# Patient Record
Sex: MALE | Race: WHITE | NOT HISPANIC OR LATINO | Employment: FULL TIME
[De-identification: names, ages, dates, MRNs, and addresses within clinical notes are randomized per-mention and may not be internally consistent; named-entity substitution may affect disease eponyms.]

---

## 2020-02-23 ENCOUNTER — HEALTH MAINTENANCE LETTER (OUTPATIENT)
Age: 40
End: 2020-02-23

## 2021-01-18 ENCOUNTER — VIRTUAL VISIT (OUTPATIENT)
Dept: FAMILY MEDICINE | Facility: OTHER | Age: 41
End: 2021-01-18

## 2021-01-18 NOTE — PROGRESS NOTES
"Date: 2021 15:29:19  Clinician: Tony Poole  Clinician NPI: [id number]  Patient: Luca Araiza  Patient : 1980  Patient Address: [address] [place] [id number]  Patient Phone: [phone]  Visit Protocol: URI  Patient Summary:  Luca is a 40 year old ( : 1980 ) male who initiated a [facility] Visit for cold, sinus infection, or influenza. When asked the question \"Please sign me up to receive news, health information and promotions from [facility].\", Luca responded \"No\".    Luca states his symptoms started gradually 5-6 days ago.   His symptoms consist of rhinitis and nasal congestion. He is experiencing difficulty breathing due to nasal congestion but he is not short of breath.   Symptom details   Nasal secretions: The color of his mucus is white, yellow, and clear.   Luca denies having sore throat, teeth pain, ageusia, diarrhea, wheezing, facial pain or pressure, myalgias, malaise, fever, cough, nausea, anosmia, ear pain, headache, chills, and vomiting. He also denies having recent facial or sinus surgery in the past 60 days, taking antibiotic medication in the past month, and double sickening (worsening symptoms after initial improvement).    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 He does not provide direct patient care. In the past 14 days, Luca has worked or volunteered at a hospital or a clinic. 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   Luca has not had a close contact with a laboratory-confirmed COVID-19 patient within 14 days of symptom onset.    Luca has not been tested for COVID-19.   Luca has received the Pfizer-BioNTech COVID-19 vaccine. He got the first dose.    Date of the first dose as reported by the patient (free text): 21      Pertinent medical " history  He has not been told by his provider to avoid NSAIDs.   Luca does not have diabetes. He denies having immunosuppressive conditions (e.g., chemotherapy, HIV, organ transplant, long-term use of steroids or other immunosuppressive medications, splenectomy). He denies having congestive heart failure and severe COPD. He does not have asthma.   Luca does not need a return to work/school note.   Luca does not smoke or use smokeless tobacco.   Weight: 205 lbs    MEDICATIONS: No current medications, ALLERGIES: NKDA  Clinician Response:  Dear Luca,   Your symptoms show that you may have coronavirus (COVID-19). This illness can cause fever, cough and trouble breathing. Many people get a mild case and get better on their own. Some people can get very sick.  What should I do?  We would like to test you for this virus.   1. Please call [phone] to schedule your visit. Explain that you were referred by [facility] to have a COVID-19 test. Be ready to share your [facility] visit ID number.  * If you need to schedule in [facility] please call [phone] or for [place] employees please call [phone].  * If you need to schedule in the [place] area please call [phone]. [place] employees call [phone].  The following will serve as your written order for this COVID Test, ordered by me, for the indication of suspected COVID [Z20.828]: The test will be ordered in [facility], our electronic health record, after you are scheduled. It will show as ordered and authorized by David Ireland MD.  Order: COVID-19 (Coronavirus) PCR for SYMPTOMATIC testing from [facility].   2. When it's time for your COVID test:  Stay at least 6 feet away from others. (If someone will drive you to your test, stay in the backseat, as far away from the  as you can.)   Cover your mouth and nose with a mask, tissue or washcloth.  Go straight to the testing site. Don't make any stops on the way there or back.      3.Starting now: Stay home and away  "from others (self-isolate) until:   You've had no fever---and no medicine that reduces fever---for one full day (24 hours). And...   Your other symptoms have gotten better. For example, your cough or breathing has improved. And...   At least 10 days have passed since your symptoms started.       During this time, don't leave the house except for testing or medical care.   Stay in your own room, even for meals. Use your own bathroom if you can.   Stay away from others in your home. No hugging, kissing or shaking hands. No visitors.  Don't go to work, school or anywhere else.    Clean \"high touch\" surfaces often (doorknobs, counters, handles, etc.). Use a household cleaning spray or wipes. You'll find a full list of  on the EPA website: www.epa.gov/pesticide-registration/list-n-disinfectants-use-against-sars-cov-2.   Cover your mouth and nose with a mask, tissue or washcloth to avoid spreading germs.  Wash your hands and face often. Use soap and water.  Caregivers in these groups are at risk for severe illness due to COVID-19:  o People 65 years and older  o People who live in a nursing home or long-term care facility  o People with chronic disease (lung, heart, cancer, diabetes, kidney, liver, immunologic)  o People who have a weakened immune system, including those who:   Are in cancer treatment  Take medicine that weakens the immune system, such as corticosteroids  Had a bone marrow or organ transplant  Have an immune deficiency  Have poorly controlled HIV or AIDS  Are obese (body mass index of 40 or higher)  Smoke regularly   o Caregivers should wear gloves while washing dishes, handling laundry and cleaning bedrooms and bathrooms.  o Use caution when washing and drying laundry: Don't shake dirty laundry, and use the warmest water setting that you can.  o For more tips, go to www.cdc.gov/coronavirus/2019-ncov/downloads/10Things.pdf.    4.Sign up for [employer]. We know it's scary to hear that you might " have COVID-19. We want to track your symptoms to make sure you're okay over the next 2 weeks. Please look for an email from [employer]---this is a free, online program that we'll use to keep in touch. To sign up, follow the link in the email. Learn more at [email or web address]  How can I take care of myself?   Get lots of rest. Drink extra fluids (unless a doctor has told you not to).   Take Tylenol (acetaminophen) for fever or pain. If you have liver or kidney problems, ask your family doctor if it's okay to take Tylenol.   Adults can take either:    650 mg (two 325 mg pills) every 4 to 6 hours, or...   1,000 mg (two 500 mg pills) every 8 hours as needed.    Note: Don't take more than 3,000 mg in one day. Acetaminophen is found in many medicines (both prescribed and over-the-counter medicines). Read all labels to be sure you don't take too much.   For children, check the Tylenol bottle for the right dose. The dose is based on the child's age or weight.    If you have other health problems (like cancer, heart failure, an organ transplant or severe kidney disease): Call your specialty clinic if you don't feel better in the next 2 days.       Know when to call 911. Emergency warning signs include:    Trouble breathing or shortness of breath Pain or pressure in the chest that doesn't go away Feeling confused like you haven't felt before, or not being able to wake up Bluish-colored lips or face.  Where can I get more information?    [employer] [place] -- About COVID-19: [email or web address] [email or web address]/covid19/   CDC -- What to Do If You're Sick: www.cdc.gov/coronavirus/2019-ncov/about/steps-when-sick.html   CDC -- Ending Home Isolation: www.cdc.gov/coronavirus/2019-ncov/hcp/disposition-in-home-patients.html   CDC -- Caring for Someone: www.cdc.gov/coronavirus/2019-ncov/if-you-are-sick/care-for-someone.html   [facility] -- Interim Guidance for Hospital Discharge to Home:  www.health.[facility].mn.us/diseases/coronavirus/hcp/hospdischarge.pdf   [facility] clinical trials (COVID-19 research studies): clinicalaffairs.[facility]/umn-clinical-trials    Below are the COVID-19 hotlines at the [facility] ([facility]). Interpreters are available.    For health questions: Call 798-348-3055 or 1-360.906.3488 (7 a.m. to 7 p.m.) For questions about schools and childcare: Call 815-864-2939 or 1-550.617.2073 (7 a.m. to 7 p.m.)    Diagnosis: Contact with and (suspected) exposure to other viral communicable diseases  Diagnosis ICD: Z20.828

## 2021-04-11 ENCOUNTER — HEALTH MAINTENANCE LETTER (OUTPATIENT)
Age: 41
End: 2021-04-11

## 2021-09-25 ENCOUNTER — HEALTH MAINTENANCE LETTER (OUTPATIENT)
Age: 41
End: 2021-09-25

## 2022-05-07 ENCOUNTER — HEALTH MAINTENANCE LETTER (OUTPATIENT)
Age: 42
End: 2022-05-07

## 2022-08-22 ENCOUNTER — TELEPHONE (OUTPATIENT)
Dept: FAMILY MEDICINE | Facility: CLINIC | Age: 42
End: 2022-08-22

## 2022-08-22 DIAGNOSIS — K52.9 GASTROENTERITIS: Primary | ICD-10-CM

## 2022-08-22 DIAGNOSIS — D64.9 ANEMIA, UNSPECIFIED TYPE: ICD-10-CM

## 2022-08-22 RX ORDER — AZITHROMYCIN 250 MG/1
500 TABLET, FILM COATED ORAL DAILY
Qty: 6 TABLET | Refills: 0 | Status: SHIPPED | OUTPATIENT
Start: 2022-08-22 | End: 2022-08-25

## 2022-08-22 NOTE — TELEPHONE ENCOUNTER
Patient states of non-improving abdominal cramping and loose since returning from [place] approximately 2 weeks.    1. Gastroenteritis  - azithromycin (ZITHROMAX) 250 MG tablet; Take 2 tablets (500 mg) by mouth daily for 3 days  Dispense: 6 tablet; Refill: 0

## 2022-10-05 ASSESSMENT — ENCOUNTER SYMPTOMS
ABDOMINAL PAIN: 0
JOINT SWELLING: 0
DIZZINESS: 0
HEMATURIA: 0
SORE THROAT: 0
DIARRHEA: 0
HEARTBURN: 0
NERVOUS/ANXIOUS: 0
CONSTIPATION: 0
PALPITATIONS: 0
ARTHRALGIAS: 0
FREQUENCY: 0
COUGH: 0
EYE PAIN: 0
HEMATOCHEZIA: 0
WEAKNESS: 0
MYALGIAS: 0
SHORTNESS OF BREATH: 0
DYSURIA: 0
FEVER: 0
HEADACHES: 0
NAUSEA: 0
CHILLS: 0
PARESTHESIAS: 0

## 2022-10-06 ENCOUNTER — OFFICE VISIT (OUTPATIENT)
Dept: FAMILY MEDICINE | Facility: CLINIC | Age: 42
End: 2022-10-06
Payer: COMMERCIAL

## 2022-10-06 VITALS
BODY MASS INDEX: 26.44 KG/M2 | WEIGHT: 206 LBS | SYSTOLIC BLOOD PRESSURE: 119 MMHG | HEART RATE: 74 BPM | DIASTOLIC BLOOD PRESSURE: 76 MMHG | HEIGHT: 74 IN

## 2022-10-06 DIAGNOSIS — Z80.0 FAMILY HISTORY OF COLON CANCER IN FATHER: ICD-10-CM

## 2022-10-06 DIAGNOSIS — Z00.00 ROUTINE GENERAL MEDICAL EXAMINATION AT A HEALTH CARE FACILITY: Primary | ICD-10-CM

## 2022-10-06 DIAGNOSIS — Z12.11 COLON CANCER SCREENING: ICD-10-CM

## 2022-10-06 DIAGNOSIS — Z13.220 SCREENING FOR LIPID DISORDERS: ICD-10-CM

## 2022-10-06 DIAGNOSIS — D64.9 ANEMIA, UNSPECIFIED TYPE: ICD-10-CM

## 2022-10-06 LAB
ALBUMIN SERPL BCG-MCNC: 4.5 G/DL (ref 3.5–5.2)
ALP SERPL-CCNC: 62 U/L (ref 40–129)
ALT SERPL W P-5'-P-CCNC: 9 U/L (ref 10–50)
ANION GAP SERPL CALCULATED.3IONS-SCNC: 11 MMOL/L (ref 7–15)
AST SERPL W P-5'-P-CCNC: 19 U/L (ref 10–50)
BILIRUB SERPL-MCNC: 0.3 MG/DL
BUN SERPL-MCNC: 17.2 MG/DL (ref 6–20)
CALCIUM SERPL-MCNC: 9.1 MG/DL (ref 8.6–10)
CHLORIDE SERPL-SCNC: 105 MMOL/L (ref 98–107)
CHOLEST SERPL-MCNC: 134 MG/DL
CREAT SERPL-MCNC: 1.19 MG/DL (ref 0.67–1.17)
DEPRECATED HCO3 PLAS-SCNC: 23 MMOL/L (ref 22–29)
ERYTHROCYTE [DISTWIDTH] IN BLOOD BY AUTOMATED COUNT: 14 % (ref 10–15)
FERRITIN SERPL-MCNC: 9 NG/ML (ref 31–409)
GFR SERPL CREATININE-BSD FRML MDRD: 79 ML/MIN/1.73M2
GLUCOSE SERPL-MCNC: 86 MG/DL (ref 70–99)
HCT VFR BLD AUTO: 29.2 % (ref 40–53)
HDLC SERPL-MCNC: 41 MG/DL
HGB BLD-MCNC: 9 G/DL (ref 13.3–17.7)
LDLC SERPL CALC-MCNC: 74 MG/DL
MCH RBC QN AUTO: 23.3 PG (ref 26.5–33)
MCHC RBC AUTO-ENTMCNC: 30.8 G/DL (ref 31.5–36.5)
MCV RBC AUTO: 76 FL (ref 78–100)
NONHDLC SERPL-MCNC: 93 MG/DL
PLATELET # BLD AUTO: 423 10E3/UL (ref 150–450)
POTASSIUM SERPL-SCNC: 4.4 MMOL/L (ref 3.4–5.3)
PROT SERPL-MCNC: 6.9 G/DL (ref 6.4–8.3)
RBC # BLD AUTO: 3.86 10E6/UL (ref 4.4–5.9)
SODIUM SERPL-SCNC: 139 MMOL/L (ref 136–145)
TRIGL SERPL-MCNC: 93 MG/DL
WBC # BLD AUTO: 8.4 10E3/UL (ref 4–11)

## 2022-10-06 PROCEDURE — 99386 PREV VISIT NEW AGE 40-64: CPT | Mod: 25 | Performed by: FAMILY MEDICINE

## 2022-10-06 PROCEDURE — 85027 COMPLETE CBC AUTOMATED: CPT | Performed by: FAMILY MEDICINE

## 2022-10-06 PROCEDURE — 80053 COMPREHEN METABOLIC PANEL: CPT | Performed by: FAMILY MEDICINE

## 2022-10-06 PROCEDURE — 82728 ASSAY OF FERRITIN: CPT | Performed by: FAMILY MEDICINE

## 2022-10-06 PROCEDURE — 91312 COVID-19,PF,PFIZER BOOSTER BIVALENT: CPT | Performed by: FAMILY MEDICINE

## 2022-10-06 PROCEDURE — 90715 TDAP VACCINE 7 YRS/> IM: CPT | Performed by: FAMILY MEDICINE

## 2022-10-06 PROCEDURE — 90471 IMMUNIZATION ADMIN: CPT | Performed by: FAMILY MEDICINE

## 2022-10-06 PROCEDURE — 80061 LIPID PANEL: CPT | Performed by: FAMILY MEDICINE

## 2022-10-06 PROCEDURE — 0124A COVID-19,PF,PFIZER BOOSTER BIVALENT: CPT | Performed by: FAMILY MEDICINE

## 2022-10-06 PROCEDURE — 36415 COLL VENOUS BLD VENIPUNCTURE: CPT | Performed by: FAMILY MEDICINE

## 2022-10-06 ASSESSMENT — ENCOUNTER SYMPTOMS
DIZZINESS: 0
COUGH: 0
DYSURIA: 0
DIARRHEA: 0
MYALGIAS: 0
EYE PAIN: 0
FEVER: 0
HEMATURIA: 0
CONSTIPATION: 0
HEADACHES: 0
SORE THROAT: 0
PARESTHESIAS: 0
CHILLS: 0
HEMATOCHEZIA: 0
SHORTNESS OF BREATH: 0
ARTHRALGIAS: 0
NAUSEA: 0
NERVOUS/ANXIOUS: 0
PALPITATIONS: 0
FREQUENCY: 0
ABDOMINAL PAIN: 0
WEAKNESS: 0
JOINT SWELLING: 0
HEARTBURN: 0

## 2022-10-06 NOTE — PROGRESS NOTES
SUBJECTIVE:   CC: Luca is an 42 year old who presents for preventative health visit.     Chief Complaint   Patient presents with     Physical     Not fasting     Establish Care       Patient has been advised of split billing requirements and indicates understanding: Yes  Healthy Habits:     Getting at least 3 servings of Calcium per day:  Yes    Bi-annual eye exam:  Yes    Dental care twice a year:  Yes    Sleep apnea or symptoms of sleep apnea:  None    Diet:  Regular (no restrictions)    Frequency of exercise:  None    Taking medications regularly:  Yes    Medication side effects:  Not applicable    PHQ-2 Total Score: 0    Additional concerns today:  No     with 3 boys, 11, 12, 15 years old. 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     Today's PHQ-2 Score:   PHQ-2 ( 1999 Pfizer) 10/5/2022   Q1: Little interest or pleasure in doing things 0   Q2: Feeling down, depressed or hopeless 0   PHQ-2 Score 0   Q1: Little interest or pleasure in doing things Not at all   Q2: Feeling down, depressed or hopeless Not at all   PHQ-2 Score 0       Abuse: Current or Past(Physical, Sexual or Emotional)- No  Do you feel safe in your environment? Yes    Have you ever done Advance Care Planning? (For example, a Health Directive, POLST, or a discussion with a medical provider or your loved ones about your wishes): No, advance care planning information given to patient to review.  Advanced care planning was discussed at today's visit.    Social History     Tobacco Use     Smoking status: Never Smoker     Smokeless tobacco: Never Used   Substance Use Topics     Alcohol use: Not Currently     If you drink alcohol do you typically have >3 drinks per day or >7 drinks per week? No    Alcohol Use 10/6/2022   Prescreen: >3 drinks/day or >7 drinks/week? -   Prescreen: >3 drinks/day or >7 drinks/week? No       Last PSA: No results found for: PSA    Reviewed orders with patient. Reviewed health maintenance and updated  "orders accordingly - Yes  Lab work is in process  Labs reviewed in EPIC    Reviewed and updated as needed this visit by clinical staff   Tobacco  Allergies  Meds  Problems  Med Hx  Surg Hx  Fam Hx            Reviewed and updated as needed this visit by Provider   Tobacco  Allergies  Meds  Problems  Med Hx  Surg Hx  Fam Hx             Review of Systems   Constitutional: Negative for chills and fever.   HENT: Negative for congestion, ear pain, hearing loss and sore throat.    Eyes: Negative for pain and visual disturbance.   Respiratory: Negative for cough and shortness of breath.    Cardiovascular: Negative for chest pain, palpitations and peripheral edema.   Gastrointestinal: Negative for abdominal pain, constipation, diarrhea, heartburn, hematochezia and nausea.   Genitourinary: Negative for dysuria, frequency, genital sores, hematuria, impotence, penile discharge and urgency.   Musculoskeletal: Negative for arthralgias, joint swelling and myalgias.   Skin: Negative for rash.   Neurological: Negative for dizziness, weakness, headaches and paresthesias.   Psychiatric/Behavioral: Negative for mood changes. The patient is not nervous/anxious.      OBJECTIVE:   /76   Pulse 74   Ht 1.88 m (6' 2\")   Wt 93.4 kg (206 lb)   BMI 26.45 kg/m      Physical Exam  GENERAL: healthy, alert and no distress  EYES: Eyes grossly normal to inspection, PERRL and conjunctivae and sclerae normal  HENT: ear canals and TM's normal, nose and mouth without ulcers or lesions  NECK: no adenopathy, no asymmetry, masses, or scars and thyroid normal to palpation  RESP: lungs clear to auscultation - no rales, rhonchi or wheezes  CV: regular rate and rhythm, normal S1 S2, no S3 or S4, no murmur  ABDOMEN: soft, nontender, no hepatosplenomegaly, no masses   MS: no gross musculoskeletal defects noted, no edema  SKIN: no suspicious lesions or rashes  NEURO: Normal strength and tone, mentation intact and speech normal  PSYCH: " "mentation appears normal, affect normal/bright    Diagnostic Test Results:  Labs reviewed in Epic    ASSESSMENT/PLAN:   Luca was seen today for physical and establish care.    Diagnoses and all orders for this visit:    Routine general medical examination at a health care facility  COUNSELING:   Reviewed preventive health counseling, as reflected in patient instructions       Regular exercise       Healthy diet/nutrition       Vision screening       Immunizations    Vaccinated for: TDAP and covid         Alcohol Use        Family planning       Consider Hep C screening for all patients one time for ages 18-79 years       HIV screeninx in teen years, 1x in adult years, and at intervals if high risk       Colorectal cancer screening       The ASCVD Risk score (Alva MENJIVAR Jr., et al., 2013) failed to calculate for the following reasons:    Cannot find a previous HDL lab    Cannot find a previous total cholesterol lab    Estimated body mass index is 26.45 kg/m  as calculated from the following:    Height as of this encounter: 1.88 m (6' 2\").    Weight as of this encounter: 93.4 kg (206 lb).     Weight management plan: Discussed healthy diet and exercise guidelines    He reports that he has never smoked. He has never used smokeless tobacco.  -     Comprehensive metabolic panel (BMP + Alb, Alk Phos, ALT, AST, Total. Bili, TP); Future  -     CBC with platelets; Future    Screening for lipid disorders  -     Lipid Profile (Chol, Trig, HDL, LDL calc); Future    Colon cancer screening  Family history of colon cancer in father  Recommend initiating colon cancer screening colonoscopy at age 40 given father's history of colon cancer.  -     Adult GI  Referral - Procedure Only; Future    Other orders  -     TDAP VACCINE (Adacel, Boostrix)  -     COVID-19,PF,PFIZER BOOSTER BIVALENT (12+YRS)        Patient has been advised of split billing requirements and indicates understanding: Yes      Diana Wade, DO   HEALTH " [facility]

## 2022-10-09 RX ORDER — FERROUS SULFATE 325(65) MG
325 TABLET ORAL EVERY MORNING
COMMUNITY
Start: 2022-10-09 | End: 2023-01-09

## 2022-10-12 PROCEDURE — 87177 OVA AND PARASITES SMEARS: CPT | Performed by: FAMILY MEDICINE

## 2022-10-12 PROCEDURE — 87209 SMEAR COMPLEX STAIN: CPT | Performed by: FAMILY MEDICINE

## 2022-10-13 LAB — O+P STL MICRO: NEGATIVE

## 2022-10-21 ENCOUNTER — LAB (OUTPATIENT)
Dept: LAB | Facility: CLINIC | Age: 42
End: 2022-10-21
Payer: COMMERCIAL

## 2022-10-21 ENCOUNTER — MYC MEDICAL ADVICE (OUTPATIENT)
Dept: FAMILY MEDICINE | Facility: CLINIC | Age: 42
End: 2022-10-21

## 2022-10-21 DIAGNOSIS — D64.9 ANEMIA, UNSPECIFIED TYPE: Primary | ICD-10-CM

## 2022-10-21 DIAGNOSIS — D64.9 ANEMIA, UNSPECIFIED TYPE: ICD-10-CM

## 2022-10-21 LAB — HGB BLD-MCNC: 8.4 G/DL (ref 13.3–17.7)

## 2022-10-21 PROCEDURE — 36415 COLL VENOUS BLD VENIPUNCTURE: CPT

## 2022-10-21 PROCEDURE — 85018 HEMOGLOBIN: CPT

## 2022-11-03 ENCOUNTER — TRANSFERRED RECORDS (OUTPATIENT)
Dept: HEALTH INFORMATION MANAGEMENT | Facility: CLINIC | Age: 42
End: 2022-11-03

## 2022-11-04 ENCOUNTER — TELEPHONE (OUTPATIENT)
Dept: FAMILY MEDICINE | Facility: CLINIC | Age: 42
End: 2022-11-04

## 2022-11-04 ENCOUNTER — TRANSFERRED RECORDS (OUTPATIENT)
Dept: HEALTH INFORMATION MANAGEMENT | Facility: CLINIC | Age: 42
End: 2022-11-04

## 2022-11-04 NOTE — TELEPHONE ENCOUNTER
Incoming call from Dr Neto Conley with [facility], calling with a pt update/FYI, no callback necessary:  Pt has new colon cancer, Dr Conley has made referrals for treatment

## 2022-11-07 ENCOUNTER — TELEPHONE (OUTPATIENT)
Dept: SURGERY | Facility: CLINIC | Age: 42
End: 2022-11-07

## 2022-11-07 ENCOUNTER — MYC MEDICAL ADVICE (OUTPATIENT)
Dept: FAMILY MEDICINE | Facility: CLINIC | Age: 42
End: 2022-11-07

## 2022-11-07 ENCOUNTER — PATIENT OUTREACH (OUTPATIENT)
Dept: SURGERY | Facility: CLINIC | Age: 42
End: 2022-11-07

## 2022-11-07 DIAGNOSIS — C18.9 COLON CANCER (H): Primary | ICD-10-CM

## 2022-11-07 DIAGNOSIS — D50.0 IRON DEFICIENCY ANEMIA DUE TO CHRONIC BLOOD LOSS: ICD-10-CM

## 2022-11-07 DIAGNOSIS — C18.2 MALIGNANT NEOPLASM OF ASCENDING COLON (H): ICD-10-CM

## 2022-11-07 NOTE — TELEPHONE ENCOUNTER
Already received referral and aware that patient needs to be scheduled. Waiting for records to review before scheduling.

## 2022-11-07 NOTE — TELEPHONE ENCOUNTER
Records Requested    Facility  [facility]  Fax: [phone]  [facility]  Fax: [phone]   Outcome * 11/7/22 8:47 AM Faxed urg req to [facility] for records to be faxed to the clinic. - Giovanna    * 11/7/22 3:21 PM Records received from [facility] and sent to HIM to be scanned into the chart. Faxed urg req to [facility] for images to be pushed. - Giovanna    * 11/9/22 7:56 AM Images received from [facility] and attached to the patient in PACs. - Giovanna    11/3/22 - Colonoscopy (Case: [id number])  11/3/22 - CEA (2.8 ng/mL)    [facility]:  11/3/22 - CT Chest/Abd/Pelvis

## 2022-11-07 NOTE — PROGRESS NOTES
Called patient to discuss recent colonoscopy results which revealed a mass in the cecum positive for cancerous cells. Underwent follow up CT chest/abd/pelvis. Waiting on result reports to be faxed. He was referred to colorectal surgery but will also place a referral if needed. Okay to stop omeprazole since alternative etiology identified for patient's iron deficiency anemia. He will continue the iron supplementation.     Diana Wade, DO

## 2022-11-07 NOTE — PROGRESS NOTES
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 Reviewed records. CEA 2.8. CT CAP showed concerns for possible metastasis in the liver. Discussed with Dr. Contreras. Plan for liver MRI.     Liver MRI 11/10/2022, check in time 6:30am (at [facility]

## 2022-11-07 NOTE — TELEPHONE ENCOUNTER
[facility] Call Center    Phone Message    May a detailed message be left on voicemail: yes     Reason for Call: Appointment Intake    Referring Provider Name:  Diana Wade DO  Diagnosis and/or Symptoms:   Colon Cancer - Urgent: 3-5 Days        Action Taken: Message routed to:  [facility] ([facility] CRS    Travel Screening: Not Applicable

## 2022-11-07 NOTE — TELEPHONE ENCOUNTER
Diagnosis, Referred by & from: Colon Cancer   Appt date: 11/17/2022   NOTES STATUS DETAILS   OFFICE NOTE from referring provider N/A    OFFICE NOTE from other specialist Internal [facility]:  10/6/22 - [facility] OV with Dr. Wade   DISCHARGE SUMMARY from hospital N/A    DISCHARGE REPORT from the ER N/A    OPERATIVE REPORT N/A    MEDICATION LIST Internal    LABS     BIOPSIES/PATHOLOGY RELATED TO DIAGNOSIS Received [facility]:  11/3/22 - Colon Biopsy (Case: [id number])   DIAGNOSTIC PROCEDURES     COLONOSCOPY Received [facility]:  11/3/22 - Colonoscopy   IMAGING (DISC & REPORT)      CT Received [facility]:  11/3/22 - CT Chest/ Abd/Pelvis   MRI Internal [facility]:  ([facility]) 11/10/22 - MRI Abdomen     Records Requested  11/07/22    Facility  [facility]  Fax: [phone]   Outcome * 11/7/22 3:21 PM Faxed urg req to [facility] for image to be pushed to [facility] PACs. - Giovanna    * 11/8/22 1:44 PM Images received from [facility] and attached to the patient in PACs. - Giovanna      Universal Safety Interventions

## 2022-11-08 ENCOUNTER — MYC MEDICAL ADVICE (OUTPATIENT)
Dept: SURGERY | Facility: CLINIC | Age: 42
End: 2022-11-08

## 2022-11-10 ENCOUNTER — ANCILLARY PROCEDURE (OUTPATIENT)
Dept: MRI IMAGING | Facility: CLINIC | Age: 42
End: 2022-11-10
Attending: SURGERY
Payer: COMMERCIAL

## 2022-11-10 ENCOUNTER — OFFICE VISIT (OUTPATIENT)
Dept: SURGERY | Facility: CLINIC | Age: 42
End: 2022-11-10
Attending: FAMILY MEDICINE
Payer: COMMERCIAL

## 2022-11-10 VITALS
WEIGHT: 211.6 LBS | HEART RATE: 77 BPM | HEIGHT: 74 IN | DIASTOLIC BLOOD PRESSURE: 75 MMHG | BODY MASS INDEX: 27.16 KG/M2 | OXYGEN SATURATION: 100 % | SYSTOLIC BLOOD PRESSURE: 119 MMHG

## 2022-11-10 DIAGNOSIS — D50.0 IRON DEFICIENCY ANEMIA DUE TO CHRONIC BLOOD LOSS: ICD-10-CM

## 2022-11-10 DIAGNOSIS — C18.9 COLON CANCER (H): ICD-10-CM

## 2022-11-10 DIAGNOSIS — C18.2 MALIGNANT NEOPLASM OF ASCENDING COLON (H): Primary | ICD-10-CM

## 2022-11-10 DIAGNOSIS — Z80.0 FAMILY HISTORY OF COLON CANCER IN FATHER: ICD-10-CM

## 2022-11-10 PROCEDURE — A9581 GADOXETATE DISODIUM INJ: HCPCS | Performed by: RADIOLOGY

## 2022-11-10 PROCEDURE — 99204 OFFICE O/P NEW MOD 45 MIN: CPT | Performed by: SURGERY

## 2022-11-10 PROCEDURE — 74183 MRI ABD W/O CNTR FLWD CNTR: CPT | Mod: GC | Performed by: RADIOLOGY

## 2022-11-10 RX ORDER — ALBUTEROL SULFATE 90 UG/1
1-2 AEROSOL, METERED RESPIRATORY (INHALATION)
Status: CANCELLED
Start: 2022-12-07

## 2022-11-10 RX ORDER — HEPARIN SODIUM,PORCINE 10 UNIT/ML
5 VIAL (ML) INTRAVENOUS
Status: CANCELLED | OUTPATIENT
Start: 2022-12-07

## 2022-11-10 RX ORDER — ONDANSETRON 4 MG/1
4 TABLET, FILM COATED ORAL EVERY 6 HOURS
Qty: 3 TABLET | Refills: 0 | Status: ON HOLD | OUTPATIENT
Start: 2022-11-10 | End: 2022-12-22

## 2022-11-10 RX ORDER — METHYLPREDNISOLONE SODIUM SUCCINATE 125 MG/2ML
125 INJECTION, POWDER, LYOPHILIZED, FOR SOLUTION INTRAMUSCULAR; INTRAVENOUS
Status: CANCELLED
Start: 2022-12-07

## 2022-11-10 RX ORDER — ALBUTEROL SULFATE 0.83 MG/ML
2.5 SOLUTION RESPIRATORY (INHALATION)
Status: CANCELLED | OUTPATIENT
Start: 2022-12-07

## 2022-11-10 RX ORDER — EPINEPHRINE 1 MG/ML
0.3 INJECTION, SOLUTION, CONCENTRATE INTRAVENOUS EVERY 5 MIN PRN
Status: CANCELLED | OUTPATIENT
Start: 2022-12-07

## 2022-11-10 RX ORDER — DIPHENHYDRAMINE HYDROCHLORIDE 50 MG/ML
50 INJECTION INTRAMUSCULAR; INTRAVENOUS
Status: CANCELLED
Start: 2022-12-07

## 2022-11-10 RX ORDER — POLYETHYLENE GLYCOL 3350 17 G/17G
238 POWDER, FOR SOLUTION ORAL SEE ADMIN INSTRUCTIONS
Qty: 14 PACKET | Refills: 0 | Status: ON HOLD | OUTPATIENT
Start: 2022-11-10 | End: 2022-12-22

## 2022-11-10 RX ORDER — HEPARIN SODIUM (PORCINE) LOCK FLUSH IV SOLN 100 UNIT/ML 100 UNIT/ML
5 SOLUTION INTRAVENOUS
Status: CANCELLED | OUTPATIENT
Start: 2022-12-07

## 2022-11-10 RX ORDER — NEOMYCIN SULFATE 500 MG/1
1000 TABLET ORAL EVERY 6 HOURS
Qty: 6 TABLET | Refills: 0 | Status: ON HOLD | OUTPATIENT
Start: 2022-11-10 | End: 2022-12-22

## 2022-11-10 RX ORDER — METRONIDAZOLE 500 MG/1
500 TABLET ORAL EVERY 6 HOURS
Qty: 3 TABLET | Refills: 0 | Status: ON HOLD | OUTPATIENT
Start: 2022-11-10 | End: 2022-12-22

## 2022-11-10 ASSESSMENT — PAIN SCALES - GENERAL: PAINLEVEL: NO PAIN (0)

## 2022-11-10 NOTE — NURSING NOTE
"Chief Complaint   Patient presents with     Cancer     New colon cancer       Vitals:    11/10/22 1459   BP: 119/75   BP Location: Left arm   Patient Position: Sitting   Cuff Size: Adult Regular   Pulse: 77   SpO2: 100%   Weight: 211 lb 9.6 oz   Height: 6' 2\"       Body mass index is 27.17 kg/m .    Yuridia Resendiz CMA    "

## 2022-11-10 NOTE — LETTER
11/10/2022       RE: Luca Araiza  [address]  [place] [address]     Dear Colleague,    Thank you for referring your patient, Luca Araiza, to the [facility] [place] [facility]. Please see a copy of my visit note below.    Colon and Rectal Surgery Clinic Note    RE: Luca Araiza.  : 1980.  LIANG: 11/10/2022.    Reason for visit: Cecal cancer    HPI: 43 yo M presenting with a new diagnosis of cecal cancer on diagnostic colonoscopy (iron deficiency anemia and father with colon cancer). Had had cramping since he got sick in [place] over the summer, continued with intermittent cramping (occurs x1-3 weekly), which prompted colonoscopy.    Baseline bowel habits: remains with normal BMs, no straining, no nausea, distention or vomiting  Last colonoscopy: 11/3/22      Pathology:           MR Abdomen 11/10/22: IMPRESSION:  No evidence for metastatic disease in the visualized abdomen. Multiple T2 hyperintense cysts throughout the liver.    CT CAP 11/3/22:       CEA 2.8      Medical history:  Past Medical History:   Diagnosis Date     Malignant neoplasm of ascending colon (H) 2022       Surgical history:  Past Surgical History:   Procedure Laterality Date     REMOVAL OF SPERM DUCT(S)      Description: Surgery Of Male Genitalia Vasectomy;  Recorded: 2011;  Comments: 2011     [facility] REPAIR CRUCIATE LIGAMENT,KNEE      Description: Primary Repair Of Knee Ligament Cruciate Anterior;  Recorded: 2009;       Family history:  Family History   Problem Relation Age of Onset     Breast Cancer Mother      Colon Cancer Father    Father with colon cancer at 60    Medications:  Current Outpatient Medications   Medication Sig Dispense Refill     ferrous sulfate (FEROSUL) 325 (65 Fe) MG tablet Take 1 tablet (325 mg) by mouth every other day         Allergies:  No Known Allergies    Social history:   Social History     Tobacco  "Use     Smoking status: Never     Smokeless tobacco: Never   Substance Use Topics     Alcohol use: Not Currently     Marital status: .    ROS:  A complete review of systems was performed with the patient and all systems negative except as per HPI.    Physical Examination:  /75 (BP Location: Left arm, Patient Position: Sitting, Cuff Size: Adult Regular)   Pulse 77   Ht 1.88 m (6' 2\")   Wt 96 kg (211 lb 9.6 oz)   SpO2 100%   BMI 27.17 kg/m    General: Well hydrated. No acute distress.  Abdomen: Soft, NT, nondistended. No inguinal adenopathy palpated.    Laboratory values reviewed:  Recent Labs   Lab Test 10/21/22  0724 10/06/22  1232   WBC  --  8.4   HGB 8.4* 9.0*   PLT  --  423   CR  --  1.19*   ALBUMIN  --  4.5   BILITOTAL  --  0.3   ALKPHOS  --  62   ALT  --  9*   AST  --  19       ASSESSMENT  1. 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 Anemia, hgb 8.4    PLAN  1. Med oncology referral  2. Genetics referral given father with colon cancer history, as well as himself at the age of 42.  3. Begin iron infusions for anemia  4. Repeat full set of labs in 3 weeks and prior to surgery    Laparoscopic Right hemicolectomy  1. Preoperative labs: CBC, CMP, PTT/INR, Prealbumin.  2. Mechanical bowel prep with oral antibiotics.  3. Ostomy marking with WOCN.  4. Hold these medications prior to surgery: none  5. Advised patient to begin protein shakes: Premier or Pure protein given high protein, low carb ratio for pre-operative rehab.    Risks, benefits, and alternatives of operative treatment were thoroughly discussed with the patient, he understands these well and agrees to proceed.    Time spent: 45 minutes, >50% spent in discussing, counseling and coordinating care.    Nahum Contreras M.D    Division of Colon and Rectal Surgery  [facility]    Referring Provider:  Diana Wade DO  [address] [place] [address]     Primary Care Provider:  Diana Wade  "

## 2022-11-10 NOTE — PATIENT INSTRUCTIONS
Follow up:  Schedule surgery with Fidelia at [phone]  Pre op physical, labs, covid test, ostomy marking appointment  Genetic counseling referral - someone will contact you to schedule this  Will need to begin iron infusions 1-2 weeks before surgery - please call [phone] to schedule ( 3 infusions total )  Begin Pure or Premier protein shakes to optimize for surgery  Mechanical bowel prep (same as colonoscopy) and antibiotics ordered for the day before surgery    Please schedule lab appt in 3 weeks       Please call with any questions or concerns regarding your clinic visit today.    It is a pleasure being involved in your health care.    Contacts post-consultation depending on your need:    Radiology Appointments [phone]    Schedule Clinic Appointments [phone] # 1   M-F 7:30 - 5 pm    JEANNINE Subramanian [phone]    Clinic Fax Number [phone]    Surgery Scheduling [phone]    My Chart is available 24 hours a day and is a secure way to access your records and communicate with your care team.  I strongly recommend signing up if you haven't already done so, if you are comfortable with computers.  If you would like to inquire about this or are having problems with My Chart access, you may call [phone] or go online at [email or web address].  Please allow at least 24 hours for a response and extra time on weekends and Holidays.

## 2022-11-14 NOTE — TELEPHONE ENCOUNTER
FUTURE VISIT INFORMATION      SURGERY INFORMATION:    Date: 12/21/22    Location: [facility] or    Surgeon:  Nahum Contreras MD    Anesthesia Type:  general    Procedure: HEMICOLECTOMY, RIGHT, LAPAROSCOPY-ASSISTED    RECORDS REQUESTED FROM:       Primary Care Provider:    Diana Wade DO- [facility]

## 2022-11-17 ENCOUNTER — PRE VISIT (OUTPATIENT)
Dept: SURGERY | Facility: CLINIC | Age: 42
End: 2022-11-17

## 2022-11-23 ENCOUNTER — TELEPHONE (OUTPATIENT)
Dept: SURGERY | Facility: CLINIC | Age: 42
End: 2022-11-23

## 2022-11-23 NOTE — TELEPHONE ENCOUNTER
"On 11/11/2022:  Spoke with patient via phone, completed the following scheduling, then later sent Surgery Packet to patient via MyChart and Mail including related scheduling information and prep instructions:    Your surgery is scheduled:    Date: Wednesday December 21, 2022  ________________________________    Time: 7:30 AM*  ________________________________    Please arrive at:  5:30 AM*  ______________________    Surgeon's Name:   - Dr. Nahum Contreras  _______________________        Pre-Op Physical Fax Numbers:         [facility] Fax:  [phone] / Phone:  [phone]        Your surgery is located at:      [facility]      [facility] [address]C)      [place] [address]      [phone]      [email or web address]     *Times are tentative and may change. You can expect a call from the pre-admission nurses to confirm arrival and surgery start time.s if the times should change.     Required: Yes, you will need a  18 years or older to drive you home from your procedure as well as stay with you for 24 hours after your procedure, if you are discharged the same day as your procedure.    Surgery: HEMICOLECTOMY, RIGHT, LAPAROSCOPY-ASSISTED    Pre-surgical Preparation:    MiraLAX/Gatorade, Antibiotics, Zofran  - see \"Day Before Surgery\"   - obtain medications and ingredients in advance    Please go to your local pharmacy or store and purchase:   - TWO 8.3oz (238g) bottles of Miralax (Powder)   - 96 oz of Gatorade (no red or purple)       Upcoming Appointments:     Dec 01, 2022  7:15 AM  (Arrive by 7:00 AM)  PAC EVALUATION with Lizette Gonzalez PA-C  [facility] [place] ([facility] ) [phone]    VIDEO VISIT     Dec 02, 2022  7:30 AM  Lab visit with [facility] ([facility] " [facility] ) [phone]    [address] [facility] [address]     Dec 06, 2022  9:00 AM  (Arrive by 8:45 AM)  Infusion Visit with SALN INFUSION CHAIR 8, SJRAGHAV INFUSION NURSE  [facility] ([facility]) [phone]    [address] [facility] [address]     Dec 07, 2022  3:40 PM  (Arrive by 3:20 PM)  Provider Visit with Diana Wade DO  [facility] ([facility] ) [phone]    VIDEO VISIT     Dec 08, 2022  9:00 AM  (Arrive by 8:45 AM)  Infusion Visit with NAN INFUSION CHAIR 8, SJRAGHAV INFUSION NURSE  [facility] ([facility]) [phone]    [address] [facility] [address]     Dec 12, 2022  9:30 AM  (Arrive by 9:15 AM)  Infusion Visit with NAN INFUSION CHAIR 9, NAN INFUSION NURSE  [facility] ([facility]) [phone]    [address] [facility] [address]     Dec 16, 2022  7:30 AM  Lab visit with [facility]  [facility] ([facility] ) [phone]    [address] [facility] [address]     Dec 19, 2022  1:00 PM  Pre-procedure Covid with [facility] COVID LAB  [facility] ([facility] )   [phone]    [address] [facility] [address]     Jan 13, 2023  8:30 AM  (Arrive by 8:15 AM)  Post Op with Lola Walton PA-C  [facility] [place] ([facility] ) [phone]    [address] [place] [address]     Feb 09, 2023  3:45 PM  (Arrive by 3:30 PM)  Post Op with Nahum Contreras MD  [facility] [place] ([facility] )  [phone]    [address] [facility] [address] [facility] [address]            WITHIN 30 DAYS OF SURGERY    Have a pre-op physical exam.  This must be completed with the Preoperative Assessment Center (PAC). All surgeries require a pre-op physical within 30 days.  If you do not have a pre-op physical completed in within this time, your surgery will be rescheduled. No exceptions.    Tell the doctor if:    -You are allergic to latex or rubber.  (Latex rubber gloves are often used in medical care.)    -You are taking any NSAID products (including baby aspirin, Ecotrin, Advil, Motrin, Ibuprofen, Clinoril, Feldane, or Naprosyn), or vitamins and/or herbal products.  You may need to stop taking some medicines before surgery.    -If you are taking a blood thinner medication such as Coumadin, Plavix, or Warfarin, you MUST contact the prescribing physician regarding clearance for this procedure, or instructions for stopping/changing this medication before surgery.    -You have any medical problems (allergies, diabetes, heart disease, etc.)    -You have a pacemaker or an AICD (automatic implanted cardiac defibrillator).  If you do, please bring the ID card with you on the day of surgery.    -You are a smoker.  People who smoke have a higher risk of infection after surgery.  Ask your doctor how you can quit smoking.     10 DAYS BEFORE SURGERY    -Arrange someone to pick you up after surgery.  You may not drive yourself home after surgery.  A  is required for all surgeries.  If you do not have a  arranged prior to surgery, your surgery will be cancelled.     FIVE DAYS BEFORE SURGERY    -Stop taking any of the following over-the-counter medications: Ibuprofen, Asprin,Iron supplements.    -If you are taking a blood thinner medication such as Coumadin, Plavix, or Warfarin, you MUST contact the prescribing physician regarding clearance for this procedure, or instructions for stopping/changing this medication before your  procedure.     -Please contact the nurse line at [phone] option #3 for any questions regarding other medications.     THREE DAYS BEFORE SURGERY    -Begin restricted low-residue diet.  Suggested foods include: white bread or rolls, plain crackers, white rice, skinless potatoes, low fiber cereals, chicken, turkey, fish or seafood, and eggs.  You may also have applesauce, pear sauce, soft honeydew, cantaloupe, ripe banana, canned fruit without seeds or skins.    -Do not eat: Corn (any form), raw vegetables, foods with seeds, whole wheat or grain breads and cereals, brown or wild rice, granola, raisins and dried fruit, berries, popcorn, all varieties of nuts, peanuts, and seeds.     THE DAY BEFORE SURGERY - Bowel Prep MiraLAX/Gatorade, Antibiotics/Zofran    -Call your surgeon if there is any change in your health.  This includes signs of a cold, flu, sore throat, runny nose, cough, rash, or fever.    -Do not smoke, drink alcohol, or take over-the-counter medicine (unless approved by your surgeon) for 24 hours before and after surgery.    -Begin the allowed clear liquid diet at breakfast time.  Drink at least 1 (one) gallon of water or allowed clear liquids throughout the day.      - Mix Miralax and 64 oz. of Gatorade in a pitcher, and place in the fridge.      ANTIBIOTICS  8:00 A.M.- Take one tab of Metronidazole (flagyle) and two tabs of Neomycin with one tab of ondansetron (zofran). Please take the ondansetron with the antibiotics as they can cause nausea.      2:00 P.M.- Take one tab of Metronidazole (flagyle) and two tabs of Neomycin with one tab of ondansetron (zofran). Please take the ondansetron with the antibiotics as they can cause nausea.     8:00 P.M.- Take one tab of Metronidazole (flagyle) and two tabs of Neomycin with one tab of ondansetron (zofran). Please take the ondansetron with the antibiotics as they can cause nausea.      BOWEL PREP MIRALAX/GATORADE    4:00 P.M.  Start drinking one 8-ounce  glass of the solution every 15-30 minutes. If you start to feel nauseous, you may space out your drinking intervals.     8:00 P.M. If your bowel movements are not clear or yellow please continue with 7 more packets (or 119g, HALF of the second bottle) of MiraLAX mixed with 32 oz of Gatorade). Start drinking one 8 ounce glass of the solution every 15-30 minutes. If you start to feel nauseous, you may space out your drinking intervals.     You may have CLEAR LIQUIDS until 2 hours prior to your procedure.    ALLOWED CLEAR LIQUIDS  - Water  - Regular or decaf coffee (no cream)  - Jell-O or popsicles (no red or purple)  - Plain hard candy (no red or purple)  - Clear juices or Gatorade (no red or purple)  - Chicken broth (no vegetables or noodles)  - Ensure Clear or Boost Clear  DO NOT DRINK  - Milk or mild products such as ice cream, malts, or shakes  - Boost or other protein drinks  - Red or purple juices of any kind  - Leon-aid, cranberry, cherry, or grape juice  - Juice with pulp (orange, grapefruit, pineapple, or tomato)  - Cream soups of any kind  - Alcoholic beverages    Fidelia Boyer  Patt-op Coordinator  [facility]  Direct Phone: [phone]

## 2022-11-23 NOTE — TELEPHONE ENCOUNTER
On 11/23/2022:  Spoke with patient via phone, there was some question as to whether he needed a WOC appointment. Typically this is not necessary with a Right Hemicolectomy, and I believe that Dr. Contreras had said that WOC is not needed. Checked with JEANNINE Subramanian, she confirmed that WOC is not needed, and stated that she just spoke with patient via phone and updated him of this. No further action needed from me in regards to this issue.    Fidelia Boyer  Patt-op Coordinator  [facility]  Direct Phone: [phone]

## 2022-12-01 ENCOUNTER — VIRTUAL VISIT (OUTPATIENT)
Dept: SURGERY | Facility: CLINIC | Age: 42
End: 2022-12-01
Payer: COMMERCIAL

## 2022-12-01 ENCOUNTER — PRE VISIT (OUTPATIENT)
Dept: SURGERY | Facility: CLINIC | Age: 42
End: 2022-12-01

## 2022-12-01 ENCOUNTER — ANESTHESIA EVENT (OUTPATIENT)
Dept: SURGERY | Facility: CLINIC | Age: 42
End: 2022-12-01

## 2022-12-01 DIAGNOSIS — D50.9 IRON DEFICIENCY ANEMIA, UNSPECIFIED IRON DEFICIENCY ANEMIA TYPE: ICD-10-CM

## 2022-12-01 DIAGNOSIS — Z01.818 PRE-OP EXAMINATION: Primary | ICD-10-CM

## 2022-12-01 DIAGNOSIS — C18.2 MALIGNANT NEOPLASM OF ASCENDING COLON (H): ICD-10-CM

## 2022-12-01 LAB
ABO/RH(D): NORMAL
ANTIBODY SCREEN: NEGATIVE
SPECIMEN EXPIRATION DATE: NORMAL

## 2022-12-01 PROCEDURE — 99203 OFFICE O/P NEW LOW 30 MIN: CPT | Mod: 95 | Performed by: PHYSICIAN ASSISTANT

## 2022-12-01 RX ORDER — ASCORBIC ACID 100 MG
TABLET,CHEWABLE ORAL EVERY MORNING
COMMUNITY
End: 2023-01-09

## 2022-12-01 ASSESSMENT — ENCOUNTER SYMPTOMS: SEIZURES: 0

## 2022-12-01 ASSESSMENT — LIFESTYLE VARIABLES: TOBACCO_USE: 0

## 2022-12-01 ASSESSMENT — PAIN SCALES - GENERAL: PAINLEVEL: NO PAIN (0)

## 2022-12-01 NOTE — H&P
Pre-Operative H & P     CC:  Preoperative exam to assess for increased cardiopulmonary risk while undergoing surgery and anesthesia.    Date of Encounter: 12/1/2022  Primary Care Physician:  Diana Wade     Reason for visit:   Encounter Diagnoses   Name Primary?     Pre-op examination Yes     Malignant neoplasm of ascending colon (H)      Iron deficiency anemia, unspecified iron deficiency anemia type        HPI  Luca Araiza is a 42 year old male who presents for pre-operative H & P in preparation for  Procedure Information     Date/Time: 12/21/22     Procedure: HEMICOLECTOMY, RIGHT, LAPAROSCOPY-ASSISTED    Anesthesia type: General     Pre-op diagnosis: Malignant neoplasm of ascending colon (H)    Location: [facility]    Providers: Dr. Contreras          The patient is a 42-year-old man with a past medical history significant for anemia and newly diagnosed colon cancer.  The patient was traveling in [place] and got traveler's diarrhea.  Upon returning he was seen by a provider and started on antibiotics.  The patient continued to have abdominal cramping and given his work-up finding iron deficiency anemia and family history of colon cancer he underwent a colonoscopy.  He was found to have a cecal mass which pathology showed to be adenocarcinoma.  He was seen by Dr. Contreras and counseled for the procedure as above    History is obtained from the patient and chart review    Hx of abnormal bleeding or anti-platelet use: none      Past Medical History  Past Medical History:   Diagnosis Date     Anemia      Malignant neoplasm of ascending colon (H) 11/07/2022       Past Surgical History  Past Surgical History:   Procedure Laterality Date     REMOVAL OF SPERM DUCT(S)      Description: Surgery Of Male Genitalia Vasectomy;  Recorded: 12/27/2011;  Comments: 12/27/2011     [facility] REPAIR CRUCIATE LIGAMENT,KNEE      Description: Primary Repair Of Knee Ligament  "Cruciate Anterior;  Recorded: 07/20/2009;       Prior to Admission Medications  Current Outpatient Medications   Medication Sig Dispense Refill     Ascorbic Acid (VITAMIN C) 100 MG CHEW Take by mouth every morning       ferrous sulfate (FEROSUL) 325 (65 Fe) MG tablet Take 325 mg by mouth every morning       metroNIDAZOLE (FLAGYL) 500 MG tablet Take 1 tablet (500 mg) by mouth every 6 hours At 8:00 am, 2:00 pm, 8:00 pm the day prior to your surgery with neomycin and zofran. 3 tablet 0     neomycin (MYCIFRADIN) 500 MG tablet Take 2 tablets (1,000 mg) by mouth every 6 hours At 8:00 am, 2:00 pm, 8:00 pm the day prior to your surgery with flagyl and zofran. 6 tablet 0     ondansetron (ZOFRAN) 4 MG tablet Take 1 tablet (4 mg) by mouth every 6 hours At 8:00 am, 2:00 pm, 8:00 pm the day prior to your surgery with neomycin and flagyl. 3 tablet 0     polyethylene glycol (MIRALAX) 17 g packet Take 238 g by mouth See Admin Instructions Starting at 4 pm night prior to surgery. Refer to \"Getting Ready for Surgery\" instructions. 14 packet 0       Allergies  No Known Allergies    Social History  Social History     Socioeconomic History     Marital status:      Spouse name: Not on file     Number of children: Not on file     Years of education: Not on file     Highest education level: Not on file   Occupational History     Not on file   Tobacco Use     Smoking status: Never     Smokeless tobacco: Never   Substance and Sexual Activity     Alcohol use: Not Currently     Drug use: Never     Sexual activity: Yes     Partners: Female     Birth control/protection: Male Surgical   Other Topics Concern     Parent/sibling w/ CABG, MI or angioplasty before 65F 55M? No   Social History Narrative     Not on file     Social Determinants of Health     Financial Resource Strain: Not on file   Food Insecurity: Not on file   Transportation Needs: Not on file   Physical Activity: Not on file   Stress: Not on file   Social Connections: Not on " file   Intimate Partner Violence: Not on file   Housing Stability: Not on file       Family History  Family History   Problem Relation Age of Onset     Breast Cancer Mother      Colon Cancer Father        Review of Systems  The complete review of systems is negative other than noted in the HPI or here.   Anesthesia Evaluation   Pt has had prior anesthetic. Type: General and MAC.    No history of anesthetic complications       ROS/MED HX  ENT/Pulmonary:  - neg pulmonary ROS  (-) tobacco use   Neurologic:    (-) no seizures, no CVA and no TIA   Cardiovascular:  - neg cardiovascular ROS     METS/Exercise Tolerance: >4 METS    Hematologic:     (+) anemia,  (-) history of blood clots and history of blood transfusion   Musculoskeletal:  - neg musculoskeletal ROS     GI/Hepatic:  - neg GI/hepatic ROS  (-) GERD   Renal/Genitourinary:  - neg Renal ROS     Endo:  - neg endo ROS     Psychiatric/Substance Use:  - neg psychiatric ROS     Infectious Disease:  - neg infectious disease ROS     Malignancy:   (+) Malignancy, History of GI.GI CA Active status post.        Other:  - neg other ROS          Virtual visit -  No vitals were obtained    Physical Exam  Constitutional: Awake, alert, cooperative, no apparent distress, and appears stated age.  Eyes: Pupils equal, glasses  HENT: Normocephalic  Respiratory: non labored breathing   Neurologic: Awake, alert, oriented to name, place and time.   Neuropsychiatric: Calm, cooperative. Normal affect.      Prior Labs/Diagnostic Studies   All labs and imaging personally reviewed     EKG/ stress test - if available please see in ROS above   No results found.  No flowsheet data found.      The patient's records and results personally reviewed by this provider.     Outside records reviewed from: Care Everywhere      Assessment      Luca Araiza is a 42 year old male seen as a PAC referral for risk assessment and optimization for anesthesia.    Plan/Recommendations  Pt will be optimized for  "the proposed procedure.  See below for details on the assessment, risk, and preoperative recommendations    NEUROLOGY  - No history of TIA, CVA or seizure  -Post Op delirium risk factors:  No risk identified    ENT  - No current airway concerns.  Will need to be reassessed day of surgery.  Mallampati: Unable to assess  TM: Unable to assess    CARDIAC  - No history of CAD, Hypertension and Afib  - METS (Metabolic Equivalents)  Patient performs 4 or more METS exercise without symptoms            Total Score: 0      RCRI-Low risk: Class 2 0.9% complication rate            Total Score: 1    RCRI: High Risk Surgery        PULMONARY  - Obstructive Sleep Apnea  No current risk of obstructive sleep apnea   DARIUS Low Risk            Total Score: 1    DARIUS: Male      - Denies asthma or inhaler use  - Tobacco History      History   Smoking Status     Never   Smokeless Tobacco     Never       GI  - ~ Colon cancer - procedure as above. ORAS  PONV Medium Risk  Total Score: 2           1 AN PONV: Patient is not a current smoker    1 AN PONV: Intended Post Op Opioids        /RENAL  - Baseline Creatinine  1.19    ENDOCRINE    - BMI: Estimated body mass index is 27.17 kg/m  as calculated from the following:    Height as of 11/10/22: 1.88 m (6' 2\").    Weight as of 11/10/22: 96 kg (211 lb 9.6 oz).  Overweight (BMI 25.0-29.9)  - No history of Diabetes Mellitus    HEME  VTE Medium Risk 1.8%            Total Score: 7    VTE: Male    VTE: Current cancer      - No history of abnormal bleeding or antiplatelet use.  - Chronic anemia - iron deficient. The patient is already set for iron infusions on 12/6, 12/8 and 12/12 before surgery.   Recommend perioperative use of blood conservation techniques intraoperatively and close monitoring for postoperative bleeding.  A type and screen has been ordered for this patient      Different anesthesia methods/types have been discussed with the patient, but they are aware that the final plan will be " decided by the assigned anesthesia provider on the date of service.      The patient is optimized for their procedure. AVS with information on surgery time/arrival time, meds and NPO status given by nursing staff. No further diagnostic testing indicated.    Please refer to the physical examination documented by the anesthesiologist in the anesthesia record on the day of surgery.    Video-Visit Details    Type of service:  Video Visit    Provider received verbal consent for a Video Visit from the patient? Yes    Video Start Time: 7:08  Video End Time (time video stopped): 7:17    Originating Location (pt. Location): Home    Distant Location (provider location): Off-site    Mode of Communication:  Video Conference via [employer]  On the day of service:     Prep time: 7 minutes  Visit time: 9 minutes  Documentation time: 5 minutes  ------------------------------------------  Total time: 21 minutes      Lizette Gonzalez PA-C  Preoperative Assessment Center  [facility]  Phone: [phone]  Fax: [phone]

## 2022-12-01 NOTE — PATIENT INSTRUCTIONS
Preparing for Your Surgery      Name:  Luca Araiza   MRN:  [id number]   :  1980   Today's Date:  2022       Arriving for surgery:  Surgery date:  22  Arrival time:  5:30AM     Surgeries and procedures: Adult patients can have 2 visitors all through the surgery process.     Visiting hours: 8 a.m. to 8:30 p.m.     Hospital: Adult patients and children under age 18 can have 4 visitor at a time     No visitors under the age of 5 are allowed for hospital patients.  Double occupancy rooms: Patients can have only two visitors at a time.     Patients with disabilities: Can have a support person with them (family member, service provider     Or someone well informed about their needs) plus the allowed number of visitors     Patients confirmed or suspected to have symptoms of COVID 19 or flu:     No visitors allowed for adult patients.   Children (under age 18) can have 1 named visitor.     People who are sick or showing symptoms of COVID 19 or flu:    Are not allowed to visit patients--we can only make exceptions in special situations.       Please follow these guidelines for your visit:   Arrive wearing a mask over your mouth and nose; we will give you a medical mask to wear    If you arrive wearing a cloth mask.   Keep it on during your entire visit, even when in patient's room.   If you don't wear a mask we'll ask you to leave.     Clean your hands with alcohol hand . Do this when you arrive at and leave the building and patient room,    And again after you touch your mask or anything in the room.     You can t visit if you have a fever, cough, shortness of breath, muscle aches, headaches, sore throat    Or diarrhea      Stay 6 feet away from others during your visit and between visits     Go directly to and from the room you are visiting.     Stay in the patient s room during your visit. Limit going to other places in the hospital as much as possible     Leave bags and jackets at home or in  the car.     For everyone s health, please don t come and go during your visit. That includes for smoking   during your visit.     Please come to:     [facility] [address]  [address] [place]  [address]    - ? parking is available in front of the hospital      -   Parking is available in the Patient Visitor Ramp on [address] and [facility].     -   When entering the hospital you will be asked COVID screening questions, you will then be directed to Registration.  Registration will direct you to the 3rd floor Surgery waiting room.     -   Please ask if you need an escort or a wheelchair to the Surgery Waiting Room.  Preop number- [phone]      What can I eat or drink?  - On 12/20/22 begin Clear Liquid diet and Bowel Prep per Colon Rectal Surgery Instructions.  -  You may have clear liquids until 2 hours prior to arrival time. (Until 12/21/22, 3:30AM)      OPTIMAL RECOVERY AFTER SURGERY        Begin hydrating yourself by drinking at least 8-10 glasses of clear liquids for 24 hours before surgery:      Suggested clear liquids:   Water    Clear Juices   Clear Broth   Non- carbonated beverages    (Crystal Light or Leon Aid)   Sodas    (Sprite, 7 up, ginger ale, seltzer)   Gatorade              Drink clear liquids up until 4 hours before your surgery.       We would like you to purchase a drink such as Gatorade or Ensure Clear (not the milkshake type).  Drink this before bedtime and the morning of surgery drink between 8-10 ounces or until you feel hydrated.        Keeping well hydrated leads to your veins being plump, you wake up faster, and you are less likely to be nauseated. Start drinking water as soon as you can after surgery and advance to clear liquids and food as tolerated.  IV fluids contain salt, drinking fluids will minimize the amount of IV fluids you need and decrease the amount of salt you get.                 The most common  reason for the patient to be readmitted is dehydration. Staying hydrated after you go home from the hospital is very important.  Ensure or Ensure Clear are good options to keep you hydrated.       -  No Alcohol for at least 24 hours before surgery.     Which medicines can I take?    Hold Aspirin for 7 days before surgery.   Hold Multivitamins for 7 days before surgery.  Hold Supplements for 7 days before surgery.  Hold Ibuprofen (Advil, Motrin) for 1 day before surgery--unless otherwise directed by surgeon.  Hold Naproxen (Aleve) for 4 days before surgery.    -  DO NOT take these medications the day of surgery:    Vitamin C    Ferrous sulfate      How do I prepare myself?  - Please take 2 showers before surgery using Scrubcare or Hibiclens soap.    Use this soap only from the neck to your toes.     Leave the soap on your skin for one minute--then rinse thoroughly.      You may use your own shampoo and conditioner. No other hair products.   - Please remove all jewelry and body piercings.  - No lotions, deodorants or fragrance.  - Bring your ID and insurance card.    -If you have a Deep Brain Stimulator, Spinal Cord Stimulator, or any Neuro Stimulator device---you must bring the remote control to the hospital.      Covid testing policy as of 12/06/2022  Your surgeon will notify and schedule you for a COVID test if one is needed before surgery--please direct any questions or COVID symptoms to your surgeon      Questions or Concerns:    - For any questions regarding the day of surgery or your hospital stay, please contact the Pre Admission Nursing Office at [phone].       - If you have health changes between today and your surgery, please call your surgeon.       - For questions after surgery, please call your surgeons office.

## 2022-12-01 NOTE — PROGRESS NOTES
Luca is a 42 year old who is being evaluated via a billable video visit.      How would you like to obtain your AVS? MyChart  If the video visit is dropped, the invitation should be resent by: Text to cell phone: [phone]    HPI       Review of Systems         Objective    Vitals - Patient Reported  Pain Score: No Pain (0)        Physical Exam

## 2022-12-02 ENCOUNTER — LAB (OUTPATIENT)
Dept: LAB | Facility: CLINIC | Age: 42
End: 2022-12-02
Payer: COMMERCIAL

## 2022-12-02 DIAGNOSIS — Z01.818 PRE-OP EXAMINATION: ICD-10-CM

## 2022-12-02 DIAGNOSIS — C18.2 MALIGNANT NEOPLASM OF ASCENDING COLON (H): ICD-10-CM

## 2022-12-02 DIAGNOSIS — D50.0 IRON DEFICIENCY ANEMIA DUE TO CHRONIC BLOOD LOSS: ICD-10-CM

## 2022-12-02 DIAGNOSIS — Z80.0 FAMILY HISTORY OF COLON CANCER IN FATHER: ICD-10-CM

## 2022-12-02 LAB
ALBUMIN SERPL BCG-MCNC: 4.5 G/DL (ref 3.5–5.2)
ALP SERPL-CCNC: 66 U/L (ref 40–129)
ALT SERPL W P-5'-P-CCNC: 15 U/L (ref 10–50)
ANION GAP SERPL CALCULATED.3IONS-SCNC: 7 MMOL/L (ref 7–15)
APTT PPP: 27 SECONDS (ref 22–38)
AST SERPL W P-5'-P-CCNC: 19 U/L (ref 10–50)
BASOPHILS # BLD AUTO: 0.1 10E3/UL (ref 0–0.2)
BASOPHILS NFR BLD AUTO: 1 %
BILIRUB SERPL-MCNC: 0.2 MG/DL
BUN SERPL-MCNC: 27.8 MG/DL (ref 6–20)
CALCIUM SERPL-MCNC: 9.2 MG/DL (ref 8.6–10)
CEA SERPL-MCNC: 2.6 NG/ML
CHLORIDE SERPL-SCNC: 108 MMOL/L (ref 98–107)
CREAT SERPL-MCNC: 1.15 MG/DL (ref 0.67–1.17)
DEPRECATED HCO3 PLAS-SCNC: 25 MMOL/L (ref 22–29)
EOSINOPHIL # BLD AUTO: 0.4 10E3/UL (ref 0–0.7)
EOSINOPHIL NFR BLD AUTO: 7 %
ERYTHROCYTE [DISTWIDTH] IN BLOOD BY AUTOMATED COUNT: 22 % (ref 10–15)
GFR SERPL CREATININE-BSD FRML MDRD: 82 ML/MIN/1.73M2
GLUCOSE SERPL-MCNC: 90 MG/DL (ref 70–99)
HCT VFR BLD AUTO: 34.7 % (ref 40–53)
HGB BLD-MCNC: 10.5 G/DL (ref 13.3–17.7)
IMM GRANULOCYTES # BLD: 0 10E3/UL
IMM GRANULOCYTES NFR BLD: 1 %
INR PPP: 1.08 (ref 0.85–1.15)
LYMPHOCYTES # BLD AUTO: 1.5 10E3/UL (ref 0.8–5.3)
LYMPHOCYTES NFR BLD AUTO: 23 %
MCH RBC QN AUTO: 24.8 PG (ref 26.5–33)
MCHC RBC AUTO-ENTMCNC: 30.3 G/DL (ref 31.5–36.5)
MCV RBC AUTO: 82 FL (ref 78–100)
MONOCYTES # BLD AUTO: 0.5 10E3/UL (ref 0–1.3)
MONOCYTES NFR BLD AUTO: 8 %
NEUTROPHILS # BLD AUTO: 3.9 10E3/UL (ref 1.6–8.3)
NEUTROPHILS NFR BLD AUTO: 61 %
PLATELET # BLD AUTO: 321 10E3/UL (ref 150–450)
POTASSIUM SERPL-SCNC: 4.9 MMOL/L (ref 3.4–5.3)
PROT SERPL-MCNC: 6.9 G/DL (ref 6.4–8.3)
RBC # BLD AUTO: 4.23 10E6/UL (ref 4.4–5.9)
SODIUM SERPL-SCNC: 140 MMOL/L (ref 136–145)
WBC # BLD AUTO: 6.4 10E3/UL (ref 4–11)

## 2022-12-02 PROCEDURE — 86900 BLOOD TYPING SEROLOGIC ABO: CPT

## 2022-12-02 PROCEDURE — 82378 CARCINOEMBRYONIC ANTIGEN: CPT

## 2022-12-02 PROCEDURE — 80053 COMPREHEN METABOLIC PANEL: CPT

## 2022-12-02 PROCEDURE — 86901 BLOOD TYPING SEROLOGIC RH(D): CPT

## 2022-12-02 PROCEDURE — 85025 COMPLETE CBC W/AUTO DIFF WBC: CPT

## 2022-12-02 PROCEDURE — 36415 COLL VENOUS BLD VENIPUNCTURE: CPT

## 2022-12-02 PROCEDURE — 84134 ASSAY OF PREALBUMIN: CPT

## 2022-12-02 PROCEDURE — 85730 THROMBOPLASTIN TIME PARTIAL: CPT

## 2022-12-02 PROCEDURE — 86850 RBC ANTIBODY SCREEN: CPT

## 2022-12-02 PROCEDURE — 85610 PROTHROMBIN TIME: CPT

## 2022-12-05 LAB — PREALB SERPL IA-MCNC: 28 MG/DL (ref 15–45)

## 2022-12-06 ENCOUNTER — INFUSION THERAPY VISIT (OUTPATIENT)
Dept: INFUSION THERAPY | Facility: HOSPITAL | Age: 42
End: 2022-12-06
Attending: SURGERY
Payer: COMMERCIAL

## 2022-12-06 VITALS
DIASTOLIC BLOOD PRESSURE: 79 MMHG | SYSTOLIC BLOOD PRESSURE: 134 MMHG | TEMPERATURE: 98.3 F | OXYGEN SATURATION: 100 % | RESPIRATION RATE: 16 BRPM | HEART RATE: 83 BPM

## 2022-12-06 DIAGNOSIS — C18.2 MALIGNANT NEOPLASM OF ASCENDING COLON (H): Primary | ICD-10-CM

## 2022-12-06 DIAGNOSIS — Z80.0 FAMILY HISTORY OF COLON CANCER IN FATHER: ICD-10-CM

## 2022-12-06 DIAGNOSIS — D50.0 IRON DEFICIENCY ANEMIA DUE TO CHRONIC BLOOD LOSS: ICD-10-CM

## 2022-12-06 PROCEDURE — 258N000003 HC RX IP 258 OP 636: Performed by: SURGERY

## 2022-12-06 PROCEDURE — 96366 THER/PROPH/DIAG IV INF ADDON: CPT

## 2022-12-06 PROCEDURE — 250N000011 HC RX IP 250 OP 636: Performed by: SURGERY

## 2022-12-06 PROCEDURE — 96365 THER/PROPH/DIAG IV INF INIT: CPT

## 2022-12-06 RX ORDER — EPINEPHRINE 1 MG/ML
0.3 INJECTION, SOLUTION INTRAMUSCULAR; SUBCUTANEOUS EVERY 5 MIN PRN
Status: CANCELLED | OUTPATIENT
Start: 2022-12-08

## 2022-12-06 RX ORDER — DIPHENHYDRAMINE HYDROCHLORIDE 50 MG/ML
50 INJECTION INTRAMUSCULAR; INTRAVENOUS
Status: CANCELLED
Start: 2022-12-08

## 2022-12-06 RX ORDER — METHYLPREDNISOLONE SODIUM SUCCINATE 125 MG/2ML
125 INJECTION, POWDER, LYOPHILIZED, FOR SOLUTION INTRAMUSCULAR; INTRAVENOUS
Status: CANCELLED
Start: 2022-12-08

## 2022-12-06 RX ORDER — HEPARIN SODIUM,PORCINE 10 UNIT/ML
5 VIAL (ML) INTRAVENOUS
Status: CANCELLED | OUTPATIENT
Start: 2022-12-08

## 2022-12-06 RX ORDER — HEPARIN SODIUM (PORCINE) LOCK FLUSH IV SOLN 100 UNIT/ML 100 UNIT/ML
5 SOLUTION INTRAVENOUS
Status: CANCELLED | OUTPATIENT
Start: 2022-12-08

## 2022-12-06 RX ORDER — ALBUTEROL SULFATE 90 UG/1
1-2 AEROSOL, METERED RESPIRATORY (INHALATION)
Status: DISCONTINUED | OUTPATIENT
Start: 2022-12-06 | End: 2022-12-06 | Stop reason: HOSPADM

## 2022-12-06 RX ORDER — METHYLPREDNISOLONE SODIUM SUCCINATE 125 MG/2ML
125 INJECTION, POWDER, LYOPHILIZED, FOR SOLUTION INTRAMUSCULAR; INTRAVENOUS
Status: DISCONTINUED | OUTPATIENT
Start: 2022-12-06 | End: 2022-12-06 | Stop reason: HOSPADM

## 2022-12-06 RX ORDER — DIPHENHYDRAMINE HYDROCHLORIDE 50 MG/ML
50 INJECTION INTRAMUSCULAR; INTRAVENOUS
Status: DISCONTINUED | OUTPATIENT
Start: 2022-12-06 | End: 2022-12-06 | Stop reason: HOSPADM

## 2022-12-06 RX ORDER — ALBUTEROL SULFATE 90 UG/1
1-2 AEROSOL, METERED RESPIRATORY (INHALATION)
Status: CANCELLED
Start: 2022-12-08

## 2022-12-06 RX ORDER — ALBUTEROL SULFATE 0.83 MG/ML
2.5 SOLUTION RESPIRATORY (INHALATION)
Status: CANCELLED | OUTPATIENT
Start: 2022-12-08

## 2022-12-06 RX ORDER — ALBUTEROL SULFATE 0.83 MG/ML
2.5 SOLUTION RESPIRATORY (INHALATION)
Status: DISCONTINUED | OUTPATIENT
Start: 2022-12-06 | End: 2022-12-06 | Stop reason: HOSPADM

## 2022-12-06 RX ORDER — EPINEPHRINE 1 MG/ML
0.3 INJECTION, SOLUTION INTRAMUSCULAR; SUBCUTANEOUS EVERY 5 MIN PRN
Status: DISCONTINUED | OUTPATIENT
Start: 2022-12-06 | End: 2022-12-06 | Stop reason: HOSPADM

## 2022-12-06 RX ADMIN — IRON SUCROSE 300 MG: 20 INJECTION, SOLUTION INTRAVENOUS at 09:12

## 2022-12-06 RX ADMIN — SODIUM CHLORIDE 250 ML: 9 INJECTION, SOLUTION INTRAVENOUS at 09:16

## 2022-12-06 NOTE — PATIENT INSTRUCTIONS
Call with any questions or concerns. [facility] 1st floor infusion [phone] option #2

## 2022-12-06 NOTE — PROGRESS NOTES
Infusion Nursing Note:  Luca Araiza presents today for his first dose of Venofer 300 mg.    Patient seen by provider today: No   present during visit today: Not Applicable.    Note: Luca comes in today for his 1st of 3, 300 mg of Venofer. Ferritin level 9 on 10/6, and is scheduled for surgery on 12/21. I went over the plan of care with Luca and he verbalized understanding. PIV placed in right forearm with great blood return throughout. 300 mg of Venofer hung per order and then flushed with normal saline for 30 minutes. Luca tolerated with no sign or symptom of a reaction. VSS. PIV removed and covered with gauze. AVS printed and reviewed. Luca left ambulatory to the [facility] and plans on returning on 12/08.    Intravenous Access:  Peripheral IV placed in right forearm.    Treatment Conditions:  Not Applicable.    Post Infusion Assessment:  Patient tolerated infusion without incident.  Patient observed for 30 minutes post.  Blood return noted pre and post infusion.  Site patent and intact, free from redness, edema or discomfort.  No evidence of extravasations.  Access discontinued per protocol.     Discharge Plan:   Copy of AVS reviewed with patient and/or family.  Patient will return 12/08/22 for next appointment.  Patient discharged in stable condition accompanied by: self.  Departure Mode: Ambulatory.      NANI DIOR RN

## 2022-12-07 ENCOUNTER — VIRTUAL VISIT (OUTPATIENT)
Dept: FAMILY MEDICINE | Facility: CLINIC | Age: 42
End: 2022-12-07
Payer: COMMERCIAL

## 2022-12-07 DIAGNOSIS — C18.2 MALIGNANT NEOPLASM OF ASCENDING COLON (H): ICD-10-CM

## 2022-12-07 DIAGNOSIS — Z02.89 ENCOUNTER FOR COMPLETION OF FORM WITH PATIENT: Primary | ICD-10-CM

## 2022-12-07 PROCEDURE — 99213 OFFICE O/P EST LOW 20 MIN: CPT | Mod: 95 | Performed by: FAMILY MEDICINE

## 2022-12-07 NOTE — PROGRESS NOTES
Luca is a 42 year old who is being evaluated via a billable video visit.      How would you like to obtain your AVS? MyChart  If the video visit is dropped, the invitation should be resent by: Text to cell phone: [phone]  Will anyone else be joining your video visit? No          Assessment & Plan     Encounter for completion of form with patient  Malignant neoplasm of ascending colon (H)  Patient with history of ascending colon cancer scheduled to undergo hemicolectomy 12/21/2022.  FMLA paperwork completed today with continuous leave of absence 12/21/2022 through 1/1/2023, along with intermittent leave as needed through 2/28/2023.  Documents to be faxed and scanned.    Diana Wade [facility]    Liliana Segovia is a 42 year old, presenting for the following health issues:  Forms (FMLA paperwork)      History of Present Illness       Reason for visit:  FMLA paperwork    He eats 2-3 servings of fruits and vegetables daily.He consumes 1 sweetened beverage(s) daily.He exercises with enough effort to increase his heart rate 9 or less minutes per day.  He exercises with enough effort to increase his heart rate 3 or less days per week.   He is taking medications regularly.     Undergoing hemicolectomy for ascending colon cancer 12/21/2022.  Plans were virtually with the last day of 12/20/2022.  We will expect to spend 3 to 5 days admitted inpatient and then further recovery will be at home.  We will undergo surgical recovery through 1/1/2023 and is seeking FMLA leave.  We will also have intermittent leave there after for follow-up oncology and colorectal appointments.        Objective           Vitals:  No vitals were obtained today due to virtual visit.    Physical Exam   GENERAL: Healthy, alert and no distress  EYES: Eyes grossly normal to inspection.  No discharge or erythema, or obvious scleral/conjunctival abnormalities.  RESP: No audible wheeze, cough, or visible cyanosis.   No visible retractions or increased work of breathing.    SKIN: Visible skin clear. No significant rash, abnormal pigmentation or lesions.  NEURO: Cranial nerves grossly intact.  Mentation and speech appropriate for age.  PSYCH: Mentation appears normal, affect normal/bright, judgement and insight intact, normal speech and appearance well-groomed.          Video-Visit Details    Video Start Time: 3:42 PM    Type of service:  Video Visit    Video End Time:3:54 PM    Originating Location (pt. Location): Other work        Distant Location (provider location):  On-site    Platform used for Video Visit: Sebastien

## 2022-12-08 ENCOUNTER — INFUSION THERAPY VISIT (OUTPATIENT)
Dept: INFUSION THERAPY | Facility: HOSPITAL | Age: 42
End: 2022-12-08
Attending: SURGERY
Payer: COMMERCIAL

## 2022-12-08 VITALS
OXYGEN SATURATION: 99 % | RESPIRATION RATE: 18 BRPM | HEART RATE: 84 BPM | SYSTOLIC BLOOD PRESSURE: 148 MMHG | TEMPERATURE: 98 F | DIASTOLIC BLOOD PRESSURE: 69 MMHG

## 2022-12-08 DIAGNOSIS — D50.0 IRON DEFICIENCY ANEMIA DUE TO CHRONIC BLOOD LOSS: ICD-10-CM

## 2022-12-08 DIAGNOSIS — C18.2 MALIGNANT NEOPLASM OF ASCENDING COLON (H): Primary | ICD-10-CM

## 2022-12-08 DIAGNOSIS — Z80.0 FAMILY HISTORY OF COLON CANCER IN FATHER: ICD-10-CM

## 2022-12-08 PROCEDURE — 250N000011 HC RX IP 250 OP 636: Performed by: SURGERY

## 2022-12-08 PROCEDURE — 258N000003 HC RX IP 258 OP 636: Performed by: SURGERY

## 2022-12-08 PROCEDURE — 96366 THER/PROPH/DIAG IV INF ADDON: CPT

## 2022-12-08 PROCEDURE — 96365 THER/PROPH/DIAG IV INF INIT: CPT

## 2022-12-08 RX ORDER — ALBUTEROL SULFATE 90 UG/1
1-2 AEROSOL, METERED RESPIRATORY (INHALATION)
Status: CANCELLED
Start: 2022-12-10

## 2022-12-08 RX ORDER — ALBUTEROL SULFATE 0.83 MG/ML
2.5 SOLUTION RESPIRATORY (INHALATION)
Status: DISCONTINUED | OUTPATIENT
Start: 2022-12-08 | End: 2022-12-08 | Stop reason: HOSPADM

## 2022-12-08 RX ORDER — ALBUTEROL SULFATE 90 UG/1
1-2 AEROSOL, METERED RESPIRATORY (INHALATION)
Status: DISCONTINUED | OUTPATIENT
Start: 2022-12-08 | End: 2022-12-08 | Stop reason: HOSPADM

## 2022-12-08 RX ORDER — HEPARIN SODIUM,PORCINE 10 UNIT/ML
5 VIAL (ML) INTRAVENOUS
Status: CANCELLED | OUTPATIENT
Start: 2022-12-10

## 2022-12-08 RX ORDER — EPINEPHRINE 1 MG/ML
0.3 INJECTION, SOLUTION INTRAMUSCULAR; SUBCUTANEOUS EVERY 5 MIN PRN
Status: CANCELLED | OUTPATIENT
Start: 2022-12-10

## 2022-12-08 RX ORDER — METHYLPREDNISOLONE SODIUM SUCCINATE 125 MG/2ML
125 INJECTION, POWDER, LYOPHILIZED, FOR SOLUTION INTRAMUSCULAR; INTRAVENOUS
Status: CANCELLED
Start: 2022-12-10

## 2022-12-08 RX ORDER — METHYLPREDNISOLONE SODIUM SUCCINATE 125 MG/2ML
125 INJECTION, POWDER, LYOPHILIZED, FOR SOLUTION INTRAMUSCULAR; INTRAVENOUS
Status: DISCONTINUED | OUTPATIENT
Start: 2022-12-08 | End: 2022-12-08 | Stop reason: HOSPADM

## 2022-12-08 RX ORDER — EPINEPHRINE 1 MG/ML
0.3 INJECTION, SOLUTION INTRAMUSCULAR; SUBCUTANEOUS EVERY 5 MIN PRN
Status: DISCONTINUED | OUTPATIENT
Start: 2022-12-08 | End: 2022-12-08 | Stop reason: HOSPADM

## 2022-12-08 RX ORDER — ALBUTEROL SULFATE 0.83 MG/ML
2.5 SOLUTION RESPIRATORY (INHALATION)
Status: CANCELLED | OUTPATIENT
Start: 2022-12-10

## 2022-12-08 RX ORDER — HEPARIN SODIUM (PORCINE) LOCK FLUSH IV SOLN 100 UNIT/ML 100 UNIT/ML
5 SOLUTION INTRAVENOUS
Status: CANCELLED | OUTPATIENT
Start: 2022-12-10

## 2022-12-08 RX ORDER — DIPHENHYDRAMINE HYDROCHLORIDE 50 MG/ML
50 INJECTION INTRAMUSCULAR; INTRAVENOUS
Status: DISCONTINUED | OUTPATIENT
Start: 2022-12-08 | End: 2022-12-08 | Stop reason: HOSPADM

## 2022-12-08 RX ORDER — DIPHENHYDRAMINE HYDROCHLORIDE 50 MG/ML
50 INJECTION INTRAMUSCULAR; INTRAVENOUS
Status: CANCELLED
Start: 2022-12-10

## 2022-12-08 RX ADMIN — IRON SUCROSE 300 MG: 20 INJECTION, SOLUTION INTRAVENOUS at 09:38

## 2022-12-08 RX ADMIN — SODIUM CHLORIDE 250 ML: 9 INJECTION, SOLUTION INTRAVENOUS at 09:10

## 2022-12-08 NOTE — PROGRESS NOTES
Infusion Nursing Note:  Luca Araiza presents today for hissecond dose of Venofer 300 mg.    Patient seen by provider today: No   present during visit today: Not Applicable.    Note: Luca comes in today for his 2nd of 3, 300 mg of Venofer. Ferritin level 9 on 10/6, and is scheduled for R logan-colectomy surgery on 12/21 for colon cancer.POC/medication education reviewed, pt  verbalized understanding and agreed to plan. Pt states he tolerated initial dose without issues after discharge and states he felt more energetic for a few days after also.    Intravenous Access:  Peripheral IV placed in right forearm.    Treatment Conditions:  Not Applicable.    Post Infusion Assessment:  Patient tolerated infusion without incident.  Patient observed for 30 minutes post.  Blood return noted pre and post infusion.  Site patent and intact, free from redness, edema or discomfort.  No evidence of extravasations.  Access discontinued per protocol.     Discharge Plan:   Copy of AVS reviewed with patient and/or family.  Patient will return 12/012/22 for next appointment, final dose.  Patient discharged in stable condition accompanied by: self.  Departure Mode: Ambulatory.      Sofia Rowe

## 2022-12-09 ENCOUNTER — TEAM CONFERENCE (OUTPATIENT)
Dept: SURGERY | Facility: CLINIC | Age: 42
End: 2022-12-09

## 2022-12-09 NOTE — CONFIDENTIAL NOTE
COLON AND RECTAL SURGERY HUDDLE:    Patient was reviewed in preporation for their surgery the following was reviewed and has been completed:    Surgeon: Dr. Nahum Contreras    Surgery & Date: 12/21 HEMICOLECTOMY, RIGHT, LAPAROSCOPY-ASSISTED     Last MD Note: reviewed    Anesthesia Type: General    Other Providers: No    PAC: Yes    WOC: Yes    Labs: Yes    Bowel Prep: Yes MiraLAX / Gatorade  and Antibiotic    Packet: Yes    Imaging: N/A    Post-Op Appointments: Yes    COVID: Yes    Is patient on TPN?: N/A   If yes, I contacted the TPN pharmacist by paging the  pharmacy at [phone] or calling [phone]. I also contacted [facility] with inpatient colon and rectal team.     Pre op call complete: Yes

## 2022-12-12 ENCOUNTER — INFUSION THERAPY VISIT (OUTPATIENT)
Dept: INFUSION THERAPY | Facility: HOSPITAL | Age: 42
End: 2022-12-12
Attending: SURGERY
Payer: COMMERCIAL

## 2022-12-12 VITALS
SYSTOLIC BLOOD PRESSURE: 126 MMHG | OXYGEN SATURATION: 100 % | HEART RATE: 68 BPM | RESPIRATION RATE: 18 BRPM | DIASTOLIC BLOOD PRESSURE: 78 MMHG | TEMPERATURE: 98.2 F

## 2022-12-12 DIAGNOSIS — D50.0 IRON DEFICIENCY ANEMIA DUE TO CHRONIC BLOOD LOSS: ICD-10-CM

## 2022-12-12 DIAGNOSIS — Z80.0 FAMILY HISTORY OF COLON CANCER IN FATHER: ICD-10-CM

## 2022-12-12 DIAGNOSIS — C18.2 MALIGNANT NEOPLASM OF ASCENDING COLON (H): Primary | ICD-10-CM

## 2022-12-12 PROCEDURE — 258N000003 HC RX IP 258 OP 636: Performed by: SURGERY

## 2022-12-12 PROCEDURE — 250N000011 HC RX IP 250 OP 636: Performed by: SURGERY

## 2022-12-12 PROCEDURE — 96365 THER/PROPH/DIAG IV INF INIT: CPT

## 2022-12-12 PROCEDURE — 96366 THER/PROPH/DIAG IV INF ADDON: CPT

## 2022-12-12 RX ORDER — ALBUTEROL SULFATE 90 UG/1
1-2 AEROSOL, METERED RESPIRATORY (INHALATION)
Status: DISCONTINUED | OUTPATIENT
Start: 2022-12-12 | End: 2022-12-12

## 2022-12-12 RX ORDER — ALBUTEROL SULFATE 0.83 MG/ML
2.5 SOLUTION RESPIRATORY (INHALATION)
Status: CANCELLED | OUTPATIENT
Start: 2022-12-12

## 2022-12-12 RX ORDER — METHYLPREDNISOLONE SODIUM SUCCINATE 125 MG/2ML
125 INJECTION, POWDER, LYOPHILIZED, FOR SOLUTION INTRAMUSCULAR; INTRAVENOUS
Status: CANCELLED
Start: 2022-12-12

## 2022-12-12 RX ORDER — EPINEPHRINE 1 MG/ML
0.3 INJECTION, SOLUTION INTRAMUSCULAR; SUBCUTANEOUS EVERY 5 MIN PRN
Status: CANCELLED | OUTPATIENT
Start: 2022-12-12

## 2022-12-12 RX ORDER — ALBUTEROL SULFATE 0.83 MG/ML
2.5 SOLUTION RESPIRATORY (INHALATION)
Status: DISCONTINUED | OUTPATIENT
Start: 2022-12-12 | End: 2022-12-12

## 2022-12-12 RX ORDER — HEPARIN SODIUM (PORCINE) LOCK FLUSH IV SOLN 100 UNIT/ML 100 UNIT/ML
5 SOLUTION INTRAVENOUS
Status: CANCELLED | OUTPATIENT
Start: 2022-12-12

## 2022-12-12 RX ORDER — ALBUTEROL SULFATE 90 UG/1
1-2 AEROSOL, METERED RESPIRATORY (INHALATION)
Status: CANCELLED
Start: 2022-12-12

## 2022-12-12 RX ORDER — DIPHENHYDRAMINE HYDROCHLORIDE 50 MG/ML
50 INJECTION INTRAMUSCULAR; INTRAVENOUS
Status: DISCONTINUED | OUTPATIENT
Start: 2022-12-12 | End: 2022-12-12

## 2022-12-12 RX ORDER — DIPHENHYDRAMINE HYDROCHLORIDE 50 MG/ML
50 INJECTION INTRAMUSCULAR; INTRAVENOUS
Status: CANCELLED
Start: 2022-12-12

## 2022-12-12 RX ORDER — EPINEPHRINE 1 MG/ML
0.3 INJECTION, SOLUTION INTRAMUSCULAR; SUBCUTANEOUS EVERY 5 MIN PRN
Status: DISCONTINUED | OUTPATIENT
Start: 2022-12-12 | End: 2022-12-12

## 2022-12-12 RX ORDER — HEPARIN SODIUM,PORCINE 10 UNIT/ML
5 VIAL (ML) INTRAVENOUS
Status: CANCELLED | OUTPATIENT
Start: 2022-12-12

## 2022-12-12 RX ORDER — METHYLPREDNISOLONE SODIUM SUCCINATE 125 MG/2ML
125 INJECTION, POWDER, LYOPHILIZED, FOR SOLUTION INTRAMUSCULAR; INTRAVENOUS
Status: DISCONTINUED | OUTPATIENT
Start: 2022-12-12 | End: 2022-12-12

## 2022-12-12 RX ADMIN — IRON SUCROSE 300 MG: 20 INJECTION, SOLUTION INTRAVENOUS at 09:53

## 2022-12-12 RX ADMIN — SODIUM CHLORIDE 250 ML: 9 INJECTION, SOLUTION INTRAVENOUS at 09:41

## 2022-12-12 NOTE — PROGRESS NOTES
Infusion Nursing Note:  Luca Araiza presents today for venofer infusion.    Patient seen by provider today: No   present during visit today: Not Applicable.    Note: Luca Araiza arrived ambulatory and in stable condition for his third out of 3 venofer infusion. VSS pre infusion. He reports no new complaints since previous infusion. He tolerated previous two doses with no issues. PIV was placed, good blood return noted and NS initiated TKO. Venofer was infused per orders. Infusion was uneventful. He left ambulatory and in stable condition around 1150. He has no future infusion appointments scheduled at this time. He is scheduled for a colectomy on 12/21.     Intravenous Access:  Peripheral IV placed.    Treatment Conditions:  Not Applicable.    Post Infusion Assessment:  Patient tolerated infusion without incident.  Site patent and intact, free from redness, edema or discomfort.  No evidence of extravasations.  Access discontinued per protocol.     Discharge Plan:   Patient and/or family verbalized understanding of discharge instructions and all questions answered.  Patient discharged in stable condition accompanied by: self.      Aria Adams RN

## 2022-12-16 ENCOUNTER — DOCUMENTATION ONLY (OUTPATIENT)
Dept: LAB | Facility: CLINIC | Age: 42
End: 2022-12-16

## 2022-12-16 ENCOUNTER — LAB (OUTPATIENT)
Dept: LAB | Facility: CLINIC | Age: 42
End: 2022-12-16
Payer: COMMERCIAL

## 2022-12-16 DIAGNOSIS — Z80.0 FAMILY HISTORY OF COLON CANCER IN FATHER: ICD-10-CM

## 2022-12-16 DIAGNOSIS — D50.0 IRON DEFICIENCY ANEMIA DUE TO CHRONIC BLOOD LOSS: ICD-10-CM

## 2022-12-16 DIAGNOSIS — C18.2 MALIGNANT NEOPLASM OF ASCENDING COLON (H): Primary | ICD-10-CM

## 2022-12-16 DIAGNOSIS — C18.2 MALIGNANT NEOPLASM OF ASCENDING COLON (H): ICD-10-CM

## 2022-12-16 DIAGNOSIS — Z20.822 ENCOUNTER FOR LABORATORY TESTING FOR COVID-19 VIRUS: ICD-10-CM

## 2022-12-16 LAB
ALBUMIN SERPL BCG-MCNC: 4.6 G/DL (ref 3.5–5.2)
ALP SERPL-CCNC: 69 U/L (ref 40–129)
ALT SERPL W P-5'-P-CCNC: 19 U/L (ref 10–50)
ANION GAP SERPL CALCULATED.3IONS-SCNC: 11 MMOL/L (ref 7–15)
APTT PPP: 26 SECONDS (ref 22–38)
AST SERPL W P-5'-P-CCNC: 20 U/L (ref 10–50)
BASOPHILS # BLD AUTO: 0.1 10E3/UL (ref 0–0.2)
BASOPHILS NFR BLD AUTO: 1 %
BILIRUB SERPL-MCNC: 0.2 MG/DL
BUN SERPL-MCNC: 22.6 MG/DL (ref 6–20)
CALCIUM SERPL-MCNC: 9.6 MG/DL (ref 8.6–10)
CEA SERPL-MCNC: 2.7 NG/ML
CHLORIDE SERPL-SCNC: 107 MMOL/L (ref 98–107)
CREAT SERPL-MCNC: 1.19 MG/DL (ref 0.67–1.17)
DEPRECATED HCO3 PLAS-SCNC: 23 MMOL/L (ref 22–29)
EOSINOPHIL # BLD AUTO: 0.3 10E3/UL (ref 0–0.7)
EOSINOPHIL NFR BLD AUTO: 5 %
ERYTHROCYTE [DISTWIDTH] IN BLOOD BY AUTOMATED COUNT: 20.8 % (ref 10–15)
GFR SERPL CREATININE-BSD FRML MDRD: 79 ML/MIN/1.73M2
GLUCOSE SERPL-MCNC: 97 MG/DL (ref 70–99)
HCT VFR BLD AUTO: 37.3 % (ref 40–53)
HGB BLD-MCNC: 11.5 G/DL (ref 13.3–17.7)
HOLD SPECIMEN: NORMAL
IMM GRANULOCYTES # BLD: 0.1 10E3/UL
IMM GRANULOCYTES NFR BLD: 1 %
INR PPP: 1.09 (ref 0.85–1.15)
LYMPHOCYTES # BLD AUTO: 1.3 10E3/UL (ref 0.8–5.3)
LYMPHOCYTES NFR BLD AUTO: 21 %
MCH RBC QN AUTO: 26.3 PG (ref 26.5–33)
MCHC RBC AUTO-ENTMCNC: 30.8 G/DL (ref 31.5–36.5)
MCV RBC AUTO: 85 FL (ref 78–100)
MONOCYTES # BLD AUTO: 0.5 10E3/UL (ref 0–1.3)
MONOCYTES NFR BLD AUTO: 7 %
NEUTROPHILS # BLD AUTO: 4.1 10E3/UL (ref 1.6–8.3)
NEUTROPHILS NFR BLD AUTO: 65 %
PLATELET # BLD AUTO: 350 10E3/UL (ref 150–450)
POTASSIUM SERPL-SCNC: 4.9 MMOL/L (ref 3.4–5.3)
PROT SERPL-MCNC: 6.9 G/DL (ref 6.4–8.3)
RBC # BLD AUTO: 4.38 10E6/UL (ref 4.4–5.9)
SODIUM SERPL-SCNC: 141 MMOL/L (ref 136–145)
WBC # BLD AUTO: 6.3 10E3/UL (ref 4–11)

## 2022-12-16 PROCEDURE — 36415 COLL VENOUS BLD VENIPUNCTURE: CPT

## 2022-12-16 PROCEDURE — 85730 THROMBOPLASTIN TIME PARTIAL: CPT

## 2022-12-16 PROCEDURE — 85025 COMPLETE CBC W/AUTO DIFF WBC: CPT

## 2022-12-16 PROCEDURE — 82378 CARCINOEMBRYONIC ANTIGEN: CPT

## 2022-12-16 PROCEDURE — 85610 PROTHROMBIN TIME: CPT

## 2022-12-16 PROCEDURE — 80053 COMPREHEN METABOLIC PANEL: CPT

## 2022-12-16 NOTE — PROGRESS NOTES
Luca Araiza has an upcoming lab appointment:    Future Appointments   Date Time Provider Department Center   12/19/2022  1:00 PM [facility] COVID LAB [facility]   1/13/2023  8:30 AM Lola Walton PA-C [facility]   2/8/2023  9:00 AM Danielle Varela [facility]   2/23/2023  3:45 PM Nahum Contreras MD [facility]     Patient is scheduled for the following lab(s):    Patient was in today for pre-op labs and stated that he just had an infusion and needs a CBC ordered. Please add this as an add-on order if appropriate. Thank you!     There is no order available. Please review and place either future orders or HMPO (Review of Health Maintenance Protocol Orders), as appropriate.    Tran Cates

## 2022-12-19 ENCOUNTER — LAB (OUTPATIENT)
Dept: LAB | Facility: CLINIC | Age: 42
End: 2022-12-19
Payer: COMMERCIAL

## 2022-12-19 DIAGNOSIS — Z20.822 ENCOUNTER FOR LABORATORY TESTING FOR COVID-19 VIRUS: ICD-10-CM

## 2022-12-19 PROCEDURE — U0003 INFECTIOUS AGENT DETECTION BY NUCLEIC ACID (DNA OR RNA); SEVERE ACUTE RESPIRATORY SYNDROME CORONAVIRUS 2 (SARS-COV-2) (CORONAVIRUS DISEASE [COVID-19]), AMPLIFIED PROBE TECHNIQUE, MAKING USE OF HIGH THROUGHPUT TECHNOLOGIES AS DESCRIBED BY CMS-2020-01-R: HCPCS

## 2022-12-19 PROCEDURE — U0005 INFEC AGEN DETEC AMPLI PROBE: HCPCS

## 2022-12-20 ENCOUNTER — ANESTHESIA EVENT (OUTPATIENT)
Dept: SURGERY | Facility: CLINIC | Age: 42
DRG: 330 | End: 2022-12-20
Payer: COMMERCIAL

## 2022-12-20 LAB — SARS-COV-2 RNA RESP QL NAA+PROBE: NEGATIVE

## 2022-12-20 ASSESSMENT — LIFESTYLE VARIABLES: TOBACCO_USE: 0

## 2022-12-20 NOTE — ANESTHESIA PREPROCEDURE EVALUATION
Anesthesia Pre-Procedure Evaluation    Patient: Luca Araiza   MRN: [id number] : 1980        Procedure : Procedure(s):  HEMICOLECTOMY, RIGHT, LAPAROSCOPY-ASSISTED          Past Medical History:   Diagnosis Date     Anemia      Malignant neoplasm of ascending colon (H) 2022      Past Surgical History:   Procedure Laterality Date     COLONOSCOPY       REMOVAL OF SPERM DUCT(S)      Description: Surgery Of Male Genitalia Vasectomy;  Recorded: 2011;  Comments: 2011     ZZC REPAIR CRUCIATE LIGAMENT,KNEE      Description: Primary Repair Of Knee Ligament Cruciate Anterior;  Recorded: 2009;      No Known Allergies   Social History     Tobacco Use     Smoking status: Never     Smokeless tobacco: Never   Substance Use Topics     Alcohol use: Not Currently      Wt Readings from Last 1 Encounters:   11/10/22 96 kg (211 lb 9.6 oz)        Anesthesia Evaluation   Pt has had prior anesthetic. Type: General and MAC.        ROS/MED HX  ENT/Pulmonary:  - neg pulmonary ROS  (-) tobacco use   Neurologic:       Cardiovascular:       METS/Exercise Tolerance: >4 METS    Hematologic: Comments: hgb- 11.9 (Iron deficency)    (+) anemia,     Musculoskeletal:  - neg musculoskeletal ROS     GI/Hepatic:       Renal/Genitourinary:       Endo:       Psychiatric/Substance Use:       Infectious Disease:       Malignancy:   (+) Malignancy, History of GI.GI CA Active status post.        Other:            Physical Exam    Airway        Mallampati: I   TM distance: > 3 FB   Neck ROM: full   Mouth opening: > 3 cm    Respiratory Devices and Support         Dental  no notable dental history         Cardiovascular   cardiovascular exam normal          Pulmonary   pulmonary exam normal                OUTSIDE LABS:  CBC:   Lab Results   Component Value Date    WBC 6.3 2022    WBC 6.4 2022    HGB 11.5 (L) 2022    HGB 10.5 (L) 2022    HCT 37.3 (L) 2022    HCT 34.7 (L) 2022     2022      12/02/2022     BMP:   Lab Results   Component Value Date     12/16/2022     12/02/2022    POTASSIUM 4.9 12/16/2022    POTASSIUM 4.9 12/02/2022    CHLORIDE 107 12/16/2022    CHLORIDE 108 (H) 12/02/2022    CO2 23 12/16/2022    CO2 25 12/02/2022    BUN 22.6 (H) 12/16/2022    BUN 27.8 (H) 12/02/2022    CR 1.19 (H) 12/16/2022    CR 1.15 12/02/2022    GLC 97 12/16/2022    GLC 90 12/02/2022     COAGS:   Lab Results   Component Value Date    PTT 26 12/16/2022    INR 1.09 12/16/2022     POC: No results found for: BGM, HCG, HCGS  HEPATIC:   Lab Results   Component Value Date    ALBUMIN 4.6 12/16/2022    PROTTOTAL 6.9 12/16/2022    ALT 19 12/16/2022    AST 20 12/16/2022    ALKPHOS 69 12/16/2022    BILITOTAL 0.2 12/16/2022     OTHER:   Lab Results   Component Value Date    RUPERTO 9.6 12/16/2022       Anesthesia Plan    ASA Status:  2   NPO Status:  NPO Appropriate    Anesthesia Type: General.     - Airway: ETT   Induction: Intravenous.   Maintenance: Balanced.   Techniques and Equipment:     - Airway: Video-Laryngoscope     - Lines/Monitors: 2nd IV     Consents    Anesthesia Plan(s) and associated risks, benefits, and realistic alternatives discussed. Questions answered and patient/representative(s) expressed understanding.     - Discussed: Risks, Benefits and Alternatives for BOTH SEDATION and the PROCEDURE were discussed     - Discussed with:  Patient      - Extended Intubation/Ventilatory Support Discussed: No.      - Patient is DNR/DNI Status: No    Use of blood products discussed: No .     Postoperative Care    Pain management: IV analgesics.   PONV prophylaxis: Ondansetron (or other 5HT-3), Dexamethasone or Solumedrol     Comments:           H&P reviewed: Unable to attach H&P to encounter due to EHR limitations. H&P Update: appropriate H&P reviewed, patient examined. No interval changes since H&P (within 30 days).         Alexx Barone MD

## 2022-12-21 ENCOUNTER — HOSPITAL ENCOUNTER (INPATIENT)
Facility: CLINIC | Age: 42
LOS: 2 days | Discharge: HOME | DRG: 330 | End: 2022-12-23
Attending: SURGERY | Admitting: SURGERY
Payer: COMMERCIAL

## 2022-12-21 ENCOUNTER — ANESTHESIA (OUTPATIENT)
Dept: SURGERY | Facility: CLINIC | Age: 42
DRG: 330 | End: 2022-12-21
Payer: COMMERCIAL

## 2022-12-21 DIAGNOSIS — C18.2 MALIGNANT NEOPLASM OF ASCENDING COLON (H): Primary | ICD-10-CM

## 2022-12-21 LAB
CREAT SERPL-MCNC: 1.28 MG/DL (ref 0.67–1.17)
GFR SERPL CREATININE-BSD FRML MDRD: 72 ML/MIN/1.73M2
GLUCOSE BLDC GLUCOMTR-MCNC: 85 MG/DL (ref 70–99)
GLUCOSE BLDC GLUCOMTR-MCNC: 90 MG/DL (ref 70–99)

## 2022-12-21 PROCEDURE — 36415 COLL VENOUS BLD VENIPUNCTURE: CPT | Performed by: SURGERY

## 2022-12-21 PROCEDURE — 360N000077 HC SURGERY LEVEL 4, PER MIN: Performed by: SURGERY

## 2022-12-21 PROCEDURE — 710N000010 HC RECOVERY PHASE 1, LEVEL 2, PER MIN: Performed by: SURGERY

## 2022-12-21 PROCEDURE — 250N000009 HC RX 250: Performed by: STUDENT IN AN ORGANIZED HEALTH CARE EDUCATION/TRAINING PROGRAM

## 2022-12-21 PROCEDURE — 272N000001 HC OR GENERAL SUPPLY STERILE: Performed by: SURGERY

## 2022-12-21 PROCEDURE — 82565 ASSAY OF CREATININE: CPT | Performed by: SURGERY

## 2022-12-21 PROCEDURE — C9290 INJ, BUPIVACAINE LIPOSOME: HCPCS | Performed by: STUDENT IN AN ORGANIZED HEALTH CARE EDUCATION/TRAINING PROGRAM

## 2022-12-21 PROCEDURE — 250N000011 HC RX IP 250 OP 636: Performed by: STUDENT IN AN ORGANIZED HEALTH CARE EDUCATION/TRAINING PROGRAM

## 2022-12-21 PROCEDURE — 250N000011 HC RX IP 250 OP 636: Performed by: SURGERY

## 2022-12-21 PROCEDURE — 370N000017 HC ANESTHESIA TECHNICAL FEE, PER MIN: Performed by: SURGERY

## 2022-12-21 PROCEDURE — 0DTF4ZZ RESECTION OF RIGHT LARGE INTESTINE, PERCUTANEOUS ENDOSCOPIC APPROACH: ICD-10-PCS | Performed by: SURGERY

## 2022-12-21 PROCEDURE — 999N000141 HC STATISTIC PRE-PROCEDURE NURSING ASSESSMENT: Performed by: SURGERY

## 2022-12-21 PROCEDURE — 250N000013 HC RX MED GY IP 250 OP 250 PS 637: Performed by: SURGERY

## 2022-12-21 PROCEDURE — 07BC4ZX EXCISION OF PELVIS LYMPHATIC, PERCUTANEOUS ENDOSCOPIC APPROACH, DIAGNOSTIC: ICD-10-PCS | Performed by: SURGERY

## 2022-12-21 PROCEDURE — 250N000025 HC SEVOFLURANE, PER MIN: Performed by: SURGERY

## 2022-12-21 PROCEDURE — 258N000003 HC RX IP 258 OP 636: Performed by: STUDENT IN AN ORGANIZED HEALTH CARE EDUCATION/TRAINING PROGRAM

## 2022-12-21 PROCEDURE — 120N000002 HC R&B MED SURG/OB UMMC

## 2022-12-21 PROCEDURE — 88309 TISSUE EXAM BY PATHOLOGIST: CPT | Mod: TC | Performed by: SURGERY

## 2022-12-21 PROCEDURE — 88309 TISSUE EXAM BY PATHOLOGIST: CPT | Mod: 26 | Performed by: PATHOLOGY

## 2022-12-21 PROCEDURE — 250N000011 HC RX IP 250 OP 636

## 2022-12-21 PROCEDURE — 0DTH4ZZ RESECTION OF CECUM, PERCUTANEOUS ENDOSCOPIC APPROACH: ICD-10-PCS | Performed by: SURGERY

## 2022-12-21 PROCEDURE — 258N000003 HC RX IP 258 OP 636: Performed by: SURGERY

## 2022-12-21 PROCEDURE — 44205 LAP COLECTOMY PART W/ILEUM: CPT | Mod: GC | Performed by: SURGERY

## 2022-12-21 RX ORDER — FENTANYL CITRATE 50 UG/ML
25-50 INJECTION, SOLUTION INTRAMUSCULAR; INTRAVENOUS
Status: DISCONTINUED | OUTPATIENT
Start: 2022-12-21 | End: 2022-12-21

## 2022-12-21 RX ORDER — ONDANSETRON 4 MG/1
4 TABLET, ORALLY DISINTEGRATING ORAL EVERY 30 MIN PRN
Status: DISCONTINUED | OUTPATIENT
Start: 2022-12-21 | End: 2022-12-21 | Stop reason: HOSPADM

## 2022-12-21 RX ORDER — LIDOCAINE 40 MG/G
CREAM TOPICAL
Status: DISCONTINUED | OUTPATIENT
Start: 2022-12-21 | End: 2022-12-23 | Stop reason: HOSPADM

## 2022-12-21 RX ORDER — ENOXAPARIN SODIUM 100 MG/ML
40 INJECTION SUBCUTANEOUS EVERY 24 HOURS
Status: DISCONTINUED | OUTPATIENT
Start: 2022-12-22 | End: 2022-12-23 | Stop reason: HOSPADM

## 2022-12-21 RX ORDER — ONDANSETRON 4 MG/1
4 TABLET, ORALLY DISINTEGRATING ORAL EVERY 6 HOURS PRN
Status: DISCONTINUED | OUTPATIENT
Start: 2022-12-21 | End: 2022-12-23 | Stop reason: HOSPADM

## 2022-12-21 RX ORDER — FLUMAZENIL 0.1 MG/ML
0.2 INJECTION, SOLUTION INTRAVENOUS
Status: DISCONTINUED | OUTPATIENT
Start: 2022-12-21 | End: 2022-12-21

## 2022-12-21 RX ORDER — ACETAMINOPHEN 500 MG
1000 TABLET ORAL EVERY 6 HOURS
Status: DISCONTINUED | OUTPATIENT
Start: 2022-12-21 | End: 2022-12-23 | Stop reason: HOSPADM

## 2022-12-21 RX ORDER — NALOXONE HYDROCHLORIDE 0.4 MG/ML
0.2 INJECTION, SOLUTION INTRAMUSCULAR; INTRAVENOUS; SUBCUTANEOUS
Status: DISCONTINUED | OUTPATIENT
Start: 2022-12-21 | End: 2022-12-23 | Stop reason: HOSPADM

## 2022-12-21 RX ORDER — METHOCARBAMOL 500 MG/1
500 TABLET, FILM COATED ORAL 4 TIMES DAILY
Status: DISCONTINUED | OUTPATIENT
Start: 2022-12-21 | End: 2022-12-23 | Stop reason: HOSPADM

## 2022-12-21 RX ORDER — METRONIDAZOLE 500 MG/100ML
INJECTION, SOLUTION INTRAVENOUS PRN
Status: DISCONTINUED | OUTPATIENT
Start: 2022-12-21 | End: 2022-12-21

## 2022-12-21 RX ORDER — OXYCODONE HYDROCHLORIDE 5 MG/1
5 TABLET ORAL EVERY 4 HOURS PRN
Status: DISCONTINUED | OUTPATIENT
Start: 2022-12-21 | End: 2022-12-23 | Stop reason: HOSPADM

## 2022-12-21 RX ORDER — ONDANSETRON 2 MG/ML
4 INJECTION INTRAMUSCULAR; INTRAVENOUS ONCE
Status: DISCONTINUED | OUTPATIENT
Start: 2022-12-21 | End: 2022-12-21

## 2022-12-21 RX ORDER — OXYCODONE HYDROCHLORIDE 10 MG/1
10 TABLET ORAL EVERY 4 HOURS PRN
Status: DISCONTINUED | OUTPATIENT
Start: 2022-12-21 | End: 2022-12-23 | Stop reason: HOSPADM

## 2022-12-21 RX ORDER — HYDROMORPHONE HCL IN WATER/PF 6 MG/30 ML
0.2 PATIENT CONTROLLED ANALGESIA SYRINGE INTRAVENOUS EVERY 5 MIN PRN
Status: DISCONTINUED | OUTPATIENT
Start: 2022-12-21 | End: 2022-12-21 | Stop reason: HOSPADM

## 2022-12-21 RX ORDER — SODIUM CHLORIDE, SODIUM LACTATE, POTASSIUM CHLORIDE, CALCIUM CHLORIDE 600; 310; 30; 20 MG/100ML; MG/100ML; MG/100ML; MG/100ML
INJECTION, SOLUTION INTRAVENOUS CONTINUOUS
Status: DISCONTINUED | OUTPATIENT
Start: 2022-12-21 | End: 2022-12-21 | Stop reason: HOSPADM

## 2022-12-21 RX ORDER — HYDROMORPHONE HCL IN WATER/PF 6 MG/30 ML
0.2 PATIENT CONTROLLED ANALGESIA SYRINGE INTRAVENOUS
Status: DISCONTINUED | OUTPATIENT
Start: 2022-12-21 | End: 2022-12-23 | Stop reason: HOSPADM

## 2022-12-21 RX ORDER — ONDANSETRON 2 MG/ML
4 INJECTION INTRAMUSCULAR; INTRAVENOUS EVERY 6 HOURS PRN
Status: DISCONTINUED | OUTPATIENT
Start: 2022-12-21 | End: 2022-12-23 | Stop reason: HOSPADM

## 2022-12-21 RX ORDER — GABAPENTIN 300 MG/1
600 CAPSULE ORAL
Status: COMPLETED | OUTPATIENT
Start: 2022-12-21 | End: 2022-12-21

## 2022-12-21 RX ORDER — NALOXONE HYDROCHLORIDE 0.4 MG/ML
0.4 INJECTION, SOLUTION INTRAMUSCULAR; INTRAVENOUS; SUBCUTANEOUS
Status: DISCONTINUED | OUTPATIENT
Start: 2022-12-21 | End: 2022-12-23 | Stop reason: HOSPADM

## 2022-12-21 RX ORDER — GABAPENTIN 100 MG/1
100 CAPSULE ORAL 3 TIMES DAILY
Status: DISCONTINUED | OUTPATIENT
Start: 2022-12-21 | End: 2022-12-23 | Stop reason: HOSPADM

## 2022-12-21 RX ORDER — FENTANYL CITRATE 50 UG/ML
25 INJECTION, SOLUTION INTRAMUSCULAR; INTRAVENOUS EVERY 5 MIN PRN
Status: DISCONTINUED | OUTPATIENT
Start: 2022-12-21 | End: 2022-12-21 | Stop reason: HOSPADM

## 2022-12-21 RX ORDER — METRONIDAZOLE 500 MG/100ML
500 INJECTION, SOLUTION INTRAVENOUS
Status: COMPLETED | OUTPATIENT
Start: 2022-12-21 | End: 2022-12-21

## 2022-12-21 RX ORDER — ONDANSETRON 2 MG/ML
INJECTION INTRAMUSCULAR; INTRAVENOUS PRN
Status: DISCONTINUED | OUTPATIENT
Start: 2022-12-21 | End: 2022-12-21

## 2022-12-21 RX ORDER — FENTANYL CITRATE 50 UG/ML
INJECTION, SOLUTION INTRAMUSCULAR; INTRAVENOUS PRN
Status: DISCONTINUED | OUTPATIENT
Start: 2022-12-21 | End: 2022-12-21

## 2022-12-21 RX ORDER — ONDANSETRON 2 MG/ML
4 INJECTION INTRAMUSCULAR; INTRAVENOUS EVERY 30 MIN PRN
Status: DISCONTINUED | OUTPATIENT
Start: 2022-12-21 | End: 2022-12-21 | Stop reason: HOSPADM

## 2022-12-21 RX ORDER — ACETAMINOPHEN 325 MG/1
975 TABLET ORAL ONCE
Status: COMPLETED | OUTPATIENT
Start: 2022-12-21 | End: 2022-12-21

## 2022-12-21 RX ORDER — HYDROMORPHONE HCL IN WATER/PF 6 MG/30 ML
0.4 PATIENT CONTROLLED ANALGESIA SYRINGE INTRAVENOUS
Status: DISCONTINUED | OUTPATIENT
Start: 2022-12-21 | End: 2022-12-23 | Stop reason: HOSPADM

## 2022-12-21 RX ORDER — PROPOFOL 10 MG/ML
INJECTION, EMULSION INTRAVENOUS PRN
Status: DISCONTINUED | OUTPATIENT
Start: 2022-12-21 | End: 2022-12-21

## 2022-12-21 RX ORDER — HYDROMORPHONE HCL IN WATER/PF 6 MG/30 ML
PATIENT CONTROLLED ANALGESIA SYRINGE INTRAVENOUS
Status: COMPLETED
Start: 2022-12-21 | End: 2022-12-21

## 2022-12-21 RX ORDER — FENTANYL CITRATE 50 UG/ML
50 INJECTION, SOLUTION INTRAMUSCULAR; INTRAVENOUS EVERY 5 MIN PRN
Status: DISCONTINUED | OUTPATIENT
Start: 2022-12-21 | End: 2022-12-21 | Stop reason: HOSPADM

## 2022-12-21 RX ORDER — LABETALOL 20 MG/4 ML (5 MG/ML) INTRAVENOUS SYRINGE
PRN
Status: DISCONTINUED | OUTPATIENT
Start: 2022-12-21 | End: 2022-12-21

## 2022-12-21 RX ORDER — SODIUM CHLORIDE, SODIUM LACTATE, POTASSIUM CHLORIDE, CALCIUM CHLORIDE 600; 310; 30; 20 MG/100ML; MG/100ML; MG/100ML; MG/100ML
INJECTION, SOLUTION INTRAVENOUS CONTINUOUS PRN
Status: DISCONTINUED | OUTPATIENT
Start: 2022-12-21 | End: 2022-12-21

## 2022-12-21 RX ORDER — CEFAZOLIN SODIUM/WATER 2 G/20 ML
2 SYRINGE (ML) INTRAVENOUS
Status: DISCONTINUED | OUTPATIENT
Start: 2022-12-21 | End: 2022-12-21

## 2022-12-21 RX ORDER — CEFAZOLIN SODIUM/WATER 2 G/20 ML
2 SYRINGE (ML) INTRAVENOUS SEE ADMIN INSTRUCTIONS
Status: DISCONTINUED | OUTPATIENT
Start: 2022-12-21 | End: 2022-12-21

## 2022-12-21 RX ORDER — CEFAZOLIN SODIUM/WATER 2 G/20 ML
SYRINGE (ML) INTRAVENOUS PRN
Status: DISCONTINUED | OUTPATIENT
Start: 2022-12-21 | End: 2022-12-21

## 2022-12-21 RX ORDER — LIDOCAINE HYDROCHLORIDE 20 MG/ML
INJECTION, SOLUTION INFILTRATION; PERINEURAL PRN
Status: DISCONTINUED | OUTPATIENT
Start: 2022-12-21 | End: 2022-12-21

## 2022-12-21 RX ORDER — HYDROMORPHONE HCL IN WATER/PF 6 MG/30 ML
0.4 PATIENT CONTROLLED ANALGESIA SYRINGE INTRAVENOUS EVERY 5 MIN PRN
Status: DISCONTINUED | OUTPATIENT
Start: 2022-12-21 | End: 2022-12-21 | Stop reason: HOSPADM

## 2022-12-21 RX ORDER — SODIUM CHLORIDE, SODIUM LACTATE, POTASSIUM CHLORIDE, CALCIUM CHLORIDE 600; 310; 30; 20 MG/100ML; MG/100ML; MG/100ML; MG/100ML
INJECTION, SOLUTION INTRAVENOUS CONTINUOUS
Status: DISCONTINUED | OUTPATIENT
Start: 2022-12-21 | End: 2022-12-22

## 2022-12-21 RX ORDER — DEXAMETHASONE SODIUM PHOSPHATE 4 MG/ML
INJECTION, SOLUTION INTRA-ARTICULAR; INTRALESIONAL; INTRAMUSCULAR; INTRAVENOUS; SOFT TISSUE PRN
Status: DISCONTINUED | OUTPATIENT
Start: 2022-12-21 | End: 2022-12-21

## 2022-12-21 RX ORDER — ENOXAPARIN SODIUM 100 MG/ML
40 INJECTION SUBCUTANEOUS
Status: COMPLETED | OUTPATIENT
Start: 2022-12-21 | End: 2022-12-21

## 2022-12-21 RX ORDER — BUPIVACAINE HYDROCHLORIDE 2.5 MG/ML
INJECTION, SOLUTION EPIDURAL; INFILTRATION; INTRACAUDAL
Status: COMPLETED | OUTPATIENT
Start: 2022-12-21 | End: 2022-12-21

## 2022-12-21 RX ADMIN — ACETAMINOPHEN 1000 MG: 325 TABLET, FILM COATED ORAL at 20:26

## 2022-12-21 RX ADMIN — GABAPENTIN 100 MG: 100 CAPSULE ORAL at 20:27

## 2022-12-21 RX ADMIN — DEXAMETHASONE SODIUM PHOSPHATE 8 MG: 4 INJECTION, SOLUTION INTRA-ARTICULAR; INTRALESIONAL; INTRAMUSCULAR; INTRAVENOUS; SOFT TISSUE at 08:15

## 2022-12-21 RX ADMIN — HYDROMORPHONE HYDROCHLORIDE 0.4 MG: 0.2 INJECTION, SOLUTION INTRAMUSCULAR; INTRAVENOUS; SUBCUTANEOUS at 12:27

## 2022-12-21 RX ADMIN — ENOXAPARIN SODIUM 40 MG: 40 INJECTION SUBCUTANEOUS at 07:00

## 2022-12-21 RX ADMIN — Medication 50 MG: at 08:02

## 2022-12-21 RX ADMIN — Medication 20 MG: at 09:08

## 2022-12-21 RX ADMIN — FENTANYL CITRATE 25 MCG: 50 INJECTION, SOLUTION INTRAMUSCULAR; INTRAVENOUS at 11:55

## 2022-12-21 RX ADMIN — METRONIDAZOLE 500 MG: 500 INJECTION, SOLUTION INTRAVENOUS at 06:53

## 2022-12-21 RX ADMIN — PHENYLEPHRINE HYDROCHLORIDE 200 MCG: 10 INJECTION INTRAVENOUS at 08:51

## 2022-12-21 RX ADMIN — ACETAMINOPHEN 975 MG: 325 TABLET, FILM COATED ORAL at 07:04

## 2022-12-21 RX ADMIN — FENTANYL CITRATE 50 MCG: 50 INJECTION, SOLUTION INTRAMUSCULAR; INTRAVENOUS at 11:45

## 2022-12-21 RX ADMIN — FENTANYL CITRATE 50 MCG: 50 INJECTION, SOLUTION INTRAMUSCULAR; INTRAVENOUS at 09:09

## 2022-12-21 RX ADMIN — HYDROMORPHONE HYDROCHLORIDE 0.4 MG: 0.2 INJECTION, SOLUTION INTRAMUSCULAR; INTRAVENOUS; SUBCUTANEOUS at 12:16

## 2022-12-21 RX ADMIN — OXYCODONE HYDROCHLORIDE 5 MG: 5 TABLET ORAL at 16:41

## 2022-12-21 RX ADMIN — OXYCODONE HYDROCHLORIDE 5 MG: 5 TABLET ORAL at 12:49

## 2022-12-21 RX ADMIN — METHOCARBAMOL 500 MG: 500 TABLET ORAL at 15:25

## 2022-12-21 RX ADMIN — FENTANYL CITRATE 50 MCG: 50 INJECTION, SOLUTION INTRAMUSCULAR; INTRAVENOUS at 09:04

## 2022-12-21 RX ADMIN — Medication 0.2 MG: at 13:58

## 2022-12-21 RX ADMIN — Medication 10 MG: at 10:13

## 2022-12-21 RX ADMIN — SODIUM CHLORIDE, POTASSIUM CHLORIDE, SODIUM LACTATE AND CALCIUM CHLORIDE: 600; 310; 30; 20 INJECTION, SOLUTION INTRAVENOUS at 07:45

## 2022-12-21 RX ADMIN — Medication 10 MG: at 10:22

## 2022-12-21 RX ADMIN — LABETALOL 20 MG/4 ML (5 MG/ML) INTRAVENOUS SYRINGE 10 MG: at 09:31

## 2022-12-21 RX ADMIN — BUPIVACAINE HYDROCHLORIDE 20 ML: 2.5 INJECTION, SOLUTION EPIDURAL; INFILTRATION; INTRACAUDAL; PERINEURAL at 07:18

## 2022-12-21 RX ADMIN — Medication 2 G: at 08:18

## 2022-12-21 RX ADMIN — LABETALOL 20 MG/4 ML (5 MG/ML) INTRAVENOUS SYRINGE 10 MG: at 09:23

## 2022-12-21 RX ADMIN — MIDAZOLAM 1 MG: 1 INJECTION INTRAMUSCULAR; INTRAVENOUS at 07:15

## 2022-12-21 RX ADMIN — PHENYLEPHRINE HYDROCHLORIDE 100 MCG: 10 INJECTION INTRAVENOUS at 08:24

## 2022-12-21 RX ADMIN — ONDANSETRON 4 MG: 2 INJECTION INTRAMUSCULAR; INTRAVENOUS at 10:14

## 2022-12-21 RX ADMIN — SUGAMMADEX 200 MG: 100 INJECTION, SOLUTION INTRAVENOUS at 10:59

## 2022-12-21 RX ADMIN — SODIUM CHLORIDE, POTASSIUM CHLORIDE, SODIUM LACTATE AND CALCIUM CHLORIDE: 600; 310; 30; 20 INJECTION, SOLUTION INTRAVENOUS at 14:00

## 2022-12-21 RX ADMIN — HYDROMORPHONE HYDROCHLORIDE 0.2 MG: 1 INJECTION, SOLUTION INTRAMUSCULAR; INTRAVENOUS; SUBCUTANEOUS at 10:57

## 2022-12-21 RX ADMIN — BUPIVACAINE 20 ML: 13.3 INJECTION, SUSPENSION, LIPOSOMAL INFILTRATION at 07:18

## 2022-12-21 RX ADMIN — LIDOCAINE HYDROCHLORIDE 100 MG: 20 INJECTION, SOLUTION INFILTRATION; PERINEURAL at 08:02

## 2022-12-21 RX ADMIN — GABAPENTIN 600 MG: 300 CAPSULE ORAL at 07:05

## 2022-12-21 RX ADMIN — MIDAZOLAM 1 MG: 1 INJECTION INTRAMUSCULAR; INTRAVENOUS at 07:21

## 2022-12-21 RX ADMIN — GABAPENTIN 100 MG: 100 CAPSULE ORAL at 15:25

## 2022-12-21 RX ADMIN — PROPOFOL 200 MG: 10 INJECTION, EMULSION INTRAVENOUS at 08:02

## 2022-12-21 RX ADMIN — PROPOFOL 50 MG: 10 INJECTION, EMULSION INTRAVENOUS at 10:35

## 2022-12-21 RX ADMIN — METRONIDAZOLE 500 MG: 500 INJECTION, SOLUTION INTRAVENOUS at 08:02

## 2022-12-21 RX ADMIN — Medication 10 MG: at 10:35

## 2022-12-21 RX ADMIN — ACETAMINOPHEN 1000 MG: 325 TABLET, FILM COATED ORAL at 15:25

## 2022-12-21 RX ADMIN — FENTANYL CITRATE 50 MCG: 50 INJECTION, SOLUTION INTRAMUSCULAR; INTRAVENOUS at 07:15

## 2022-12-21 RX ADMIN — FENTANYL CITRATE 100 MCG: 50 INJECTION, SOLUTION INTRAMUSCULAR; INTRAVENOUS at 08:02

## 2022-12-21 RX ADMIN — METHOCARBAMOL 500 MG: 500 TABLET ORAL at 20:27

## 2022-12-21 RX ADMIN — Medication 20 MG: at 08:45

## 2022-12-21 RX ADMIN — HYDROMORPHONE HYDROCHLORIDE 0.2 MG: 0.2 INJECTION, SOLUTION INTRAMUSCULAR; INTRAVENOUS; SUBCUTANEOUS at 13:58

## 2022-12-21 ASSESSMENT — ACTIVITIES OF DAILY LIVING (ADL)
ADLS_ACUITY_SCORE: 20

## 2022-12-21 NOTE — PROGRESS NOTES
"Brief Post Operative Progress Note      Subjective:  12/21/2022    Luca Araiza is a 42 year old male with h/o anemia, newly diagnosed colon cancer. now POD#0 s/p lap R hemicolectomy.    Pt reports minimal pain, mild abd soreness. Denies SOB, chest pain, or dizziness, nausea or vomiting. No flatus and BM.     Objective:  /75   Pulse 90   Temp 97  F (36.1  C) (Temporal)   Resp 13   Ht 1.93 m (6' 4\")   Wt 92.9 kg (204 lb 12.9 oz)   SpO2 99%   BMI 24.93 kg/m      GEN: AAO*3, not in acute distress, lying comfortably  HEENT: atraumatic, mucosa moist  CVS: normal heart rate, perfusing extremeties  RESP: no resp distress, satting well on RA>  ABD: soft, nontender, nondistended, incisions are CDI  : normal male external genitalia, ceron in place  EXT: distal pulses palpable, normal range of motion  NEURO/PSYCH: CN grossly normal, no focal weakness, normal mood and thought process       Assessment and Plans:     Luca Araiza is a 42 year old male POD#0 s/p right hemicolectomy. Doing well post operatively. Continue plan of care.  -Pain control: continue current pain regimen  -Diet: CLD  -Fluids: mIVF, wean as tolerate PO  -Activity: Ad margot      Bret Pathak MD  PGY-1 Urology  [email or web address]      "

## 2022-12-21 NOTE — PHARMACY-ADMISSION MEDICATION HISTORY
"  Admission Medication History Completed by Pharmacy    See [facility] Admission Navigator for allergy information, preferred outpatient pharmacy, prior to admission medications and immunization status.     Medication History Sources:     Pre-op RN assessment, Dispense hx        Prior to Admission medications    Medication Sig Last Dose Taking? Auth Provider Long Term End Date   metroNIDAZOLE (FLAGYL) 500 MG tablet Take 1 tablet (500 mg) by mouth every 6 hours At 8:00 am, 2:00 pm, 8:00 pm the day prior to your surgery with neomycin and zofran. 12/20/2022 at 0800 Yes Nahum Contreras MD     neomycin (MYCIFRADIN) 500 MG tablet Take 2 tablets (1,000 mg) by mouth every 6 hours At 8:00 am, 2:00 pm, 8:00 pm the day prior to your surgery with flagyl and zofran. 12/20/2022 at 0800 Yes Nahum Contreras MD     ondansetron (ZOFRAN) 4 MG tablet Take 1 tablet (4 mg) by mouth every 6 hours At 8:00 am, 2:00 pm, 8:00 pm the day prior to your surgery with neomycin and flagyl. 12/20/2022 at 0800 Yes Nahum Contreras MD     polyethylene glycol (MIRALAX) 17 g packet Take 238 g by mouth See Admin Instructions Starting at 4 pm night prior to surgery. Refer to \"Getting Ready for Surgery\" instructions. 12/20/2022 at 1800 Yes Nahum Contreras MD     Ascorbic Acid (VITAMIN C) 100 MG CHEW Take by mouth every morning 12/18/2022 at 0800  Reported, Patient     ferrous sulfate (FEROSUL) 325 (65 Fe) MG tablet Take 325 mg by mouth every morning 12/18/2022 at 0800  Diana Wade DO         Date completed: 12/21/22    Medication history completed by: Tran Parish [facility]    "

## 2022-12-21 NOTE — ANESTHESIA POSTPROCEDURE EVALUATION
Patient: Luca Araiza    Procedure: Procedure(s):  HEMICOLECTOMY, RIGHT, LAPAROSCOPY-ASSISTED       Anesthesia Type:  General    Note:  Disposition: Inpatient   Postop Pain Control: Uneventful            Sign Out: Well controlled pain   PONV: No   Neuro/Psych: Uneventful            Sign Out: Acceptable/Baseline neuro status   Airway/Respiratory: Uneventful            Sign Out: Acceptable/Baseline resp. status   CV/Hemodynamics: Uneventful            Sign Out: Acceptable CV status; No obvious hypovolemia; No obvious fluid overload   Other NRE: NONE   DID A NON-ROUTINE EVENT OCCUR? No           Last vitals:  Vitals Value Taken Time   /122 12/21/22 1300   Temp 36.8  C (98.2  F) 12/21/22 1300   Pulse 89 12/21/22 1315   Resp 17 12/21/22 1315   SpO2 100 % 12/21/22 1315       Electronically Signed By: Trav Anthony MD  December 21, 2022  1:36 PM

## 2022-12-21 NOTE — ANESTHESIA PROCEDURE NOTES
TAP Procedure Note    Pre-Procedure   Staff -        Anesthesiologist:  Drew Kay MD       Resident/Fellow: Alexx Barone MD       Performed By: resident       Location: pre-op       Procedure Start/Stop Times: 12/21/2022 7:18 AM       Pre-Anesthestic Checklist: patient identified, IV checked, site marked, risks and benefits discussed, informed consent, monitors and equipment checked, pre-op evaluation, at physician/surgeon's request and post-op pain management  Timeout:       Correct Patient: Yes        Correct Procedure: Yes        Correct Site: Yes        Correct Position: Yes        Correct Laterality: Yes        Site Marked: Yes  Procedure Documentation  Procedure: TAP       Diagnosis: POST OPERATIVE PAIN       Laterality: bilateral       Patient Position: supine       Patient Prep/Sterile Barriers: sterile gloves, mask       Skin prep: Chloraprep       Needle Type: short bevel       Needle Gauge: 21.        Needle Length (millimeters): 110        Ultrasound guided       1. Ultrasound was used to identify targeted nerve, plexus, vascular marker, or fascial plane and place a needle adjacent to it in real-time.       2. Ultrasound was used to visualize the spread of anesthetic in close proximity to the above referenced structure.       3. A permanent image is entered into the patient's record.       4. The visualized anatomic structures appeared normal.       5. There were no apparent abnormal pathologic findings.    Assessment/Narrative         The placement was negative for: blood aspirated, painful injection and site bleeding       Paresthesias: No.       Bolus given via needle..        Secured via.        Insertion/Infusion Method: Single Shot       Complications: none       Injection made incrementally with aspirations every 5 mL.    Medication(s) Administered   Bupivacaine 0.25% PF (Infiltration) - Infiltration   20 mL - 12/21/2022 7:18:00 AM  Bupivacaine liposome (Exparel) 1.3% LA inj susp  "(Infiltration) - Infiltration   20 mL - 12/21/2022 7:18:00 AM  Medication Administration Time: 12/21/2022 7:18 AM      FOR [facility] ([facility]) ONLY:   Pain Team Contact information: please page the Pain Team Via [employer]. Search \"Pain\". During daytime hours, please page the attending first. At night please page the resident first.    "

## 2022-12-21 NOTE — BRIEF OP NOTE
[facility]    Brief Operative Note    Pre-operative diagnosis: Malignant neoplasm of ascending colon (H) [C18.2]  Iron deficiency anemia due to chronic blood loss [D50.0]  Post-operative diagnosis Same as pre-operative diagnosis    Procedure: Procedure(s):  HEMICOLECTOMY, RIGHT, LAPAROSCOPY-ASSISTED  Surgeon: Surgeon(s) and Role:     * Nahum Contreras MD - Primary     * Alva Johnson MD - Fellow - Assisting  Anesthesia: General with Block   Estimated Blood Loss: 30 mL    Drains: None  Specimens:   ID Type Source Tests Collected by Time Destination   1 : Terminal ilieum, appendix, ceccum, and ascending colon Tissue Other SURGICAL PATHOLOGY EXAM Nahum Contreras MD 12/21/2022 10:04 AM      Findings:     1. Stapled ileocolonic anastomosis. No evidence of metastatic disease.  2. Approximately 10 cm mass with at least 6 cm of proximal and distal margin, confirmed by pathology on call back.    Complications: None.    Implants:  None      Nahum Contreras MD  Division of Colon and Rectal Surgery  [facility]  [phone]

## 2022-12-21 NOTE — ANESTHESIA PROCEDURE NOTES
Airway       Patient location during procedure: OR       Procedure Start/Stop Times: 12/21/2022 8:04 AM and 12/21/2022 8:07 AM  Staff -        Anesthesiologist:  Rajeev Jang MD       Resident/Fellow: Alexx Barone MD       Performed By: with residents       Procedure performed by resident/fellow/CRNA in presence of a teaching physician.    Consent for Airway        Urgency: elective  Indications and Patient Condition       Indications for airway management: mary-procedural       Induction type:intravenous       Mask difficulty assessment: 1 - vent by mask    Final Airway Details       Final airway type: endotracheal airway       Successful airway: ETT - single  Endotracheal Airway Details        ETT size (mm): 7.5       Cuffed: yes       Successful intubation technique: direct laryngoscopy       DL Blade Type: MAC 3       Grade View of Cords: 2       Adjucts: stylet       Position: Right       Measured from: lips       Bite block used: Soft    Post intubation assessment        Placement verified by: capnometry, equal breath sounds and chest rise        Number of attempts at approach: 1       Number of other approaches attempted: 0       Secured with: pink tape       Ease of procedure: easy       Dentition: Intact    Medication(s) Administered   Medication Administration Time: 12/21/2022 8:04 AM

## 2022-12-21 NOTE — OP NOTE
"[facility] Colorectal Surgery Operative Report  December 21, 2022    PREOPERATIVE DIAGNOSIS:  1. Cecal cancer, MMR intact  2. Iron deficiency anemia      POSTOPERATIVE DIAGNOSIS:   1. Cecal cancer, MMR intact  2. Iron deficiency anemia     PROCEDURE:  1. Laparoscopic right colectomy.    ANESTHESIA: General endotracheal anesthesia plus local anesthesia.    SURGEON:  Nahum Contreras M.D.    ASSISTANT(S): Alva Johnson CRS Fellow    INDICATIONS FOR PROCEDURE  Luca Araiza is a 42 year old male who presented with a 10 cm cecal mass after colonoscopy was performed for iron deficiency anemia. The mass was confirmed to be invasive colon cancer. I recommended laparoscopic right hemicolectomy. I thoroughly discussed the risks, benefits, and alternatives of operative treatment with the patient and he/she agreed to proceed.    General risks related to abdominal surgery were reviewed with the patient. These include, but are not limited to, death, myocardial infarction, pneumonia, urinary tract infection, deep venous thrombosis with or without pulmonary embolus, abdominal infection from bowel injury or abscess, fistula, anastomotic leak that may require reoperation and a stoma, ureteral injury, bowel obstruction, wound infection, and bleeding.    OPERATIVE PROCEDURE: After obtaining informed consent, the patient was brought to the operating room and placed in the supine position. Appropriate preoperative mechanical and chemical deep venous thrombosis prophylaxis, as well as preoperative prophylactic parenteral antibiotics were given. General endotracheal anesthesia was gently induced. Bilateral lower extremity pneumatic compression devices were applied and all pressure points were cushioned. A Sagastume catheter was inserted without difficulty. The abdomen was then preped and draped in the standard sterile fashion. After a \"time-out\" was performed, a 12mm infraumbilical incision was made and a 12mm trocar was inserted in an open fashion. " Pneumoperitoneum was established with CO2 without difficulty. A 10mm 30 degree angle laparoscope was then used to explore the peritoneal cavity. No evidence of bleeding, bowel injury or metastatic disease was visualized. Additional trocars were placed at the suprapubic area (5mm) and left lower quadrant (5mm), under direct vision. The small bowel and omentum were then displaced towards the left side of the peritoneal cavity and the right colon was tented caudally and laterally. The ileocolic vessels were then identified and dissected at their origin with monopolar and bipolar electrocautery taking care to fully visualize and protect the duodenum and right ureter. The ileocolic artery was divided at its origin with a Ligasure device. No bleeding was visualized. The cecum, ascending colon and proximal transverse colon were mobilized using a medial-to-lateral technique and the mesocolon was divided with a Ligusure device up to the required bowel segments for our resection and anastomosis. The lateral attachments of the appendix and ascending colon were then divided. After obtaining sufficient mobilization of the colon to perform our resection, a 5-cm periumbilical extraction incision was made and a wound protector was placed. The terminal ileum, ascending colon and proximal transverse colon were brought through the incision. The greater omentum up to our proposed area of colonic transection was dissected and divided with the Ligasure device. The terminal ileum and transverse colon were aligned, making sure to not incorporate mesentery or adjacent tissues, and a stapled side-to-side functional end-to-end ileo-colic anastomosis was made with a MICA 75 surgical stapler. The anastomosis was evaluated through the common enterotomy and no bleeding was visualized. After this, a TA 90 surgical stapler was used to transect the remaining colon and terminal ileum, making sure the MICA staple lines were not aligned. The specimen,  including the terminal ileum, appendix, ascending colon and proximal transverse colon were sent to pathology. The anastomosis appeared to be well vascularized, widely patent, and without tension or torsion. Areas of bleeding at the anastomosis were reinforced with 3-0 PDS. The crossing point of the staple line was reinforce with 3-0 PDS in Lembert fashion.    The bowel was then returned to the peritoneal cavity. The peritoneal cavity was irrigated and suctioned. There was no evidence of bleeding or bowel injury. Pneumoperitoneum was deflated. Instrument, sponge, and needle counts were all correct, as reported to me, at this time. The periumbilical extraction incision fascia was re-approximated with interrupted 0 PDS sutures. The abdomen was then resufflated for another evaluation of the abdomen, and everything appeared healthy, hemostasis without evidence of succus. The abdomen was again desufflated, and all trocars were removed with no signs of bleeding. Wounds were irrigated and dried, and hemostasis was corroborated. Skin incisions were re-approximated with running subcuticular 4-0 Monocryl sutures and Dermabond was applied. The patient tolerated the procedure well.    COMPLICATIONS: none.    ESTIMATED BLOOD LOSS: 30 mL.    URINE OUTPUT: 125 mL    REPLACEMENT:     - Crystalloid: 1300 mL.     - Colloid: 0 mL.     - Blood products: 0.    SPECIMEN(S): terminal ileum, vermiform appendix, ascending colon, proximal transverse colon, mesocolon, and partial omentectomy.    OPERATIVE COUNT: Complete.    OPERATIVE FINDINGS:   1. Stapled ileocolonic anastomosis. No evidence of metastatic disease.  2. Approximately 10 cm mass with at least 6 cm of proximal and distal margin, confirmed by pathology on call back.    Nahum Contreras MD  Division of Colon and Rectal Surgery  [facility]  [phone]

## 2022-12-21 NOTE — PROGRESS NOTES
Admitted/transferred from:   2 RN full   skin assessment completed by Hermelinda Spring RN and Pura Dalton RN.  Skin assessment finding: skin intact, no problems   Interventions/actions: other None     Will continue to monitor.

## 2022-12-22 ENCOUNTER — APPOINTMENT (OUTPATIENT)
Dept: PHYSICAL THERAPY | Facility: CLINIC | Age: 42
DRG: 330 | End: 2022-12-22
Attending: SURGERY
Payer: COMMERCIAL

## 2022-12-22 LAB
ANION GAP SERPL CALCULATED.3IONS-SCNC: 11 MMOL/L (ref 7–15)
BUN SERPL-MCNC: 12.9 MG/DL (ref 6–20)
CALCIUM SERPL-MCNC: 8.8 MG/DL (ref 8.6–10)
CHLORIDE SERPL-SCNC: 107 MMOL/L (ref 98–107)
CREAT SERPL-MCNC: 1.15 MG/DL (ref 0.67–1.17)
DEPRECATED HCO3 PLAS-SCNC: 22 MMOL/L (ref 22–29)
ERYTHROCYTE [DISTWIDTH] IN BLOOD BY AUTOMATED COUNT: 19.6 % (ref 10–15)
GFR SERPL CREATININE-BSD FRML MDRD: 82 ML/MIN/1.73M2
GLUCOSE BLDC GLUCOMTR-MCNC: 114 MG/DL (ref 70–99)
GLUCOSE SERPL-MCNC: 107 MG/DL (ref 70–99)
HCT VFR BLD AUTO: 37 % (ref 40–53)
HGB BLD-MCNC: 11.2 G/DL (ref 13.3–17.7)
MAGNESIUM SERPL-MCNC: 2.1 MG/DL (ref 1.7–2.3)
MCH RBC QN AUTO: 26.3 PG (ref 26.5–33)
MCHC RBC AUTO-ENTMCNC: 30.3 G/DL (ref 31.5–36.5)
MCV RBC AUTO: 87 FL (ref 78–100)
PLATELET # BLD AUTO: 312 10E3/UL (ref 150–450)
POTASSIUM SERPL-SCNC: 4.1 MMOL/L (ref 3.4–5.3)
RBC # BLD AUTO: 4.26 10E6/UL (ref 4.4–5.9)
SODIUM SERPL-SCNC: 140 MMOL/L (ref 136–145)
WBC # BLD AUTO: 9.8 10E3/UL (ref 4–11)

## 2022-12-22 PROCEDURE — 85027 COMPLETE CBC AUTOMATED: CPT | Performed by: SURGERY

## 2022-12-22 PROCEDURE — 97116 GAIT TRAINING THERAPY: CPT | Mod: GP

## 2022-12-22 PROCEDURE — 36415 COLL VENOUS BLD VENIPUNCTURE: CPT | Performed by: SURGERY

## 2022-12-22 PROCEDURE — 120N000002 HC R&B MED SURG/OB UMMC

## 2022-12-22 PROCEDURE — 250N000013 HC RX MED GY IP 250 OP 250 PS 637: Performed by: SURGERY

## 2022-12-22 PROCEDURE — 83735 ASSAY OF MAGNESIUM: CPT | Performed by: SURGERY

## 2022-12-22 PROCEDURE — 250N000011 HC RX IP 250 OP 636: Performed by: SURGERY

## 2022-12-22 PROCEDURE — 80048 BASIC METABOLIC PNL TOTAL CA: CPT | Performed by: SURGERY

## 2022-12-22 PROCEDURE — 97530 THERAPEUTIC ACTIVITIES: CPT | Mod: GP

## 2022-12-22 PROCEDURE — 999N000111 HC STATISTIC OT IP EVAL DEFER: Performed by: OCCUPATIONAL THERAPIST

## 2022-12-22 PROCEDURE — 97162 PT EVAL MOD COMPLEX 30 MIN: CPT | Mod: GP

## 2022-12-22 RX ORDER — ACETAMINOPHEN 500 MG
1000 TABLET ORAL EVERY 6 HOURS
Qty: 112 TABLET | Refills: 0 | Status: SHIPPED | OUTPATIENT
Start: 2022-12-22 | End: 2023-01-05

## 2022-12-22 RX ORDER — ENOXAPARIN SODIUM 100 MG/ML
40 INJECTION SUBCUTANEOUS EVERY 24 HOURS
Qty: 12 ML | Refills: 0 | Status: SHIPPED | OUTPATIENT
Start: 2022-12-23 | End: 2023-01-23

## 2022-12-22 RX ORDER — OXYCODONE HYDROCHLORIDE 5 MG/1
5 TABLET ORAL EVERY 4 HOURS PRN
Qty: 12 TABLET | Refills: 0 | Status: SHIPPED | OUTPATIENT
Start: 2022-12-22 | End: 2022-12-23

## 2022-12-22 RX ORDER — GABAPENTIN 100 MG/1
100 CAPSULE ORAL 3 TIMES DAILY
Qty: 42 CAPSULE | Refills: 0 | Status: SHIPPED | OUTPATIENT
Start: 2022-12-22 | End: 2023-01-09

## 2022-12-22 RX ORDER — SIMETHICONE 80 MG
80 TABLET,CHEWABLE ORAL EVERY 6 HOURS PRN
Status: DISCONTINUED | OUTPATIENT
Start: 2022-12-22 | End: 2022-12-23 | Stop reason: HOSPADM

## 2022-12-22 RX ORDER — METHOCARBAMOL 500 MG/1
500 TABLET, FILM COATED ORAL 4 TIMES DAILY
Qty: 56 TABLET | Refills: 0 | Status: SHIPPED | OUTPATIENT
Start: 2022-12-22 | End: 2023-01-05

## 2022-12-22 RX ADMIN — GABAPENTIN 100 MG: 100 CAPSULE ORAL at 08:46

## 2022-12-22 RX ADMIN — METHOCARBAMOL 500 MG: 500 TABLET ORAL at 19:51

## 2022-12-22 RX ADMIN — GABAPENTIN 100 MG: 100 CAPSULE ORAL at 19:51

## 2022-12-22 RX ADMIN — ACETAMINOPHEN 1000 MG: 325 TABLET, FILM COATED ORAL at 02:29

## 2022-12-22 RX ADMIN — ENOXAPARIN SODIUM 40 MG: 40 INJECTION SUBCUTANEOUS at 08:46

## 2022-12-22 RX ADMIN — METHOCARBAMOL 500 MG: 500 TABLET ORAL at 16:29

## 2022-12-22 RX ADMIN — METHOCARBAMOL 500 MG: 500 TABLET ORAL at 08:46

## 2022-12-22 RX ADMIN — OXYCODONE HYDROCHLORIDE 5 MG: 5 TABLET ORAL at 07:41

## 2022-12-22 RX ADMIN — METHOCARBAMOL 500 MG: 500 TABLET ORAL at 12:15

## 2022-12-22 RX ADMIN — ACETAMINOPHEN 1000 MG: 325 TABLET, FILM COATED ORAL at 08:46

## 2022-12-22 RX ADMIN — GABAPENTIN 100 MG: 100 CAPSULE ORAL at 13:51

## 2022-12-22 RX ADMIN — ACETAMINOPHEN 1000 MG: 325 TABLET, FILM COATED ORAL at 13:51

## 2022-12-22 ASSESSMENT — ACTIVITIES OF DAILY LIVING (ADL)
ADLS_ACUITY_SCORE: 21
ADLS_ACUITY_SCORE: 20
ADLS_ACUITY_SCORE: 21
ADLS_ACUITY_SCORE: 20
ADLS_ACUITY_SCORE: 21

## 2022-12-22 NOTE — PROGRESS NOTES
Surgery Progress Note  12/22/2022       Subjective:  KALANI. Tolerated his operation well. Pain well controlled. Ambulating, tolerating CLD     Objective:  Temp:  [97  F (36.1  C)-98.6  F (37  C)] 98.6  F (37  C)  Pulse:  [66-92] 75  Resp:  [13-18] 15  BP: (115-145)/() 131/72  SpO2:  [97 %-100 %] 99 %    I/O last 3 completed shifts:  In: 2171.25 [P.O.:240; I.V.:1931.25]  Out: 4835 [Urine:4805; Blood:30]      Gen: Awake, alert, NAD  CVS: RRR  Resp: NLB on RA  Abd: soft, nondistended, appropriately tender  Incision: c/d/I  Ext: WWP, no edema     Labs:  Recent Labs   Lab 12/16/22  0751   WBC 6.3   HGB 11.5*          Recent Labs   Lab 12/21/22  1402 12/21/22  0620 12/21/22  0601 12/16/22  0744   NA  --   --   --  141   POTASSIUM  --   --   --  4.9   CHLORIDE  --   --   --  107   CO2  --   --   --  23   BUN  --   --   --  22.6*   CR 1.28*  --   --  1.19*   GLC  --  90 85 97   RUPERTO  --   --   --  9.6       Imaging:  No results found for this or any previous visit (from the past 24 hour(s)).       Assessment/Plan:   42 year old male with h/o anemia, newly diagnosed colon cancer. now s/p lap R hemicolectomy on 12/21.     -Continue multimodal pain regimen  - ADAT  - Lovenox teaching today  - Remove ceron  - Encourage IS  - Encourage OOB, ad margot    Seen, examined, and discussed with fellow, who will discuss with staff.      Bret Pathak MD  PGY-1 Urology  [email or web address]

## 2022-12-22 NOTE — PROGRESS NOTES
AOx4. VSS on RA. Pain controlled with tylenol and prn and oxycodone. Abdominal incision clean, dry and CLIFFORD. Due to void at 1500. No flatus or BM yet. On clear liquids diet. L piv SL.

## 2022-12-22 NOTE — PLAN OF CARE
"Goal Outcome Evaluation:       Reports pain well controlled with IV diaudid x1 and po oxycodone x1, plus gabapentin, tylenol and robaxin scheduled. Denies nausea. Stood at bedside x1 and later ambulated cabello with standby assist. Tolerating clear liquid diet. Sagastume with adequate output. Using IS, PCD, abd binder. Vitals stable on capno. Midline incision and laps x2 CDI. /75   Pulse 90   Temp 97  F (36.1  C) (Temporal)   Resp 13   Ht 1.93 m (6' 4\")   Wt 92.9 kg (204 lb 12.9 oz)   SpO2 99%   BMI 24.93 kg/m                     "

## 2022-12-22 NOTE — PROGRESS NOTES
"   12/22/22 1000   Appointment Info   Signing Clinician's Name / Credentials (PT) Erwin Shaver, PT, DPT   Living Environment   People in Home spouse;parent(s)   Current Living Arrangements house   Home Accessibility stairs within home;stairs to enter home   Number of Stairs, Main Entrance 2   Stair Railings, Main Entrance none   Number of Stairs, Within Home, Primary greater than 10 stairs   Stair Railings, Within Home, Primary railings safe and in good condition;railings on both sides of stairs   Transportation Anticipated family or friend will provide   Self-Care   Usual Activity Tolerance good   Current Activity Tolerance moderate   Regular Exercise No   Equipment Currently Used at Home none   Fall history within last six months no   General Information   Onset of Illness/Injury or Date of Surgery 12/21/22   Referring Physician Bret Pathak MD   Patient/Family Therapy Goals Statement (PT) to go home and be able to complete a flight of stairs   Pertinent History of Current Problem (include personal factors and/or comorbidities that impact the POC) per chart \"   Luca Araiza is a 42 year old male with h/o anemia, newly diagnosed colon cancer. now POD#0 s/p lap R hemicolectomy.\"   Existing Precautions/Restrictions abdominal   Cognition   Affect/Mental Status (Cognition) WFL   Orientation Status (Cognition) oriented x 4   Pain Assessment   Patient Currently in Pain Yes, see Vital Sign flowsheet   Range of Motion (ROM)   Range of Motion ROM deficits secondary to surgical procedure   Strength (Manual Muscle Testing)   Strength (Manual Muscle Testing) strength is WFL   Bed Mobility   Bed Mobility rolling left;rolling right;other (see comments)  (deficits secondary to surgical procedure)   Transfers   Transfers no deficits identified   Gait/Stairs (Locomotion)   [place] Level (Gait) modified independence   Assistive Device (Gait) other (see comments)  (IV pole)   Distance in Feet 450'   Distance in Feet (Gait) 450' "   Pattern (Gait) step-to   Clinical Impression   Criteria for Skilled Therapeutic Intervention Yes, treatment indicated   PT Diagnosis (PT) impaired functional mobility   Influenced by the following impairments pain, weakness, activity tolerance   Functional limitations due to impairments functional mobility, stair navigation   Clinical Presentation (PT Evaluation Complexity) Evolving/Changing   Clinical Presentation Rationale per clinician judgement   Clinical Decision Making (Complexity) moderate complexity   Planned Therapy Interventions (PT) home exercise program;stair training;postural re-education;risk factor education;progressive activity/exercise;home program guidelines   Anticipated Equipment Needs at Discharge (PT) other (see comments)  (none)   Risk & Benefits of therapy have been explained patient   PT Total Evaluation Time   PT Eval, Moderate Complexity Minutes (71270) 10   Plan of Care Review   Plan of Care Reviewed With patient   Physical Therapy Goals   PT Frequency Daily   PT Predicted Duration/Target Date for Goal Attainment 12/24/22   PT Goals Stairs;Gait;Bed Mobility   PT: Bed Mobility Independent;Goal Met   PT: Gait Independent;Greater than 200 feet   PT: Stairs Independent;Greater than 10 stairs   Interventions   Interventions Quick Adds Gait Training   Therapeutic Activity   Therapeutic Activities: dynamic activities to improve functional performance Minutes (22501) 10   Symptoms Noted During/After Treatment Fatigue   Treatment Detail/Skilled Intervention PT: Pt greeted supine in bed; agreeable to session. Abdominal binder in use and pt educated on use of it. Educated pt on abdominal precautions and completed bed mobility training. Pt able to complete log rolling with cues. Sat at EOB with good sitting balance and upright posture maintained. IND with sit to stand transfer and able to get back in bed to supine IND using log rolling technique. After completing gait training, pt fatigued and  preferred to lay in bed. Encouraged to sit up in chair and change positions as well. Ensured all needs nearby upon exit.   Gait Training   Gait Training Minutes (90662) 12   Symptoms Noted During/After Treatment (Gait Training) fatigue   Treatment Detail/Skilled Intervention 450PT:facilitated gait and stair training to promote activity tolerance and safe navigation. Pt ambulated SBA with IV pole for a total of 450' and occassionaly taking standing rest breaks. Cues provided to maintain upright posture with pt able to correct. Completed 2 sets of 4 step stairs with bilat railing with one seated rest break. Tolerated well but fatigued at end.   [place] Level (Gait Training) stand-by assist   PT Discharge Planning   PT Plan progress amb, stairs , review bed mobility   PT Discharge Recommendation (DC Rec) home;home with assist   PT Rationale for DC Rec pt currently below baseline in terms of functional mobility. He is currently amb SBA with IV pole with good balance. Prmary recommendation home dc with assist. Pt has assist from spouse and will mainly need assistance with navigating stairs at the moment but anticipate fast progression to IND.   PT Brief overview of current status SBA ambulation with IV pole, IND transfers   Total Session Time   Timed Code Treatment Minutes 22   Total Session Time (sum of timed and untimed services) 32

## 2022-12-22 NOTE — PLAN OF CARE
Assumed care for pt [id number]  Pt AO x4, VSS on RA. Capno in place. Pain controlled with scheduled tylenol, denies nausea. Midline incision with liquid bandage, lapsites x2 CLIFFORD.  Abdominal binder in place. Sagastume in place with good UOP. Pt ambulated in the cabello x1 with SBA assist. LPIV  Infusing IVMF at 125ml/hr, RPIV SL

## 2022-12-22 NOTE — ANESTHESIA CARE TRANSFER NOTE
Patient: Luca Araiza    Procedure: Procedure(s):  HEMICOLECTOMY, RIGHT, LAPAROSCOPY-ASSISTED       Diagnosis: Malignant neoplasm of ascending colon (H) [C18.2]  Iron deficiency anemia due to chronic blood loss [D50.0]  Diagnosis Additional Information: No value filed.    Anesthesia Type:   General     Note:    Oropharynx: oropharynx clear of all foreign objects  Level of Consciousness: awake      Independent Airway: airway patency satisfactory and stable  Dentition: dentition unchanged      Patient transferred to: PACU    Handoff Report: Identifed the Patient, Identified the Reponsible Provider, Reviewed the pertinent medical history, Discussed the surgical course, Reviewed Intra-OP anesthesia mangement and issues during anesthesia, Set expectations for post-procedure period and Allowed opportunity for questions and acknowledgement of understanding      Vitals:  Vitals Value Taken Time   /122 12/21/22 1300   Temp 36.8  C (98.2  F) 12/21/22 1300   Pulse 89 12/21/22 1315   Resp 17 12/21/22 1315   SpO2 100 % 12/21/22 1315       Electronically Signed By: Rajeev Jang MD  December 22, 2022  10:10 AM

## 2022-12-22 NOTE — DISCHARGE SUMMARY
[facility]  Discharge Summary  Colon and Rectal Surgery     Luca Araiza MRN# [id number]   YOB: 1980 Age: 42 year old     Date of Admission:  12/21/2022  Date of Discharge::  12/23/2022  Admitting Physician:  Nahum Contreras MD  Discharge Physician:  Nahum Contreras MD  Primary Care Physician:        Diana Wade          Admission Diagnoses:   Malignant neoplasm of ascending colon (H) [C18.2]  Iron deficiency anemia due to chronic blood loss [D50.0]  Colon cancer (H) [C18.9]            Discharge Diagnosis:     Same as above          Procedures:   Procedure(s):  HEMICOLECTOMY, RIGHT, LAPAROSCOPY-ASSISTED  12/21           Consultations:   OCCUPATIONAL THERAPY ADULT IP CONSULT  PHYSICAL THERAPY ADULT IP CONSULT  CARE MANAGEMENT / SOCIAL WORK IP CONSULT         Imaging Studies:     Results for orders placed or performed during the hospital encounter of 12/21/22   POC US Guidance Needle Placement    Impression    Bilateral transversus abdominis plane block                Medications Prior to Admission:     Medications Prior to Admission   Medication Sig Dispense Refill Last Dose     Ascorbic Acid (VITAMIN C) 100 MG CHEW Take by mouth every morning   12/18/2022 at 0800     ferrous sulfate (FEROSUL) 325 (65 Fe) MG tablet Take 325 mg by mouth every morning   12/18/2022 at 0800     [DISCONTINUED] metroNIDAZOLE (FLAGYL) 500 MG tablet Take 1 tablet (500 mg) by mouth every 6 hours At 8:00 am, 2:00 pm, 8:00 pm the day prior to your surgery with neomycin and zofran. 3 tablet 0 12/20/2022 at 0800     [DISCONTINUED] neomycin (MYCIFRADIN) 500 MG tablet Take 2 tablets (1,000 mg) by mouth every 6 hours At 8:00 am, 2:00 pm, 8:00 pm the day prior to your surgery with flagyl and zofran. 6 tablet 0 12/20/2022 at 0800     [DISCONTINUED] ondansetron (ZOFRAN) 4 MG tablet Take 1 tablet (4 mg) by mouth every 6 hours At 8:00 am, 2:00 pm, 8:00 pm the day prior to your  "surgery with neomycin and flagyl. 3 tablet 0 12/20/2022 at 0800     [DISCONTINUED] polyethylene glycol (MIRALAX) 17 g packet Take 238 g by mouth See Admin Instructions Starting at 4 pm night prior to surgery. Refer to \"Getting Ready for Surgery\" instructions. 14 packet 0 12/20/2022 at 1800              Discharge Medications:     Current Discharge Medication List      START taking these medications    Details   acetaminophen (TYLENOL) 500 MG tablet Take 2 tablets (1,000 mg) by mouth every 6 hours for 14 days  Qty: 112 tablet, Refills: 0    Associated Diagnoses: Malignant neoplasm of ascending colon (H)      enoxaparin ANTICOAGULANT (LOVENOX) 40 MG/0.4ML syringe Inject 0.4 mLs (40 mg) Subcutaneous every 24 hours for 30 days  Qty: 12 mL, Refills: 0    Associated Diagnoses: Malignant neoplasm of ascending colon (H)      gabapentin (NEURONTIN) 100 MG capsule Take 1 capsule (100 mg) by mouth 3 times daily for 14 days  Qty: 42 capsule, Refills: 0    Associated Diagnoses: Malignant neoplasm of ascending colon (H)      methocarbamol (ROBAXIN) 500 MG tablet Take 1 tablet (500 mg) by mouth 4 times daily for 14 days  Qty: 56 tablet, Refills: 0    Associated Diagnoses: Malignant neoplasm of ascending colon (H)      oxyCODONE (ROXICODONE) 5 MG tablet Take 1 tablet (5 mg) by mouth every 4 hours as needed for moderate pain (4-6)  Qty: 15 tablet, Refills: 0    Associated Diagnoses: Malignant neoplasm of ascending colon (H)         CONTINUE these medications which have NOT CHANGED    Details   Ascorbic Acid (VITAMIN C) 100 MG CHEW Take by mouth every morning      ferrous sulfate (FEROSUL) 325 (65 Fe) MG tablet Take 325 mg by mouth every morning         STOP taking these medications       metroNIDAZOLE (FLAGYL) 500 MG tablet Comments:   Reason for Stopping:         neomycin (MYCIFRADIN) 500 MG tablet Comments:   Reason for Stopping:         ondansetron (ZOFRAN) 4 MG tablet Comments:   Reason for Stopping:         polyethylene glycol " "(MIRALAX) 17 g packet Comments:   Reason for Stopping:                      Brief History of Illness:   Luca Araiza is a 42 year old male who presented with a 10 cm cecal mass after colonoscopy was performed for iron deficiency anemia. The mass was confirmed to be invasive colon cancer. Patient elected to proceed with laparoscopic right hemicolectomy           Hospital Course:   Post-operatively pt was gently fluid resuscitated.  Sagastume was removed and pt was able to void independently.  Pt had eventual return of bowel function and was able to tolerate small amounts of a low fiber diet.     Pt was taught how to self-inject post-operative prophylactic lovenox for which he is to do for a total of 30 days. Post-operative pain was controlled with scheduled tylenol, gabapentin, robaxin and prn oxycodone.     Patient is to follow up in the Colon and Rectal Surgery Clinic in 2-3 week with Lola Walton PA-C on 1/13/2022 and then with Dr. Contreras on 2/23/2023.         Day of Discharge Physical Exam:   Blood pressure 131/72, pulse 75, temperature 98.6  F (37  C), temperature source Temporal, resp. rate 15, height 1.93 m (6' 4\"), weight 92.9 kg (204 lb 12.9 oz), SpO2 99 %.    Gen: AAOx3, NAD  Pulm: Non-labored breathing  Abd: Soft, appropriately tender, no guarding/rebound   Incision C/D/I   Ext:  Warm and well-perfused         Final Pathology Result:   12/21/22 Surgical Pathology Results:   -Positive for metastases to three of thirty-three local lymph nodes (3 /33)   -Positive for tumor deposit (x1) and high score tumor budding   -Positive for perineural invasion and microscopic lymphovascular invasion    I have reviewed and agree with the pathology above.      Nahum Contreras MD  Division of Colon and Rectal Surgery  [facility]  [phone]           Discharge Instructions and Follow-Up:     Discharge Procedure Orders   Reason for your hospital stay   Order Comments: Right hemicolectomy "     Activity   Order Comments: Your activity upon discharge: activity as tolerated and see above instructions     Order Specific Question Answer Comments   Is discharge order? Yes      Adult [facility] Follow-up and recommended labs and tests   Order Comments: DIET  -Low Residue Diet for at least 4-6 weeks unless cleared by Colorectal surgery.  No raw vegetables, fruit skins, fibrous foods that require a lot of chewing, nuts, seeds, corn, popcorn.   -We recommend eating slowly, chewing thoroughly, eating small frequent meals throughout the day  -Stay well hydrated.      ACTIVITY  -No lifting, pushing, pulling greater than 10 lbs and no strenuous exercise for 6 weeks   -No driving while on narcotic analgesics (i.e. Percocet, oxycodone, Vicodin)  -No driving until you are able to fully twist to both sides or slam on brakes quickly and without any pain  -We encourage walking at least 4-5 times per day    WOUND CARE  -Inspect your wounds daily for signs of infection (increased redness, drainage, pain)  -Keep your wound clean and dry  -You may shower, but do not soak in tub or pool    NOTIFY  Please contact Anaya Richey RN or Marilynn Obregon RN at [phone] for problems after discharge such as:  -Temperature > 101F, chills, rigors, dizziness  -Redness around or purulent drainage from wound  -Inability to tolerate diet, nausea or vomiting  -You stop passing gas, develop significant bloating, abdominal pain  -Have blood in stools/vomit  -Have severe diarrhea/constipation  -Any other questions or concerns.  - At nights (after 4:30pm), on weekends, or if urgent, call [phone] and ask the  to speak with the on-call Colorectal Surgery resident or fellow      Medication Instructions  Some of your medications may have changed. Please take only prescribed and resumed medications     FOLLOW-UP  1.  You will need to follow-up with Lola Walton PA-C in the Colon and Rectal Surgery clinic on 1/13 and then with CRS Staff:   Nahum Contreras on 2/23/23.  Please contact our Nurses (phone # [phone]) if you have not heard from our clinic in 3 business days afer discharge to schedule a follow-up appointment.         Appointments on [place] and/or [facility] (with [facility] or [facility] provider or service). Call [phone] if you haven't heard regarding these appointments within 7 days of discharge.     Diet   Order Comments: Follow this diet upon discharge: Orders Placed This Encounter      Low Fiber Diet     Order Specific Question Answer Comments   Is discharge order? Yes             Home Health Care:     Not needed           Discharge Disposition:     Discharged to home      Condition at discharge: Stable    Pt was seen and discussed with Dr. Johnson    The above plan of care was performed and communicated to me by CRS Staff: Dr. Nahum Contreras     I spent 20 minute face-to-face or coordinating care of Luca Araiza. Over 50% of our time on the unit was spent counseling the patient and/or coordinating care as documented in the assessment and plan.      Tracie Godinez PA-C ..................12/23/2022   9:56 AM  Colon and Rectal Surgery    [id number] post op hospital visit

## 2022-12-23 VITALS
HEIGHT: 76 IN | SYSTOLIC BLOOD PRESSURE: 126 MMHG | TEMPERATURE: 98.3 F | OXYGEN SATURATION: 99 % | BODY MASS INDEX: 24.94 KG/M2 | WEIGHT: 204.81 LBS | HEART RATE: 77 BPM | DIASTOLIC BLOOD PRESSURE: 78 MMHG | RESPIRATION RATE: 18 BRPM

## 2022-12-23 LAB — GLUCOSE BLDC GLUCOMTR-MCNC: 113 MG/DL (ref 70–99)

## 2022-12-23 PROCEDURE — 250N000011 HC RX IP 250 OP 636: Performed by: SURGERY

## 2022-12-23 PROCEDURE — 250N000013 HC RX MED GY IP 250 OP 250 PS 637: Performed by: SURGERY

## 2022-12-23 RX ORDER — OXYCODONE HYDROCHLORIDE 5 MG/1
5 TABLET ORAL EVERY 4 HOURS PRN
Qty: 15 TABLET | Refills: 0 | Status: SHIPPED | OUTPATIENT
Start: 2022-12-23 | End: 2023-01-09

## 2022-12-23 RX ADMIN — GABAPENTIN 100 MG: 100 CAPSULE ORAL at 08:43

## 2022-12-23 RX ADMIN — ACETAMINOPHEN 1000 MG: 325 TABLET, FILM COATED ORAL at 08:43

## 2022-12-23 RX ADMIN — METHOCARBAMOL 500 MG: 500 TABLET ORAL at 08:44

## 2022-12-23 RX ADMIN — ACETAMINOPHEN 1000 MG: 325 TABLET, FILM COATED ORAL at 01:08

## 2022-12-23 RX ADMIN — ENOXAPARIN SODIUM 40 MG: 40 INJECTION SUBCUTANEOUS at 10:30

## 2022-12-23 ASSESSMENT — ACTIVITIES OF DAILY LIVING (ADL)
ADLS_ACUITY_SCORE: 20

## 2022-12-23 NOTE — PLAN OF CARE
Time: [id number]    Reason for Admission: POD #1l ap R hemicolectomy.      Activity: Indpendent    Neuro: A&O x4. Calm and cooperative.     GI/: Voiding spontaneously without difficulty. Last BM 12/22.    Diet: Low Fiber, tolerating.     Incisions/Drains: Midline incision and abd lap sites with liquid bandage, CDI.     IV Access: L PIV saline locked.     Vitals: VSS on RA    Pain: Pt reporting abdominal cramping. Scheduled gabapentin and robaxin given.     New changes this shift: Paged team about pt cramping. PRN simethicone ordered.     Plan: Continue with plan of care.

## 2022-12-23 NOTE — PLAN OF CARE
"Goal Outcome Evaluation:       Reports pain well controlled with po tylenol, robaxin and gabapentin. Denies nausea. Tolerating low fiber diet. Voiding adequate volumes and passed gas and small loose stool. Abdominal incisions intact. Ambulating in halls and in room independently. Vitals stable. /72 (BP Location: Right arm)   Pulse 75   Temp 98.6  F (37  C) (Temporal)   Resp 15   Ht 1.93 m (6' 4\")   Wt 92.9 kg (204 lb 12.9 oz)   SpO2 99%   BMI 24.93 kg/m                     "

## 2022-12-23 NOTE — PROGRESS NOTES
"  Status: POD# 1 ir Hemicolectomy  Neuro: A&O x4  GI: +bs +flatus no BM.  :Voiding spont.  Resp: R.A  Mobility: sba  Cardiac: WDL  Skin: Midline incision and abd lap sites with liquid bandage CDI  Lines/Drains: l piv sl  Pain: comfortable   Behavior: calm   VS: Blood pressure 130/81, pulse 79, temperature 98.1  F (36.7  C), temperature source Temporal, resp. rate 16, height 1.93 m (6' 4\"), weight 92.9 kg (204 lb 12.9 oz), SpO2 98 %.      Plan of Care: possible discharge tomorrow home  "

## 2022-12-23 NOTE — PLAN OF CARE
Physical Therapy Discharge Summary    Reason for therapy discharge:    Discharged to home.    Progress towards therapy goal(s). See goals on Care Plan in [facility] electronic health record for goal details.  Goals met    Therapy recommendation(s):    No further therapy is recommended.

## 2022-12-23 NOTE — PLAN OF CARE
Goal Outcome Evaluation:      Plan of Care Reviewed With: patient, spouse    Overall Patient Progress: improvingOverall Patient Progress: improving    Outcome Evaluation: Pt educated on discharge instructions along with pts wife. Both reiterated understanding via teachback method. PIV removed. Wife providing transportation home for pt

## 2022-12-26 ENCOUNTER — PATIENT OUTREACH (OUTPATIENT)
Dept: SURGERY | Facility: CLINIC | Age: 42
End: 2022-12-26

## 2022-12-26 ENCOUNTER — PATIENT OUTREACH (OUTPATIENT)
Dept: CARE COORDINATION | Facility: CLINIC | Age: 42
End: 2022-12-26

## 2022-12-26 NOTE — PROGRESS NOTES
[facility] Care Resource [place]    Background: Transitional Care Management program identified per system criteria and reviewed by [facility] team for possible outreach.    Assessment: Upon chart review, [facility] Team member will not proceed with patient outreach related to this episode of Transitional Care Management program due to reason below:    Patient has active communication with a nurse, provider or care team for reason of post-hospital follow up plan.  Outreach call by [facility] team not indicated to minimize duplicative efforts.     Plan: Transitional Care Management episode addressed appropriately per reason noted above.      Rosie Thomas  [facility]    *Connected Care Resource Team does NOT follow patient ongoing. Referrals are identified based on internal discharge reports and the outreach is to ensure patient has an understanding of their discharge instructions.

## 2022-12-26 NOTE — PROGRESS NOTES
Post Op Note     Called to check on patient postoperatively after hospital discharge.     Patient is s/p lap right hemicolectomy with Dr. Nahum Contreras for colon cancer  Admitted 12/21 and discharged on 12/23      Pain is well controlled with tylenol, gabapentin, robaxin, oxy    Lovenox: injecting that daily without issues     Patient is eating and drinking normally. Patient is on a low fiber diet.  Encouraged patient to drink 8-10 glasses of water a day.     Patient is passing flats, is having soft bowel movements.    Patient is voiding normally and urine is light in color.    Patient is not set up with home care.     Patient Denies nausea and vomiting.    Patient Denies any fevers or chills.    Patient's incision is C/D/I. Patient reminded NOT to remove any dressings over their incisions.     Patient is on a activity restriction. Lifting 10 pounds for 6 weeks.     Patient reports needing any forms completed. He will have his HR fax this over to us.     Follow up is set up with Lola Walton PA-C on 1/13 and with Dr. Nahum Contreras on 2/23.   Encouraged the patient to contact the clinic in the meantime with any fevers, chills, nausea, vomiting, increased colostomy output, no colostomy output, dizziness, lightheadedness, uncontrolled pain, changes to the incisions, or with any questions or concerns.    Patient's questions were answered to their stated satisfaction and they are in agreement with this plan.    JEANNINE Julien [phone]  Colon & Rectal Surgery Clinic  [facility]

## 2022-12-27 ENCOUNTER — PATIENT OUTREACH (OUTPATIENT)
Dept: ONCOLOGY | Facility: CLINIC | Age: 42
End: 2022-12-27

## 2022-12-27 DIAGNOSIS — C18.2 MALIGNANT NEOPLASM OF ASCENDING COLON (H): Primary | ICD-10-CM

## 2022-12-27 NOTE — PROGRESS NOTES
Review of Oncology referral from [facility] CRS to Med Onc for pt with colon cancer.    11/3 cscope at [facility] cecal mass, + mod diff adeno, CT CAP, MRI Abd no mets, saw Dr Contreras 11/10, s/p R hemicolectomy 12/21, LN + disease, needs med onc.    Will call to offer next available Med Onc for colon cancer within 2-4 wks per pt preference for timing.

## 2023-01-05 NOTE — PROGRESS NOTES
Colon and Rectal Surgery Postoperative Clinic Note    RE: Luca Araiza  : 1980  LIANG: 2023    Luca Araiza is a very pleasant 42 year old male with a history of adenocarcinoma of the cecum now status post laparoscopic right hemicolectomy with Dr. Contreras on 22.     Final Diagnosis   COLON, ASCENDING COLECTOMY WITH APPENDIX AND TERMINAL ILEUM:  Adenocarcinoma, moderately differentiated (size = 8.6 cm):   -Tumor invades through the colonic wall to pericolic connective tissue   -Maximum depth of invasion 1.33 cm (measured on block A7)   -Positive for metastases to three of thirty-three local lymph nodes (3 /33)   -Positive for tumor deposit (x1) and high score tumor budding   -Positive for perineural invasion and microscopic lymphovascular invasion   -Invasion of the main branches of veins or arteries not identified   -Completely excised with negative distal, proximal and radial margins:       (Closest resection margin: radial at 1.6 cm clear)   -AJCC/TNM stage: pT3 N1b (see also synoptic data)  Appendix with fibrous obliteration; no acute inflammation or malignancy  Terminal ileal mucosa with no malignancy, granulomas, or other abnormality     Interval history: Doing great. No pain. Feels like he has more energy now than he did for the past 2 years. On low fiber diet. Bowel movements back to baseline. Not lifting more than 10 lb. He has met with Dr. Hall for oncology and has discussed adjuvant chemotherapy vs clinical trials. He is scheduled to return to see them on  to decide on his treatment plan. So far leaning towards a clinical trial.    Physical Examination:   /78 (BP Location: Left arm, Patient Position: Sitting, Cuff Size: Adult Regular)   Pulse 83   SpO2 100%   General: alert, oriented, in no acute distress, sitting comfortably  HEENT: moist mucous membranes  Abdomen: Two small laparoscopic incisions well approximated with minimal surrounding blanching erythema.  Supraumbilical incision well approximated, no erythema.    Assessment/Plan:  42 year old male with a history of adenocarcinoma of the cecum now status post laparoscopic right hemicolectomy with Dr. Contreras on 12/21/22. Luca is doing great. Can start transitioning to a regular diet. Avoid lifting >10 lb for a full 6 weeks postop. Otherwise encouraged activity as tolerated. Scheduled to see Dr. Hall on 1/23 to discuss chemotherapy vs clinical trial. He will follow up with Dr. Contreras on 2/23/23. Contact our clinic in the meantime with any questions or concerns. Patient's questions were answered to his stated satisfaction and he is in agreement with this plan.     Medical history:  Past Medical History:   Diagnosis Date     Anemia      Malignant neoplasm of ascending colon (H) 11/07/2022       Surgical history:  Past Surgical History:   Procedure Laterality Date     COLONOSCOPY       REMOVAL OF SPERM DUCT(S)      Description: Surgery Of Male Genitalia Vasectomy;  Recorded: 12/27/2011;  Comments: 12/27/2011     [facility] REPAIR CRUCIATE LIGAMENT,KNEE      Description: Primary Repair Of Knee Ligament Cruciate Anterior;  Recorded: 07/20/2009;       Problem list:    Patient Active Problem List    Diagnosis Date Noted     Colon cancer (H) 12/21/2022     Priority: Medium     Malignant neoplasm of ascending colon (H) 11/07/2022     Priority: Medium     Iron deficiency anemia due to chronic blood loss 11/07/2022     Priority: Medium     Family history of colon cancer in father 10/06/2022     Priority: Medium       Medications:  Current Outpatient Medications   Medication Sig Dispense Refill     enoxaparin ANTICOAGULANT (LOVENOX) 40 MG/0.4ML syringe Inject 0.4 mLs (40 mg) Subcutaneous every 24 hours for 30 days 12 mL 0       Allergies:  No Known Allergies    Family history:  Family History   Problem Relation Age of Onset     Breast Cancer Mother      Colon Cancer Father      Anesthesia Reaction No family hx of      Venous  thrombosis No family hx of        Social history:  Social History     Tobacco Use     Smoking status: Never     Smokeless tobacco: Never   Substance Use Topics     Alcohol use: Not Currently     Marital status: .    Nursing Notes:   Siddhartha Miner, EMT  1/13/2023  8:30 AM  Signed  Chief Complaint   Patient presents with     Post-op Visit       Vitals:    01/13/23 0828   BP: 124/78   BP Location: Left arm   Patient Position: Sitting   Cuff Size: Adult Regular   Pulse: 83   SpO2: 100%       There is no height or weight on file to calculate BMI.    Siddhartha Miner EMT-P         15 minutes spent on the date of the encounter doing chart review, history and exam, documentation and further activities as noted above.   This is a postop visit.      Lola Walton PA-C  Colon and Rectal Surgery  [facility]

## 2023-01-06 ENCOUNTER — MYC MEDICAL ADVICE (OUTPATIENT)
Dept: SURGERY | Facility: CLINIC | Age: 43
End: 2023-01-06

## 2023-01-09 ENCOUNTER — ONCOLOGY VISIT (OUTPATIENT)
Dept: ONCOLOGY | Facility: HOSPITAL | Age: 43
End: 2023-01-09
Attending: SURGERY
Payer: COMMERCIAL

## 2023-01-09 VITALS
SYSTOLIC BLOOD PRESSURE: 124 MMHG | WEIGHT: 206 LBS | DIASTOLIC BLOOD PRESSURE: 69 MMHG | HEART RATE: 77 BPM | OXYGEN SATURATION: 100 % | HEIGHT: 74 IN | RESPIRATION RATE: 20 BRPM | BODY MASS INDEX: 26.44 KG/M2 | TEMPERATURE: 98.1 F

## 2023-01-09 DIAGNOSIS — C18.2 MALIGNANT NEOPLASM OF ASCENDING COLON (H): Primary | ICD-10-CM

## 2023-01-09 PROCEDURE — 99205 OFFICE O/P NEW HI 60 MIN: CPT | Performed by: INTERNAL MEDICINE

## 2023-01-09 PROCEDURE — G0463 HOSPITAL OUTPT CLINIC VISIT: HCPCS | Performed by: INTERNAL MEDICINE

## 2023-01-09 ASSESSMENT — PAIN SCALES - GENERAL: PAINLEVEL: NO PAIN (0)

## 2023-01-09 NOTE — PROGRESS NOTES
"Oncology Rooming Note    January 9, 2023 2:50 PM   Luca Araiza is a 42 year old male who presents for:    Chief Complaint   Patient presents with     Oncology Clinic Visit     New Patient - Malignant neoplasm of ascending colon     Initial Vitals: /69 (BP Location: Left arm, Patient Position: Sitting, Cuff Size: Adult Large)   Pulse 77   Temp 98.1  F (36.7  C) (Oral)   Resp 20   Ht 1.873 m (6' 1.75\")   Wt 93.4 kg (206 lb)   SpO2 100%   BMI 26.63 kg/m   Estimated body mass index is 26.63 kg/m  as calculated from the following:    Height as of this encounter: 1.873 m (6' 1.75\").    Weight as of this encounter: 93.4 kg (206 lb). Body surface area is 2.2 meters squared.  No Pain (0) Comment: Data Unavailable   No LMP for male patient.  Allergies reviewed: Yes  Medications reviewed: Yes    Medications: Medication refills not needed today.  Pharmacy name entered into EPIC:    [facility] [place] - [address]  [facility] - [place], [place] - [address] [place] [address] [place] - [address] [facility] [place] - [address] [facility][id number] - [facility] [address] [address]    Clinical concerns: New Patient - Malignant neoplasm of ascending colon.       Kelley Connolly, [facility]            "

## 2023-01-09 NOTE — PROGRESS NOTES
[facility] Hematology and Oncology Consult Note    Patient: Luca Araiza  MRN: [id number]  Date of Service: Jan 9, 2023       Reason for Visit    I was consulted by LONG.    -Continue home medications Tramouz for colon cancer    Assessment/Plan    T3N1B, stage IIIb adenocarcinoma of the cecum status post laparoscopic right colectomy, December 21, 2022  Tumor is MMR intact  Early onset of colon cancer with family history    Pathology is reviewed and shows stage IIIb adenocarcinoma the colon with moderately differentiated colon cancer and 3 out of 33 lymph nodes positive.    Discussed with patient and his wife that standard of care would be to consider adjuvant chemotherapy and I would recommend modified FOLFOX for 3 months at a minimum.    Gave patient information about current clinical trial .  This would require central CT DNA testing for all patients.  Patients who were CT DNA negative will be randomized to FOLFOX chemotherapy for 3 to 6 months versus observation with serial CT DNA testing every 3 months.    If patients became positive then they would randomize to cohort B.    Cohort B patients or those were CT DNA positive and they would be randomized to standard chemotherapy versus more aggressive chemotherapy with FOLFIRINOX for 6 months.    Rationale for the clinical trial and benefits and risks were discussed with patient and his wife and questions answered.  He will think through all of these options and we will see him back in a couple of weeks to make a decision.    At a minimum of clinical trial I strongly recommend adjuvant chemotherapy because of his young age and high risk for recurrent disease.    Based on his early onset of colon cancer in the family history he will have genetic testing and I discussed benefits of this kind of testing.    Also discussed second clinical trial which is NSABP C 14 which is a clinical validation study to predict recurrence using a circulating tumor DNA  assay to detect minimal residual disease.    Patient has been provided with information about these clinical trials.    I reviewed standard FOLFOX 6 chemotherapy and reviewed potential side effects including but not limited to alopecia, nausea and vomiting, fatigue, myelosuppression with risk for infection and transfusions and also mucositis, diarrhea, cold intolerance and neuropathy.    Discussed typical standard surveillance for patients with colon cancer after adjuvant chemotherapy.    Plan: Return in 2 weeks to make decision about treatment on or off protocol  Will check labs with return                ECOG Performance    0 - Independent    Encounter Diagnoses:    Problem List Items Addressed This Visit        Digestive    Malignant neoplasm of ascending colon (H) - Primary    Relevant Orders    Check Out Appointment Request           ______________________________________________________________________________    Staging History  Cancer Staging   No matching staging information was found for the patient.      History    Mr. Luca Araiza is a 42 year old no significant past medical history who is been diagnosed and undergone laparoscopic hemicolectomy for colon cancer.  He was in [place] last August and had significant illness with diarrhea and vomiting.  He then had physical which showed that he was anemic and subsequently had colonoscopy showing cecal colon cancer.  He underwent laparoscopic right hemicolectomy on December 21 and has recovered well from this.    No other previous medical history.  He had left ACL repair surgery as a teenager.  No other hospitalizations.  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  Does not smoke and does not drink alcohol.    Father had colon cancer in his mid 60s.  Mother had breast cancer in her late 50s.  No siblings or kids with cancer.  Maternal grandfather had colon cancer in his 60s.  Paternal  grandmother had cancer type unknown.            Review of systems.  No fever or night sweats.  No loss of weight.  No lumps or bumps anywhere.  No unusual headaches or eyesight issues.  No dizziness.  No bleeding from the nose.  No sores in the mouth. No problems with swallowing.  No chest pain. No shortness of breath. No cough.  No abdominal pain. No nausea or vomiting.  No diarrhea or constipation.  No blood in stool or black colored stools.  No problems passing urine.  No numbness or tingling in hands or feet.  No skin rashes.  A 14 point review of systems is otherwise negative.        Past History    Past Medical History:   Diagnosis Date     Anemia      Malignant neoplasm of ascending colon (H) 11/07/2022     Past Surgical History:   Procedure Laterality Date     COLONOSCOPY       REMOVAL OF SPERM DUCT(S)      Description: Surgery Of Male Genitalia Vasectomy;  Recorded: 12/27/2011;  Comments: 12/27/2011     [facility] REPAIR CRUCIATE LIGAMENT,KNEE      Description: Primary Repair Of Knee Ligament Cruciate Anterior;  Recorded: 07/20/2009;     Family History   Problem Relation Age of Onset     Breast Cancer Mother      Colon Cancer Father      Anesthesia Reaction No family hx of      Venous thrombosis No family hx of      Social History     Socioeconomic History     Marital status:      Spouse name: None     Number of children: None     Years of education: None     Highest education level: None   Tobacco Use     Smoking status: Never     Smokeless tobacco: Never   Substance and Sexual Activity     Alcohol use: Not Currently     Drug use: Never     Sexual activity: Yes     Partners: Female     Birth control/protection: Male Surgical   Other Topics Concern     Parent/sibling w/ CABG, MI or angioplasty before 65F 55M? No         Allergies    No Known Allergies        Physical Exam    /69 (BP Location: Left arm, Patient Position: Sitting, Cuff Size: Adult Large)   Pulse 77   Temp 98.1  F (36.7  C) (Oral)    "Resp 20   Ht 1.873 m (6' 1.75\")   Wt 93.4 kg (206 lb)   SpO2 100%   BMI 26.63 kg/m        GENERAL: Alert and oriented to time place and person. Seated comfortably. In no distress.    HEAD: Atraumatic and normocephalic.    EYES: LUIS AGNEL, EOMI.  No pallor.  No icterus.    Oral cavity: no mucosal lesion or tonsillar enlargement.    NECK: supple. JVP normal.  No thyroid enlargement.    LYMPH NODES: No palpable, cervical, axillary or inguinal lymphadenopathy.    CHEST: clear to auscultation bilaterally.  Resonant to percussion throughout bilaterally.  Symmetrical breath movements bilaterally.    CVS: S1 and S2 are heard. Regular rate and rhythm.  No murmur or gallop or rub heard.  No peripheral edema.    ABDOMEN: Soft. Not tender. Not distended.  No palpable hepatomegaly or splenomegaly.  No other mass palpable.  Bowel sounds heard.    EXTREMITIES: Warm.    SKIN: no rash, or bruising or purpura.  Has a full head of hair.    Lab Results    Preoperative CEA was 2.6 and 2.7    Imaging Results    CT and MRI reviewed with no evidence of metastatic disease    POC US Guidance Needle Placement    Result Date: 12/21/2022  Bilateral transversus abdominis plane block         Signed by: Sekou Hall MD  "

## 2023-01-09 NOTE — LETTER
"    1/9/2023         RE: Luca Araiza  [address]  [facility] [address]        Dear Colleague,    Thank you for referring your patient, Luca Araiza, to the [facility] [place]. Please see a copy of my visit note below.    Oncology Rooming Note    January 9, 2023 2:50 PM   Luca Araiza is a 42 year old male who presents for:    Chief Complaint   Patient presents with     Oncology Clinic Visit     New Patient - Malignant neoplasm of ascending colon     Initial Vitals: /69 (BP Location: Left arm, Patient Position: Sitting, Cuff Size: Adult Large)   Pulse 77   Temp 98.1  F (36.7  C) (Oral)   Resp 20   Ht 1.873 m (6' 1.75\")   Wt 93.4 kg (206 lb)   SpO2 100%   BMI 26.63 kg/m   Estimated body mass index is 26.63 kg/m  as calculated from the following:    Height as of this encounter: 1.873 m (6' 1.75\").    Weight as of this encounter: 93.4 kg (206 lb). Body surface area is 2.2 meters squared.  No Pain (0) Comment: Data Unavailable   No LMP for male patient.  Allergies reviewed: Yes  Medications reviewed: Yes    Medications: Medication refills not needed today.  Pharmacy name entered into [employer]:    [facility] - [place], [place] - [address].  [facility] - [place], [place] - [address] [place] [address] [facility], [place] - [address] [facility], [place] - [address] [id number] - [facility] [address] [address]    Clinical concerns: New Patient - Malignant neoplasm of ascending colon.       Kelley Connolly, BRANDON              [facility] Hematology and Oncology Consult Note    Patient: Luca Araiza  MRN: [id number]  Date of Service: Jan 9, 2023       Reason for Visit    I was consulted by GERD.    -Continue home medications Diane for colon cancer    Assessment/Plan    T3N1B, stage IIIb adenocarcinoma of the cecum status post laparoscopic right colectomy, December 21, " 2022  Tumor is MMR intact  Early onset of colon cancer with family history    Pathology is reviewed and shows stage IIIb adenocarcinoma the colon with moderately differentiated colon cancer and 3 out of 33 lymph nodes positive.    Discussed with patient and his wife that standard of care would be to consider adjuvant chemotherapy and I would recommend modified FOLFOX for 3 months at a minimum.    Gave patient information about current clinical trial .  This would require central CT DNA testing for all patients.  Patients who were CT DNA negative will be randomized to FOLFOX chemotherapy for 3 to 6 months versus observation with serial CT DNA testing every 3 months.    If patients became positive then they would randomize to cohort B.    Cohort B patients or those were CT DNA positive and they would be randomized to standard chemotherapy versus more aggressive chemotherapy with FOLFIRINOX for 6 months.    Rationale for the clinical trial and benefits and risks were discussed with patient and his wife and questions answered.  He will think through all of these options and we will see him back in a couple of weeks to make a decision.    At a minimum of clinical trial I strongly recommend adjuvant chemotherapy because of his young age and high risk for recurrent disease.    Based on his early onset of colon cancer in the family history he will have genetic testing and I discussed benefits of this kind of testing.    Also discussed second clinical trial which is NSABP C 14 which is a clinical validation study to predict recurrence using a circulating tumor DNA assay to detect minimal residual disease.    Patient has been provided with information about these clinical trials.    I reviewed standard FOLFOX 6 chemotherapy and reviewed potential side effects including but not limited to alopecia, nausea and vomiting, fatigue, myelosuppression with risk for infection and transfusions and also mucositis, diarrhea, cold  intolerance and neuropathy.    Discussed typical standard surveillance for patients with colon cancer after adjuvant chemotherapy.    Plan: Return in 2 weeks to make decision about treatment on or off protocol  Will check labs with return                ECOG Performance    0 - Independent    Encounter Diagnoses:    Problem List Items Addressed This Visit        Digestive    Malignant neoplasm of ascending colon (H) - Primary    Relevant Orders    Check Out Appointment Request           ______________________________________________________________________________    Staging History  Cancer Staging   No matching staging information was found for the patient.      History    Mr. Luca Araiza is a 42 year old no significant past medical history who is been diagnosed and undergone laparoscopic hemicolectomy for colon cancer.  He was in [place] last August and had significant illness with diarrhea and vomiting.  He then had physical which showed that he was anemic and subsequently had colonoscopy showing cecal colon cancer.  He underwent laparoscopic right hemicolectomy on December 21 and has recovered well from this.    No other previous medical history.  He had left ACL repair surgery as a teenager.  No other hospitalizations.  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  Does not smoke and does not drink alcohol.    Father had colon cancer in his mid 60s.  Mother had breast cancer in her late 50s.  No siblings or kids with cancer.  Maternal grandfather had colon cancer in his 60s.  Paternal grandmother had cancer type unknown.            Review of systems.  No fever or night sweats.  No loss of weight.  No lumps or bumps anywhere.  No unusual headaches or eyesight issues.  No dizziness.  No bleeding from the nose.  No sores in the mouth. No problems with swallowing.  No chest pain. No shortness of breath. No cough.  No abdominal pain. No nausea or  "vomiting.  No diarrhea or constipation.  No blood in stool or black colored stools.  No problems passing urine.  No numbness or tingling in hands or feet.  No skin rashes.  A 14 point review of systems is otherwise negative.        Past History    Past Medical History:   Diagnosis Date     Anemia      Malignant neoplasm of ascending colon (H) 11/07/2022     Past Surgical History:   Procedure Laterality Date     COLONOSCOPY       REMOVAL OF SPERM DUCT(S)      Description: Surgery Of Male Genitalia Vasectomy;  Recorded: 12/27/2011;  Comments: 12/27/2011     [facility] REPAIR CRUCIATE LIGAMENT,KNEE      Description: Primary Repair Of Knee Ligament Cruciate Anterior;  Recorded: 07/20/2009;     Family History   Problem Relation Age of Onset     Breast Cancer Mother      Colon Cancer Father      Anesthesia Reaction No family hx of      Venous thrombosis No family hx of      Social History     Socioeconomic History     Marital status:      Spouse name: None     Number of children: None     Years of education: None     Highest education level: None   Tobacco Use     Smoking status: Never     Smokeless tobacco: Never   Substance and Sexual Activity     Alcohol use: Not Currently     Drug use: Never     Sexual activity: Yes     Partners: Female     Birth control/protection: Male Surgical   Other Topics Concern     Parent/sibling w/ CABG, MI or angioplasty before 65F 55M? No         Allergies    No Known Allergies        Physical Exam    /69 (BP Location: Left arm, Patient Position: Sitting, Cuff Size: Adult Large)   Pulse 77   Temp 98.1  F (36.7  C) (Oral)   Resp 20   Ht 1.873 m (6' 1.75\")   Wt 93.4 kg (206 lb)   SpO2 100%   BMI 26.63 kg/m        GENERAL: Alert and oriented to time place and person. Seated comfortably. In no distress.    HEAD: Atraumatic and normocephalic.    EYES: LUIS ANGEL, EOMI.  No pallor.  No icterus.    Oral cavity: no mucosal lesion or tonsillar enlargement.    NECK: supple. JVP normal.  No " thyroid enlargement.    LYMPH NODES: No palpable, cervical, axillary or inguinal lymphadenopathy.    CHEST: clear to auscultation bilaterally.  Resonant to percussion throughout bilaterally.  Symmetrical breath movements bilaterally.    CVS: S1 and S2 are heard. Regular rate and rhythm.  No murmur or gallop or rub heard.  No peripheral edema.    ABDOMEN: Soft. Not tender. Not distended.  No palpable hepatomegaly or splenomegaly.  No other mass palpable.  Bowel sounds heard.    EXTREMITIES: Warm.    SKIN: no rash, or bruising or purpura.  Has a full head of hair.    Lab Results    Preoperative CEA was 2.6 and 2.7    Imaging Results    CT and MRI reviewed with no evidence of metastatic disease    POC US Guidance Needle Placement    Result Date: 12/21/2022  Bilateral transversus abdominis plane block         Signed by: Sekou Hall MD      Again, thank you for allowing me to participate in the care of your patient.        Sincerely,        Sekou Hall MD

## 2023-01-13 ENCOUNTER — OFFICE VISIT (OUTPATIENT)
Dept: SURGERY | Facility: CLINIC | Age: 43
End: 2023-01-13
Payer: COMMERCIAL

## 2023-01-13 VITALS — OXYGEN SATURATION: 100 % | DIASTOLIC BLOOD PRESSURE: 78 MMHG | HEART RATE: 83 BPM | SYSTOLIC BLOOD PRESSURE: 124 MMHG

## 2023-01-13 DIAGNOSIS — Z09 FOLLOW-UP EXAMINATION AFTER COLORECTAL SURGERY: Primary | ICD-10-CM

## 2023-01-13 PROCEDURE — 99024 POSTOP FOLLOW-UP VISIT: CPT

## 2023-01-13 ASSESSMENT — PAIN SCALES - GENERAL: PAINLEVEL: NO PAIN (0)

## 2023-01-13 NOTE — LETTER
2023       RE: Luca Araiza  [address]  [facility] [address]     Dear Colleague,    Thank you for referring your patient, Luca Araiza, to the [facility] COLON [facility] RECT[facility] SURGERY CLINIC [place] at [facility]. Please see a copy of my visit note below.    Colon and Rectal Surgery Postoperative Clinic Note    RE: Luca Araiza  : 1980  LIANG: 2023    Luca Araiza is a very pleasant 42 year old male with a history of adenocarcinoma of the cecum now status post laparoscopic right hemicolectomy with Dr. Contreras on 22.     Final Diagnosis   COLON, ASCENDING COLECTOMY WITH APPENDIX AND TERMINAL ILEUM:  Adenocarcinoma, moderately differentiated (size = 8.6 cm):   -Tumor invades through the colonic wall to pericolic connective tissue   -Maximum depth of invasion 1.33 cm (measured on block A7)   -Positive for metastases to three of thirty-three local lymph nodes (3 /33)   -Positive for tumor deposit (x1) and high score tumor budding   -Positive for perineural invasion and microscopic lymphovascular invasion   -Invasion of the main branches of veins or arteries not identified   -Completely excised with negative distal, proximal and radial margins:       (Closest resection margin: radial at 1.6 cm clear)   -AJCC/TNM stage: pT3 N1b (see also synoptic data)  Appendix with fibrous obliteration; no acute inflammation or malignancy  Terminal ileal mucosa with no malignancy, granulomas, or other abnormality     Interval history: Doing great. No pain. Feels like he has more energy now than he did for the past 2 years. On low fiber diet. Bowel movements back to baseline. Not lifting more than 10 lb. He has met with Dr. Hall for oncology and has discussed adjuvant chemotherapy vs clinical trials. He is scheduled to return to see them on  to decide on his treatment plan. So far leaning towards a clinical trial.    Physical Examination:   /78 (BP  Location: Left arm, Patient Position: Sitting, Cuff Size: Adult Regular)   Pulse 83   SpO2 100%   General: alert, oriented, in no acute distress, sitting comfortably  HEENT: moist mucous membranes  Abdomen: Two small laparoscopic incisions well approximated with minimal surrounding blanching erythema. Supraumbilical incision well approximated, no erythema.    Assessment/Plan:  42 year old male with a history of adenocarcinoma of the cecum now status post laparoscopic right hemicolectomy with Dr. Contreras on 12/21/22. Luca is doing great. Can start transitioning to a regular diet. Avoid lifting >10 lb for a full 6 weeks postop. Otherwise encouraged activity as tolerated. Scheduled to see Dr. Hall on 1/23 to discuss chemotherapy vs clinical trial. He will follow up with Dr. Contreras on 2/23/23. Contact our clinic in the meantime with any questions or concerns. Patient's questions were answered to his stated satisfaction and he is in agreement with this plan.     Medical history:  Past Medical History:   Diagnosis Date     Anemia      Malignant neoplasm of ascending colon (H) 11/07/2022       Surgical history:  Past Surgical History:   Procedure Laterality Date     COLONOSCOPY       REMOVAL OF SPERM DUCT(S)      Description: Surgery Of Male Genitalia Vasectomy;  Recorded: 12/27/2011;  Comments: 12/27/2011     [facility] REPAIR CRUCIATE LIGAMENT,KNEE      Description: Primary Repair Of Knee Ligament Cruciate Anterior;  Recorded: 07/20/2009;       Problem list:    Patient Active Problem List    Diagnosis Date Noted     Colon cancer (H) 12/21/2022     Priority: Medium     Malignant neoplasm of ascending colon (H) 11/07/2022     Priority: Medium     Iron deficiency anemia due to chronic blood loss 11/07/2022     Priority: Medium     Family history of colon cancer in father 10/06/2022     Priority: Medium       Medications:  Current Outpatient Medications   Medication Sig Dispense Refill     enoxaparin ANTICOAGULANT  (LOVENOX) 40 MG/0.4ML syringe Inject 0.4 mLs (40 mg) Subcutaneous every 24 hours for 30 days 12 mL 0       Allergies:  No Known Allergies    Family history:  Family History   Problem Relation Age of Onset     Breast Cancer Mother      Colon Cancer Father      Anesthesia Reaction No family hx of      Venous thrombosis No family hx of        Social history:  Social History     Tobacco Use     Smoking status: Never     Smokeless tobacco: Never   Substance Use Topics     Alcohol use: Not Currently     Marital status: .    Nursing Notes:   Siddhartha Miner, EMT  1/13/2023  8:30 AM  Signed  Chief Complaint   Patient presents with     Post-op Visit       Vitals:    01/13/23 0828   BP: 124/78   BP Location: Left arm   Patient Position: Sitting   Cuff Size: Adult Regular   Pulse: 83   SpO2: 100%       There is no height or weight on file to calculate BMI.    Siddhartha Miner EMT-P         15 minutes spent on the date of the encounter doing chart review, history and exam, documentation and further activities as noted above.   This is a postop visit.      Lola Walton PA-C  Colon and Rectal Surgery  [facility]

## 2023-01-13 NOTE — NURSING NOTE
Chief Complaint   Patient presents with     Post-op Visit       Vitals:    01/13/23 0828   BP: 124/78   BP Location: Left arm   Patient Position: Sitting   Cuff Size: Adult Regular   Pulse: 83   SpO2: 100%       There is no height or weight on file to calculate BMI.    Siddhartha Miner EMT-P

## 2023-01-23 ENCOUNTER — LAB (OUTPATIENT)
Dept: INFUSION THERAPY | Facility: HOSPITAL | Age: 43
End: 2023-01-23
Attending: INTERNAL MEDICINE
Payer: COMMERCIAL

## 2023-01-23 ENCOUNTER — ONCOLOGY VISIT (OUTPATIENT)
Dept: ONCOLOGY | Facility: HOSPITAL | Age: 43
End: 2023-01-23
Attending: INTERNAL MEDICINE
Payer: COMMERCIAL

## 2023-01-23 VITALS
SYSTOLIC BLOOD PRESSURE: 121 MMHG | BODY MASS INDEX: 26.31 KG/M2 | OXYGEN SATURATION: 100 % | HEIGHT: 74 IN | HEART RATE: 65 BPM | DIASTOLIC BLOOD PRESSURE: 64 MMHG | RESPIRATION RATE: 20 BRPM | WEIGHT: 205 LBS | TEMPERATURE: 98.5 F

## 2023-01-23 DIAGNOSIS — C18.2 MALIGNANT NEOPLASM OF ASCENDING COLON (H): Primary | ICD-10-CM

## 2023-01-23 DIAGNOSIS — C18.2 MALIGNANT NEOPLASM OF ASCENDING COLON (H): ICD-10-CM

## 2023-01-23 LAB
ALBUMIN SERPL BCG-MCNC: 4.5 G/DL (ref 3.5–5.2)
ALP SERPL-CCNC: 82 U/L (ref 40–129)
ALT SERPL W P-5'-P-CCNC: 25 U/L (ref 10–50)
ANION GAP SERPL CALCULATED.3IONS-SCNC: 8 MMOL/L (ref 7–15)
AST SERPL W P-5'-P-CCNC: 19 U/L (ref 10–50)
BASOPHILS # BLD AUTO: 0.1 10E3/UL (ref 0–0.2)
BASOPHILS NFR BLD AUTO: 1 %
BILIRUB SERPL-MCNC: <0.2 MG/DL
BUN SERPL-MCNC: 17.9 MG/DL (ref 6–20)
CALCIUM SERPL-MCNC: 9.7 MG/DL (ref 8.6–10)
CEA SERPL-MCNC: 1.5 NG/ML
CHLORIDE SERPL-SCNC: 107 MMOL/L (ref 98–107)
CREAT SERPL-MCNC: 1.23 MG/DL (ref 0.67–1.17)
DEPRECATED HCO3 PLAS-SCNC: 27 MMOL/L (ref 22–29)
EOSINOPHIL # BLD AUTO: 0.4 10E3/UL (ref 0–0.7)
EOSINOPHIL NFR BLD AUTO: 6 %
ERYTHROCYTE [DISTWIDTH] IN BLOOD BY AUTOMATED COUNT: 16.1 % (ref 10–15)
GFR SERPL CREATININE-BSD FRML MDRD: 76 ML/MIN/1.73M2
GLUCOSE SERPL-MCNC: 87 MG/DL (ref 70–99)
HCT VFR BLD AUTO: 43.4 % (ref 40–53)
HGB BLD-MCNC: 13.4 G/DL (ref 13.3–17.7)
IMM GRANULOCYTES # BLD: 0 10E3/UL
IMM GRANULOCYTES NFR BLD: 0 %
LYMPHOCYTES # BLD AUTO: 1.9 10E3/UL (ref 0.8–5.3)
LYMPHOCYTES NFR BLD AUTO: 26 %
MCH RBC QN AUTO: 26.2 PG (ref 26.5–33)
MCHC RBC AUTO-ENTMCNC: 30.9 G/DL (ref 31.5–36.5)
MCV RBC AUTO: 85 FL (ref 78–100)
MONOCYTES # BLD AUTO: 0.5 10E3/UL (ref 0–1.3)
MONOCYTES NFR BLD AUTO: 7 %
NEUTROPHILS # BLD AUTO: 4.3 10E3/UL (ref 1.6–8.3)
NEUTROPHILS NFR BLD AUTO: 60 %
NRBC # BLD AUTO: 0 10E3/UL
NRBC BLD AUTO-RTO: 0 /100
PLATELET # BLD AUTO: 250 10E3/UL (ref 150–450)
POTASSIUM SERPL-SCNC: 4.8 MMOL/L (ref 3.4–5.3)
PROT SERPL-MCNC: 7 G/DL (ref 6.4–8.3)
RBC # BLD AUTO: 5.12 10E6/UL (ref 4.4–5.9)
SODIUM SERPL-SCNC: 142 MMOL/L (ref 136–145)
WBC # BLD AUTO: 7.3 10E3/UL (ref 4–11)

## 2023-01-23 PROCEDURE — G0463 HOSPITAL OUTPT CLINIC VISIT: HCPCS | Performed by: INTERNAL MEDICINE

## 2023-01-23 PROCEDURE — 80053 COMPREHEN METABOLIC PANEL: CPT

## 2023-01-23 PROCEDURE — 36415 COLL VENOUS BLD VENIPUNCTURE: CPT

## 2023-01-23 PROCEDURE — 99213 OFFICE O/P EST LOW 20 MIN: CPT | Performed by: INTERNAL MEDICINE

## 2023-01-23 PROCEDURE — 82378 CARCINOEMBRYONIC ANTIGEN: CPT

## 2023-01-23 PROCEDURE — 85025 COMPLETE CBC W/AUTO DIFF WBC: CPT

## 2023-01-23 ASSESSMENT — PAIN SCALES - GENERAL: PAINLEVEL: NO PAIN (0)

## 2023-01-23 NOTE — PROGRESS NOTES
"Oncology Rooming Note    January 23, 2023 11:09 AM   Luca Araiza is a 42 year old male who presents for:    Chief Complaint   Patient presents with     Oncology Clinic Visit     Return for discussion / decide on treatment regarding Malignant neoplasm of ascending colon, No labs yet today.     Initial Vitals: /64 (BP Location: Right arm, Patient Position: Sitting, Cuff Size: Adult Large)   Pulse 65   Temp 98.5  F (36.9  C) (Oral)   Resp 20   Ht 1.88 m (6' 2\")   Wt 93 kg (205 lb)   SpO2 100%   BMI 26.32 kg/m   Estimated body mass index is 26.32 kg/m  as calculated from the following:    Height as of this encounter: 1.88 m (6' 2\").    Weight as of this encounter: 93 kg (205 lb). Body surface area is 2.2 meters squared.  No Pain (0) Comment: Data Unavailable   No LMP for male patient.  Allergies reviewed: Yes  Medications reviewed: Yes    Medications: Medication refills not needed today.  Pharmacy name entered into [employer]      [facility] - [address]      Clinical concerns: Return for discussion / decide on treatment regarding Malignant neoplasm of ascending colon, No labs yet today.      Kelley Connolly CMA              "

## 2023-01-23 NOTE — PROGRESS NOTES
[facility] Hematology and Oncology Consult Note    Patient: Luca Araiza  MRN: [id number]  Date of Service: Jan 23, 2023       Reason for Visit    I was consulted by LONG.    -Continue home medications Diane for colon cancer    Assessment/Plan      Luca is here for reevaluation.  Continues to recover well after surgery.  He and his wife had multiple questions about clinical trial and these were answered today.      January 2023:    T3N1B, stage IIIb adenocarcinoma of the cecum status post laparoscopic right colectomy, December 21, 2022  Tumor is MMR intact  Early onset of colon cancer with family history      Patient continues to recover well after surgery.  Spent time again answering questions about clinical trials.  He agrees to proceed on the clinical trials.    Will get baseline blood work and imaging.  He will return in a few days to get baseline CT DNA testing.  We will plan to see him back in 4 weeks to determine next steps.    If his CT DNA is negative then he will be randomized to observation versus chemotherapy.  I would recommend 3 months of FOLFOX chemotherapy is indicated.  He will require port placement.    If CT DNA is positive then he will randomized to either FOLFOX or FOLFIRINOX.    Plan: As above    Measurable disease: None postoperatively                  ECOG Performance    0 - Independent    Encounter Diagnoses:    Problem List Items Addressed This Visit        Digestive    Malignant neoplasm of ascending colon (H) - Primary    Relevant Orders    CEA    CBC with platelets and differential    Comprehensive metabolic panel (BMP + Alb, Alk Phos, ALT, AST, Total. Bili, TP)    CT Chest/Abdomen/Pelvis w Contrast    Check Out Appointment Request           ______________________________________________________________________________    Staging History   Cancer Staging   No matching staging information was found for the patient.      History    Mr. Luca Araiza is a 42 year old no  significant past medical history who is been diagnosed and undergone laparoscopic hemicolectomy for colon cancer.  He was in [place] last August and had significant illness with diarrhea and vomiting.  He then had physical which showed that he was anemic and subsequently had colonoscopy showing cecal colon cancer.  He underwent laparoscopic right hemicolectomy on December 21 and has recovered well from this.    No other previous medical history.  He had left ACL repair surgery as a teenager.  No other hospitalizations.  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  Does not smoke and does not drink alcohol.    Father had colon cancer in his mid 60s.  Mother had breast cancer in her late 50s.  No siblings or kids with cancer.  Maternal grandfather had colon cancer in his 60s.  Paternal grandmother had cancer type unknown.            Review of systems.  No fever or night sweats.  No loss of weight.  No lumps or bumps anywhere.  No unusual headaches or eyesight issues.  No dizziness.  No bleeding from the nose.  No sores in the mouth. No problems with swallowing.  No chest pain. No shortness of breath. No cough.  No abdominal pain. No nausea or vomiting.  No diarrhea or constipation.  No blood in stool or black colored stools.  No problems passing urine.  No numbness or tingling in hands or feet.  No skin rashes.  A 14 point review of systems is otherwise negative.        Past History    Past Medical History:   Diagnosis Date     Anemia      Malignant neoplasm of ascending colon (H) 11/07/2022     Past Surgical History:   Procedure Laterality Date     COLONOSCOPY       REMOVAL OF SPERM DUCT(S)      Description: Surgery Of Male Genitalia Vasectomy;  Recorded: 12/27/2011;  Comments: 12/27/2011     [facility] REPAIR CRUCIATE LIGAMENT,KNEE      Description: Primary Repair Of Knee Ligament Cruciate Anterior;  Recorded: 07/20/2009;     Family History   Problem  "Relation Age of Onset     Breast Cancer Mother      Colon Cancer Father      Anesthesia Reaction No family hx of      Venous thrombosis No family hx of      Social History     Socioeconomic History     Marital status:      Spouse name: None     Number of children: None     Years of education: None     Highest education level: None   Tobacco Use     Smoking status: Never     Smokeless tobacco: Never   Substance and Sexual Activity     Alcohol use: Not Currently     Drug use: Never     Sexual activity: Yes     Partners: Female     Birth control/protection: Male Surgical   Other Topics Concern     Parent/sibling w/ CABG, MI or angioplasty before 65F 55M? No         Allergies    No Known Allergies        Physical Exam    /64 (BP Location: Right arm, Patient Position: Sitting, Cuff Size: Adult Large)   Pulse 65   Temp 98.5  F (36.9  C) (Oral)   Resp 20   Ht 1.88 m (6' 2\")   Wt 93 kg (205 lb)   SpO2 100%   BMI 26.32 kg/m        GENERAL: Alert and oriented to time place and person. Seated comfortably. In no distress.    HEAD: Atraumatic and normocephalic.    EYES: LUIS ANGEL, EOMI.  No pallor.  No icterus.    Oral cavity: no mucosal lesion or tonsillar enlargement.    NECK: supple. JVP normal.  No thyroid enlargement.    LYMPH NODES: No palpable, cervical, axillary or inguinal lymphadenopathy.    CHEST: clear to auscultation bilaterally.  Resonant to percussion throughout bilaterally.  Symmetrical breath movements bilaterally.    CVS: S1 and S2 are heard. Regular rate and rhythm.  No murmur or gallop or rub heard.  No peripheral edema.    ABDOMEN: Soft. Not tender. Not distended.  No palpable hepatomegaly or splenomegaly.  No other mass palpable.  Bowel sounds heard.    EXTREMITIES: Warm.    SKIN: no rash, or bruising or purpura.  Has a full head of hair.    Lab Results    Preoperative CEA was 2.6 and 2.7    Imaging Results    CT and MRI reviewed with no evidence of metastatic disease    No results " found.      Signed by: Sekou Hall MD

## 2023-01-23 NOTE — LETTER
"    1/23/2023         RE: Luca Araiza  [address]  [facility] [address]        Dear Colleague,    Thank you for referring your patient, Luca Araiza, to the [facility] [place]. Please see a copy of my visit note below.    Oncology Rooming Note    January 23, 2023 11:09 AM   Luca Araiza is a 42 year old male who presents for:    Chief Complaint   Patient presents with     Oncology Clinic Visit     Return for discussion / decide on treatment regarding Malignant neoplasm of ascending colon, No labs yet today.     Initial Vitals: /64 (BP Location: Right arm, Patient Position: Sitting, Cuff Size: Adult Large)   Pulse 65   Temp 98.5  F (36.9  C) (Oral)   Resp 20   Ht 1.88 m (6' 2\")   Wt 93 kg (205 lb)   SpO2 100%   BMI 26.32 kg/m   Estimated body mass index is 26.32 kg/m  as calculated from the following:    Height as of this encounter: 1.88 m (6' 2\").    Weight as of this encounter: 93 kg (205 lb). Body surface area is 2.2 meters squared.  No Pain (0) Comment: Data Unavailable   No LMP for male patient.  Allergies reviewed: Yes  Medications reviewed: Yes    Medications: Medication refills not needed today.  Pharmacy name entered into [employer]:      [facility] - [address] [facility] [address]      Clinical concerns: Return for discussion / decide on treatment regarding Malignant neoplasm of ascending colon, No labs yet today.      Kelley Connolly, [facility] Hematology and Oncology Consult Note    Patient: Luca Araiza  MRN: [id number]  Date of Service: Jan 23, 2023       Reason for Visit    I was consulted by GERD.    -Continue home medications Diane for colon cancer    Assessment/Plan      Luca is here for reevaluation.  Continues to recover well after surgery.  He and his wife had multiple questions about clinical trial and these were answered today.      January 2023:    T3N1B, stage IIIb adenocarcinoma of the cecum status post " laparoscopic right colectomy, December 21, 2022  Tumor is MMR intact  Early onset of colon cancer with family history      Patient continues to recover well after surgery.  Spent time again answering questions about clinical trials.  He agrees to proceed on the clinical trials.    Will get baseline blood work and imaging.  He will return in a few days to get baseline CT DNA testing.  We will plan to see him back in 4 weeks to determine next steps.    If his CT DNA is negative then he will be randomized to observation versus chemotherapy.  I would recommend 3 months of FOLFOX chemotherapy is indicated.  He will require port placement.    If CT DNA is positive then he will randomized to either FOLFOX or FOLFIRINOX.    Plan: As above    Measurable disease: None postoperatively                  ECOG Performance    0 - Independent    Encounter Diagnoses:    Problem List Items Addressed This Visit        Digestive    Malignant neoplasm of ascending colon (H) - Primary    Relevant Orders    CEA    CBC with platelets and differential    Comprehensive metabolic panel (BMP + Alb, Alk Phos, ALT, AST, Total. Bili, TP)    CT Chest/Abdomen/Pelvis w Contrast    Check Out Appointment Request           ______________________________________________________________________________    Staging History   Cancer Staging   No matching staging information was found for the patient.      History    Mr. Luca Araiza is a 42 year old no significant past medical history who is been diagnosed and undergone laparoscopic hemicolectomy for colon cancer.  He was in [place] last August and had significant illness with diarrhea and vomiting.  He then had physical which showed that he was anemic and subsequently had colonoscopy showing cecal colon cancer.  He underwent laparoscopic right hemicolectomy on December 21 and has recovered well from this.    No other previous medical history.  He had left ACL repair surgery as a teenager.  No other  hospitalizations.  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  Does not smoke and does not drink alcohol.    Father had colon cancer in his mid 60s.  Mother had breast cancer in her late 50s.  No siblings or kids with cancer.  Maternal grandfather had colon cancer in his 60s.  Paternal grandmother had cancer type unknown.            Review of systems.  No fever or night sweats.  No loss of weight.  No lumps or bumps anywhere.  No unusual headaches or eyesight issues.  No dizziness.  No bleeding from the nose.  No sores in the mouth. No problems with swallowing.  No chest pain. No shortness of breath. No cough.  No abdominal pain. No nausea or vomiting.  No diarrhea or constipation.  No blood in stool or black colored stools.  No problems passing urine.  No numbness or tingling in hands or feet.  No skin rashes.  A 14 point review of systems is otherwise negative.        Past History    Past Medical History:   Diagnosis Date     Anemia      Malignant neoplasm of ascending colon (H) 11/07/2022     Past Surgical History:   Procedure Laterality Date     COLONOSCOPY       REMOVAL OF SPERM DUCT(S)      Description: Surgery Of Male Genitalia Vasectomy;  Recorded: 12/27/2011;  Comments: 12/27/2011     [facility] REPAIR CRUCIATE LIGAMENT,KNEE      Description: Primary Repair Of Knee Ligament Cruciate Anterior;  Recorded: 07/20/2009;     Family History   Problem Relation Age of Onset     Breast Cancer Mother      Colon Cancer Father      Anesthesia Reaction No family hx of      Venous thrombosis No family hx of      Social History     Socioeconomic History     Marital status:      Spouse name: None     Number of children: None     Years of education: None     Highest education level: None   Tobacco Use     Smoking status: Never     Smokeless tobacco: Never   Substance and Sexual Activity     Alcohol use: Not Currently     Drug use: Never     Sexual activity: Yes  "    Partners: Female     Birth control/protection: Male Surgical   Other Topics Concern     Parent/sibling w/ CABG, MI or angioplasty before 65F 55M? No         Allergies    No Known Allergies        Physical Exam    /64 (BP Location: Right arm, Patient Position: Sitting, Cuff Size: Adult Large)   Pulse 65   Temp 98.5  F (36.9  C) (Oral)   Resp 20   Ht 1.88 m (6' 2\")   Wt 93 kg (205 lb)   SpO2 100%   BMI 26.32 kg/m        GENERAL: Alert and oriented to time place and person. Seated comfortably. In no distress.    HEAD: Atraumatic and normocephalic.    EYES: LUIS ANGEL, EOMI.  No pallor.  No icterus.    Oral cavity: no mucosal lesion or tonsillar enlargement.    NECK: supple. JVP normal.  No thyroid enlargement.    LYMPH NODES: No palpable, cervical, axillary or inguinal lymphadenopathy.    CHEST: clear to auscultation bilaterally.  Resonant to percussion throughout bilaterally.  Symmetrical breath movements bilaterally.    CVS: S1 and S2 are heard. Regular rate and rhythm.  No murmur or gallop or rub heard.  No peripheral edema.    ABDOMEN: Soft. Not tender. Not distended.  No palpable hepatomegaly or splenomegaly.  No other mass palpable.  Bowel sounds heard.    EXTREMITIES: Warm.    SKIN: no rash, or bruising or purpura.  Has a full head of hair.    Lab Results    Preoperative CEA was 2.6 and 2.7    Imaging Results    CT and MRI reviewed with no evidence of metastatic disease    No results found.      Signed by: Sekou Hall MD      Again, thank you for allowing me to participate in the care of your patient.        Sincerely,        Sekou Hall MD    "

## 2023-01-27 NOTE — PROGRESS NOTES
"Colon and Rectal Surgery Consult Telephone Note    Date: 2023     Referring provider:  Nahum Contreras MD  [address] [place] [id number]     RE: Luca Araiza  : 1980  LIANG: 2023    Luca Araiza is a 42 year old male who is being evaluated via a billable Telephone visit.      The patient has been notified of following:     \"This Telephone visit will be conducted via a call between you and your physician/provider. We have found that certain health care needs can be provided without the need for an in-person physical exam.  This service lets us provide the care you need with a Telephone conversation.  If a prescription is necessary we can send it directly to your pharmacy.  If lab work is needed we can place an order for that and you can then stop by our lab to have the test done at a later time.    Telephone visits are billed at different rates depending on your insurance coverage.  Please reach out to your insurance provider with any questions.    If during the course of the call the physician/provider feels a Telephone visit is not appropriate, you will not be charged for this service.\"    Patient has given verbal consent for Telephone visit? Yes    Telephone Start Time: 10:30 AM     Luca Araiza is a very pleasant 42 year old male with a history of adenocarcinoma of the cecum now status post laparoscopic right hemicolectomy on 22.     Interval Hx: Doing well, no issues, tolerating diet and BM daily.     Final Diagnosis   COLON, ASCENDING COLECTOMY WITH APPENDIX AND TERMINAL ILEUM:  Adenocarcinoma, moderately differentiated (size = 8.6 cm):   -Tumor invades through the colonic wall to pericolic connective tissue   -Maximum depth of invasion 1.33 cm (measured on block A7)   -Positive for metastases to three of thirty-three local lymph nodes (3 /33)   -Positive for tumor deposit (x1) and high score tumor budding   -Positive for perineural invasion and microscopic lymphovascular " invasion   -Invasion of the main branches of veins or arteries not identified   -Completely excised with negative distal, proximal and radial margins:       (Closest resection margin: radial at 1.6 cm clear)   -AJCC/TNM stage: pT3 N1b (see also synoptic data)  Appendix with fibrous obliteration; no acute inflammation or malignancy  Terminal ileal mucosa with no malignancy, granulomas, or other abnormality       He met with Dr. Jeny Hall of medical oncology on 23 with the following plan:   Will get baseline blood work and imaging.  He will return in a few days to get baseline CT DNA testing.  We will plan to see him back in 4 weeks to determine next steps.     If his CT DNA is negative then he will be randomized to observation versus chemotherapy.  I would recommend 3 months of FOLFOX chemotherapy is indicated.  He will require port placement.     If CT DNA is positive then he will randomized to either FOLFOX or FOLFIRINOX.      Assessment/Plan:  42 year old male without a significant past medical history now 8 week(s) status post right hemicolectomy.    --Colon cancer surveillance monitorin. History, physical exam, CEA, CT CAP per Dr. Hall   2. Colonoscopy after one year, then three years thereafter if negative. Next Nov/Dec '23.      Nahum Contreras MD  Division of Colon and Rectal Surgery   [facility]  [phone]      Nahum Contreras MD  Division of Colon and Rectal Surgery  [facility]  [phone]      Telephone-Visit Details    Type of service:  Telephone Visit    Telephone End Time (time Telephone stopped): 10:30 AM    Originating Location (pt. Location): Other home    Distant Location (provider location):  [facility] [place]     Mode of Communication:  Telephone Conference via [employer]     Medical history:  Past Medical History:   Diagnosis Date     Anemia      Malignant neoplasm of  "ascending colon (H) 11/07/2022       Surgical history:  Past Surgical History:   Procedure Laterality Date     COLONOSCOPY       REMOVAL OF SPERM DUCT(S)      Description: Surgery Of Male Genitalia Vasectomy;  Recorded: 12/27/2011;  Comments: 12/27/2011     [facility] REPAIR CRUCIATE LIGAMENT,KNEE      Description: Primary Repair Of Knee Ligament Cruciate Anterior;  Recorded: 07/20/2009;       Problem list:  Patient Active Problem List    Diagnosis Date Noted     Colon cancer (H) 12/21/2022     Priority: Medium     Malignant neoplasm of ascending colon (H) 11/07/2022     Priority: Medium     Iron deficiency anemia due to chronic blood loss 11/07/2022     Priority: Medium     Family history of colon cancer in father 10/06/2022     Priority: Medium       Medications:  No current outpatient medications on file.       Allergies:  No Known Allergies    Family history:  Family History   Problem Relation Age of Onset     Breast Cancer Mother      Colon Cancer Father      Anesthesia Reaction No family hx of      Venous thrombosis No family hx of        Social history:  Social History     Tobacco Use     Smoking status: Never     Smokeless tobacco: Never   Substance Use Topics     Alcohol use: Not Currently    Marital status: ..    Nursing Notes:   Yuridia Resendiz  2/23/2023  8:33 AM  Signed  Chief Complaint   Patient presents with     Post-op Visit       Vitals:    02/23/23 0816   Weight: 205 lb   Height: 6' 2\"       Body mass index is 26.32 kg/m .    BRANDON Knox MD  Division of Colon and Rectal Surgery  [facility]  [phone]      "

## 2023-01-30 ENCOUNTER — HOSPITAL ENCOUNTER (OUTPATIENT)
Dept: CT IMAGING | Facility: CLINIC | Age: 43
Discharge: HOME | End: 2023-01-30
Attending: INTERNAL MEDICINE | Admitting: INTERNAL MEDICINE
Payer: COMMERCIAL

## 2023-01-30 DIAGNOSIS — C18.2 MALIGNANT NEOPLASM OF ASCENDING COLON (H): ICD-10-CM

## 2023-01-30 PROCEDURE — 250N000009 HC RX 250: Performed by: RADIOLOGY

## 2023-01-30 PROCEDURE — 250N000011 HC RX IP 250 OP 636: Performed by: RADIOLOGY

## 2023-01-30 PROCEDURE — 74177 CT ABD & PELVIS W/CONTRAST: CPT

## 2023-01-30 RX ORDER — IOPAMIDOL 755 MG/ML
90 INJECTION, SOLUTION INTRAVASCULAR ONCE
Status: COMPLETED | OUTPATIENT
Start: 2023-01-30 | End: 2023-01-30

## 2023-01-30 RX ADMIN — SODIUM CHLORIDE 65 ML: 9 INJECTION, SOLUTION INTRAVENOUS at 08:29

## 2023-01-30 RX ADMIN — IOPAMIDOL 90 ML: 755 INJECTION, SOLUTION INTRAVENOUS at 08:28

## 2023-02-01 ENCOUNTER — LAB (OUTPATIENT)
Dept: INFUSION THERAPY | Facility: HOSPITAL | Age: 43
End: 2023-02-01
Attending: INTERNAL MEDICINE
Payer: COMMERCIAL

## 2023-02-08 ENCOUNTER — VIRTUAL VISIT (OUTPATIENT)
Dept: ONCOLOGY | Facility: CLINIC | Age: 43
End: 2023-02-08
Attending: GENETIC COUNSELOR, MS
Payer: COMMERCIAL

## 2023-02-08 DIAGNOSIS — Z80.0 FAMILY HISTORY OF PANCREATIC CANCER: ICD-10-CM

## 2023-02-08 DIAGNOSIS — Z80.0 FAMILY HISTORY OF COLON CANCER: Primary | ICD-10-CM

## 2023-02-08 DIAGNOSIS — C18.2 MALIGNANT NEOPLASM OF ASCENDING COLON (H): ICD-10-CM

## 2023-02-08 DIAGNOSIS — D50.0 IRON DEFICIENCY ANEMIA DUE TO CHRONIC BLOOD LOSS: ICD-10-CM

## 2023-02-08 DIAGNOSIS — Z80.3 FAMILY HISTORY OF MALIGNANT NEOPLASM OF BREAST: ICD-10-CM

## 2023-02-08 PROCEDURE — 96040 HC GENETIC COUNSELING, EACH 30 MINUTES: CPT | Mod: GT,95 | Performed by: GENETIC COUNSELOR, MS

## 2023-02-08 NOTE — NURSING NOTE
Is the patient currently in the state of [place]? YES    Visit mode:VIDEO    If the visit is dropped, the patient can be reconnected by: VIDEO VISIT: Text to cell phone: [phone]    Will anyone else be joining the visit? NO      How would you like to obtain your AVS? MyChart    Are changes needed to the allergy or medication list? NO    Comments or concerns related to today's visit: JENNIFER Cash [facility]

## 2023-02-08 NOTE — LETTER
2/8/2023         RE: Luca Araiza  [address]  [facility] [address]        Dear Colleague,    Thank you for referring your patient, Luca Araiza, to the [facility]. Please see a copy of my visit note below.    Video-Visit Details    Type of service:  Video Visit  Video Start Time (time video started): 8:52am  Video End Time (time video stopped): 9:31am  Originating Location (pt. Location): Home  Distant Location (provider location):  Off-site  Mode of Communication:  Video Conference via Visual Threat    2/8/2023    Referring Provider: Nahum Contreras MD    Present for Today's Visit: Luca    Presenting Information:   I met with Luca Araiza today for genetic counseling (video visit due to covid19) to discuss his personal and family history of cancer.  He is here today to review this history, cancer screening recommendations, and available genetic testing options.    Personal History:  Luca is a 42 year old male. He was diagnosed with a moderately differentiated adenocarcinoma of the cecum in the fall of 2022. He underwent a right hemicolectomy in December 2022 and is following with Dr. Hall whose team is working with Luca to get enrolled in a clinic trial which will determine a chemotherapy plan. This colon cancer was identified on his first colonoscopy on 11/4/2022 in which two sessile serrated polyps were also removed; one 10mm polyp in the transverse colon and one 9mm polyp in the sigmoid colon.       He does not regularly do any other cancer screening at this time.  Luca reported no tobacco use, and no alcohol use.    Family History: Cancer family history and pertinent information reviewed below (Please see scanned pedigree for detailed family history information)    Mother has a history of breast cancer diagnosed in her mid 50's (treatment included lumpectomy and chemotherapy).     Maternal grandfather passed from colon cancer diagnosed in his 60's/70's.    Father has a  history of colon caner diagnosed at age 62 (treatment included colectomy)    Paternal grandmother passed from a neuroendocrine pancreatic cancer.      Paternal grandfather's mother had a history of colon cancer diagnosed at age 80.    His ethnicity is [place], [place], and [place] citizen.  There is no known Ashkenazi Congregational ancestry on either side of his family. There is no reported consanguinity.    Discussion:    Luca's personal and family history of cancer is suggestive of a hereditary cancer syndrome.    We reviewed the features of sporadic, familial, and hereditary cancers. In looking at Luca's family history, it is possible that a cancer susceptibility gene is present due to his early-onset colon cancer, his father's colon cancer history, history, his paternal great-grandmother's colon cancer history as well as his paternal grandmother's neuroendocrine pancreatic cancer, and his maternal grandfather's colon cancer and his mother's relatively early-onset breast cancer history.    We discussed the natural history and genetics of hereditary cancers. A detailed handout regarding the information we discussed will be sent to Luca via [employer]. Topics included: inheritance pattern, cancer risks, cancer screening recommendations, and also risks, benefits and limitations of testing.    Moise syndrome can be caused by a mutation in one of five genes:  MLH1, MSH2, MSH6, PMS2, and EPCAM.  Moise syndrome may present with polyps, but typically a small number.  The highest cancer risks associated with Moise syndrome include colon cancer, endometrial/uterine cancer, gastric cancer, and ovarian cancer.    Based on his personal and family history, Luca meets current National Comprehensive Cancer Network (NCCN) criteria for genetic testing of Moise syndrome.      We discussed that there are additional genes that could cause increased risk for colon and cancer. As many of these genes present with overlapping features in a family and  accurate cancer risk cannot always be established based upon the pedigree analysis alone, it would be reasonable for Luca to consider panel genetic testing to analyze multiple genes at once.    We reviewed genetic testing options given Luca's personal and family history including targeted and expanded options. After our discussion, Luca opted to proceed with genetic testing via Moise syndrome analysis with automatic reflex to the Common Hereditary Cancers panel through [facility].   Genetic testing is available for 47 genes associated with cancers of the breast, ovary, uterus, prostate and gastrointestinal system: [facility] Common Hereditary Cancers panel (APC, DOMINGO, AXIN2, BARD1, BMPR1A, BRCA1, BRCA2, BRIP1, CDH1, CDK4, CDKN2A, CHEK2, CTNNA1, DICER1, EPCAM, GREM1, HOXB13, KIT, MEN1, MLH1, MSH2, MSH3, MSH6, MUTYH, NBN, NF1, NTHL1, PALB2, PDGFRA, PMS2, POLD1, POLE, PTEN,RAD50, RAD51C, RAD51D, SDHA, SDHB, SDHC, SDHD, SMAD4, SMARCA4, STK11, TP53,TSC1, TSC2, VHL).    We discussed that many of these genes are associated with specific hereditary cancer syndromes and published management guidelines: Hereditary Breast and Ovarian Cancer syndrome (BRCA1, BRCA2), Moise syndrome (MLH1, MSH2, MSH6, PMS2, EPCAM), Familial Adenomatous Polyposis (APC), Hereditary Diffuse Gastric Cancer (CDH1), Familial Atypical Multiple Mole Melanoma syndrome (CDK4, CDKN2A), Multiple Endocrine Neoplasia type 1 (MEN1), Juvenile Polyposis syndrome (BMPR1A, SMAD4), Cowden syndrome (PTEN), Li Fraumeni syndrome (TP53), Hereditary Paraganglioma and Pheochromocytoma syndrome (SDHA, SDHB, SDHC, SDHD), Peutz-Jeghers syndrome (STK11), MUTYH Associated Polyposis (MUTYH), Tuberous sclerosis complex (TSC1, TSC2), Von Hippel-Lindau disease (VHL), and Neurofibromatosis type 1 (NF1).   The DOMINGO, AXIN2, BRIP1, CHEK2, GREM1, MSH3, NBN, NTHL1, PALB2, POLD1, POLE, RAD51C, and RAD51D genes are associated with increased cancer risk and have published management guidelines  for certain cancers.   The remaining genes (BARD1, CTNNA1, DICER1, HOXB13, KIT, PDGFRA, RAD50, and SMARCA4) are associated with increased cancer risk and may allow us to make medical recommendations when mutations are identified.    Consent was obtained over the video with no witness required due to the current covid19 global pandemic.    Medical Management: For Luca, we reviewed that the information from genetic testing may determine:    additional cancer screening for which Luca may qualify (i.e.  more frequent colonoscopies, upper endoscopies, more frequent dermatologic exams, etc.),     and targeted chemotherapies for Luca's active cancer, or if he were to develop certain cancers in the future (i.e. immunotherapy for individuals with Moise syndrome, PARP inhibitors, etc.).     These recommendations will be discussed in detail once genetic testing is completed.     Luca will schedule a lab only appointment at any [facility] clinic at her earliest convenience.        Plan:  1) Today Luca elected to proceed with Moise syndrome analysis with automatic reflex to Common Hereditary Cancers panel genetic testing (47 genes) through [facility].  2) This information should be available in approximately 3 weeks.  3) Luca will be contacted by our scheduling department to set up a result disclosure appointment.     Danielle Varela MS, CGC  Licensed, Certified Genetic Counselor  [facility]  [email or web address]

## 2023-02-08 NOTE — PATIENT INSTRUCTIONS
Assessing Cancer Risk  Cancer is a common diagnosis which impacts many families.  Individuals may develop cancer due to environmental factors (such as exposures and lifestyle), aging, genetic predisposition, or a combination of these factors.      Approximately 5-10% of cancer diagnoses are thought to be caused by inherited risk factors.  These families often have:  Several people with the same or related types of cancer  Cancers diagnosed at a young age (before age 50)  Individuals with more than one primary cancer  Multiple generations of the family affected with cancer    Moise Syndrome Genes  Many of the genes we are born with impact our risk of cancer.  When one of these genes is not working properly due to a mistake (known as a  mutation  or  variant ), this may lead to an increased risk of developing certain types of cancer.      There are currently five genes known to cause Moise Syndrome: MLH1, MSH2, MSH6, PMS2, and EPCAM.  A single mutation in one of the Moise Syndrome genes increases the risk of developing several types of cancers, including colon, endometrial, ovarian, and stomach.  Other cancers that can be seen in some families include pancreatic, bladder, biliary tract, urothelial, small bowel, prostate, breast and brain cancers.  The table below lists the chance that a person with Moise syndrome would develop cancer in their lifetime1.  Of note, exact risks differ between each gene.        Lifetime Cancer Risks    General Population Moise Syndrome   Colon 4.2% 8.7-61%   Endometrial 3.1% 13-57%   Stomach <1% up to 16%   Ovarian 1.3% up-38%   Additional cancer risks may include renal pelvis, ureter, bladder, small bowel, pancreas, biliary tract, prostate, breast, brain, skin      Inheriting a mutation does not mean a person will develop cancer, but it does significantly increase their risk of certain cancers above the general population risk.    Medical Management  If a harmful mutation is found in  a Moise syndrome gene, there may be increased cancer surveillance or risk reducing surgeries that can be offered. While cancer screening and medical management recommendations are based on genetic, personal and family history factors, the following guidelines may be recommended for some families with Moise syndrome 1:  Colorectal: Colonoscopy screening may start as early as age 20-25 (or earlier based on family history), and repeated every 1-3 years.  The age to start and frequency of screening depends on the specific gene.     Endometrial: Hysterectomy (removal of the uterus) can be considered to reduce the risk of endometrial cancer. Screening via endometrial biopsy every 1-2 years (beginning at age 30-35) can be considered. In post-menopausal women, transvaginal ultrasound may also be considered.  Ovarian: Bilateral salpingo-oophorectomy (removal of both ovaries and fallopian tubes) may reduce the chance to develop ovarian cancer, and may be considered depending on the specific Moise syndrome gene mutation. While there currently are no effective screening method for ovarian cancer, transvaginal ultrasound and a CA-125 blood test may be considered at the discretion of each individual's clinician.   Stomach: Upper gastrointestinal surveillance, including upper endoscopy (EGD) with extended duodenoscopy, may be performed in conjunction with colonoscopy.  This screening may start at age 30-40, and repeated every 2-4 years. This recommendation is largely dependent on family history, genetic, and other factors.  Urinary Tract: There is limited evidence to suggest a screening strategy for urothelial/urinary tract cancers. Annual urinalysis starting at age 30-35 can be considered. Consideration for screening is largely dependent upon personal and family history factors, as well as the specific Moise Syndrome mutation.   Brain: Individuals at risk for brain cancer are encouraged to be aware of the signs and symptoms of  neurologic cancer, and should promptly report abnormal symptoms to their managing physicians.     Pancreas: Pancreatic cancer screening may be considered for some families.  This screening is typically done with contrast-enhanced MRI/ magnetic resonance cholangiopancreatography (MRCP) and/or endoscopic ultrasound (EUS).   Prostate: There is limited evidence to recommend early or more frequent screening for prostate cancer. However, individuals at risk for prostate cancer should follow screening as recommended in the NCCN Guidelines for Prostate Early Detection. This screening should be discussed with each individual's medical provider.  Skin: Due to the increased prevalence of both malignant and benign skin tumors in some Moise syndrome genes (such as sebaceous adenocarcinomas and keratoacanthomas), skin exams may be considered every 1-2 years.     These recommendations vary depending on the specific gene identified, as cancer risks differ between each Moise syndrome gene.  These recommendations should be discussed with an individuals genetic counselor and managing providers.      Inheritance   Moise Syndrome mutations are inherited in an autosomal dominant pattern.  This means that if a parent has a mutation, each of their children will have a 50% chance of inheriting that same mutation.  Therefore, each child--male or female--would have a 50% chance of being at increased risk for developing cancer.    In rare situations in which both parents have a mutation in the same Moise syndrome gene, their children each have a 25% risk for constitutional mismatch repair deficiency syndrome (CMMRD). CMMRD is a rare autosomal recessive disorder associated with cafe-au-lait spots and risk for childhood cancers. For individuals of childbearing age with Moise syndrome mutations, genetic counseling and genetic testing may be advised for their partners.    Tumor Screening for Moise Syndrome  There are two different tests that can  be done on a person s colon or endometrial tumor as an initial screen for Moise Syndrome. These tests are called IHC (immunohistochemistry) and MSI (microsatellite instability). Tumors that show an absent protein on IHC or an unstable MSI result could be due to a mutation in one of the known Moise Syndrome genes. The IHC results can also guide further genetic testing for Moise Syndrome by indicating which gene should be tested.     Genetic Testing  For some families, genetic testing may help to explain why their cancer developed, provide tailored management options, and clarify the risk of developing cancer in the future.      Genetic testing involves a simple blood or saliva test and will look at the genetic information in select genes for variants associated with cancer risk.  This testing may include analysis of a single gene due to a known variant in the family, multiple genes most associated with the cancers in a family, or an expanded panel of genes related to many types of cancers.    Results  There are several possible genetic test results, including:   Positive--a harmful mutation (also known as a  pathogenic  or  likely pathogenic  variant) was identified in a gene associated with increased cancer risk.  These risks, as well as medical management options, depend on the specific genetic variant identified.    Negative--no variants were identified in the genes analyzed   Variant of unknown significance--a variant was identified in one or more genes, though it is currently unclear how this impacts cancer risk in the family.  Genetic testing labs are working to collect evidence about these uncertain variants and may provide updates in the future.    Genetic Information Nondiscrimination Act (PILAR)  The Genetic Information Nondiscrimination Act of 2008 (PILAR) is a federal law that protects individuals from health insurance or employment discrimination based on a genetic test result alone (with some exceptions,  including employers with fewer than 15 employees, and ).  However, PILAR's protection does not cover life insurance, long term care, or disability insurances.  The National Human [employer] Research [place] is a great resource to learn more.    Questions to Think About Regarding Genetic Testing  What effect will the test result have on me and my relationship with my family members if I have an inherited gene mutation?  If I don t have a gene mutation?  Should I share my test results, and how will my family react to this news, which may also affect them?  Are my children ready to learn new information that may one day affect their own health?    Resources  Moise Syndrome International [email or web address] Options   Moise Syndrome Screening Network [email or web address].net   American Cancer Society (ACS) cancer.org   National Cancer [place] (NCI) cancer.gov     Please call us if you have any questions or concerns.   Cancer Risk Management Program (Appointments: [phone])    References  National Comprehensive Cancer Network. Clinical practice guidelines in oncology, colorectal cancer screening. Available online (registration required). 2022.

## 2023-02-08 NOTE — PROGRESS NOTES
Video-Visit Details    Type of service:  Video Visit  Video Start Time (time video started): 8:52am  Video End Time (time video stopped): 9:31am  Originating Location (pt. Location): Home  Distant Location (provider location):  Off-site  Mode of Communication:  Video Conference via 9+    2/8/2023    Referring Provider: Nahum Contreras MD    Present for Today's Visit: Luca    Presenting Information:   I met with Luca Araiza today for genetic counseling (video visit due to covid19) to discuss his personal and family history of cancer.  He is here today to review this history, cancer screening recommendations, and available genetic testing options.    Personal History:  Luca is a 42 year old male. He was diagnosed with a moderately differentiated adenocarcinoma of the cecum in the fall of 2022. He underwent a right hemicolectomy in December 2022 and is following with Dr. Hall whose team is working with Luca to get enrolled in a clinic trial which will determine a chemotherapy plan. This colon cancer was identified on his first colonoscopy on 11/4/2022 in which two sessile serrated polyps were also removed; one 10mm polyp in the transverse colon and one 9mm polyp in the sigmoid colon.       He does not regularly do any other cancer screening at this time.  Luca reported no tobacco use, and no alcohol use.    Family History: Cancer family history and pertinent information reviewed below (Please see scanned pedigree for detailed family history information)    Mother has a history of breast cancer diagnosed in her mid 50's (treatment included lumpectomy and chemotherapy).     Maternal grandfather passed from colon cancer diagnosed in his 60's/70's.    Father has a history of colon caner diagnosed at age 62 (treatment included colectomy)    Paternal grandmother passed from a neuroendocrine pancreatic cancer.      Paternal grandfather's mother had a history of colon cancer diagnosed at age 80.    His  ethnicity is Polish, Icelandic, and Japanese.  There is no known Ashkenazi Gnosticism ancestry on either side of his family. There is no reported consanguinity.    Discussion:    Luca's personal and family history of cancer is suggestive of a hereditary cancer syndrome.    We reviewed the features of sporadic, familial, and hereditary cancers. In looking at Luca's family history, it is possible that a cancer susceptibility gene is present due to his early-onset colon cancer, his father's colon cancer history, history, his paternal great-grandmother's colon cancer history as well as his paternal grandmother's neuroendocrine pancreatic cancer, and his maternal grandfather's colon cancer and his mother's relatively early-onset breast cancer history.    We discussed the natural history and genetics of hereditary cancers. A detailed handout regarding the information we discussed will be sent to Luca via [employer]. Topics included: inheritance pattern, cancer risks, cancer screening recommendations, and also risks, benefits and limitations of testing.    Moise syndrome can be caused by a mutation in one of five genes:  MLH1, MSH2, MSH6, PMS2, and EPCAM.  Moise syndrome may present with polyps, but typically a small number.  The highest cancer risks associated with Moise syndrome include colon cancer, endometrial/uterine cancer, gastric cancer, and ovarian cancer.    Based on his personal and family history, Luca meets current National Comprehensive Cancer Network (NCCN) criteria for genetic testing of Moise syndrome.      We discussed that there are additional genes that could cause increased risk for colon and cancer. As many of these genes present with overlapping features in a family and accurate cancer risk cannot always be established based upon the pedigree analysis alone, it would be reasonable for Luca to consider panel genetic testing to analyze multiple genes at once.    We reviewed genetic testing options given  Luca's personal and family history including targeted and expanded options. After our discussion, Luca opted to proceed with genetic testing via Moise syndrome analysis with automatic reflex to the Common Hereditary Cancers panel through [facility].   Genetic testing is available for 47 genes associated with cancers of the breast, ovary, uterus, prostate and gastrointestinal system: [facility] Common Hereditary Cancers panel (APC, DOMINGO, AXIN2, BARD1, BMPR1A, BRCA1, BRCA2, BRIP1, CDH1, CDK4, CDKN2A, CHEK2, CTNNA1, DICER1, EPCAM, GREM1, HOXB13, KIT, MEN1, MLH1, MSH2, MSH3, MSH6, MUTYH, NBN, NF1, NTHL1, PALB2, PDGFRA, PMS2, POLD1, POLE, PTEN,RAD50, RAD51C, RAD51D, SDHA, SDHB, SDHC, SDHD, SMAD4, SMARCA4, STK11, TP53,TSC1, TSC2, VHL).    We discussed that many of these genes are associated with specific hereditary cancer syndromes and published management guidelines: Hereditary Breast and Ovarian Cancer syndrome (BRCA1, BRCA2), Moise syndrome (MLH1, MSH2, MSH6, PMS2, EPCAM), Familial Adenomatous Polyposis (APC), Hereditary Diffuse Gastric Cancer (CDH1), Familial Atypical Multiple Mole Melanoma syndrome (CDK4, CDKN2A), Multiple Endocrine Neoplasia type 1 (MEN1), Juvenile Polyposis syndrome (BMPR1A, SMAD4), Cowden syndrome (PTEN), Li Fraumeni syndrome (TP53), Hereditary Paraganglioma and Pheochromocytoma syndrome (SDHA, SDHB, SDHC, SDHD), Peutz-Jeghers syndrome (STK11), MUTYH Associated Polyposis (MUTYH), Tuberous sclerosis complex (TSC1, TSC2), Von Hippel-Lindau disease (VHL), and Neurofibromatosis type 1 (NF1).   The DOMINGO, AXIN2, BRIP1, CHEK2, GREM1, MSH3, NBN, NTHL1, PALB2, POLD1, POLE, RAD51C, and RAD51D genes are associated with increased cancer risk and have published management guidelines for certain cancers.   The remaining genes (BARD1, CTNNA1, DICER1, HOXB13, KIT, PDGFRA, RAD50, and SMARCA4) are associated with increased cancer risk and may allow us to make medical recommendations when mutations are  identified.    Consent was obtained over the video with no witness required due to the current covid19 global pandemic.    Medical Management: For Luca, we reviewed that the information from genetic testing may determine:    additional cancer screening for which Luca may qualify (i.e.  more frequent colonoscopies, upper endoscopies, more frequent dermatologic exams, etc.),     and targeted chemotherapies for Luca's active cancer, or if he were to develop certain cancers in the future (i.e. immunotherapy for individuals with Moise syndrome, PARP inhibitors, etc.).     These recommendations will be discussed in detail once genetic testing is completed.     Luca will schedule a lab only appointment at any [facility] clinic at her earliest convenience.        Plan:  1) Today Luca elected to proceed with Moise syndrome analysis with automatic reflex to Common Hereditary Cancers panel genetic testing (47 genes) through [facility].  2) This information should be available in approximately 3 weeks.  3) Luca will be contacted by our scheduling department to set up a result disclosure appointment.     Danielle Varela MS, CGC  Licensed, Certified Genetic Counselor  [facility]  [email or web address]

## 2023-02-15 ENCOUNTER — MYC MEDICAL ADVICE (OUTPATIENT)
Dept: SURGERY | Facility: CLINIC | Age: 43
End: 2023-02-15

## 2023-02-15 NOTE — CONFIDENTIAL NOTE
Sent upcoming appointment reminder via [employer].     Appt date/time: 2/23/23 at 11:00 am  Provider: Dr. Nahum Contreras  Appt type: Post-op s/p rt hemicolectomy    Yuridia Resendiz CMA

## 2023-02-21 ENCOUNTER — TELEPHONE (OUTPATIENT)
Dept: ONCOLOGY | Facility: HOSPITAL | Age: 43
End: 2023-02-21

## 2023-02-21 NOTE — TELEPHONE ENCOUNTER
Updated patient regarding study specific lab results are still not back.  Per the study they will be available March 8th, if not before.  Discussed with Dr. Hall- ok to wait for results.  Cancel appts scheduled for this Thursday.  I will let him know as soon as results are back so we can schedule port placement, if needed.    Patient aware of above and ok with plan.    Message sent to scheduling to cancel thursdays appointments.    JEANNINE Subram[employer]

## 2023-02-23 ENCOUNTER — VIRTUAL VISIT (OUTPATIENT)
Dept: SURGERY | Facility: CLINIC | Age: 43
End: 2023-02-23
Payer: COMMERCIAL

## 2023-02-23 VITALS — HEIGHT: 74 IN | BODY MASS INDEX: 26.31 KG/M2 | WEIGHT: 205 LBS

## 2023-02-23 DIAGNOSIS — C18.9 COLON CANCER (H): ICD-10-CM

## 2023-02-23 DIAGNOSIS — Z09 FOLLOW-UP EXAMINATION AFTER COLORECTAL SURGERY: Primary | ICD-10-CM

## 2023-02-23 DIAGNOSIS — C18.2 MALIGNANT NEOPLASM OF ASCENDING COLON (H): ICD-10-CM

## 2023-02-23 PROCEDURE — 99024 POSTOP FOLLOW-UP VISIT: CPT | Mod: VID | Performed by: SURGERY

## 2023-02-23 ASSESSMENT — PAIN SCALES - GENERAL: PAINLEVEL: NO PAIN (0)

## 2023-02-23 NOTE — NURSING NOTE
"Chief Complaint   Patient presents with     Post-op Visit       Vitals:    02/23/23 0816   Weight: 205 lb   Height: 6' 2\"       Body mass index is 26.32 kg/m .    Yuridia Resendiz CMA    "

## 2023-02-23 NOTE — LETTER
2023       RE: Luca Araiza  [address]  [facility] [address]     Dear Colleague,    Thank you for referring your patient, Luca Araiza, to the [facility] [place] at [facility]. Please see a copy of my visit note below.    Colon and Rectal Surgery Consult Telephone Note    Date: 2023     Referring provider:  Nahum Contreras MD  [address] [place] [id number]     RE: Luca Araiza  : 1980  LIANG: 2023      Luca Araiza is a very pleasant 42 year old male with a history of adenocarcinoma of the cecum now status post laparoscopic right hemicolectomy on 22.     Interval Hx: Doing well, no issues, tolerating diet and BM daily.     Final Diagnosis   COLON, ASCENDING COLECTOMY WITH APPENDIX AND TERMINAL ILEUM:  Adenocarcinoma, moderately differentiated (size = 8.6 cm):   -Tumor invades through the colonic wall to pericolic connective tissue   -Maximum depth of invasion 1.33 cm (measured on block A7)   -Positive for metastases to three of thirty-three local lymph nodes (3 /33)   -Positive for tumor deposit (x1) and high score tumor budding   -Positive for perineural invasion and microscopic lymphovascular invasion   -Invasion of the main branches of veins or arteries not identified   -Completely excised with negative distal, proximal and radial margins:       (Closest resection margin: radial at 1.6 cm clear)   -AJCC/TNM stage: pT3 N1b (see also synoptic data)  Appendix with fibrous obliteration; no acute inflammation or malignancy  Terminal ileal mucosa with no malignancy, granulomas, or other abnormality       He met with Dr. Jeny Hall of medical oncology on 23 with the following plan:   Will get baseline blood work and imaging.  He will return in a few days to get baseline CT DNA testing.  We will plan to see him back in 4 weeks to determine next steps.     If his CT DNA is  negative then he will be randomized to observation versus chemotherapy.  I would recommend 3 months of FOLFOX chemotherapy is indicated.  He will require port placement.     If CT DNA is positive then he will randomized to either FOLFOX or FOLFIRINOX.      Assessment/Plan:  42 year old male without a significant past medical history now 8 week(s) status post right hemicolectomy.    --Colon cancer surveillance monitorin. History, physical exam, CEA, CT CAP per Dr. Hall   2. Colonoscopy after one year, then three years thereafter if negative. Next Nov/Dec '23.      Nahum Contreras MD  Division of Colon and Rectal Surgery   [facility]  [phone]      Nahum Contreras MD  Division of Colon and Rectal Surgery  [facility]  [phone]      Telephone-Visit Details    Type of service:  Telephone Visit    Telephone End Time (time Telephone stopped): 10:30 AM    Originating Location (pt. Location): Other home    Distant Location (provider location):  [facility] [place]     Mode of Communication:  Telephone Conference via [employer]     Medical history:  Past Medical History:   Diagnosis Date     Anemia      Malignant neoplasm of ascending colon (H) 2022       Surgical history:  Past Surgical History:   Procedure Laterality Date     COLONOSCOPY       REMOVAL OF SPERM DUCT(S)      Description: Surgery Of Male Genitalia Vasectomy;  Recorded: 2011;  Comments: 2011     [facility] REPAIR CRUCIATE LIGAMENT,KNEE      Description: Primary Repair Of Knee Ligament Cruciate Anterior;  Recorded: 2009;       Problem list:  Patient Active Problem List    Diagnosis Date Noted     Colon cancer (H) 2022     Priority: Medium     Malignant neoplasm of ascending colon (H) 2022     Priority: Medium     Iron deficiency anemia due to chronic blood loss 2022     Priority: Medium     Family history of colon  "cancer in father 10/06/2022     Priority: Medium       Medications:  No current outpatient medications on file.       Allergies:  No Known Allergies    Family history:  Family History   Problem Relation Age of Onset     Breast Cancer Mother      Colon Cancer Father      Anesthesia Reaction No family hx of      Venous thrombosis No family hx of        Social history:  Social History     Tobacco Use     Smoking status: Never     Smokeless tobacco: Never   Substance Use Topics     Alcohol use: Not Currently    Marital status: ..    Nursing Notes:   Yuridia Resendiz  2/23/2023  8:33 AM  Signed  Chief Complaint   Patient presents with     Post-op Visit       Vitals:    02/23/23 0816   Weight: 205 lb   Height: 6' 2\"       Body mass index is 26.32 kg/m .    BRANDON Knox MD  Division of Colon and Rectal Surgery  [facility]  [phone]  "

## 2023-03-02 ENCOUNTER — TELEPHONE (OUTPATIENT)
Dept: ONCOLOGY | Facility: HOSPITAL | Age: 43
End: 2023-03-02

## 2023-03-02 DIAGNOSIS — C18.2 MALIGNANT NEOPLASM OF ASCENDING COLON (H): Primary | ICD-10-CM

## 2023-03-02 NOTE — TELEPHONE ENCOUNTER
Patient returned call- discussed information below.  He verbalized understanding and didn't have any further questions.  Patient was transferred to scheduling for port placement.    JEANNINE Subramanian [employer]

## 2023-03-02 NOTE — TELEPHONE ENCOUNTER
L/M for patient to return call regarding test result for ctDNA and treatment plan.    Patient tested positive for ctDNA for study .  Randomized patient this morning- he will be on Arm 4- receiving mFolfirinox.  Notified Dr. Hall of randomization results.  He placed an order for port placement and will need infusion appt added to schedule on 3/13/23.    JEANNINE Subramanian [employer]

## 2023-03-03 DIAGNOSIS — C18.2 MALIGNANT NEOPLASM OF ASCENDING COLON (H): Primary | ICD-10-CM

## 2023-03-03 RX ORDER — PROCHLORPERAZINE MALEATE 10 MG
10 TABLET ORAL EVERY 6 HOURS PRN
Qty: 30 TABLET | Refills: 2 | Status: SHIPPED | OUTPATIENT
Start: 2023-03-12 | End: 2023-03-13

## 2023-03-03 RX ORDER — ONDANSETRON 8 MG/1
8 TABLET, FILM COATED ORAL EVERY 8 HOURS PRN
Qty: 30 TABLET | Refills: 2 | Status: SHIPPED | OUTPATIENT
Start: 2023-03-12 | End: 2023-03-13

## 2023-03-03 RX ORDER — DEXAMETHASONE 4 MG/1
8 TABLET ORAL DAILY
Qty: 4 TABLET | Refills: 2 | Status: SHIPPED | OUTPATIENT
Start: 2023-03-12 | End: 2023-03-13

## 2023-03-03 RX ORDER — LOPERAMIDE HCL 2 MG
CAPSULE ORAL
Qty: 30 CAPSULE | Refills: 0 | Status: SHIPPED | OUTPATIENT
Start: 2023-03-12 | End: 2023-03-13

## 2023-03-08 ENCOUNTER — TELEPHONE (OUTPATIENT)
Dept: INTERVENTIONAL RADIOLOGY/VASCULAR | Facility: CLINIC | Age: 43
End: 2023-03-08

## 2023-03-09 ENCOUNTER — HOSPITAL ENCOUNTER (OUTPATIENT)
Dept: INTERVENTIONAL RADIOLOGY/VASCULAR | Facility: HOSPITAL | Age: 43
Discharge: HOME | End: 2023-03-09
Attending: INTERNAL MEDICINE | Admitting: RADIOLOGY
Payer: COMMERCIAL

## 2023-03-09 VITALS
DIASTOLIC BLOOD PRESSURE: 65 MMHG | HEART RATE: 76 BPM | SYSTOLIC BLOOD PRESSURE: 119 MMHG | OXYGEN SATURATION: 98 % | TEMPERATURE: 98.4 F | RESPIRATION RATE: 18 BRPM

## 2023-03-09 DIAGNOSIS — C18.2 MALIGNANT NEOPLASM OF ASCENDING COLON (H): ICD-10-CM

## 2023-03-09 PROCEDURE — 250N000011 HC RX IP 250 OP 636: Performed by: RADIOLOGY

## 2023-03-09 PROCEDURE — 272N000500 HC NEEDLE CR2

## 2023-03-09 PROCEDURE — 250N000011 HC RX IP 250 OP 636: Performed by: NURSE PRACTITIONER

## 2023-03-09 PROCEDURE — 272N000602 HC WOUND GLUE CR1

## 2023-03-09 PROCEDURE — 36561 INSERT TUNNELED CV CATH: CPT

## 2023-03-09 PROCEDURE — 99152 MOD SED SAME PHYS/QHP 5/>YRS: CPT

## 2023-03-09 PROCEDURE — 250N000009 HC RX 250: Performed by: RADIOLOGY

## 2023-03-09 PROCEDURE — C1788 PORT, INDWELLING, IMP: HCPCS

## 2023-03-09 RX ORDER — NALOXONE HYDROCHLORIDE 0.4 MG/ML
0.2 INJECTION, SOLUTION INTRAMUSCULAR; INTRAVENOUS; SUBCUTANEOUS
Status: DISCONTINUED | OUTPATIENT
Start: 2023-03-09 | End: 2023-03-10 | Stop reason: HOSPADM

## 2023-03-09 RX ORDER — FLUMAZENIL 0.1 MG/ML
0.2 INJECTION, SOLUTION INTRAVENOUS
Status: DISCONTINUED | OUTPATIENT
Start: 2023-03-09 | End: 2023-03-10 | Stop reason: HOSPADM

## 2023-03-09 RX ORDER — HEPARIN SODIUM (PORCINE) LOCK FLUSH IV SOLN 100 UNIT/ML 100 UNIT/ML
5-10 SOLUTION INTRAVENOUS
Status: DISCONTINUED | OUTPATIENT
Start: 2023-03-09 | End: 2023-03-10 | Stop reason: HOSPADM

## 2023-03-09 RX ORDER — HEPARIN SODIUM,PORCINE 10 UNIT/ML
5-10 VIAL (ML) INTRAVENOUS
Status: DISCONTINUED | OUTPATIENT
Start: 2023-03-09 | End: 2023-03-10 | Stop reason: HOSPADM

## 2023-03-09 RX ORDER — HEPARIN SODIUM (PORCINE) LOCK FLUSH IV SOLN 100 UNIT/ML 100 UNIT/ML
500 SOLUTION INTRAVENOUS ONCE
Status: COMPLETED | OUTPATIENT
Start: 2023-03-09 | End: 2023-03-09

## 2023-03-09 RX ORDER — HEPARIN SODIUM,PORCINE 10 UNIT/ML
5-10 VIAL (ML) INTRAVENOUS EVERY 24 HOURS
Status: DISCONTINUED | OUTPATIENT
Start: 2023-03-09 | End: 2023-03-10 | Stop reason: HOSPADM

## 2023-03-09 RX ORDER — SODIUM CHLORIDE 9 MG/ML
INJECTION, SOLUTION INTRAVENOUS CONTINUOUS
Status: DISCONTINUED | OUTPATIENT
Start: 2023-03-09 | End: 2023-03-10 | Stop reason: HOSPADM

## 2023-03-09 RX ORDER — CEFAZOLIN SODIUM/WATER 2 G/20 ML
2 SYRINGE (ML) INTRAVENOUS
Status: COMPLETED | OUTPATIENT
Start: 2023-03-09 | End: 2023-03-09

## 2023-03-09 RX ORDER — LIDOCAINE HYDROCHLORIDE AND EPINEPHRINE 10; 10 MG/ML; UG/ML
15 INJECTION, SOLUTION INFILTRATION; PERINEURAL ONCE
Status: COMPLETED | OUTPATIENT
Start: 2023-03-09 | End: 2023-03-09

## 2023-03-09 RX ORDER — ACETAMINOPHEN 325 MG/1
650 TABLET ORAL
Status: DISCONTINUED | OUTPATIENT
Start: 2023-03-09 | End: 2023-03-10 | Stop reason: HOSPADM

## 2023-03-09 RX ORDER — LIDOCAINE 40 MG/G
CREAM TOPICAL
Status: DISCONTINUED | OUTPATIENT
Start: 2023-03-09 | End: 2023-03-10 | Stop reason: HOSPADM

## 2023-03-09 RX ORDER — NALOXONE HYDROCHLORIDE 0.4 MG/ML
0.4 INJECTION, SOLUTION INTRAMUSCULAR; INTRAVENOUS; SUBCUTANEOUS
Status: DISCONTINUED | OUTPATIENT
Start: 2023-03-09 | End: 2023-03-10 | Stop reason: HOSPADM

## 2023-03-09 RX ORDER — FENTANYL CITRATE 50 UG/ML
25-50 INJECTION, SOLUTION INTRAMUSCULAR; INTRAVENOUS EVERY 5 MIN PRN
Status: DISCONTINUED | OUTPATIENT
Start: 2023-03-09 | End: 2023-03-10 | Stop reason: HOSPADM

## 2023-03-09 RX ADMIN — MIDAZOLAM HYDROCHLORIDE 1 MG: 1 INJECTION, SOLUTION INTRAMUSCULAR; INTRAVENOUS at 13:39

## 2023-03-09 RX ADMIN — MIDAZOLAM HYDROCHLORIDE 1 MG: 1 INJECTION, SOLUTION INTRAMUSCULAR; INTRAVENOUS at 13:45

## 2023-03-09 RX ADMIN — LIDOCAINE HYDROCHLORIDE,EPINEPHRINE BITARTRATE 15 ML: 10; .01 INJECTION, SOLUTION INFILTRATION; PERINEURAL at 13:50

## 2023-03-09 RX ADMIN — Medication 2 G: at 13:36

## 2023-03-09 RX ADMIN — FENTANYL CITRATE 50 MCG: 50 INJECTION, SOLUTION INTRAMUSCULAR; INTRAVENOUS at 13:42

## 2023-03-09 RX ADMIN — FENTANYL CITRATE 50 MCG: 50 INJECTION, SOLUTION INTRAMUSCULAR; INTRAVENOUS at 13:48

## 2023-03-09 RX ADMIN — Medication 500 UNITS: at 13:49

## 2023-03-09 NOTE — DISCHARGE INSTRUCTIONS
Port Placement Procedure Discharge Instructions:  You had a port placed. A port is a small medical device that is placed under the skin and is connected to a vein with a catheter (thin, flexible tube). Ports can be used to administer IV medications, fluids or blood products (including chemotherapy) or for blood lab draws. Please follow the below instructions:  Care Instructions:  - If you received sedation for your procedure, do not drive or operate heavy machinery for the rest of the day.  - You may shower beginning post procedure day #1.  Do not scrub site until well healed; pat dry.  - Avoid submerging the port site under water (tub baths, Jacuzzis, hot tubs and pools) for 10 days or until glue falls off.  - You may take over the counter pain medication for discomfort. Follow the package directions.  - Avoid heavy lifting (greater than 10 pounds) and strenuous activities for 3 days.   - If you experience significant bleeding at site, apply pressure with hands above the clavicle bone, sit upright and seek immediate medical assistance.    Call [facility] ([phone])*** if you experience the following:   - Uncontrolled bleeding from port site  - Fever (greater than 101 F (38.3C))  - Purulent (yellow/green/foul smelling) drainage from port insertion site.  - Increasing pain at port site  - Increasing redness at port site

## 2023-03-09 NOTE — PROGRESS NOTES
Reviewed discharge instruction with pt verbalized understanding, pt tolerated po intake. Pt accompanied to elevator with wife upon discharge.

## 2023-03-09 NOTE — PRE-PROCEDURE
GENERAL PRE-PROCEDURE:   Procedure:  Port placement  Date/Time:  3/9/2023 1:28 PM    Written consent obtained?: Yes    Risks and benefits: Risks, benefits and alternatives were discussed    Consent given by:  Patient  Patient states understanding of procedure being performed: Yes    Patient's understanding of procedure matches consent: Yes    Procedure consent matches procedure scheduled: Yes    Expected level of sedation:  Moderate  Appropriately NPO:  Yes  ASA Class:  3  Mallampati  :  Grade 2- soft palate, base of uvula, tonsillar pillars, and portion of posterior pharyngeal wall visible  Lungs:  Lungs clear with good breath sounds bilaterally  Heart:  Normal heart sounds and rate  History & Physical reviewed:  History and physical reviewed and no updates needed  Statement of review:  I have reviewed the lab findings, diagnostic data, medications, and the plan for sedation

## 2023-03-09 NOTE — PROGRESS NOTES
Interventional Radiology - History and Physical  3/9/2023    Procedure Requested: port placement  Requesting Provider: Sekou Hall MD    HPI: Luca Araiza is a 42 year old male without significant medical history but with recently diagnosed adenocarcinoma of the cecum s/p laparoscopic right colectomy 12/21/22. Patient is part of a clinical trial through Oncology. Oncology last visit 1/23 noted if CT DNA was positive patient will be randomized to either folfox of folfirinox for treatment. Patient was contacted 3/2 for positive ctDNA result sfor study .     Presents today for port placement to facilitate treatments.     NPO Status: midnight  Anticoagulation/Antiplatelets/Bleeding tendencies: none  Antibiotics: Ancef 2g for procedure    Review of Systems: A comprehensive 10-point review of systems was performed. All systems were reviewed and negative with exception to those reported in the HPI.    PMH:  Past Medical History:   Diagnosis Date     Anemia      Malignant neoplasm of ascending colon (H) 11/07/2022       PSH:  Past Surgical History:   Procedure Laterality Date     COLONOSCOPY       REMOVAL OF SPERM DUCT(S)      Description: Surgery Of Male Genitalia Vasectomy;  Recorded: 12/27/2011;  Comments: 12/27/2011     [facility] REPAIR CRUCIATE LIGAMENT,KNEE      Description: Primary Repair Of Knee Ligament Cruciate Anterior;  Recorded: 07/20/2009;       ALLERGIES:  No Known Allergies    MEDICATIONS:  Current Outpatient Medications   Medication     [START ON 3/12/2023] dexamethasone (DECADRON) 4 MG tablet     [START ON 3/12/2023] loperamide (IMODIUM) 2 MG capsule     [START ON 3/12/2023] ondansetron (ZOFRAN) 8 MG tablet     [START ON 3/12/2023] prochlorperazine (COMPAZINE) 10 MG tablet     No current facility-administered medications for this encounter.         LABS:  INR   Date Value Ref Range Status   12/16/2022 1.09 0.85 - 1.15 Final      Hemoglobin   Date Value Ref Range Status   01/23/2023 13.4 13.3 -  17.7 g/dL Final   ]  Platelet Count   Date Value Ref Range Status   01/23/2023 250 150 - 450 10e3/uL Final     Creatinine   Date Value Ref Range Status   01/23/2023 1.23 (H) 0.67 - 1.17 mg/dL Final     Potassium   Date Value Ref Range Status   01/23/2023 4.8 3.4 - 5.3 mmol/L Final         EXAM:  There were no vitals taken for this visit.  General:  Stable.  In no acute distress.    Neuro:  A&O x 3. Moves all extremities equally.  Resp:  Lungs clear to auscultation bilaterally.  Cardio:  S1S2 and reg, without murmur, clicks or rubs  Skin:  Without excoriations, ecchymosis, erythema, lesions or open sores on bilateral upper chest and neck.      Pre-Sedation Assessment:  Mallampati Airway Classification:  II - Faucial pillars and soft palate may be seen, but uvula is masked by the base of the tongue  Previous reaction to anesthesia/sedation:  No  Sedation plan based on assessment: Moderate (conscious) sedation  ASA Classification: Class 3 - SEVERE SYSTEMIC DISEASE, DEFINITE FUNCTIONAL LIMITATIONS.   Code Status: Full Code intra procedure, per discussion with patient.     ASSESSMENT:  43yo male with  adenocarcinoma of the cecum s/p laparoscopic right colectomy 12/21/22. Presents for port placement to facilitate treatments.     PLAN:    Procedure discussion with patient and physical exam yielded no contraindications to laterality for port placement. Okay for right sided placement.    Port placement with sedation, laterality based on Radiologist discretion.    Procedure, risks/benefits, details, alternatives, and sedation reviewed with patient/family and patient/family verbalized understanding. All questions answered. OK to proceed with above radiology procedure.       Loretta Garrison, APRN CNP  Interventional Radiology  [phone]

## 2023-03-09 NOTE — IP AVS SNAPSHOT
[facility] Interventional Radiology  [address] [place] [id number]  Phone: [phone]  Fax: [phone]                                    After Visit Summary   3/9/2023    Luca Araiza   MRN: [id number]           After Visit Summary Signature Page    I have received my discharge instructions, and my questions have been answered. I have discussed any challenges I see with this plan with the nurse or doctor.    ..........................................................................................................................................  Patient/Patient Representative Signature      ..........................................................................................................................................  Patient Representative Print Name and Relationship to Patient    ..................................................               ................................................  Date                                   Time    ..........................................................................................................................................  Reviewed by Signature/Title    ...................................................              ..............................................  Date                                               Time          22EPIC Rev 08/18

## 2023-03-10 ENCOUNTER — PATIENT OUTREACH (OUTPATIENT)
Dept: ONCOLOGY | Facility: HOSPITAL | Age: 43
End: 2023-03-10

## 2023-03-10 NOTE — PROGRESS NOTES
[facility]: Cancer Care Initial Note                                    Discussion with Patient:                                                      I called Luca to go over chemo education.     Antiemetics: Zofran and Compazine.  Steroid use/side effect: dexamethasone; take for 2 days, starting the day after chemo. Take with food.  Medication understanding/side effect: verbalized understanding.  Port concerns: none.  Education concerns: none.    Luca will be going home on a pump. I did explain to him that home care will be set up for discontinuation of said pump. I explained our process and the need for consent. He verbalized understanding and appreciation of our conversation.       Assessment:                                                      Initial  Current living arrangement:: I live in a private home with family  Informal Support system:: Family  Bed or wheelchair confined:: No  Mobility Status: Independent  Transportation means:: Accessible car  Medication adherence problem (GOAL):: No  Knowledgeable about how to use meds:: Yes  Medication side effects suspected:: No  Referrals Placed: None  Advanced Care Plans/Directives on file:: No  Patient Reported Pain?: No  Pain Score: No Pain (0)        [facility]: Cancer Care Plan of Care Education Note                                      Assessment:                                                      Assessment completed with:: Patient    Current living arrangement  I live in a private home with family    Plan of Care Education   Yearly learning assessment completed?: Yes (see Education tab)  Diagnosis:: Colon Cancer  Does patient understand diagnosis?: Yes  Tx plan/regimen:: FOLFIRINOX  Does patient understand treatment plan/regimen?: Yes  Preparing for treatment:: Reviewed treatment preparation information with patient (vascular access, day of chemo, visitor policy, what to bring, etc.)  Vascular access:: Port  Side effect education::  Diarrhea/Constipation;Endocrine therapy (myalgias, arthralgias, hot flashes, vaginal dryness, mood disorder, and thinning of the bones);Fatigue;Infection;Lab value monitoring (anemia, neutropenia, thrombocytopenia);Mouth sores;Mylosuppression;Nausea/Vomiting  Transportation means:: Accessible car  Safety/self care at home reviewed with patient:: Yes  Informal Support system:: Family  Coping - concerns/fears reviewed with patient:: Yes  Plan of Care:: MD follow-up appointment;Treatment schedule  When to call provider:: Bleeding;Increased shortness of breath;New/worsening pain;Shaking chills;Temperature >100.4F;Uncontrolled diarrhea/constipation;Uncontrolled nausea/vomiting  Reasons for deferring treatment reviewed with patient:: Yes    Evaluation of Learning  Patient Education Provided: Yes  Readiness:: Eager;Acceptance  Method:: Explanation  Response:: Verbalizes understanding      Intervention/Education provided during outreach:                                                       Patient to follow up as scheduled at next appt    Signature:  Angelina Huertas RN

## 2023-03-10 NOTE — TELEPHONE ENCOUNTER
Patient received chemotherapy teaching today.    The patient was educated on:    -Chemotherapy: what it is and how it works  -Described a typical day at the treatment center and what the patient can expect  -Discussed port access and functions of the port   -Chemotherapy side effects and appropriate management of these side effects. SE discussed included but not limited to: fatigue, nausea and vomiting, constipation, diarrhea, mucositis, alopecia, peripheral neuropathy, pain,anemia, thrombocytopenia, and neutropenia   -Reasons to call the physician: fever >100.5, shaking/chills, unusual bleeding or bruising, shortness of breath, vomiting >12hours, nausea >24 hours, unable to eat/drink >24 hours, painful urination, blood in urine or stool, soreness at the IV site, and painful mouth sores    All questions were addressed and the patient was encouraged to call with any questions.    Time Spent: 15 minutes    Angelina Huertas  RN Care Coordinator  [facility]

## 2023-03-14 ENCOUNTER — DOCUMENTATION ONLY (OUTPATIENT)
Dept: ONCOLOGY | Facility: HOSPITAL | Age: 43
End: 2023-03-14

## 2023-03-14 ENCOUNTER — LAB (OUTPATIENT)
Dept: INFUSION THERAPY | Facility: HOSPITAL | Age: 43
End: 2023-03-14
Attending: INTERNAL MEDICINE
Payer: COMMERCIAL

## 2023-03-14 ENCOUNTER — ONCOLOGY VISIT (OUTPATIENT)
Dept: ONCOLOGY | Facility: HOSPITAL | Age: 43
End: 2023-03-14
Attending: INTERNAL MEDICINE
Payer: COMMERCIAL

## 2023-03-14 VITALS
DIASTOLIC BLOOD PRESSURE: 76 MMHG | WEIGHT: 209.1 LBS | HEART RATE: 70 BPM | HEIGHT: 74 IN | TEMPERATURE: 98.3 F | OXYGEN SATURATION: 98 % | BODY MASS INDEX: 26.84 KG/M2 | RESPIRATION RATE: 18 BRPM | SYSTOLIC BLOOD PRESSURE: 121 MMHG

## 2023-03-14 DIAGNOSIS — C18.2 MALIGNANT NEOPLASM OF ASCENDING COLON (H): Primary | ICD-10-CM

## 2023-03-14 LAB
ALBUMIN SERPL BCG-MCNC: 4.4 G/DL (ref 3.5–5.2)
ALP SERPL-CCNC: 92 U/L (ref 40–129)
ALT SERPL W P-5'-P-CCNC: 30 U/L (ref 10–50)
ANION GAP SERPL CALCULATED.3IONS-SCNC: 8 MMOL/L (ref 7–15)
AST SERPL W P-5'-P-CCNC: 25 U/L (ref 10–50)
BASOPHILS # BLD AUTO: 0.1 10E3/UL (ref 0–0.2)
BASOPHILS NFR BLD AUTO: 1 %
BILIRUB SERPL-MCNC: 0.3 MG/DL
BUN SERPL-MCNC: 21.6 MG/DL (ref 6–20)
CALCIUM SERPL-MCNC: 9.2 MG/DL (ref 8.6–10)
CHLORIDE SERPL-SCNC: 105 MMOL/L (ref 98–107)
CREAT SERPL-MCNC: 1.11 MG/DL (ref 0.67–1.17)
DEPRECATED HCO3 PLAS-SCNC: 27 MMOL/L (ref 22–29)
EOSINOPHIL # BLD AUTO: 0.3 10E3/UL (ref 0–0.7)
EOSINOPHIL NFR BLD AUTO: 5 %
ERYTHROCYTE [DISTWIDTH] IN BLOOD BY AUTOMATED COUNT: 14.6 % (ref 10–15)
GFR SERPL CREATININE-BSD FRML MDRD: 86 ML/MIN/1.73M2
GLUCOSE SERPL-MCNC: 92 MG/DL (ref 70–99)
HCT VFR BLD AUTO: 41.7 % (ref 40–53)
HGB BLD-MCNC: 13.3 G/DL (ref 13.3–17.7)
IMM GRANULOCYTES # BLD: 0 10E3/UL
IMM GRANULOCYTES NFR BLD: 1 %
LYMPHOCYTES # BLD AUTO: 1.7 10E3/UL (ref 0.8–5.3)
LYMPHOCYTES NFR BLD AUTO: 25 %
MAGNESIUM SERPL-MCNC: 2.2 MG/DL (ref 1.7–2.3)
MCH RBC QN AUTO: 26.1 PG (ref 26.5–33)
MCHC RBC AUTO-ENTMCNC: 31.9 G/DL (ref 31.5–36.5)
MCV RBC AUTO: 82 FL (ref 78–100)
MONOCYTES # BLD AUTO: 0.7 10E3/UL (ref 0–1.3)
MONOCYTES NFR BLD AUTO: 10 %
NEUTROPHILS # BLD AUTO: 4.2 10E3/UL (ref 1.6–8.3)
NEUTROPHILS NFR BLD AUTO: 58 %
NRBC # BLD AUTO: 0 10E3/UL
NRBC BLD AUTO-RTO: 0 /100
PLATELET # BLD AUTO: 251 10E3/UL (ref 150–450)
POTASSIUM SERPL-SCNC: 4.4 MMOL/L (ref 3.4–5.3)
PROT SERPL-MCNC: 7.1 G/DL (ref 6.4–8.3)
RBC # BLD AUTO: 5.09 10E6/UL (ref 4.4–5.9)
SODIUM SERPL-SCNC: 140 MMOL/L (ref 136–145)
WBC # BLD AUTO: 7 10E3/UL (ref 4–11)

## 2023-03-14 PROCEDURE — G0463 HOSPITAL OUTPT CLINIC VISIT: HCPCS | Mod: 25 | Performed by: INTERNAL MEDICINE

## 2023-03-14 PROCEDURE — 258N000003 HC RX IP 258 OP 636: Performed by: INTERNAL MEDICINE

## 2023-03-14 PROCEDURE — 96415 CHEMO IV INFUSION ADDL HR: CPT

## 2023-03-14 PROCEDURE — 85025 COMPLETE CBC W/AUTO DIFF WBC: CPT | Performed by: INTERNAL MEDICINE

## 2023-03-14 PROCEDURE — 80053 COMPREHEN METABOLIC PANEL: CPT | Performed by: INTERNAL MEDICINE

## 2023-03-14 PROCEDURE — 83735 ASSAY OF MAGNESIUM: CPT | Performed by: INTERNAL MEDICINE

## 2023-03-14 PROCEDURE — G0498 CHEMO EXTEND IV INFUS W/PUMP: HCPCS

## 2023-03-14 PROCEDURE — 250N000011 HC RX IP 250 OP 636: Performed by: INTERNAL MEDICINE

## 2023-03-14 PROCEDURE — 96417 CHEMO IV INFUS EACH ADDL SEQ: CPT

## 2023-03-14 PROCEDURE — 96368 THER/DIAG CONCURRENT INF: CPT

## 2023-03-14 PROCEDURE — 96413 CHEMO IV INFUSION 1 HR: CPT

## 2023-03-14 PROCEDURE — 99215 OFFICE O/P EST HI 40 MIN: CPT | Performed by: INTERNAL MEDICINE

## 2023-03-14 PROCEDURE — 96375 TX/PRO/DX INJ NEW DRUG ADDON: CPT

## 2023-03-14 PROCEDURE — 96367 TX/PROPH/DG ADDL SEQ IV INF: CPT

## 2023-03-14 RX ORDER — PROCHLORPERAZINE MALEATE 10 MG
10 TABLET ORAL EVERY 6 HOURS PRN
COMMUNITY
End: 2023-12-12

## 2023-03-14 RX ORDER — ONDANSETRON 4 MG/1
TABLET, FILM COATED ORAL EVERY 8 HOURS PRN
COMMUNITY
End: 2023-04-06

## 2023-03-14 RX ORDER — ONDANSETRON 2 MG/ML
8 INJECTION INTRAMUSCULAR; INTRAVENOUS ONCE
Status: COMPLETED | OUTPATIENT
Start: 2023-03-14 | End: 2023-03-14

## 2023-03-14 RX ORDER — DIPHENHYDRAMINE HYDROCHLORIDE 50 MG/ML
50 INJECTION INTRAMUSCULAR; INTRAVENOUS
Status: CANCELLED
Start: 2023-03-14

## 2023-03-14 RX ORDER — METHYLPREDNISOLONE SODIUM SUCCINATE 125 MG/2ML
125 INJECTION, POWDER, LYOPHILIZED, FOR SOLUTION INTRAMUSCULAR; INTRAVENOUS
Status: CANCELLED
Start: 2023-03-14

## 2023-03-14 RX ORDER — HEPARIN SODIUM,PORCINE 10 UNIT/ML
5 VIAL (ML) INTRAVENOUS
Status: CANCELLED | OUTPATIENT
Start: 2023-03-16

## 2023-03-14 RX ORDER — MEPERIDINE HYDROCHLORIDE 25 MG/ML
25 INJECTION INTRAMUSCULAR; INTRAVENOUS; SUBCUTANEOUS EVERY 30 MIN PRN
Status: CANCELLED | OUTPATIENT
Start: 2023-03-14

## 2023-03-14 RX ORDER — ATROPINE SULFATE 0.4 MG/ML
0.4 AMPUL (ML) INJECTION
Status: CANCELLED | OUTPATIENT
Start: 2023-03-14

## 2023-03-14 RX ORDER — LORAZEPAM 2 MG/ML
0.5 INJECTION INTRAMUSCULAR ONCE
Status: DISPENSED | OUTPATIENT
Start: 2023-03-14

## 2023-03-14 RX ORDER — HEPARIN SODIUM,PORCINE 10 UNIT/ML
5 VIAL (ML) INTRAVENOUS
Status: CANCELLED | OUTPATIENT
Start: 2023-03-14

## 2023-03-14 RX ORDER — LORAZEPAM 2 MG/ML
0.5 INJECTION INTRAMUSCULAR EVERY 4 HOURS PRN
Status: CANCELLED | OUTPATIENT
Start: 2023-03-14

## 2023-03-14 RX ORDER — HEPARIN SODIUM (PORCINE) LOCK FLUSH IV SOLN 100 UNIT/ML 100 UNIT/ML
5 SOLUTION INTRAVENOUS
Status: CANCELLED | OUTPATIENT
Start: 2023-03-16

## 2023-03-14 RX ORDER — ONDANSETRON 2 MG/ML
8 INJECTION INTRAMUSCULAR; INTRAVENOUS ONCE
Status: CANCELLED | OUTPATIENT
Start: 2023-03-14

## 2023-03-14 RX ORDER — ALBUTEROL SULFATE 90 UG/1
1-2 AEROSOL, METERED RESPIRATORY (INHALATION)
Status: CANCELLED
Start: 2023-03-14

## 2023-03-14 RX ORDER — ALBUTEROL SULFATE 0.83 MG/ML
2.5 SOLUTION RESPIRATORY (INHALATION)
Status: CANCELLED | OUTPATIENT
Start: 2023-03-14

## 2023-03-14 RX ORDER — LORAZEPAM 2 MG/ML
0.5 INJECTION INTRAMUSCULAR EVERY 4 HOURS PRN
Status: DISCONTINUED | OUTPATIENT
Start: 2023-03-14 | End: 2023-03-14 | Stop reason: HOSPADM

## 2023-03-14 RX ORDER — HEPARIN SODIUM (PORCINE) LOCK FLUSH IV SOLN 100 UNIT/ML 100 UNIT/ML
5 SOLUTION INTRAVENOUS
Status: CANCELLED | OUTPATIENT
Start: 2023-03-14

## 2023-03-14 RX ORDER — RIBOFLAVIN (VITAMIN B2) 100 MG
100 TABLET ORAL PRN
COMMUNITY
End: 2023-12-12

## 2023-03-14 RX ORDER — EPINEPHRINE 1 MG/ML
0.3 INJECTION, SOLUTION INTRAMUSCULAR; SUBCUTANEOUS EVERY 5 MIN PRN
Status: CANCELLED | OUTPATIENT
Start: 2023-03-14

## 2023-03-14 RX ADMIN — LORAZEPAM 0.5 MG: 2 INJECTION INTRAMUSCULAR; INTRAVENOUS at 15:07

## 2023-03-14 RX ADMIN — ONDANSETRON 8 MG: 2 INJECTION INTRAMUSCULAR; INTRAVENOUS at 11:07

## 2023-03-14 RX ADMIN — DEXAMETHASONE SODIUM PHOSPHATE: 10 INJECTION, SOLUTION INTRAMUSCULAR; INTRAVENOUS at 11:29

## 2023-03-14 RX ADMIN — FAMOTIDINE 20 MG: 10 INJECTION, SOLUTION INTRAVENOUS at 15:06

## 2023-03-14 RX ADMIN — DEXTROSE MONOHYDRATE 250 ML: 50 INJECTION, SOLUTION INTRAVENOUS at 10:42

## 2023-03-14 RX ADMIN — LEUCOVORIN CALCIUM 880 MG: 350 INJECTION, POWDER, LYOPHILIZED, FOR SOLUTION INTRAMUSCULAR; INTRAVENOUS at 13:46

## 2023-03-14 RX ADMIN — OXALIPLATIN 200 MG: 5 INJECTION, SOLUTION INTRAVENOUS at 11:50

## 2023-03-14 RX ADMIN — IRINOTECAN HYDROCHLORIDE 340 MG: 20 INJECTION, SOLUTION INTRAVENOUS at 13:50

## 2023-03-14 RX ADMIN — LORAZEPAM 0.5 MG: 2 INJECTION INTRAMUSCULAR; INTRAVENOUS at 15:24

## 2023-03-14 ASSESSMENT — PAIN SCALES - GENERAL: PAINLEVEL: NO PAIN (0)

## 2023-03-14 NOTE — PROGRESS NOTES
PT here ambulatory for first txt. Orientated pt to txt area. Reviewed txt and duration. Txt administered as ordered and pt tolerated without any problems. Reviewed each medication as given and possible side effects/ how to manage at home. On hour into irinotecan pt started sweating/runny nose and nausea. Ativan 0.5 mg ivp and pepcid ivp given. 30 minutes later irinotecan finished infusing, pt states feeling a little better but nausea continues. Reviewed with Dayami NUNO and may repeat ativan 0.5 mg. Ativan administered with more relief from pt. Pump set up with continuous chemo . Reviewed with pt to return Thursday at 130 for pump discontinue. PT dc'd steady gait and pump confirmed infusing

## 2023-03-14 NOTE — PROGRESS NOTES
Met with patient and provider for C1 appointment.  Labs and vitals reviewed- ok to proceed with protocol.  Patient will return next week for visit and lab check with YAAKOV Stock to make sure he is tolerating his treatment.  Patient will return Thursday this week for pump disconnect and C2 is scheduled for 3/28/23.    JEANNINE Subramanian Research

## 2023-03-14 NOTE — PROGRESS NOTES
[facility] Hematology and Oncology Consult Note    Patient: Luca Araiza  MRN: [id number]  Date of Service: Mar 14, 2023       Reason for Visit    Follow-up for colon cancer    January 2023:    T3N1B, stage IIIb adenocarcinoma of the cecum status post laparoscopic right colectomy, December 21, 2022  CT DNA positive, March 2023  Tumor is MMR intact  Early onset of colon cancer with family history    Patient CT DNA test was positive.  He has randomized to M FOLFIRINOX which is for of the  clinical trial.    He has had Port-A-Cath placed.    Recommend to proceed with chemotherapy with an FOLFIRINOX per the clinical trial.  Discussed how treatment is typically administered.  Reviewed potential side effects including but not limited to alopecia, nausea and vomiting, fatigue, myelosuppression with risk for fever and infection, diarrhea, cold intolerance, and neuropathy.  He understands and is willing to proceed.    Plan is for 12 cycles administered every 2 weeks.  May need to modify treatment based on toxicities.    Discussed use of dexamethasone, Compazine and Zofran for nausea.  Discussed use of Imodium for diarrhea.  Discussed fever precautions and the need to call for temperature greater than 100.5  F.  Discussed management of diarrhea with Imodium.    We will proceed with cycle 1 of chemotherapy today.  He will return in 2 days for pump removal.  He will return in a week for labs and toxicity check.  Return in 2 weeks for cycle 2 of treatment.    Questions answered.  45 minutes spent.    Plan: Proceed with cycle 1 of M FOLFIRINOX on clinical trial today  Follow-up as above    Measurable disease: None postoperatively    Current therapy: Modified FOLFIRINOX day 1 cycle 1, March 14, 2023                  ECOG Performance    0 - Independent    Encounter Diagnoses:    Problem List Items Addressed This Visit        Digestive    Malignant neoplasm of ascending colon (H) - Primary    Relevant Orders    Check  Out Appointment Request    Infusion Appointment Request    Infusion Appointment Request    Infusion Appointment Request    CBC with platelets and differential    Comprehensive metabolic panel (BMP + Alb, Alk Phos, ALT, AST, Total. Bili, TP)    Magnesium           ______________________________________________________________________________    Staging History   Cancer Staging   No matching staging information was found for the patient.      History    Luca is here for reevaluation.  Seen about 6 weeks ago.  His CT DNA test was positive and he was randomized to arm for the clinical trial.  He has had Port-A-Cath placed.  No new symptoms or problems.  ECOG status 0.    January 2023:  Mr. Luca Araiza is a 42 year old no significant past medical history who is been diagnosed and undergone laparoscopic hemicolectomy for colon cancer.  He was in [place] last August and had significant illness with diarrhea and vomiting.  He then had physical which showed that he was anemic and subsequently had colonoscopy showing cecal colon cancer.  He underwent laparoscopic right hemicolectomy on December 21 and has recovered well from this.    No other previous medical history.  He had left ACL repair surgery as a teenager.  No other hospitalizations.  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  Does not smoke and does not drink alcohol.    Father had colon cancer in his mid 60s.  Mother had breast cancer in her late 50s.  No siblings or kids with cancer.  Maternal grandfather had colon cancer in his 60s.  Paternal grandmother had cancer type unknown.            Review of systems.  No fever or night sweats.  No loss of weight.  No lumps or bumps anywhere.  No unusual headaches or eyesight issues.  No dizziness.  No bleeding from the nose.  No sores in the mouth. No problems with swallowing.  No chest pain. No shortness of breath. No cough.  No abdominal pain. No  "nausea or vomiting.  No diarrhea or constipation.  No blood in stool or black colored stools.  No problems passing urine.  No numbness or tingling in hands or feet.  No skin rashes.  A 14 point review of systems is otherwise negative.        Past History    Past Medical History:   Diagnosis Date     Anemia      Malignant neoplasm of ascending colon (H) 11/07/2022     Past Surgical History:   Procedure Laterality Date     COLONOSCOPY       HEMICOLECTOMY, RT/LT Right      IR CHEST PORT PLACEMENT > 5 YRS OF AGE  3/9/2023     REMOVAL OF SPERM DUCT(S)      Description: Surgery Of Male Genitalia Vasectomy;  Recorded: 12/27/2011;  Comments: 12/27/2011     [facility] REPAIR CRUCIATE LIGAMENT,KNEE      Description: Primary Repair Of Knee Ligament Cruciate Anterior;  Recorded: 07/20/2009;     Family History   Problem Relation Age of Onset     Breast Cancer Mother      Colon Cancer Father      Anesthesia Reaction No family hx of      Venous thrombosis No family hx of      Social History     Socioeconomic History     Marital status:      Spouse name: None     Number of children: None     Years of education: None     Highest education level: None   Tobacco Use     Smoking status: Never     Smokeless tobacco: Never   Substance and Sexual Activity     Alcohol use: Not Currently     Drug use: Never     Sexual activity: Yes     Partners: Female     Birth control/protection: Male Surgical   Other Topics Concern     Parent/sibling w/ CABG, MI or angioplasty before 65F 55M? No         Allergies    No Known Allergies        Physical Exam    /76 (BP Location: Right arm, Patient Position: Sitting, Cuff Size: Adult Regular)   Pulse 70   Temp 98.3  F (36.8  C) (Oral)   Resp 18   Ht 1.88 m (6' 2\")   Wt 94.8 kg (209 lb 1.6 oz)   SpO2 98%   BMI 26.85 kg/m        GENERAL: Alert and oriented to time place and person. Seated comfortably. In no distress.    HEAD: Atraumatic and normocephalic.    EYES: LUIS ANGEL, EOMI.  No pallor.  No " icterus.    Oral cavity: no mucosal lesion or tonsillar enlargement.    NECK: supple. JVP normal.  No thyroid enlargement.    LYMPH NODES: No palpable, cervical, axillary or inguinal lymphadenopathy.    CHEST: clear to auscultation bilaterally.  Resonant to percussion throughout bilaterally.  Symmetrical breath movements bilaterally.    CVS: S1 and S2 are heard. Regular rate and rhythm.  No murmur or gallop or rub heard.  No peripheral edema.    ABDOMEN: Soft. Not tender. Not distended.  No palpable hepatomegaly or splenomegaly.  No other mass palpable.  Bowel sounds heard.    EXTREMITIES: Warm.    SKIN: no rash, or bruising or purpura.  Has a full head of hair.    Lab Results    Preoperative CEA was 2.6 and 2.7    Imaging Results    CT and MRI reviewed with no evidence of metastatic disease    IR Chest Port Placement > 5 Yrs of Age    Result Date: 3/9/2023  LOCATION: [facility] DATE: 3/9/2023 PROCEDURE: IMPLANTABLE VENOUS PORT PLACEMENT 1.  Implantable venous access port placement. 2.  Ultrasound guidance for vascular access. A permanent image was stored. 3.  Fluoroscopic guidance for central venous access device placement. INTERVENTIONAL RADIOLOGIST: Tee Fuentes MD INDICATION: Colorectal cancer, plan for port placement. CONSENT: The risks, benefits and alternatives of the procedure were discussed with the patient or representative in detail. All questions were answered. Informed consent was given to proceed with the procedure. MODERATE SEDATION: Versed 2 mg IV; Fentanyl 100 mcg IV. During the time out, immediately prior to the administration of medications, the patient was reassessed for adequacy to receive conscious sedation.  Under physician supervision, Versed and fentanyl were administered for moderate sedation. Pulse oximetry, heart rate and blood pressure were continuously monitored by an independent trained observer. The physician spent 15 minutes of face-to-face sedation time  with the patient. FLUOROSCOPIC TIME: 0.2 minutes. RADIATION DOSE: Air Kerma: 6 mGy. COMPLICATIONS: No immediate complications. UNIVERSAL PROTOCOL: The operative site was marked and any prior imaging was reviewed. Required items including blood products, implants, devices and special equipment was made available. Patient identity was confirmed either verbally, with demographic information, hospital assigned identification or other identification markers. A timeout was performed immediately prior to the procedure. Antibiotics were administered within one hour of port placement. See electronic medical record for dosage details. STERILE BARRIER TECHNIQUE: Maximum sterile barrier technique was used. Cutaneous antisepsis was performed at the operative site with application of 2% chlorhexidine and large sterile drape. Prior to the procedure, the  and assistant performed hand hygiene and wore hat, mask, sterile gown, and sterile gloves during the entire procedure. PROCEDURE:  Using real-time ultrasound guidance the right internal jugular vein was accessed. A subcutaneous pocket was created and irrigated with sterile normal saline. The catheter tubing was tunneled in an antegrade fashion from the port pocket to the dermatotomy  site. Over a guidewire, a peel-away sheath was advanced with fluoroscopic monitoring. Through the peel-away sheath, the catheter was advanced until the tip was at the cavoatrial junction. Positioning of the distal tip was confirmed with a radiograph from the in room fluoroscopic unit. The catheter was cut to length and attached firmly to the port. The port pocket incision was closed with layered absorbable suture and surgical glue. The dermatotomy site was closed with surgical glue. FINDINGS: Ultrasound shows an anechoic and compressible jugular vein. At the completion of the study, the port tip lies at the cavoatrial junction.     IMPRESSION:  Successful power-injectable venous port  placement.         Signed by: Sekou Hall MD

## 2023-03-14 NOTE — PROGRESS NOTES
"Oncology Rooming Note    March 14, 2023 9:47 AM   Luca Araiza is a 42 year old male who presents for:    Chief Complaint   Patient presents with     Oncology Clinic Visit     [facility] - Malignant neoplasm of ascending colon, review labs & Infusion plan     Initial Vitals: /76 (BP Location: Right arm, Patient Position: Sitting, Cuff Size: Adult Regular)   Pulse 70   Temp 98.3  F (36.8  C) (Oral)   Resp 18   Ht 1.88 m (6' 2\")   Wt 94.8 kg (209 lb 1.6 oz)   SpO2 98%   BMI 26.85 kg/m   Estimated body mass index is 26.85 kg/m  as calculated from the following:    Height as of this encounter: 1.88 m (6' 2\").    Weight as of this encounter: 94.8 kg (209 lb 1.6 oz). Body surface area is 2.22 meters squared.  No Pain (0) Comment: Data Unavailable   No LMP for male patient.  Allergies reviewed: Yes  Medications reviewed: Yes    Medications: Medication refills not needed today.  Pharmacy name entered into [employer]: [id number] - [place] [phone]    Clinical concerns: [facility] - Malignant neoplasm of ascending colon, review labs & Infusion plan      Kelley Connolly CMA            "

## 2023-03-14 NOTE — LETTER
"    3/14/2023         RE: Luca Araiza  [address]  [facility] [address]        Dear Colleague,    Thank you for referring your patient, Luca Araiza, to the [facility]. Please see a copy of my visit note below.    Oncology Rooming Note    March 14, 2023 9:47 AM   Luca Araiza is a 42 year old male who presents for:    Chief Complaint   Patient presents with     Oncology Clinic Visit     [facility] - Malignant neoplasm of ascending colon, review labs & Infusion plan     Initial Vitals: /76 (BP Location: Right arm, Patient Position: Sitting, Cuff Size: Adult Regular)   Pulse 70   Temp 98.3  F (36.8  C) (Oral)   Resp 18   Ht 1.88 m (6' 2\")   Wt 94.8 kg (209 lb 1.6 oz)   SpO2 98%   BMI 26.85 kg/m   Estimated body mass index is 26.85 kg/m  as calculated from the following:    Height as of this encounter: 1.88 m (6' 2\").    Weight as of this encounter: 94.8 kg (209 lb 1.6 oz). Body surface area is 2.22 meters squared.  No Pain (0) Comment: Data Unavailable   No LMP for male patient.  Allergies reviewed: Yes  Medications reviewed: Yes    Medications: Medication refills not needed today.  Pharmacy name entered into [employer]: [id number] - [place] [phone]    Clinical concerns: [facility] - Malignant neoplasm of ascending colon, review labs & Infusion plan      Kelley Connolly [facility] Hematology and Oncology Consult Note    Patient: Luca Araiza  MRN: [id number]  Date of Service: Mar 14, 2023       Reason for Visit    Follow-up for colon cancer    January 2023:    T3N1B, stage IIIb adenocarcinoma of the cecum status post laparoscopic right colectomy, December 21, 2022  CT DNA positive, March 2023  Tumor is MMR intact  Early onset of colon cancer with family history    Patient CT DNA test was positive.  He has randomized to M FOLFIRINOX which is for of the  clinical trial.    He has had Port-A-Cath placed.    Recommend to " proceed with chemotherapy with an FOLFIRINOX per the clinical trial.  Discussed how treatment is typically administered.  Reviewed potential side effects including but not limited to alopecia, nausea and vomiting, fatigue, myelosuppression with risk for fever and infection, diarrhea, cold intolerance, and neuropathy.  He understands and is willing to proceed.    Plan is for 12 cycles administered every 2 weeks.  May need to modify treatment based on toxicities.    Discussed use of dexamethasone, Compazine and Zofran for nausea.  Discussed use of Imodium for diarrhea.  Discussed fever precautions and the need to call for temperature greater than 100.5  F.  Discussed management of diarrhea with Imodium.    We will proceed with cycle 1 of chemotherapy today.  He will return in 2 days for pump removal.  He will return in a week for labs and toxicity check.  Return in 2 weeks for cycle 2 of treatment.    Questions answered.  45 minutes spent.    Plan: Proceed with cycle 1 of M FOLFIRINOX on clinical trial today  Follow-up as above    Measurable disease: None postoperatively    Current therapy: Modified FOLFIRINOX day 1 cycle 1, March 14, 2023                  ECOG Performance    0 - Independent    Encounter Diagnoses:    Problem List Items Addressed This Visit        Digestive    Malignant neoplasm of ascending colon (H) - Primary    Relevant Orders    Check Out Appointment Request    Infusion Appointment Request    Infusion Appointment Request    Infusion Appointment Request    CBC with platelets and differential    Comprehensive metabolic panel (BMP + Alb, Alk Phos, ALT, AST, Total. Bili, TP)    Magnesium           ______________________________________________________________________________    Staging History   Cancer Staging   No matching staging information was found for the patient.      History    Luca is here for reevaluation.  Seen about 6 weeks ago.  His CT DNA test was positive and he was randomized to arm  for the clinical trial.  He has had Port-A-Cath placed.  No new symptoms or problems.  ECOG status 0.    January 2023:  Mr. Luca Araiza is a 42 year old no significant past medical history who is been diagnosed and undergone laparoscopic hemicolectomy for colon cancer.  He was in [place] last August and had significant illness with diarrhea and vomiting.  He then had physical which showed that he was anemic and subsequently had colonoscopy showing cecal colon cancer.  He underwent laparoscopic right hemicolectomy on December 21 and has recovered well from this.    No other previous medical history.  He had left ACL repair surgery as a teenager.  No other hospitalizations.  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  Does not smoke and does not drink alcohol.    Father had colon cancer in his mid 60s.  Mother had breast cancer in her late 50s.  No siblings or kids with cancer.  Maternal grandfather had colon cancer in his 60s.  Paternal grandmother had cancer type unknown.            Review of systems.  No fever or night sweats.  No loss of weight.  No lumps or bumps anywhere.  No unusual headaches or eyesight issues.  No dizziness.  No bleeding from the nose.  No sores in the mouth. No problems with swallowing.  No chest pain. No shortness of breath. No cough.  No abdominal pain. No nausea or vomiting.  No diarrhea or constipation.  No blood in stool or black colored stools.  No problems passing urine.  No numbness or tingling in hands or feet.  No skin rashes.  A 14 point review of systems is otherwise negative.        Past History    Past Medical History:   Diagnosis Date     Anemia      Malignant neoplasm of ascending colon (H) 11/07/2022     Past Surgical History:   Procedure Laterality Date     COLONOSCOPY       HEMICOLECTOMY, RT/LT Right      IR CHEST PORT PLACEMENT > 5 YRS OF AGE  3/9/2023     REMOVAL OF SPERM DUCT(S)      Description:  "Surgery Of Male Genitalia Vasectomy;  Recorded: 12/27/2011;  Comments: 12/27/2011     [facility]KNEE      Description: Primary Repair Of Knee Ligament Cruciate Anterior;  Recorded: 07/20/2009;     Family History   Problem Relation Age of Onset     Breast Cancer Mother      Colon Cancer Father      Anesthesia Reaction No family hx of      Venous thrombosis No family hx of      Social History     Socioeconomic History     Marital status:      Spouse name: None     Number of children: None     Years of education: None     Highest education level: None   Tobacco Use     Smoking status: Never     Smokeless tobacco: Never   Substance and Sexual Activity     Alcohol use: Not Currently     Drug use: Never     Sexual activity: Yes     Partners: Female     Birth control/protection: Male Surgical   Other Topics Concern     Parent/sibling w/ CABG, MI or angioplasty before 65F 55M? No         Allergies    No Known Allergies        Physical Exam    /76 (BP Location: Right arm, Patient Position: Sitting, Cuff Size: Adult Regular)   Pulse 70   Temp 98.3  F (36.8  C) (Oral)   Resp 18   Ht 1.88 m (6' 2\")   Wt 94.8 kg (209 lb 1.6 oz)   SpO2 98%   BMI 26.85 kg/m        GENERAL: Alert and oriented to time place and person. Seated comfortably. In no distress.    HEAD: Atraumatic and normocephalic.    EYES: LUIS ANGEL, EOMI.  No pallor.  No icterus.    Oral cavity: no mucosal lesion or tonsillar enlargement.    NECK: supple. JVP normal.  No thyroid enlargement.    LYMPH NODES: No palpable, cervical, axillary or inguinal lymphadenopathy.    CHEST: clear to auscultation bilaterally.  Resonant to percussion throughout bilaterally.  Symmetrical breath movements bilaterally.    CVS: S1 and S2 are heard. Regular rate and rhythm.  No murmur or gallop or rub heard.  No peripheral edema.    ABDOMEN: Soft. Not tender. Not distended.  No palpable hepatomegaly or splenomegaly.  No other mass palpable.  Bowel sounds " heard.    EXTREMITIES: Warm.    SKIN: no rash, or bruising or purpura.  Has a full head of hair.    Lab Results    Preoperative CEA was 2.6 and 2.7    Imaging Results    CT and MRI reviewed with no evidence of metastatic disease    IR Chest Port Placement > 5 Yrs of Age    Result Date: 3/9/2023  LOC[facility]: [facility] DATE: 3/9/2023 PROCEDURE: IMPLANTABLE VENOUS PORT PLACEMENT 1.  Implantable venous access port placement. 2.  Ultrasound guidance for vascular access. A permanent image was stored. 3.  Fluoroscopic guidance for central venous access device placement. INTERVENTIONAL RADIOLOGIST: Tee Fuentes MD INDICATION: Colorectal cancer, plan for port placement. CONSENT: The risks, benefits and alternatives of the procedure were discussed with the patient or representative in detail. All questions were answered. Informed consent was given to proceed with the procedure. MODERATE SEDATION: Versed 2 mg IV; Fentanyl 100 mcg IV. During the time out, immediately prior to the administration of medications, the patient was reassessed for adequacy to receive conscious sedation.  Under physician supervision, Versed and fentanyl were administered for moderate sedation. Pulse oximetry, heart rate and blood pressure were continuously monitored by an independent trained observer. The physician spent 15 minutes of face-to-face sedation time with the patient. FLUOROSCOPIC TIME: 0.2 minutes. RADIATION DOSE: Air Kerma: 6 mGy. COMPLICATIONS: No immediate complications. UNIVERSAL PROTOCOL: The operative site was marked and any prior imaging was reviewed. Required items including blood products, implants, devices and special equipment was made available. Patient identity was confirmed either verbally, with demographic information, hospital assigned identification or other identification markers. A timeout was performed immediately prior to the procedure. Antibiotics were administered within one hour of port  placement. See electronic medical record for dosage details. STERILE BARRIER TECHNIQUE: Maximum sterile barrier technique was used. Cutaneous antisepsis was performed at the operative site with application of 2% chlorhexidine and large sterile drape. Prior to the procedure, the  and assistant performed hand hygiene and wore hat, mask, sterile gown, and sterile gloves during the entire procedure. PROCEDURE:  Using real-time ultrasound guidance the right internal jugular vein was accessed. A subcutaneous pocket was created and irrigated with sterile normal saline. The catheter tubing was tunneled in an antegrade fashion from the port pocket to the dermatotomy  site. Over a guidewire, a peel-away sheath was advanced with fluoroscopic monitoring. Through the peel-away sheath, the catheter was advanced until the tip was at the cavoatrial junction. Positioning of the distal tip was confirmed with a radiograph from the in room fluoroscopic unit. The catheter was cut to length and attached firmly to the port. The port pocket incision was closed with layered absorbable suture and surgical glue. The dermatotomy site was closed with surgical glue. FINDINGS: Ultrasound shows an anechoic and compressible jugular vein. At the completion of the study, the port tip lies at the cavoatrial junction.     IMPRESSION:  Successful power-injectable venous port placement.         Signed by: Sekou Hall MD      Again, thank you for allowing me to participate in the care of your patient.        Sincerely,        Sekou Hall MD

## 2023-03-16 ENCOUNTER — INFUSION THERAPY VISIT (OUTPATIENT)
Dept: INFUSION THERAPY | Facility: HOSPITAL | Age: 43
End: 2023-03-16
Attending: INTERNAL MEDICINE
Payer: COMMERCIAL

## 2023-03-16 VITALS
RESPIRATION RATE: 18 BRPM | SYSTOLIC BLOOD PRESSURE: 132 MMHG | OXYGEN SATURATION: 96 % | DIASTOLIC BLOOD PRESSURE: 68 MMHG | TEMPERATURE: 97.8 F | HEART RATE: 78 BPM

## 2023-03-16 DIAGNOSIS — C18.2 MALIGNANT NEOPLASM OF ASCENDING COLON (H): Primary | ICD-10-CM

## 2023-03-16 PROCEDURE — 250N000011 HC RX IP 250 OP 636: Performed by: INTERNAL MEDICINE

## 2023-03-16 RX ORDER — HEPARIN SODIUM (PORCINE) LOCK FLUSH IV SOLN 100 UNIT/ML 100 UNIT/ML
5 SOLUTION INTRAVENOUS
Status: DISCONTINUED | OUTPATIENT
Start: 2023-03-16 | End: 2023-03-16 | Stop reason: HOSPADM

## 2023-03-16 RX ADMIN — Medication 5 ML: at 13:37

## 2023-03-16 NOTE — PROGRESS NOTES
Infusion Nursing Note:  Luca Araiza presents today for cycle 1 pump disconnect.    Patient seen by provider today: No   present during visit today: Not Applicable.    Note: Luca arrived ambulatory and in stable condition. Reported some mild nausea and noted some looser stools. No issues with his pump since being connected. He was disconnected and port was de-accessed and site covered with gauze and secured with paper tape. Will return on 3/23 for next appointment.     Intravenous Access:  Implanted Port.    Treatment Conditions:  Not Applicable.    Post Infusion Assessment:  Patient tolerated infusion without incident.  Blood return noted pre and post infusion.  Site patent and intact, free from redness, edema or discomfort.  No evidence of extravasations.  Access discontinued per protocol.     Discharge Plan:   Patient and/or family verbalized understanding of discharge instructions and all questions answered.  AVS to patient via [employer].  Patient will return 3/23 for next appointment.   Patient discharged in stable condition accompanied by: self.  Departure Mode: Ambulatory.      Aria Adams RN

## 2023-03-17 ENCOUNTER — PATIENT OUTREACH (OUTPATIENT)
Dept: ONCOLOGY | Facility: HOSPITAL | Age: 43
End: 2023-03-17

## 2023-03-17 NOTE — PROGRESS NOTES
[facility]: Cancer Care                                                                                      RN Cancer Care Coordinator calls patient for wellbeing check after chemo new start on Tuesday 3/14. He reports that he has had a little bit of nausea for which he took antiemetics on Wednesday and then not much on Thursday.  He did not have vomiting.    He asks if it is ok to take Prilosec OTC because he feels more acidic than usual. Writer tells him this is a good choice to take.     He had a little bit of loose stool and took imodium.   Also had fatigue.    He sounds upbeat and positive. Is following recommendations for preemptively addressing side effects.     Writer went over with him how to call on-call MD during weekend if needed. Let him know he may feel worse in the next few days and then it will turn around. Cycle is 14 days long.    He verbalized understanding and appreciation for call.     Signature:  Mary Beth Resendiz RN

## 2023-03-23 ENCOUNTER — LAB (OUTPATIENT)
Dept: INFUSION THERAPY | Facility: HOSPITAL | Age: 43
End: 2023-03-23
Attending: INTERNAL MEDICINE
Payer: COMMERCIAL

## 2023-03-23 ENCOUNTER — ONCOLOGY VISIT (OUTPATIENT)
Dept: ONCOLOGY | Facility: HOSPITAL | Age: 43
End: 2023-03-23
Attending: INTERNAL MEDICINE
Payer: COMMERCIAL

## 2023-03-23 VITALS
HEIGHT: 74 IN | WEIGHT: 205.3 LBS | OXYGEN SATURATION: 98 % | SYSTOLIC BLOOD PRESSURE: 126 MMHG | BODY MASS INDEX: 26.35 KG/M2 | DIASTOLIC BLOOD PRESSURE: 83 MMHG | RESPIRATION RATE: 18 BRPM | HEART RATE: 79 BPM | TEMPERATURE: 97.9 F

## 2023-03-23 DIAGNOSIS — Z80.0 FAMILY HISTORY OF COLON CANCER IN FATHER: ICD-10-CM

## 2023-03-23 DIAGNOSIS — D50.0 IRON DEFICIENCY ANEMIA DUE TO CHRONIC BLOOD LOSS: ICD-10-CM

## 2023-03-23 DIAGNOSIS — Z80.0 FAMILY HISTORY OF COLON CANCER: ICD-10-CM

## 2023-03-23 DIAGNOSIS — C18.2 MALIGNANT NEOPLASM OF ASCENDING COLON (H): ICD-10-CM

## 2023-03-23 DIAGNOSIS — C18.2 MALIGNANT NEOPLASM OF ASCENDING COLON (H): Primary | ICD-10-CM

## 2023-03-23 DIAGNOSIS — Z80.3 FAMILY HISTORY OF MALIGNANT NEOPLASM OF BREAST: ICD-10-CM

## 2023-03-23 DIAGNOSIS — Z80.0 FAMILY HISTORY OF PANCREATIC CANCER: ICD-10-CM

## 2023-03-23 LAB
ALBUMIN SERPL BCG-MCNC: 4.4 G/DL (ref 3.5–5.2)
ALP SERPL-CCNC: 90 U/L (ref 40–129)
ALT SERPL W P-5'-P-CCNC: 20 U/L (ref 10–50)
ANION GAP SERPL CALCULATED.3IONS-SCNC: 10 MMOL/L (ref 7–15)
AST SERPL W P-5'-P-CCNC: 18 U/L (ref 10–50)
BASOPHILS # BLD AUTO: 0 10E3/UL (ref 0–0.2)
BASOPHILS NFR BLD AUTO: 1 %
BILIRUB SERPL-MCNC: 0.2 MG/DL
BUN SERPL-MCNC: 23.2 MG/DL (ref 6–20)
CALCIUM SERPL-MCNC: 9.4 MG/DL (ref 8.6–10)
CHLORIDE SERPL-SCNC: 108 MMOL/L (ref 98–107)
CREAT SERPL-MCNC: 1.06 MG/DL (ref 0.67–1.17)
DEPRECATED HCO3 PLAS-SCNC: 24 MMOL/L (ref 22–29)
EOSINOPHIL # BLD AUTO: 0.2 10E3/UL (ref 0–0.7)
EOSINOPHIL NFR BLD AUTO: 3 %
ERYTHROCYTE [DISTWIDTH] IN BLOOD BY AUTOMATED COUNT: 14.1 % (ref 10–15)
GFR SERPL CREATININE-BSD FRML MDRD: 90 ML/MIN/1.73M2
GLUCOSE SERPL-MCNC: 132 MG/DL (ref 70–99)
HCT VFR BLD AUTO: 40.7 % (ref 40–53)
HGB BLD-MCNC: 13.3 G/DL (ref 13.3–17.7)
IMM GRANULOCYTES # BLD: 0.1 10E3/UL
IMM GRANULOCYTES NFR BLD: 1 %
LYMPHOCYTES # BLD AUTO: 1.3 10E3/UL (ref 0.8–5.3)
LYMPHOCYTES NFR BLD AUTO: 20 %
MAGNESIUM SERPL-MCNC: 2 MG/DL (ref 1.7–2.3)
MCH RBC QN AUTO: 26.5 PG (ref 26.5–33)
MCHC RBC AUTO-ENTMCNC: 32.7 G/DL (ref 31.5–36.5)
MCV RBC AUTO: 81 FL (ref 78–100)
MONOCYTES # BLD AUTO: 0.5 10E3/UL (ref 0–1.3)
MONOCYTES NFR BLD AUTO: 7 %
NEUTROPHILS # BLD AUTO: 4.6 10E3/UL (ref 1.6–8.3)
NEUTROPHILS NFR BLD AUTO: 68 %
NRBC # BLD AUTO: 0 10E3/UL
NRBC BLD AUTO-RTO: 0 /100
PLATELET # BLD AUTO: 210 10E3/UL (ref 150–450)
POTASSIUM SERPL-SCNC: 4.1 MMOL/L (ref 3.4–5.3)
PROT SERPL-MCNC: 7 G/DL (ref 6.4–8.3)
RBC # BLD AUTO: 5.01 10E6/UL (ref 4.4–5.9)
SODIUM SERPL-SCNC: 142 MMOL/L (ref 136–145)
WBC # BLD AUTO: 6.6 10E3/UL (ref 4–11)

## 2023-03-23 PROCEDURE — 83735 ASSAY OF MAGNESIUM: CPT

## 2023-03-23 PROCEDURE — 85025 COMPLETE CBC W/AUTO DIFF WBC: CPT

## 2023-03-23 PROCEDURE — 250N000011 HC RX IP 250 OP 636: Performed by: INTERNAL MEDICINE

## 2023-03-23 PROCEDURE — G0463 HOSPITAL OUTPT CLINIC VISIT: HCPCS | Performed by: NURSE PRACTITIONER

## 2023-03-23 PROCEDURE — 80053 COMPREHEN METABOLIC PANEL: CPT

## 2023-03-23 PROCEDURE — 36591 DRAW BLOOD OFF VENOUS DEVICE: CPT

## 2023-03-23 PROCEDURE — 99215 OFFICE O/P EST HI 40 MIN: CPT | Performed by: NURSE PRACTITIONER

## 2023-03-23 RX ORDER — DEXAMETHASONE 4 MG/1
8 TABLET ORAL DAILY
Qty: 44 TABLET | Refills: 0 | Status: SHIPPED | OUTPATIENT
Start: 2023-03-23 | End: 2023-09-12

## 2023-03-23 RX ORDER — PALONOSETRON 0.05 MG/ML
0.25 INJECTION, SOLUTION INTRAVENOUS ONCE
Status: CANCELLED
Start: 2023-03-28

## 2023-03-23 RX ORDER — HEPARIN SODIUM (PORCINE) LOCK FLUSH IV SOLN 100 UNIT/ML 100 UNIT/ML
5 SOLUTION INTRAVENOUS
Status: DISCONTINUED | OUTPATIENT
Start: 2023-03-23 | End: 2023-03-23 | Stop reason: HOSPADM

## 2023-03-23 RX ORDER — LOPERAMIDE HCL 2 MG
2 CAPSULE ORAL 4 TIMES DAILY PRN
COMMUNITY
End: 2023-03-28

## 2023-03-23 RX ORDER — HEPARIN SODIUM (PORCINE) LOCK FLUSH IV SOLN 100 UNIT/ML 100 UNIT/ML
5 SOLUTION INTRAVENOUS
Status: CANCELLED | OUTPATIENT
Start: 2023-03-23

## 2023-03-23 RX ADMIN — Medication 5 ML: at 08:22

## 2023-03-23 ASSESSMENT — PAIN SCALES - GENERAL: PAINLEVEL: NO PAIN (0)

## 2023-03-23 NOTE — PROGRESS NOTES
[facility] Hematology and Oncology Progress Note    Patient: Luca Araiza  MRN: [id number]  Date of Service: 03/23/2023        Reason for Visit    Chief Complaint   Patient presents with     Oncology Clinic Visit     Malignant neoplasm of ascending colon (H)       Assessment and Plan     Cancer Staging   No matching staging information was found for the patient.    1. Colon Cancer, Stage IIIb [id number], adenocarcinoma of cecum, Dx December 2022: pt is on clinical trial due to positive circulating tumor cells on DNA testing. Pt started FOLFIRINOX last week. The overall plan is to give 12 cycles. Will return next week for cycle 2.     2. Slight reaction to irinotecan: received ativan, pepcid during infusion due to feeling sweaty/runny nose/nausea. Needed one more dose of ativan before he left, but did feel a little better. Had a rough night at home and then 3 days with mild to moderate nausea. We will give pepcid, atropine and ativan as premed this time. May give another dose of atropine and ativan if needed. Will also change from zofran to aloxi. Increase dexamethasone slightly. If nausea continues, may have to add in oral olanzapine at home for 5 days.   Also, continue to use famotidine/tums at home. Antonette prodcuts. Sea Bands.     3. Early onset cancer: getting genetic testing done. Blood drawn today.  We talked today about how he is doing mentally.  He does have 3 children that are 1113 and 15 and states that their 13-year-old is having a hard time.  I offered our psychotherapy services as well as encouraged them to touch base with their school .  They also mention that one of his friends had a mom who went through chemo so encouraged him to talk with his friend about those issues.        ECOG Performance    0 - Independent    Distress Screening (within last 30 days)    No data recorded     Pain  Pain Score: No Pain (0)    Problem List    Patient Active Problem List   Diagnosis     Family  "history of colon cancer in father     Malignant neoplasm of ascending colon (H)     Iron deficiency anemia due to chronic blood loss     Colon cancer (H)        ______________________________________________________________________________    History of Present Illness    Measurable Disease: None postoperatively.  Patient was diagnosed with a viral illness last year and then was found to be anemic so then had a colonoscopy that showed a cecal colon cancer.    Treatment:   Underwent laparoscopic right hemicolectomy December 21, 2022.  Patient started on clinical trial, .  He was randomized to modified FOLFIRINOX.  He started on March 16, 2023.  Today is cycle 1, day 8.    Interim History: Patient is here today for a toxicity check.  He states last couple days has been feeling pretty good.  He really struggled the day of his infusion.  He states that he had significant issues with feeling very nauseated and feeling sweaty and a lot of it.  He ended up getting 2 doses of Ativan as well as atropine and Pepcid.  He said he got through the infusion and did not throw up but felt pretty crummy that whole evening and into the night.  He said then he had about 3 days of mild low-grade nausea.  He did not throw up but did take some antiemetics that probably helped a little bit.  He states that the last couple days he is actually felt pretty good.  He had some mild diarrhea that he took Imodium for and that seemed to help.  No new bone or back pain.  No bleeding complications.  No infectious complaints.    Review of Systems    Pertinent items are noted in HPI.    Past History    Past Medical History:   Diagnosis Date     Anemia      Malignant neoplasm of ascending colon (H) 11/07/2022       PHYSICAL EXAM  /83   Pulse 79   Temp 97.9  F (36.6  C)   Resp 18   Ht 1.88 m (6' 2\")   Wt 93.1 kg (205 lb 4.8 oz)   SpO2 98%   BMI 26.36 kg/m      GENERAL: no acute distress. Cooperative in conversation. Here with wife. " Mask on  RESP: Regular respiratory rate. No expiratory wheezes   MUSCULOSKELETAL: no bilateral leg swelling  NEURO: non focal. Alert and oriented x3.   PSYCH: within normal limits. No depression or anxiety.  SKIN: exposed skin is dry intact.     Lab Results    Recent Results (from the past 168 hour(s))   Comprehensive metabolic panel (BMP + Alb, Alk Phos, ALT, AST, Total. Bili, TP)   Result Value Ref Range    Sodium 142 136 - 145 mmol/L    Potassium 4.1 3.4 - 5.3 mmol/L    Chloride 108 (H) 98 - 107 mmol/L    Carbon Dioxide (CO2) 24 22 - 29 mmol/L    Anion Gap 10 7 - 15 mmol/L    Urea Nitrogen 23.2 (H) 6.0 - 20.0 mg/dL    Creatinine 1.06 0.67 - 1.17 mg/dL    Calcium 9.4 8.6 - 10.0 mg/dL    Glucose 132 (H) 70 - 99 mg/dL    Alkaline Phosphatase 90 40 - 129 U/L    AST 18 10 - 50 U/L    ALT 20 10 - 50 U/L    Protein Total 7.0 6.4 - 8.3 g/dL    Albumin 4.4 3.5 - 5.2 g/dL    Bilirubin Total 0.2 <=1.2 mg/dL    GFR Estimate 90 >60 mL/min/1.73m2   Magnesium   Result Value Ref Range    Magnesium 2.0 1.7 - 2.3 mg/dL   CBC with platelets and differential   Result Value Ref Range    WBC Count 6.6 4.0 - 11.0 10e3/uL    RBC Count 5.01 4.40 - 5.90 10e6/uL    Hemoglobin 13.3 13.3 - 17.7 g/dL    Hematocrit 40.7 40.0 - 53.0 %    MCV 81 78 - 100 fL    MCH 26.5 26.5 - 33.0 pg    MCHC 32.7 31.5 - 36.5 g/dL    RDW 14.1 10.0 - 15.0 %    Platelet Count 210 150 - 450 10e3/uL    % Neutrophils 68 %    % Lymphocytes 20 %    % Monocytes 7 %    % Eosinophils 3 %    % Basophils 1 %    % Immature Granulocytes 1 %    NRBCs per 100 WBC 0 <1 /100    Absolute Neutrophils 4.6 1.6 - 8.3 10e3/uL    Absolute Lymphocytes 1.3 0.8 - 5.3 10e3/uL    Absolute Monocytes 0.5 0.0 - 1.3 10e3/uL    Absolute Eosinophils 0.2 0.0 - 0.7 10e3/uL    Absolute Basophils 0.0 0.0 - 0.2 10e3/uL    Absolute Immature Granulocytes 0.1 <=0.4 10e3/uL    Absolute NRBCs 0.0 10e3/uL       Imaging    IR Chest Port Placement > 5 Yrs of Age    Result Date: 3/9/2023  LOCATION: [facility]  [facility] DATE: 3/9/2023 PROCEDURE: IMPLANTABLE VENOUS PORT PLACEMENT 1.  Implantable venous access port placement. 2.  Ultrasound guidance for vascular access. A permanent image was stored. 3.  Fluoroscopic guidance for central venous access device placement. INTERVENTIONAL RADIOLOGIST: Tee Fuentes MD INDICATION: Colorectal cancer, plan for port placement. CONSENT: The risks, benefits and alternatives of the procedure were discussed with the patient or representative in detail. All questions were answered. Informed consent was given to proceed with the procedure. MODERATE SEDATION: Versed 2 mg IV; Fentanyl 100 mcg IV. During the time out, immediately prior to the administration of medications, the patient was reassessed for adequacy to receive conscious sedation.  Under physician supervision, Versed and fentanyl were administered for moderate sedation. Pulse oximetry, heart rate and blood pressure were continuously monitored by an independent trained observer. The physician spent 15 minutes of face-to-face sedation time with the patient. FLUOROSCOPIC TIME: 0.2 minutes. RADIATION DOSE: Air Kerma: 6 mGy. COMPLICATIONS: No immediate complications. UNIVERSAL PROTOCOL: The operative site was marked and any prior imaging was reviewed. Required items including blood products, implants, devices and special equipment was made available. Patient identity was confirmed either verbally, with demographic information, hospital assigned identification or other identification markers. A timeout was performed immediately prior to the procedure. Antibiotics were administered within one hour of port placement. See electronic medical record for dosage details. STERILE BARRIER TECHNIQUE: Maximum sterile barrier technique was used. Cutaneous antisepsis was performed at the operative site with application of 2% chlorhexidine and large sterile drape. Prior to the procedure, the  and assistant performed hand  hygiene and wore hat, mask, sterile gown, and sterile gloves during the entire procedure. PROCEDURE:  Using real-time ultrasound guidance the right internal jugular vein was accessed. A subcutaneous pocket was created and irrigated with sterile normal saline. The catheter tubing was tunneled in an antegrade fashion from the port pocket to the dermatotomy  site. Over a guidewire, a peel-away sheath was advanced with fluoroscopic monitoring. Through the peel-away sheath, the catheter was advanced until the tip was at the cavoatrial junction. Positioning of the distal tip was confirmed with a radiograph from the in room fluoroscopic unit. The catheter was cut to length and attached firmly to the port. The port pocket incision was closed with layered absorbable suture and surgical glue. The dermatotomy site was closed with surgical glue. FINDINGS: Ultrasound shows an anechoic and compressible jugular vein. At the completion of the study, the port tip lies at the cavoatrial junction.     IMPRESSION:  Successful power-injectable venous port placement.     Total time spent with patient in face to face time, chart review and documentation was 40 minutes.        Signed by: RAQUEL Grey CNP

## 2023-03-23 NOTE — PROGRESS NOTES
"Oncology Rooming Note    March 23, 2023 8:39 AM   Luca Araiza is a 42 year old male who presents for:    Chief Complaint   Patient presents with     Oncology Clinic Visit     Malignant neoplasm of ascending colon (H)     Initial Vitals: /83   Pulse 79   Temp 97.9  F (36.6  C)   Resp 18   Ht 1.88 m (6' 2\")   Wt 93.1 kg (205 lb 4.8 oz)   SpO2 98%   BMI 26.36 kg/m   Estimated body mass index is 26.36 kg/m  as calculated from the following:    Height as of this encounter: 1.88 m (6' 2\").    Weight as of this encounter: 93.1 kg (205 lb 4.8 oz). Body surface area is 2.2 meters squared.  No Pain (0) Comment: Data Unavailable   No LMP for male patient.  Allergies reviewed: Yes  Medications reviewed: Yes    Medications: yes. yes  Pharmacy name entered into EPIC:    [id number] - [facility], [place] - [address] [facility], [place] - [address] [place] [address] [place]ARM[place], [place] - [address] [facility] [place] [address] [facility] [place] - [address]    Clinical concerns: None       Gege Burton LPN            "

## 2023-03-23 NOTE — LETTER
3/23/2023         RE: Luca Araiza  [address]  [place] [address]        Dear Colleague,    Thank you for referring your patient, Luca Araiza, to the [facility] [place]. Please see a copy of my visit note below.    [facility] Hematology and Oncology Progress Note    Patient: Luca Araiza  MRN: [id number]  Date of Service: 03/23/2023        Reason for Visit    Chief Complaint   Patient presents with     Oncology Clinic Visit     Malignant neoplasm of ascending colon (H)       Assessment and Plan     Cancer Staging   No matching staging information was found for the patient.    1. Colon Cancer, Stage IIIb [id number], adenocarcinoma of cecum, Dx December 2022: pt is on clinical trial due to positive circulating tumor cells on DNA testing. Pt started FOLFIRINOX last week. The overall plan is to give 12 cycles. Will return next week for cycle 2.     2. Slight reaction to irinotecan: received ativan, pepcid during infusion due to feeling sweaty/runny nose/nausea. Needed one more dose of ativan before he left, but did feel a little better. Had a rough night at home and then 3 days with mild to moderate nausea. We will give pepcid, atropine and ativan as premed this time. May give another dose of atropine and ativan if needed. Will also change from zofran to aloxi. Increase dexamethasone slightly. If nausea continues, may have to add in oral olanzapine at home for 5 days.   Also, continue to use famotidine/tums at home. Antonette prodcuts. Sea Bands.     3. Early onset cancer: getting genetic testing done. Blood drawn today.  We talked today about how he is doing mentally.  He does have 3 children that are 1113 and 15 and states that their 13-year-old is having a hard time.  I offered our psychotherapy services as well as encouraged them to touch base with their school .  They also mention that one of his friends had a mom who went through chemo so encouraged him to talk with his  friend about those issues.        ECOG Performance    0 - Independent    Distress Screening (within last 30 days)    No data recorded     Pain  Pain Score: No Pain (0)    Problem List    Patient Active Problem List   Diagnosis     Family history of colon cancer in father     Malignant neoplasm of ascending colon (H)     Iron deficiency anemia due to chronic blood loss     Colon cancer (H)        ______________________________________________________________________________    History of Present Illness    Measurable Disease: None postoperatively.  Patient was diagnosed with a viral illness last year and then was found to be anemic so then had a colonoscopy that showed a cecal colon cancer.    Treatment:   Underwent laparoscopic right hemicolectomy December 21, 2022.  Patient started on clinical trial, .  He was randomized to modified FOLFIRINOX.  He started on March 16, 2023.  Today is cycle 1, day 8.    Interim History: Patient is here today for a toxicity check.  He states last couple days has been feeling pretty good.  He really struggled the day of his infusion.  He states that he had significant issues with feeling very nauseated and feeling sweaty and a lot of it.  He ended up getting 2 doses of Ativan as well as atropine and Pepcid.  He said he got through the infusion and did not throw up but felt pretty crummy that whole evening and into the night.  He said then he had about 3 days of mild low-grade nausea.  He did not throw up but did take some antiemetics that probably helped a little bit.  He states that the last couple days he is actually felt pretty good.  He had some mild diarrhea that he took Imodium for and that seemed to help.  No new bone or back pain.  No bleeding complications.  No infectious complaints.    Review of Systems    Pertinent items are noted in HPI.    Past History    Past Medical History:   Diagnosis Date     Anemia      Malignant neoplasm of ascending colon (H) 11/07/2022  "      PHYSICAL EXAM  /83   Pulse 79   Temp 97.9  F (36.6  C)   Resp 18   Ht 1.88 m (6' 2\")   Wt 93.1 kg (205 lb 4.8 oz)   SpO2 98%   BMI 26.36 kg/m      GENERAL: no acute distress. Cooperative in conversation. Here with wife. Mask on  RESP: Regular respiratory rate. No expiratory wheezes   MUSCULOSKELETAL: no bilateral leg swelling  NEURO: non focal. Alert and oriented x3.   PSYCH: within normal limits. No depression or anxiety.  SKIN: exposed skin is dry intact.     Lab Results    Recent Results (from the past 168 hour(s))   Comprehensive metabolic panel (BMP + Alb, Alk Phos, ALT, AST, Total. Bili, TP)   Result Value Ref Range    Sodium 142 136 - 145 mmol/L    Potassium 4.1 3.4 - 5.3 mmol/L    Chloride 108 (H) 98 - 107 mmol/L    Carbon Dioxide (CO2) 24 22 - 29 mmol/L    Anion Gap 10 7 - 15 mmol/L    Urea Nitrogen 23.2 (H) 6.0 - 20.0 mg/dL    Creatinine 1.06 0.67 - 1.17 mg/dL    Calcium 9.4 8.6 - 10.0 mg/dL    Glucose 132 (H) 70 - 99 mg/dL    Alkaline Phosphatase 90 40 - 129 U/L    AST 18 10 - 50 U/L    ALT 20 10 - 50 U/L    Protein Total 7.0 6.4 - 8.3 g/dL    Albumin 4.4 3.5 - 5.2 g/dL    Bilirubin Total 0.2 <=1.2 mg/dL    GFR Estimate 90 >60 mL/min/1.73m2   Magnesium   Result Value Ref Range    Magnesium 2.0 1.7 - 2.3 mg/dL   CBC with platelets and differential   Result Value Ref Range    WBC Count 6.6 4.0 - 11.0 10e3/uL    RBC Count 5.01 4.40 - 5.90 10e6/uL    Hemoglobin 13.3 13.3 - 17.7 g/dL    Hematocrit 40.7 40.0 - 53.0 %    MCV 81 78 - 100 fL    MCH 26.5 26.5 - 33.0 pg    MCHC 32.7 31.5 - 36.5 g/dL    RDW 14.1 10.0 - 15.0 %    Platelet Count 210 150 - 450 10e3/uL    % Neutrophils 68 %    % Lymphocytes 20 %    % Monocytes 7 %    % Eosinophils 3 %    % Basophils 1 %    % Immature Granulocytes 1 %    NRBCs per 100 WBC 0 <1 /100    Absolute Neutrophils 4.6 1.6 - 8.3 10e3/uL    Absolute Lymphocytes 1.3 0.8 - 5.3 10e3/uL    Absolute Monocytes 0.5 0.0 - 1.3 10e3/uL    Absolute Eosinophils 0.2 0.0 - 0.7 " 10e3/uL    Absolute Basophils 0.0 0.0 - 0.2 10e3/uL    Absolute Immature Granulocytes 0.1 <=0.4 10e3/uL    Absolute NRBCs 0.0 10e3/uL       Imaging    IR Chest Port Placement > 5 Yrs of Age    Result Date: 3/9/2023  LOCATION: [facility] DATE: 3/9/2023 PROCEDURE: IMPLANTABLE VENOUS PORT PLACEMENT 1.  Implantable venous access port placement. 2.  Ultrasound guidance for vascular access. A permanent image was stored. 3.  Fluoroscopic guidance for central venous access device placement. INTERVENTIONAL RADIOLOGIST: Tee Fuentes MD INDICATION: Colorectal cancer, plan for port placement. CONSENT: The risks, benefits and alternatives of the procedure were discussed with the patient or representative in detail. All questions were answered. Informed consent was given to proceed with the procedure. MODERATE SEDATION: Versed 2 mg IV; Fentanyl 100 mcg IV. During the time out, immediately prior to the administration of medications, the patient was reassessed for adequacy to receive conscious sedation.  Under physician supervision, Versed and fentanyl were administered for moderate sedation. Pulse oximetry, heart rate and blood pressure were continuously monitored by an independent trained observer. The physician spent 15 minutes of face-to-face sedation time with the patient. FLUOROSCOPIC TIME: 0.2 minutes. RADIATION DOSE: Air Kerma: 6 mGy. COMPLICATIONS: No immediate complications. UNIVERSAL PROTOCOL: The operative site was marked and any prior imaging was reviewed. Required items including blood products, implants, devices and special equipment was made available. Patient identity was confirmed either verbally, with demographic information, hospital assigned identification or other identification markers. A timeout was performed immediately prior to the procedure. Antibiotics were administered within one hour of port placement. See electronic medical record for dosage details. STERILE BARRIER  "TECHNIQUE: Maximum sterile barrier technique was used. Cutaneous antisepsis was performed at the operative site with application of 2% chlorhexidine and large sterile drape. Prior to the procedure, the  and assistant performed hand hygiene and wore hat, mask, sterile gown, and sterile gloves during the entire procedure. PROCEDURE:  Using real-time ultrasound guidance the right internal jugular vein was accessed. A subcutaneous pocket was created and irrigated with sterile normal saline. The catheter tubing was tunneled in an antegrade fashion from the port pocket to the dermatotomy  site. Over a guidewire, a peel-away sheath was advanced with fluoroscopic monitoring. Through the peel-away sheath, the catheter was advanced until the tip was at the cavoatrial junction. Positioning of the distal tip was confirmed with a radiograph from the in room fluoroscopic unit. The catheter was cut to length and attached firmly to the port. The port pocket incision was closed with layered absorbable suture and surgical glue. The dermatotomy site was closed with surgical glue. FINDINGS: Ultrasound shows an anechoic and compressible jugular vein. At the completion of the study, the port tip lies at the cavoatrial junction.     IMPRESSION:  Successful power-injectable venous port placement.     Total time spent with patient in face to face time, chart review and documentation was 40 minutes.        Signed by: RAQUEL Grey CNP    Oncology Rooming Note    March 23, 2023 8:39 AM   Luca Araiza is a 42 year old male who presents for:    Chief Complaint   Patient presents with     Oncology Clinic Visit     Malignant neoplasm of ascending colon (H)     Initial Vitals: /83   Pulse 79   Temp 97.9  F (36.6  C)   Resp 18   Ht 1.88 m (6' 2\")   Wt 93.1 kg (205 lb 4.8 oz)   SpO2 98%   BMI 26.36 kg/m   Estimated body mass index is 26.36 kg/m  as calculated from the following:    Height as of this encounter: 1.88 m " "(6' 2\").    Weight as of this encounter: 93.1 kg (205 lb 4.8 oz). Body surface area is 2.2 meters squared.  No Pain (0) Comment: Data Unavailable   No LMP for male patient.  Allergies reviewed: Yes  Medications reviewed: Yes    Medications: yes. yes  Pharmacy name entered into EPIC:    [id number] - [facility], [place] - [address] [facility], [place] - [address] [place] [address] [place] - [address] [facility] [place] [facility], [place] - [address] [facility] [place] [address] [facility].    Clinical concerns: None       Gege Burton LPN                Again, thank you for allowing me to participate in the care of your patient.        Sincerely,        RAQUEL Grey CNP    "

## 2023-03-28 ENCOUNTER — INFUSION THERAPY VISIT (OUTPATIENT)
Dept: INFUSION THERAPY | Facility: HOSPITAL | Age: 43
End: 2023-03-28
Attending: INTERNAL MEDICINE
Payer: COMMERCIAL

## 2023-03-28 ENCOUNTER — DOCUMENTATION ONLY (OUTPATIENT)
Dept: ONCOLOGY | Facility: HOSPITAL | Age: 43
End: 2023-03-28

## 2023-03-28 ENCOUNTER — ONCOLOGY VISIT (OUTPATIENT)
Dept: ONCOLOGY | Facility: HOSPITAL | Age: 43
End: 2023-03-28
Attending: INTERNAL MEDICINE
Payer: COMMERCIAL

## 2023-03-28 VITALS
OXYGEN SATURATION: 98 % | BODY MASS INDEX: 26.18 KG/M2 | TEMPERATURE: 98.6 F | DIASTOLIC BLOOD PRESSURE: 77 MMHG | HEIGHT: 74 IN | WEIGHT: 204 LBS | SYSTOLIC BLOOD PRESSURE: 127 MMHG | HEART RATE: 72 BPM

## 2023-03-28 DIAGNOSIS — C18.2 MALIGNANT NEOPLASM OF ASCENDING COLON (H): ICD-10-CM

## 2023-03-28 DIAGNOSIS — C18.2 MALIGNANT NEOPLASM OF ASCENDING COLON (H): Primary | ICD-10-CM

## 2023-03-28 DIAGNOSIS — K52.1 CHEMOTHERAPY INDUCED DIARRHEA: Primary | ICD-10-CM

## 2023-03-28 DIAGNOSIS — T45.1X5A CHEMOTHERAPY INDUCED DIARRHEA: Primary | ICD-10-CM

## 2023-03-28 LAB
ALBUMIN SERPL BCG-MCNC: 4.4 G/DL (ref 3.5–5.2)
ALP SERPL-CCNC: 92 U/L (ref 40–129)
ALT SERPL W P-5'-P-CCNC: 26 U/L (ref 10–50)
ANION GAP SERPL CALCULATED.3IONS-SCNC: 10 MMOL/L (ref 7–15)
AST SERPL W P-5'-P-CCNC: 21 U/L (ref 10–50)
BASOPHILS # BLD AUTO: 0.1 10E3/UL (ref 0–0.2)
BASOPHILS NFR BLD AUTO: 1 %
BILIRUB SERPL-MCNC: 0.3 MG/DL
BUN SERPL-MCNC: 19.7 MG/DL (ref 6–20)
CALCIUM SERPL-MCNC: 9.2 MG/DL (ref 8.6–10)
CHLORIDE SERPL-SCNC: 104 MMOL/L (ref 98–107)
CREAT SERPL-MCNC: 1.07 MG/DL (ref 0.67–1.17)
DEPRECATED HCO3 PLAS-SCNC: 25 MMOL/L (ref 22–29)
EOSINOPHIL # BLD AUTO: 0.2 10E3/UL (ref 0–0.7)
EOSINOPHIL NFR BLD AUTO: 3 %
ERYTHROCYTE [DISTWIDTH] IN BLOOD BY AUTOMATED COUNT: 14.8 % (ref 10–15)
GFR SERPL CREATININE-BSD FRML MDRD: 89 ML/MIN/1.73M2
GLUCOSE SERPL-MCNC: 116 MG/DL (ref 70–99)
HCT VFR BLD AUTO: 39.7 % (ref 40–53)
HGB BLD-MCNC: 13 G/DL (ref 13.3–17.7)
IMM GRANULOCYTES # BLD: 0 10E3/UL
IMM GRANULOCYTES NFR BLD: 0 %
LYMPHOCYTES # BLD AUTO: 1.5 10E3/UL (ref 0.8–5.3)
LYMPHOCYTES NFR BLD AUTO: 22 %
MAGNESIUM SERPL-MCNC: 2.2 MG/DL (ref 1.7–2.3)
MCH RBC QN AUTO: 26.6 PG (ref 26.5–33)
MCHC RBC AUTO-ENTMCNC: 32.7 G/DL (ref 31.5–36.5)
MCV RBC AUTO: 81 FL (ref 78–100)
MONOCYTES # BLD AUTO: 0.6 10E3/UL (ref 0–1.3)
MONOCYTES NFR BLD AUTO: 8 %
NEUTROPHILS # BLD AUTO: 4.4 10E3/UL (ref 1.6–8.3)
NEUTROPHILS NFR BLD AUTO: 66 %
NRBC # BLD AUTO: 0 10E3/UL
NRBC BLD AUTO-RTO: 0 /100
PLATELET # BLD AUTO: 245 10E3/UL (ref 150–450)
POTASSIUM SERPL-SCNC: 4.3 MMOL/L (ref 3.4–5.3)
PROT SERPL-MCNC: 6.9 G/DL (ref 6.4–8.3)
RBC # BLD AUTO: 4.88 10E6/UL (ref 4.4–5.9)
SODIUM SERPL-SCNC: 139 MMOL/L (ref 136–145)
WBC # BLD AUTO: 6.7 10E3/UL (ref 4–11)

## 2023-03-28 PROCEDURE — 96368 THER/DIAG CONCURRENT INF: CPT

## 2023-03-28 PROCEDURE — 250N000011 HC RX IP 250 OP 636: Performed by: NURSE PRACTITIONER

## 2023-03-28 PROCEDURE — 96413 CHEMO IV INFUSION 1 HR: CPT

## 2023-03-28 PROCEDURE — 258N000003 HC RX IP 258 OP 636: Performed by: NURSE PRACTITIONER

## 2023-03-28 PROCEDURE — 99214 OFFICE O/P EST MOD 30 MIN: CPT | Performed by: NURSE PRACTITIONER

## 2023-03-28 PROCEDURE — 84132 ASSAY OF SERUM POTASSIUM: CPT | Performed by: INTERNAL MEDICINE

## 2023-03-28 PROCEDURE — G0498 CHEMO EXTEND IV INFUS W/PUMP: HCPCS

## 2023-03-28 PROCEDURE — 83735 ASSAY OF MAGNESIUM: CPT | Performed by: INTERNAL MEDICINE

## 2023-03-28 PROCEDURE — G0463 HOSPITAL OUTPT CLINIC VISIT: HCPCS | Mod: 25 | Performed by: NURSE PRACTITIONER

## 2023-03-28 PROCEDURE — 85014 HEMATOCRIT: CPT | Performed by: INTERNAL MEDICINE

## 2023-03-28 PROCEDURE — 96366 THER/PROPH/DIAG IV INF ADDON: CPT

## 2023-03-28 PROCEDURE — 96375 TX/PRO/DX INJ NEW DRUG ADDON: CPT

## 2023-03-28 PROCEDURE — 96372 THER/PROPH/DIAG INJ SC/IM: CPT | Performed by: NURSE PRACTITIONER

## 2023-03-28 PROCEDURE — 96415 CHEMO IV INFUSION ADDL HR: CPT

## 2023-03-28 PROCEDURE — 84155 ASSAY OF PROTEIN SERUM: CPT | Performed by: INTERNAL MEDICINE

## 2023-03-28 PROCEDURE — 96417 CHEMO IV INFUS EACH ADDL SEQ: CPT

## 2023-03-28 PROCEDURE — 96367 TX/PROPH/DG ADDL SEQ IV INF: CPT

## 2023-03-28 PROCEDURE — 96411 CHEMO IV PUSH ADDL DRUG: CPT

## 2023-03-28 RX ORDER — LOPERAMIDE HCL 2 MG
2 CAPSULE ORAL 4 TIMES DAILY PRN
Qty: 120 CAPSULE | Refills: 1 | Status: SHIPPED | OUTPATIENT
Start: 2023-03-28 | End: 2023-12-12

## 2023-03-28 RX ORDER — ALBUTEROL SULFATE 0.83 MG/ML
2.5 SOLUTION RESPIRATORY (INHALATION)
Status: CANCELLED | OUTPATIENT
Start: 2023-03-28

## 2023-03-28 RX ORDER — EPINEPHRINE 1 MG/ML
0.3 INJECTION, SOLUTION INTRAMUSCULAR; SUBCUTANEOUS EVERY 5 MIN PRN
Status: DISCONTINUED | OUTPATIENT
Start: 2023-03-28 | End: 2023-03-28 | Stop reason: HOSPADM

## 2023-03-28 RX ORDER — LORAZEPAM 2 MG/ML
0.5 INJECTION INTRAMUSCULAR ONCE
Status: CANCELLED
Start: 2023-03-28 | End: 2023-03-28

## 2023-03-28 RX ORDER — ATROPINE SULFATE 0.4 MG/ML
0.4 AMPUL (ML) INJECTION
Status: CANCELLED | OUTPATIENT
Start: 2023-03-28

## 2023-03-28 RX ORDER — METHYLPREDNISOLONE SODIUM SUCCINATE 125 MG/2ML
125 INJECTION, POWDER, LYOPHILIZED, FOR SOLUTION INTRAMUSCULAR; INTRAVENOUS
Status: CANCELLED
Start: 2023-03-28

## 2023-03-28 RX ORDER — FAMOTIDINE 10 MG
10 TABLET ORAL PRN
COMMUNITY
End: 2023-12-12

## 2023-03-28 RX ORDER — LORAZEPAM 2 MG/ML
0.5 INJECTION INTRAMUSCULAR EVERY 4 HOURS PRN
Status: CANCELLED | OUTPATIENT
Start: 2023-03-28

## 2023-03-28 RX ORDER — HEPARIN SODIUM (PORCINE) LOCK FLUSH IV SOLN 100 UNIT/ML 100 UNIT/ML
5 SOLUTION INTRAVENOUS
Status: CANCELLED | OUTPATIENT
Start: 2023-03-28

## 2023-03-28 RX ORDER — PALONOSETRON 0.05 MG/ML
0.25 INJECTION, SOLUTION INTRAVENOUS ONCE
Status: COMPLETED | OUTPATIENT
Start: 2023-03-28 | End: 2023-03-28

## 2023-03-28 RX ORDER — DIPHENHYDRAMINE HYDROCHLORIDE 50 MG/ML
50 INJECTION INTRAMUSCULAR; INTRAVENOUS
Status: DISCONTINUED | OUTPATIENT
Start: 2023-03-28 | End: 2023-03-28 | Stop reason: HOSPADM

## 2023-03-28 RX ORDER — MEPERIDINE HYDROCHLORIDE 25 MG/ML
25 INJECTION INTRAMUSCULAR; INTRAVENOUS; SUBCUTANEOUS EVERY 30 MIN PRN
Status: DISCONTINUED | OUTPATIENT
Start: 2023-03-28 | End: 2023-03-28 | Stop reason: HOSPADM

## 2023-03-28 RX ORDER — HEPARIN SODIUM (PORCINE) LOCK FLUSH IV SOLN 100 UNIT/ML 100 UNIT/ML
5 SOLUTION INTRAVENOUS
Status: CANCELLED | OUTPATIENT
Start: 2023-03-30

## 2023-03-28 RX ORDER — HEPARIN SODIUM,PORCINE 10 UNIT/ML
5 VIAL (ML) INTRAVENOUS
Status: CANCELLED | OUTPATIENT
Start: 2023-03-30

## 2023-03-28 RX ORDER — ALBUTEROL SULFATE 90 UG/1
1-2 AEROSOL, METERED RESPIRATORY (INHALATION)
Status: CANCELLED
Start: 2023-03-28

## 2023-03-28 RX ORDER — ATROPINE SULFATE 0.4 MG/ML
0.4 AMPUL (ML) INJECTION
Status: DISCONTINUED | OUTPATIENT
Start: 2023-03-28 | End: 2023-03-28 | Stop reason: HOSPADM

## 2023-03-28 RX ORDER — EPINEPHRINE 1 MG/ML
0.3 INJECTION, SOLUTION INTRAMUSCULAR; SUBCUTANEOUS EVERY 5 MIN PRN
Status: CANCELLED | OUTPATIENT
Start: 2023-03-28

## 2023-03-28 RX ORDER — MEPERIDINE HYDROCHLORIDE 25 MG/ML
25 INJECTION INTRAMUSCULAR; INTRAVENOUS; SUBCUTANEOUS EVERY 30 MIN PRN
Status: CANCELLED | OUTPATIENT
Start: 2023-03-28

## 2023-03-28 RX ORDER — HEPARIN SODIUM (PORCINE) LOCK FLUSH IV SOLN 100 UNIT/ML 100 UNIT/ML
5 SOLUTION INTRAVENOUS
Status: DISCONTINUED | OUTPATIENT
Start: 2023-03-28 | End: 2023-03-28 | Stop reason: HOSPADM

## 2023-03-28 RX ORDER — ALBUTEROL SULFATE 90 UG/1
1-2 AEROSOL, METERED RESPIRATORY (INHALATION)
Status: DISCONTINUED | OUTPATIENT
Start: 2023-03-28 | End: 2023-03-28 | Stop reason: HOSPADM

## 2023-03-28 RX ORDER — DIPHENHYDRAMINE HYDROCHLORIDE 50 MG/ML
50 INJECTION INTRAMUSCULAR; INTRAVENOUS
Status: CANCELLED
Start: 2023-03-28

## 2023-03-28 RX ORDER — HEPARIN SODIUM,PORCINE 10 UNIT/ML
5 VIAL (ML) INTRAVENOUS
Status: CANCELLED | OUTPATIENT
Start: 2023-03-28

## 2023-03-28 RX ORDER — LORAZEPAM 2 MG/ML
0.5 INJECTION INTRAMUSCULAR ONCE
Status: COMPLETED | OUTPATIENT
Start: 2023-03-28 | End: 2023-03-28

## 2023-03-28 RX ORDER — ALBUTEROL SULFATE 0.83 MG/ML
2.5 SOLUTION RESPIRATORY (INHALATION)
Status: DISCONTINUED | OUTPATIENT
Start: 2023-03-28 | End: 2023-03-28 | Stop reason: HOSPADM

## 2023-03-28 RX ORDER — METHYLPREDNISOLONE SODIUM SUCCINATE 125 MG/2ML
125 INJECTION, POWDER, LYOPHILIZED, FOR SOLUTION INTRAMUSCULAR; INTRAVENOUS
Status: DISCONTINUED | OUTPATIENT
Start: 2023-03-28 | End: 2023-03-28 | Stop reason: HOSPADM

## 2023-03-28 RX ADMIN — ATROPINE SULFATE 0.4 MG: 0.4 INJECTION, SOLUTION INTRAVENOUS at 16:08

## 2023-03-28 RX ADMIN — IRINOTECAN HYDROCHLORIDE 340 MG: 20 INJECTION, SOLUTION INTRAVENOUS at 15:38

## 2023-03-28 RX ADMIN — DEXTROSE MONOHYDRATE 250 ML: 50 INJECTION, SOLUTION INTRAVENOUS at 12:25

## 2023-03-28 RX ADMIN — ATROPINE SULFATE 0.4 MG: 0.4 INJECTION, SOLUTION INTRAVENOUS at 15:35

## 2023-03-28 RX ADMIN — PALONOSETRON 0.25 MG: 0.05 INJECTION, SOLUTION INTRAVENOUS at 12:25

## 2023-03-28 RX ADMIN — LORAZEPAM 0.5 MG: 2 INJECTION INTRAMUSCULAR; INTRAVENOUS at 12:31

## 2023-03-28 RX ADMIN — OXALIPLATIN 200 MG: 5 INJECTION, SOLUTION INTRAVENOUS at 13:34

## 2023-03-28 RX ADMIN — DEXAMETHASONE SODIUM PHOSPHATE: 10 INJECTION, SOLUTION INTRAMUSCULAR; INTRAVENOUS at 12:55

## 2023-03-28 RX ADMIN — FAMOTIDINE 20 MG: 10 INJECTION, SOLUTION INTRAVENOUS at 12:28

## 2023-03-28 RX ADMIN — LEUCOVORIN CALCIUM 880 MG: 350 INJECTION, POWDER, LYOPHILIZED, FOR SUSPENSION INTRAMUSCULAR; INTRAVENOUS at 15:39

## 2023-03-28 ASSESSMENT — PAIN SCALES - GENERAL: PAINLEVEL: NO PAIN (0)

## 2023-03-28 NOTE — LETTER
3/28/2023         RE: Luca Araiza  [address]  [place] [address]        Dear Colleague,    Thank you for referring your patient, Luca Araiza, to the [facility]. Please see a copy of my visit note below.    [facility] Hematology and Oncology Progress Note    Patient: Luca Araiza  MRN: [id number]  Date of Service: 03/23/2023        Reason for Visit    No chief complaint on file.      Assessment and Plan     Cancer Staging   No matching staging information was found for the patient.    1. Colon Cancer, Stage IIIb [id number], adenocarcinoma of cecum, Dx December 2022: pt is on clinical trial due to positive circulating tumor cells on DNA testing. Pt started FOLFIRINOX.  The overall plan is to give 12 cycles. He will get cycle 2 today. Will return in 2 weeks for cycle 3. We will reimage per clinical study after he completes the 12 cycles.     2. Slight reaction to irinotecan: received ativan, pepcid during infusion due to feeling sweaty/runny nose/nause during C1. a. Needed one more dose of ativan before he left, but did feel a little better. Had a rough night at home likely related to the ativan and feeling out of it and then 3 days with mild to moderate nausea. Here today for cycle 2. Plan today is to give pepcid, atropine and ativan as premed this time. May give another dose of atropine and ativan if needed. Will also change from zofran to aloxi. Increase dexamethasone slightly. If nausea continues, may have to add in oral olanzapine at home for 5 days.   Also, continue to use famotidine/tums at home. Antonette prodcuts. [employer].     3. Early onset cancer: getting genetic testing done. Results are pending.         ECOG Performance    0 - Independent    Distress Screening (within last 30 days)    No data recorded     Pain  Pain Score: No Pain (0)    Problem List    Patient Active Problem List   Diagnosis     Family history of colon cancer in father     Malignant neoplasm of  "ascending colon (H)     Iron deficiency anemia due to chronic blood loss     Colon cancer (H)        ______________________________________________________________________________    History of Present Illness    Measurable Disease: None postoperatively.  Patient was diagnosed with a viral illness last year and then was found to be anemic so then had a colonoscopy that showed a cecal colon cancer.    Treatment:   Underwent laparoscopic right hemicolectomy December 21, 2022.  Patient started on clinical trial, .  He was randomized to modified FOLFIRINOX.  He started on March 16, 2023.  Today is cycle 2    Interim History: Patient is here today to start cycle 2 of his study FOLFIRINOX. He reports that he had moderate nausea during the first cycle. During his initial infusion, he did get two doses of ativan and a dose of atropine and pepcid. He reports he felt \"out of it\" the night of his cycle and then felt nauseous and fatigued for 3 days after. He did take his zofran and compazine which helped. He reported that he will try to stay on top of taking his nausea medication. He reports he did have some diarrhea for a couple of days, but it was manageable with immodium. He also reports he had some intermittent heartburn and took pepcid and it resolved. He will return in two weeks for cycle 3. No new bone or back pain. No bleeding complications. No infectious complaints.    Review of Systems    Pertinent items are noted in HPI.    Past History    Past Medical History:   Diagnosis Date     Anemia      Malignant neoplasm of ascending colon (H) 11/07/2022       PHYSICAL EXAM  /77 (BP Location: Left arm, Patient Position: Sitting, Cuff Size: Adult Regular)   Pulse 72   Temp 98.6  F (37  C) (Oral)   Ht 1.88 m (6' 2\")   Wt 92.5 kg (204 lb)   SpO2 98%   BMI 26.19 kg/m      GENERAL: no acute distress. Cooperative in conversation. Here with wife. Mask on  RESP: Regular respiratory rate. No expiratory wheezes "   MUSCULOSKELETAL: no bilateral leg swelling  NEURO: non focal. Alert and oriented x3.   PSYCH: within normal limits. No depression or anxiety.  SKIN: exposed skin is dry intact.     Lab Results    Recent Results (from the past 168 hour(s))   Comprehensive metabolic panel (BMP + Alb, Alk Phos, ALT, AST, Total. Bili, TP)   Result Value Ref Range    Sodium 142 136 - 145 mmol/L    Potassium 4.1 3.4 - 5.3 mmol/L    Chloride 108 (H) 98 - 107 mmol/L    Carbon Dioxide (CO2) 24 22 - 29 mmol/L    Anion Gap 10 7 - 15 mmol/L    Urea Nitrogen 23.2 (H) 6.0 - 20.0 mg/dL    Creatinine 1.06 0.67 - 1.17 mg/dL    Calcium 9.4 8.6 - 10.0 mg/dL    Glucose 132 (H) 70 - 99 mg/dL    Alkaline Phosphatase 90 40 - 129 U/L    AST 18 10 - 50 U/L    ALT 20 10 - 50 U/L    Protein Total 7.0 6.4 - 8.3 g/dL    Albumin 4.4 3.5 - 5.2 g/dL    Bilirubin Total 0.2 <=1.2 mg/dL    GFR Estimate 90 >60 mL/min/1.73m2   Magnesium   Result Value Ref Range    Magnesium 2.0 1.7 - 2.3 mg/dL   CBC with platelets and differential   Result Value Ref Range    WBC Count 6.6 4.0 - 11.0 10e3/uL    RBC Count 5.01 4.40 - 5.90 10e6/uL    Hemoglobin 13.3 13.3 - 17.7 g/dL    Hematocrit 40.7 40.0 - 53.0 %    MCV 81 78 - 100 fL    MCH 26.5 26.5 - 33.0 pg    MCHC 32.7 31.5 - 36.5 g/dL    RDW 14.1 10.0 - 15.0 %    Platelet Count 210 150 - 450 10e3/uL    % Neutrophils 68 %    % Lymphocytes 20 %    % Monocytes 7 %    % Eosinophils 3 %    % Basophils 1 %    % Immature Granulocytes 1 %    NRBCs per 100 WBC 0 <1 /100    Absolute Neutrophils 4.6 1.6 - 8.3 10e3/uL    Absolute Lymphocytes 1.3 0.8 - 5.3 10e3/uL    Absolute Monocytes 0.5 0.0 - 1.3 10e3/uL    Absolute Eosinophils 0.2 0.0 - 0.7 10e3/uL    Absolute Basophils 0.0 0.0 - 0.2 10e3/uL    Absolute Immature Granulocytes 0.1 <=0.4 10e3/uL    Absolute NRBCs 0.0 10e3/uL       Imaging    IR Chest Port Placement > 5 Yrs of Age    Result Date: 3/9/2023  LOCATION: [facility] DATE: 3/9/2023 PROCEDURE: IMPLANTABLE  VENOUS PORT PLACEMENT 1.  Implantable venous access port placement. 2.  Ultrasound guidance for vascular access. A permanent image was stored. 3.  Fluoroscopic guidance for central venous access device placement. INTERVENTIONAL RADIOLOGIST: Tee Fuentes MD INDICATION: Colorectal cancer, plan for port placement. CONSENT: The risks, benefits and alternatives of the procedure were discussed with the patient or representative in detail. All questions were answered. Informed consent was given to proceed with the procedure. MODERATE SEDATION: Versed 2 mg IV; Fentanyl 100 mcg IV. During the time out, immediately prior to the administration of medications, the patient was reassessed for adequacy to receive conscious sedation.  Under physician supervision, Versed and fentanyl were administered for moderate sedation. Pulse oximetry, heart rate and blood pressure were continuously monitored by an independent trained observer. The physician spent 15 minutes of face-to-face sedation time with the patient. FLUOROSCOPIC TIME: 0.2 minutes. RADIATION DOSE: Air Kerma: 6 mGy. COMPLICATIONS: No immediate complications. UNIVERSAL PROTOCOL: The operative site was marked and any prior imaging was reviewed. Required items including blood products, implants, devices and special equipment was made available. Patient identity was confirmed either verbally, with demographic information, hospital assigned identification or other identification markers. A timeout was performed immediately prior to the procedure. Antibiotics were administered within one hour of port placement. See electronic medical record for dosage details. STERILE BARRIER TECHNIQUE: Maximum sterile barrier technique was used. Cutaneous antisepsis was performed at the operative site with application of 2% chlorhexidine and large sterile drape. Prior to the procedure, the  and assistant performed hand hygiene and wore hat, mask, sterile gown, and sterile gloves during  "the entire procedure. PROCEDURE:  Using real-time ultrasound guidance the right internal jugular vein was accessed. A subcutaneous pocket was created and irrigated with sterile normal saline. The catheter tubing was tunneled in an antegrade fashion from the port pocket to the dermatotomy  site. Over a guidewire, a peel-away sheath was advanced with fluoroscopic monitoring. Through the peel-away sheath, the catheter was advanced until the tip was at the cavoatrial junction. Positioning of the distal tip was confirmed with a radiograph from the in room fluoroscopic unit. The catheter was cut to length and attached firmly to the port. The port pocket incision was closed with layered absorbable suture and surgical glue. The dermatotomy site was closed with surgical glue. FINDINGS: Ultrasound shows an anechoic and compressible jugular vein. At the completion of the study, the port tip lies at the cavoatrial junction.     IMPRESSION:  Successful power-injectable venous port placement.   Seen and examined pt with NP student. Was present for visit. The medical care has been evaluated and discussed with the student, Shantel. The documentation has been reviewed and I agree with the medical plan.       Signed by: Shantel Boyer    Oncology Rooming Note    March 28, 2023 10:44 AM   Luca Araiza is a 42 year old male who presents for:    Chief Complaint   Patient presents with     Oncology Clinic Visit     Return Malignant neoplasm of ascending colon, review labs & Infusion     Initial Vitals: /77 (BP Location: Left arm, Patient Position: Sitting, Cuff Size: Adult Regular)   Pulse 72   Temp 98.6  F (37  C) (Oral)   Ht 1.88 m (6' 2\")   Wt 92.5 kg (204 lb)   SpO2 98%   BMI 26.19 kg/m   Estimated body mass index is 26.19 kg/m  as calculated from the following:    Height as of this encounter: 1.88 m (6' 2\").    Weight as of this encounter: 92.5 kg (204 lb). Body surface area is 2.2 meters squared.  No Pain (0) Comment: " Data Unavailable   No LMP for male patient.  Allergies reviewed: Yes  Medications reviewed: Yes    Medications: Medication refills not needed today.  Pharmacy name entered into [employer]:    [id number] - [place] [id number]      Clinical concerns: Return Malignant neoplasm of ascending colon, review labs & Infusion      Kelley Connolly CMA                Again, thank you for allowing me to participate in the care of your patient.        Sincerely,        RAQUEL Grey CNP

## 2023-03-28 NOTE — PROGRESS NOTES
"Oncology Rooming Note    March 28, 2023 10:44 AM   Luca Araiza is a 42 year old male who presents for:    Chief Complaint   Patient presents with     Oncology Clinic Visit     Return Malignant neoplasm of ascending colon, review labs & Infusion     Initial Vitals: /77 (BP Location: Left arm, Patient Position: Sitting, Cuff Size: Adult Regular)   Pulse 72   Temp 98.6  F (37  C) (Oral)   Ht 1.88 m (6' 2\")   Wt 92.5 kg (204 lb)   SpO2 98%   BMI 26.19 kg/m   Estimated body mass index is 26.19 kg/m  as calculated from the following:    Height as of this encounter: 1.88 m (6' 2\").    Weight as of this encounter: 92.5 kg (204 lb). Body surface area is 2.2 meters squared.  No Pain (0) Comment: Data Unavailable   No LMP for male patient.  Allergies reviewed: Yes  Medications reviewed: Yes    Medications: Medication refills not needed today.  Pharmacy name entered into [employer]:    [id number] - [place] [id number]      Clinical concerns: Return Malignant neoplasm of ascending colon, review labs & Infusion      Kelley Connolly CMA            "

## 2023-03-28 NOTE — PROGRESS NOTES
[facility] Hematology and Oncology Progress Note    Patient: Luca Araiza  MRN: [id number]  Date of Service: 03/23/2023        Reason for Visit    No chief complaint on file.      Assessment and Plan     Cancer Staging   No matching staging information was found for the patient.    1. Colon Cancer, Stage IIIb [id number], adenocarcinoma of cecum, Dx December 2022: pt is on clinical trial due to positive circulating tumor cells on DNA testing. Pt started FOLFIRINOX.  The overall plan is to give 12 cycles. He will get cycle 2 today. Will return in 2 weeks for cycle 3. We will reimage per clinical study after he completes the 12 cycles.     2. Slight reaction to irinotecan: received ativan, pepcid during infusion due to feeling sweaty/runny nose/nause during C1. a. Needed one more dose of ativan before he left, but did feel a little better. Had a rough night at home likely related to the ativan and feeling out of it and then 3 days with mild to moderate nausea. Here today for cycle 2. Plan today is to give pepcid, atropine and ativan as premed this time. May give another dose of atropine and ativan if needed. Will also change from zofran to aloxi. Increase dexamethasone slightly. If nausea continues, may have to add in oral olanzapine at home for 5 days.   Also, continue to use famotidine/tums at home. Antonette prodcuts. [employer].     3. Early onset cancer: getting genetic testing done. Results are pending.         ECOG Performance    0 - Independent    Distress Screening (within last 30 days)    No data recorded     Pain  Pain Score: No Pain (0)    Problem List    Patient Active Problem List   Diagnosis     Family history of colon cancer in father     Malignant neoplasm of ascending colon (H)     Iron deficiency anemia due to chronic blood loss     Colon cancer (H)        ______________________________________________________________________________    History of Present Illness    Measurable Disease: None  "postoperatively.  Patient was diagnosed with a viral illness last year and then was found to be anemic so then had a colonoscopy that showed a cecal colon cancer.    Treatment:   Underwent laparoscopic right hemicolectomy December 21, 2022.  Patient started on clinical trial, .  He was randomized to modified FOLFIRINOX.  He started on March 16, 2023.  Today is cycle 2    Interim History: Patient is here today to start cycle 2 of his study FOLFIRINOX. He reports that he had moderate nausea during the first cycle. During his initial infusion, he did get two doses of ativan and a dose of atropine and pepcid. He reports he felt \"out of it\" the night of his cycle and then felt nauseous and fatigued for 3 days after. He did take his zofran and compazine which helped. He reported that he will try to stay on top of taking his nausea medication. He reports he did have some diarrhea for a couple of days, but it was manageable with immodium. He also reports he had some intermittent heartburn and took pepcid and it resolved. He will return in two weeks for cycle 3. No new bone or back pain. No bleeding complications. No infectious complaints.    Review of Systems    Pertinent items are noted in HPI.    Past History    Past Medical History:   Diagnosis Date     Anemia      Malignant neoplasm of ascending colon (H) 11/07/2022       PHYSICAL EXAM  /77 (BP Location: Left arm, Patient Position: Sitting, Cuff Size: Adult Regular)   Pulse 72   Temp 98.6  F (37  C) (Oral)   Ht 1.88 m (6' 2\")   Wt 92.5 kg (204 lb)   SpO2 98%   BMI 26.19 kg/m      GENERAL: no acute distress. Cooperative in conversation. Here with wife. Mask on  RESP: Regular respiratory rate. No expiratory wheezes   MUSCULOSKELETAL: no bilateral leg swelling  NEURO: non focal. Alert and oriented x3.   PSYCH: within normal limits. No depression or anxiety.  SKIN: exposed skin is dry intact.     Lab Results    Recent Results (from the past 168 hour(s)) "   Comprehensive metabolic panel (BMP + Alb, Alk Phos, ALT, AST, Total. Bili, TP)   Result Value Ref Range    Sodium 142 136 - 145 mmol/L    Potassium 4.1 3.4 - 5.3 mmol/L    Chloride 108 (H) 98 - 107 mmol/L    Carbon Dioxide (CO2) 24 22 - 29 mmol/L    Anion Gap 10 7 - 15 mmol/L    Urea Nitrogen 23.2 (H) 6.0 - 20.0 mg/dL    Creatinine 1.06 0.67 - 1.17 mg/dL    Calcium 9.4 8.6 - 10.0 mg/dL    Glucose 132 (H) 70 - 99 mg/dL    Alkaline Phosphatase 90 40 - 129 U/L    AST 18 10 - 50 U/L    ALT 20 10 - 50 U/L    Protein Total 7.0 6.4 - 8.3 g/dL    Albumin 4.4 3.5 - 5.2 g/dL    Bilirubin Total 0.2 <=1.2 mg/dL    GFR Estimate 90 >60 mL/min/1.73m2   Magnesium   Result Value Ref Range    Magnesium 2.0 1.7 - 2.3 mg/dL   CBC with platelets and differential   Result Value Ref Range    WBC Count 6.6 4.0 - 11.0 10e3/uL    RBC Count 5.01 4.40 - 5.90 10e6/uL    Hemoglobin 13.3 13.3 - 17.7 g/dL    Hematocrit 40.7 40.0 - 53.0 %    MCV 81 78 - 100 fL    MCH 26.5 26.5 - 33.0 pg    MCHC 32.7 31.5 - 36.5 g/dL    RDW 14.1 10.0 - 15.0 %    Platelet Count 210 150 - 450 10e3/uL    % Neutrophils 68 %    % Lymphocytes 20 %    % Monocytes 7 %    % Eosinophils 3 %    % Basophils 1 %    % Immature Granulocytes 1 %    NRBCs per 100 WBC 0 <1 /100    Absolute Neutrophils 4.6 1.6 - 8.3 10e3/uL    Absolute Lymphocytes 1.3 0.8 - 5.3 10e3/uL    Absolute Monocytes 0.5 0.0 - 1.3 10e3/uL    Absolute Eosinophils 0.2 0.0 - 0.7 10e3/uL    Absolute Basophils 0.0 0.0 - 0.2 10e3/uL    Absolute Immature Granulocytes 0.1 <=0.4 10e3/uL    Absolute NRBCs 0.0 10e3/uL       Imaging    IR Chest Port Placement > 5 Yrs of Age    Result Date: 3/9/2023  LOCATION: [facility] DATE: 3/9/2023 PROCEDURE: IMPLANTABLE VENOUS PORT PLACEMENT 1.  Implantable venous access port placement. 2.  Ultrasound guidance for vascular access. A permanent image was stored. 3.  Fluoroscopic guidance for central venous access device placement. INTERVENTIONAL RADIOLOGIST:  Tee Fuentes MD INDICATION: Colorectal cancer, plan for port placement. CONSENT: The risks, benefits and alternatives of the procedure were discussed with the patient or representative in detail. All questions were answered. Informed consent was given to proceed with the procedure. MODERATE SEDATION: Versed 2 mg IV; Fentanyl 100 mcg IV. During the time out, immediately prior to the administration of medications, the patient was reassessed for adequacy to receive conscious sedation.  Under physician supervision, Versed and fentanyl were administered for moderate sedation. Pulse oximetry, heart rate and blood pressure were continuously monitored by an independent trained observer. The physician spent 15 minutes of face-to-face sedation time with the patient. FLUOROSCOPIC TIME: 0.2 minutes. RADIATION DOSE: Air Kerma: 6 mGy. COMPLICATIONS: No immediate complications. UNIVERSAL PROTOCOL: The operative site was marked and any prior imaging was reviewed. Required items including blood products, implants, devices and special equipment was made available. Patient identity was confirmed either verbally, with demographic information, hospital assigned identification or other identification markers. A timeout was performed immediately prior to the procedure. Antibiotics were administered within one hour of port placement. See electronic medical record for dosage details. STERILE BARRIER TECHNIQUE: Maximum sterile barrier technique was used. Cutaneous antisepsis was performed at the operative site with application of 2% chlorhexidine and large sterile drape. Prior to the procedure, the  and assistant performed hand hygiene and wore hat, mask, sterile gown, and sterile gloves during the entire procedure. PROCEDURE:  Using real-time ultrasound guidance the right internal jugular vein was accessed. A subcutaneous pocket was created and irrigated with sterile normal saline. The catheter tubing was tunneled in an antegrade  fashion from the port pocket to the dermatotomy  site. Over a guidewire, a peel-away sheath was advanced with fluoroscopic monitoring. Through the peel-away sheath, the catheter was advanced until the tip was at the cavoatrial junction. Positioning of the distal tip was confirmed with a radiograph from the in room fluoroscopic unit. The catheter was cut to length and attached firmly to the port. The port pocket incision was closed with layered absorbable suture and surgical glue. The dermatotomy site was closed with surgical glue. FINDINGS: Ultrasound shows an anechoic and compressible jugular vein. At the completion of the study, the port tip lies at the cavoatrial junction.     IMPRESSION:  Successful power-injectable venous port placement.   Seen and examined pt with NP student. Was present for visit. The medical care has been evaluated and discussed with the student, Shantel. The documentation has been reviewed and I agree with the medical plan.       Signed by: Shantel Boyer

## 2023-03-28 NOTE — PROGRESS NOTES
Infusion Nursing Note:  Luca Araiza presents today for C2D1.    Patient seen by provider today: Yes: Dayami Pablo CNP   present during visit today: Not Applicable.    Note: PT here ambulatory for treatment after seeing provider, Treatment administered as ordered, patient was premedicated with Aloxi, Pepcid, Ativan 0.5 mg ivp and Emend/ Decadron. Second dose of Atropine given preemptively 30 minutes into infusion. 1630 care transferred to late RN who assumed care of patient.    Intravenous Access:  Labs drawn without difficulty.  Implanted Port.    Treatment Conditions:  Lab Results   Component Value Date    HGB 13.0 (L) 03/28/2023    WBC 6.7 03/28/2023    ANEUTAUTO 4.4 03/28/2023     03/28/2023      Lab Results   Component Value Date     03/28/2023    POTASSIUM 4.3 03/28/2023    MAG 2.2 03/28/2023    CR 1.07 03/28/2023    RUPERTO 9.2 03/28/2023    BILITOTAL 0.3 03/28/2023    ALBUMIN 4.4 03/28/2023    ALT 26 03/28/2023    AST 21 03/28/2023     Results reviewed, labs MET treatment parameters, ok to proceed with treatment.    Post Infusion Assessment:  Blood return noted pre infusion  No evidence of extravasations.      Discharge Plan:   Discharge instructions reviewed with: Patient.  Patient and/or family verbalized understanding of discharge instructions and all questions answered.  AVS to patient via [employer].  Patient will return 3/30 for next appointment.         Sofia Tavera RN

## 2023-03-28 NOTE — PROGRESS NOTES
PT tolerated remainder of txt without any problems. Pump set up to infuse over 46 hours attached to pt and tegaderm placed over tubing on pt abdomen. Reviewed with pt to return Thursday for pump discontinue at 300pm. Also instructed pt to call in Thursday am if feeling poorly for iv hydration and iv antiemetics with pump discontinue if needed. PT dc'd steady gait with wife

## 2023-03-30 ENCOUNTER — INFUSION THERAPY VISIT (OUTPATIENT)
Dept: INFUSION THERAPY | Facility: HOSPITAL | Age: 43
End: 2023-03-30
Attending: INTERNAL MEDICINE
Payer: COMMERCIAL

## 2023-03-30 DIAGNOSIS — C18.2 MALIGNANT NEOPLASM OF ASCENDING COLON (H): Primary | ICD-10-CM

## 2023-03-30 PROCEDURE — 250N000011 HC RX IP 250 OP 636: Performed by: NURSE PRACTITIONER

## 2023-03-30 RX ORDER — HEPARIN SODIUM (PORCINE) LOCK FLUSH IV SOLN 100 UNIT/ML 100 UNIT/ML
5 SOLUTION INTRAVENOUS
Status: DISCONTINUED | OUTPATIENT
Start: 2023-03-30 | End: 2023-03-30 | Stop reason: HOSPADM

## 2023-03-30 RX ADMIN — Medication 5 ML: at 15:05

## 2023-03-30 NOTE — PROGRESS NOTES
Pt here for pump disconnect, pt reports this round went much better and he has had no issues, blood return noted,  Port was flushed per protocol and 2x2 was placed at site, pt was discharged to [facility] in stable condition.  Pt will return on 4/11.

## 2023-04-07 LAB — SCANNED LAB RESULT: NORMAL

## 2023-04-11 ENCOUNTER — LAB (OUTPATIENT)
Dept: INFUSION THERAPY | Facility: HOSPITAL | Age: 43
End: 2023-04-11
Attending: INTERNAL MEDICINE
Payer: COMMERCIAL

## 2023-04-11 ENCOUNTER — DOCUMENTATION ONLY (OUTPATIENT)
Dept: ONCOLOGY | Facility: HOSPITAL | Age: 43
End: 2023-04-11

## 2023-04-11 ENCOUNTER — ONCOLOGY VISIT (OUTPATIENT)
Dept: ONCOLOGY | Facility: HOSPITAL | Age: 43
End: 2023-04-11
Attending: INTERNAL MEDICINE
Payer: COMMERCIAL

## 2023-04-11 VITALS
HEART RATE: 85 BPM | WEIGHT: 212 LBS | BODY MASS INDEX: 27.22 KG/M2 | SYSTOLIC BLOOD PRESSURE: 127 MMHG | RESPIRATION RATE: 20 BRPM | DIASTOLIC BLOOD PRESSURE: 70 MMHG | OXYGEN SATURATION: 98 % | TEMPERATURE: 98.5 F

## 2023-04-11 DIAGNOSIS — C18.2 MALIGNANT NEOPLASM OF ASCENDING COLON (H): Primary | ICD-10-CM

## 2023-04-11 LAB
ALBUMIN SERPL BCG-MCNC: 4.2 G/DL (ref 3.5–5.2)
ALP SERPL-CCNC: 84 U/L (ref 40–129)
ALT SERPL W P-5'-P-CCNC: 47 U/L (ref 10–50)
ANION GAP SERPL CALCULATED.3IONS-SCNC: 9 MMOL/L (ref 7–15)
AST SERPL W P-5'-P-CCNC: 32 U/L (ref 10–50)
BASOPHILS # BLD AUTO: 0.1 10E3/UL (ref 0–0.2)
BASOPHILS NFR BLD AUTO: 1 %
BILIRUB SERPL-MCNC: 0.3 MG/DL
BUN SERPL-MCNC: 16.3 MG/DL (ref 6–20)
CALCIUM SERPL-MCNC: 8.8 MG/DL (ref 8.6–10)
CHLORIDE SERPL-SCNC: 103 MMOL/L (ref 98–107)
CREAT SERPL-MCNC: 1.15 MG/DL (ref 0.67–1.17)
DEPRECATED HCO3 PLAS-SCNC: 25 MMOL/L (ref 22–29)
EOSINOPHIL # BLD AUTO: 0.3 10E3/UL (ref 0–0.7)
EOSINOPHIL NFR BLD AUTO: 5 %
ERYTHROCYTE [DISTWIDTH] IN BLOOD BY AUTOMATED COUNT: 15.7 % (ref 10–15)
GFR SERPL CREATININE-BSD FRML MDRD: 82 ML/MIN/1.73M2
GLUCOSE SERPL-MCNC: 129 MG/DL (ref 70–99)
HCT VFR BLD AUTO: 38.9 % (ref 40–53)
HGB BLD-MCNC: 12.6 G/DL (ref 13.3–17.7)
IMM GRANULOCYTES # BLD: 0.1 10E3/UL
IMM GRANULOCYTES NFR BLD: 1 %
LYMPHOCYTES # BLD AUTO: 1.5 10E3/UL (ref 0.8–5.3)
LYMPHOCYTES NFR BLD AUTO: 23 %
MAGNESIUM SERPL-MCNC: 2.1 MG/DL (ref 1.7–2.3)
MCH RBC QN AUTO: 26.5 PG (ref 26.5–33)
MCHC RBC AUTO-ENTMCNC: 32.4 G/DL (ref 31.5–36.5)
MCV RBC AUTO: 82 FL (ref 78–100)
MONOCYTES # BLD AUTO: 0.7 10E3/UL (ref 0–1.3)
MONOCYTES NFR BLD AUTO: 12 %
NEUTROPHILS # BLD AUTO: 3.8 10E3/UL (ref 1.6–8.3)
NEUTROPHILS NFR BLD AUTO: 58 %
NRBC # BLD AUTO: 0 10E3/UL
NRBC BLD AUTO-RTO: 0 /100
PLATELET # BLD AUTO: 145 10E3/UL (ref 150–450)
POTASSIUM SERPL-SCNC: 4.1 MMOL/L (ref 3.4–5.3)
PROT SERPL-MCNC: 6.6 G/DL (ref 6.4–8.3)
RBC # BLD AUTO: 4.75 10E6/UL (ref 4.4–5.9)
SODIUM SERPL-SCNC: 137 MMOL/L (ref 136–145)
WBC # BLD AUTO: 6.4 10E3/UL (ref 4–11)

## 2023-04-11 PROCEDURE — 96376 TX/PRO/DX INJ SAME DRUG ADON: CPT

## 2023-04-11 PROCEDURE — G0498 CHEMO EXTEND IV INFUS W/PUMP: HCPCS

## 2023-04-11 PROCEDURE — 258N000003 HC RX IP 258 OP 636: Performed by: INTERNAL MEDICINE

## 2023-04-11 PROCEDURE — G0463 HOSPITAL OUTPT CLINIC VISIT: HCPCS | Mod: 25 | Performed by: INTERNAL MEDICINE

## 2023-04-11 PROCEDURE — 96375 TX/PRO/DX INJ NEW DRUG ADDON: CPT

## 2023-04-11 PROCEDURE — 99214 OFFICE O/P EST MOD 30 MIN: CPT | Performed by: INTERNAL MEDICINE

## 2023-04-11 PROCEDURE — 85025 COMPLETE CBC W/AUTO DIFF WBC: CPT | Performed by: NURSE PRACTITIONER

## 2023-04-11 PROCEDURE — 96415 CHEMO IV INFUSION ADDL HR: CPT

## 2023-04-11 PROCEDURE — 96368 THER/DIAG CONCURRENT INF: CPT

## 2023-04-11 PROCEDURE — 250N000011 HC RX IP 250 OP 636: Performed by: INTERNAL MEDICINE

## 2023-04-11 PROCEDURE — 96367 TX/PROPH/DG ADDL SEQ IV INF: CPT

## 2023-04-11 PROCEDURE — 96417 CHEMO IV INFUS EACH ADDL SEQ: CPT

## 2023-04-11 PROCEDURE — 83735 ASSAY OF MAGNESIUM: CPT | Performed by: NURSE PRACTITIONER

## 2023-04-11 PROCEDURE — 80053 COMPREHEN METABOLIC PANEL: CPT | Performed by: NURSE PRACTITIONER

## 2023-04-11 PROCEDURE — 96413 CHEMO IV INFUSION 1 HR: CPT

## 2023-04-11 RX ORDER — ATROPINE SULFATE 0.4 MG/ML
0.4 AMPUL (ML) INJECTION
Status: CANCELLED | OUTPATIENT
Start: 2023-04-11

## 2023-04-11 RX ORDER — ALBUTEROL SULFATE 90 UG/1
1-2 AEROSOL, METERED RESPIRATORY (INHALATION)
Status: CANCELLED
Start: 2023-04-11

## 2023-04-11 RX ORDER — PALONOSETRON 0.05 MG/ML
0.25 INJECTION, SOLUTION INTRAVENOUS ONCE
Status: COMPLETED | OUTPATIENT
Start: 2023-04-11 | End: 2023-04-11

## 2023-04-11 RX ORDER — METHYLPREDNISOLONE SODIUM SUCCINATE 125 MG/2ML
125 INJECTION, POWDER, LYOPHILIZED, FOR SOLUTION INTRAMUSCULAR; INTRAVENOUS
Status: CANCELLED
Start: 2023-04-11

## 2023-04-11 RX ORDER — EPINEPHRINE 1 MG/ML
0.3 INJECTION, SOLUTION INTRAMUSCULAR; SUBCUTANEOUS EVERY 5 MIN PRN
Status: CANCELLED | OUTPATIENT
Start: 2023-04-11

## 2023-04-11 RX ORDER — HEPARIN SODIUM,PORCINE 10 UNIT/ML
5 VIAL (ML) INTRAVENOUS
Status: CANCELLED | OUTPATIENT
Start: 2023-04-13

## 2023-04-11 RX ORDER — ALBUTEROL SULFATE 0.83 MG/ML
2.5 SOLUTION RESPIRATORY (INHALATION)
Status: DISCONTINUED | OUTPATIENT
Start: 2023-04-11 | End: 2023-04-11 | Stop reason: HOSPADM

## 2023-04-11 RX ORDER — ATROPINE SULFATE 0.4 MG/ML
0.4 AMPUL (ML) INJECTION
Status: DISCONTINUED | OUTPATIENT
Start: 2023-04-11 | End: 2023-04-11 | Stop reason: HOSPADM

## 2023-04-11 RX ORDER — EPINEPHRINE 1 MG/ML
0.3 INJECTION, SOLUTION INTRAMUSCULAR; SUBCUTANEOUS EVERY 5 MIN PRN
Status: DISCONTINUED | OUTPATIENT
Start: 2023-04-11 | End: 2023-04-11 | Stop reason: HOSPADM

## 2023-04-11 RX ORDER — ATROPINE SULFATE 0.4 MG/ML
0.4 AMPUL (ML) INJECTION
Status: COMPLETED | OUTPATIENT
Start: 2023-04-11 | End: 2023-04-11

## 2023-04-11 RX ORDER — LORAZEPAM 2 MG/ML
0.5 INJECTION INTRAMUSCULAR ONCE
Status: COMPLETED | OUTPATIENT
Start: 2023-04-11 | End: 2023-04-11

## 2023-04-11 RX ORDER — LORAZEPAM 2 MG/ML
0.5 INJECTION INTRAMUSCULAR ONCE
Status: CANCELLED
Start: 2023-04-11 | End: 2023-04-11

## 2023-04-11 RX ORDER — PALONOSETRON 0.05 MG/ML
0.25 INJECTION, SOLUTION INTRAVENOUS ONCE
Status: CANCELLED
Start: 2023-04-11 | End: 2023-04-11

## 2023-04-11 RX ORDER — ALBUTEROL SULFATE 90 UG/1
1-2 AEROSOL, METERED RESPIRATORY (INHALATION)
Status: DISCONTINUED | OUTPATIENT
Start: 2023-04-11 | End: 2023-04-11 | Stop reason: HOSPADM

## 2023-04-11 RX ORDER — MEPERIDINE HYDROCHLORIDE 25 MG/ML
25 INJECTION INTRAMUSCULAR; INTRAVENOUS; SUBCUTANEOUS EVERY 30 MIN PRN
Status: CANCELLED | OUTPATIENT
Start: 2023-04-11

## 2023-04-11 RX ORDER — HEPARIN SODIUM,PORCINE 10 UNIT/ML
5 VIAL (ML) INTRAVENOUS
Status: CANCELLED | OUTPATIENT
Start: 2023-04-11

## 2023-04-11 RX ORDER — METHYLPREDNISOLONE SODIUM SUCCINATE 125 MG/2ML
125 INJECTION, POWDER, LYOPHILIZED, FOR SOLUTION INTRAMUSCULAR; INTRAVENOUS
Status: DISCONTINUED | OUTPATIENT
Start: 2023-04-11 | End: 2023-04-11 | Stop reason: HOSPADM

## 2023-04-11 RX ORDER — ALBUTEROL SULFATE 0.83 MG/ML
2.5 SOLUTION RESPIRATORY (INHALATION)
Status: CANCELLED | OUTPATIENT
Start: 2023-04-11

## 2023-04-11 RX ORDER — MEPERIDINE HYDROCHLORIDE 25 MG/ML
25 INJECTION INTRAMUSCULAR; INTRAVENOUS; SUBCUTANEOUS EVERY 30 MIN PRN
Status: DISCONTINUED | OUTPATIENT
Start: 2023-04-11 | End: 2023-04-11 | Stop reason: HOSPADM

## 2023-04-11 RX ORDER — DIPHENHYDRAMINE HYDROCHLORIDE 50 MG/ML
50 INJECTION INTRAMUSCULAR; INTRAVENOUS
Status: CANCELLED
Start: 2023-04-11

## 2023-04-11 RX ORDER — HEPARIN SODIUM (PORCINE) LOCK FLUSH IV SOLN 100 UNIT/ML 100 UNIT/ML
5 SOLUTION INTRAVENOUS
Status: CANCELLED | OUTPATIENT
Start: 2023-04-13

## 2023-04-11 RX ORDER — DIPHENHYDRAMINE HYDROCHLORIDE 50 MG/ML
50 INJECTION INTRAMUSCULAR; INTRAVENOUS
Status: DISCONTINUED | OUTPATIENT
Start: 2023-04-11 | End: 2023-04-11 | Stop reason: HOSPADM

## 2023-04-11 RX ORDER — HEPARIN SODIUM (PORCINE) LOCK FLUSH IV SOLN 100 UNIT/ML 100 UNIT/ML
5 SOLUTION INTRAVENOUS
Status: CANCELLED | OUTPATIENT
Start: 2023-04-11

## 2023-04-11 RX ORDER — LORAZEPAM 2 MG/ML
0.5 INJECTION INTRAMUSCULAR EVERY 4 HOURS PRN
Status: CANCELLED | OUTPATIENT
Start: 2023-04-11

## 2023-04-11 RX ADMIN — IRINOTECAN HYDROCHLORIDE 340 MG: 20 INJECTION, SOLUTION INTRAVENOUS at 14:02

## 2023-04-11 RX ADMIN — OXALIPLATIN 200 MG: 100 INJECTION, SOLUTION, CONCENTRATE INTRAVENOUS at 11:50

## 2023-04-11 RX ADMIN — LEUCOVORIN CALCIUM 880 MG: 350 INJECTION, POWDER, LYOPHILIZED, FOR SOLUTION INTRAMUSCULAR; INTRAVENOUS at 13:58

## 2023-04-11 RX ADMIN — LORAZEPAM 0.5 MG: 2 INJECTION INTRAMUSCULAR; INTRAVENOUS at 11:13

## 2023-04-11 RX ADMIN — DEXAMETHASONE SODIUM PHOSPHATE: 10 INJECTION, SOLUTION INTRAMUSCULAR; INTRAVENOUS at 11:24

## 2023-04-11 RX ADMIN — DEXTROSE MONOHYDRATE 250 ML: 50 INJECTION, SOLUTION INTRAVENOUS at 11:13

## 2023-04-11 RX ADMIN — ATROPINE SULFATE 0.4 MG: 0.4 INJECTION, SOLUTION INTRAVENOUS at 13:49

## 2023-04-11 RX ADMIN — PALONOSETRON 0.25 MG: 0.05 INJECTION, SOLUTION INTRAVENOUS at 11:16

## 2023-04-11 RX ADMIN — ATROPINE SULFATE 0.4 MG: 0.4 INJECTION, SOLUTION INTRAVENOUS at 14:29

## 2023-04-11 RX ADMIN — FAMOTIDINE 20 MG: 10 INJECTION, SOLUTION INTRAVENOUS at 13:52

## 2023-04-11 ASSESSMENT — PAIN SCALES - GENERAL: PAINLEVEL: NO PAIN (0)

## 2023-04-11 NOTE — PROGRESS NOTES
Met with patient and provider for C2 visit.  Patient reports having a lot of nausea and slight reaction to irinotecan (sweating, runny nose and nausea).  YAAKOV Stock adjusted his premeds for C2 and see if it helps.  He did have some nausea, vomiting and diarrhea for a couple of days following treatment.  Labs and AE's reviewed- pt ok to continue on study with no modifications per protocol.    Pt will return for C3 in two weeks.    JEANNINE [employer]

## 2023-04-11 NOTE — PROGRESS NOTES
Infusion Nursing Note:  Luca Araiza presents today for cycle 3 day 1 treatment with Folfirinox chemotherapy    Patient seen by provider today: Yes: Dr Hall   present during visit today: Not Applicable.    Note: Luca was educated on his plan of care for today and each medication was reviewed prior to administration.  He was premedicated prior to Irinotecan with famotidine and Atropine.  Atropine was repeated 30 minutes into infusion per pt request as he tolerated his last tx well doing this this way. Luca received treatment as ordered.    Intravenous Access:  Implanted Port.    Treatment Conditions:  Results reviewed, labs MET treatment parameters, ok to proceed with treatment.    Post Infusion Assessment:  Patient tolerated infusions without incident.  Blood return noted pre and post each infusion.  Site patent and intact, free from redness, edema or discomfort.  No evidence of extravasations.   46 hour home infusion of 5FU started at 1538 .  He will return Thursday at 1345 for pump (bulb) removal and vad flush.  Discharge Plan:   Patient discharged in stable condition accompanied by: wife.  Departure Mode: Ambulatory.      Bailey Sena RN

## 2023-04-11 NOTE — PROGRESS NOTES
[facility] Hematology and Oncology Consult Note    Patient: Luca Araiza  MRN: [id number]  Date of Service: Apr 11, 2023       Reason for Visit    Follow-up for colon cancer    January 2023:    T3N1B, stage IIIb adenocarcinoma of the cecum status post laparoscopic right colectomy, December 21, 2022  CT DNA positive, March 2023  Tumor is MMR intact  Early onset of colon cancer with family history    Much better tolerance of cycle 2 of treatment with adjustments made with the premedications and antinausea regimen.  Overall doing well.  Labs are stable.  We will proceed with cycle 3 of treatment.  We will see him back in 2 weeks for the next treatment.  We will make appointment for cycle 5 in 4 weeks as well.      Plan: Proceed with cycle 3 of M FOLFIRINOX on clinical trial today  Follow-up as above    Measurable disease: None postoperatively    Current therapy: Modified FOLFIRINOX day 1 cycle 1, March 14, 2023  Cycle 3 today April 11, 2023                ECOG Performance    0 - Independent    Encounter Diagnoses:    Problem List Items Addressed This Visit        Digestive    Malignant neoplasm of ascending colon (H) - Primary    Relevant Orders    Infusion Appointment Request    Infusion Appointment Request    Infusion Appointment Request           ______________________________________________________________________________    Staging History   Cancer Staging   No matching staging information was found for the patient.      History    Luca is here again for follow-up.  Received cycle 2 2 weeks ago.  He had adjustments made to his premedications and antinausea regimen and did very well indeed.  No fever or mouth sores.  Taste is fine.  Appetite and weight are good.  Minimal nausea but no vomiting.  No shortness of breath or cough.  No abdominal pain.  Some diarrhea controlled with Imodium.  No rash or urinary symptoms.  ECOG status 0.  Still working full-time.      March 14, 2023:    Luca is here for  reevaluation.  Seen about 6 weeks ago.  His CT DNA test was positive and he was randomized to arm for the clinical trial.  He has had Port-A-Cath placed.  No new symptoms or problems.  ECOG status 0.    January 2023:  Mr. Luca Araiza is a 42 year old no significant past medical history who is been diagnosed and undergone laparoscopic hemicolectomy for colon cancer.  He was in [place] last August and had significant illness with diarrhea and vomiting.  He then had physical which showed that he was anemic and subsequently had colonoscopy showing cecal colon cancer.  He underwent laparoscopic right hemicolectomy on December 21 and has recovered well from this.    No other previous medical history.  He had left ACL repair surgery as a teenager.  No other hospitalizations.  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  Does not smoke and does not drink alcohol.    Father had colon cancer in his mid 60s.  Mother had breast cancer in her late 50s.  No siblings or kids with cancer.  Maternal grandfather had colon cancer in his 60s.  Paternal grandmother had cancer type unknown.            Review of systems.  No fever or night sweats.  No loss of weight.  No lumps or bumps anywhere.  No unusual headaches or eyesight issues.  No dizziness.  No bleeding from the nose.  No sores in the mouth. No problems with swallowing.  No chest pain. No shortness of breath. No cough.  No abdominal pain. No nausea or vomiting.  No diarrhea or constipation.  No blood in stool or black colored stools.  No problems passing urine.  No numbness or tingling in hands or feet.  No skin rashes.  A 14 point review of systems is otherwise negative.        Past History    Past Medical History:   Diagnosis Date     Anemia      Malignant neoplasm of ascending colon (H) 11/07/2022     Past Surgical History:   Procedure Laterality Date     COLONOSCOPY       HEMICOLECTOMY, RT/LT Right      IR  CHEST PORT PLACEMENT > 5 YRS OF AGE  3/9/2023     REMOVAL OF SPERM DUCT(S)      Description: Surgery Of Male Genitalia Vasectomy;  Recorded: 12/27/2011;  Comments: 12/27/2011     Z REPAIR CRUCIATE LIGAMENT,KNEE      Description: Primary Repair Of Knee Ligament Cruciate Anterior;  Recorded: 07/20/2009;     Family History   Problem Relation Age of Onset     Breast Cancer Mother      Colon Cancer Father      Anesthesia Reaction No family hx of      Venous thrombosis No family hx of      Social History     Socioeconomic History     Marital status:      Spouse name: None     Number of children: None     Years of education: None     Highest education level: None   Tobacco Use     Smoking status: Never     Smokeless tobacco: Never   Substance and Sexual Activity     Alcohol use: Not Currently     Drug use: Never     Sexual activity: Yes     Partners: Female     Birth control/protection: Male Surgical   Other Topics Concern     Parent/sibling w/ CABG, MI or angioplasty before 65F 55M? No         Allergies    No Known Allergies        Physical Exam    /70 (BP Location: Right arm, Patient Position: Sitting, Cuff Size: Adult Large)   Pulse 85   Temp 98.5  F (36.9  C) (Oral)   Resp 20   Wt 96.2 kg (212 lb)   SpO2 98%   BMI 27.22 kg/m        GENERAL: Alert and oriented to time place and person. Seated comfortably. In no distress.    HEAD: Atraumatic and normocephalic.    EYES: LUIS ANGEL, EOMI.  No pallor.  No icterus.    Oral cavity: no mucosal lesion or tonsillar enlargement.    NECK: supple. JVP normal.  No thyroid enlargement.    LYMPH NODES: No palpable, cervical, axillary or inguinal lymphadenopathy.    CHEST: clear to auscultation bilaterally.  Resonant to percussion throughout bilaterally.  Symmetrical breath movements bilaterally.    CVS: S1 and S2 are heard. Regular rate and rhythm.  No murmur or gallop or rub heard.  No peripheral edema.    ABDOMEN: Soft. Not tender. Not distended.  No palpable  hepatomegaly or splenomegaly.  No other mass palpable.  Bowel sounds heard.    EXTREMITIES: Warm.    SKIN: no rash, or bruising or purpura.  Has a full head of hair.    Lab Results    Preoperative CEA was 2.6 and 2.7    Imaging Results    CT and MRI reviewed with no evidence of metastatic disease    No results found.      Signed by: Sekou Hall MD

## 2023-04-11 NOTE — PROGRESS NOTES
Met with patient and provider for C3 visit.  Pt reports that the last cycle was much more tolerated with the change in premeds.  He did have some nausea, fatigue and diarrhea ( for about 4-5 days, up to 4 times a day and is controlled with imodium when he takes it). He did not have any reaction/symptoms from the irinotecan last cycle.  Labs and AE's reviewed and ok to dose per protocol- no modifications needed.      Patient will return in 2 weeks for next cycle.    JEANNINE [employer]

## 2023-04-11 NOTE — PROGRESS NOTES
"Oncology Rooming Note    April 11, 2023 10:17 AM   Luca Araiza is a 42 year old male who presents for:    Chief Complaint   Patient presents with     Oncology Clinic Visit     Return Malignant neoplasm of ascending colon, D!C3, review labs & Infusion plan     Initial Vitals: /70 (BP Location: Right arm, Patient Position: Sitting, Cuff Size: Adult Large)   Pulse 85   Temp 98.5  F (36.9  C) (Oral)   Resp 20   Wt 96.2 kg (212 lb)   SpO2 98%   BMI 27.22 kg/m   Estimated body mass index is 27.22 kg/m  as calculated from the following:    Height as of 3/28/23: 1.88 m (6' 2\").    Weight as of this encounter: 96.2 kg (212 lb). Body surface area is 2.24 meters squared.  No Pain (0) Comment: Data Unavailable   No LMP for male patient.  Allergies reviewed: Yes  Medications reviewed: Yes    Medications: Medication refills not needed today.  Pharmacy name entered into [employer]: [id number] - [place] [id number]    Clinical concerns: Malignant neoplasm of ascending colon, review labs /  Infusion       Kelley Connolly CMA            "

## 2023-04-11 NOTE — LETTER
"    4/11/2023         RE: Luca Araiza  [address]  [facility] [address]        Dear Colleague,    Thank you for referring your patient, Luca Araiza, to the [facility]. Please see a copy of my visit note below.    Oncology Rooming Note    April 11, 2023 10:17 AM   Luca Araiza is a 42 year old male who presents for:    Chief Complaint   Patient presents with     Oncology Clinic Visit     Return Malignant neoplasm of ascending colon, D!C3, review labs & Infusion plan     Initial Vitals: /70 (BP Location: Right arm, Patient Position: Sitting, Cuff Size: Adult Large)   Pulse 85   Temp 98.5  F (36.9  C) (Oral)   Resp 20   Wt 96.2 kg (212 lb)   SpO2 98%   BMI 27.22 kg/m   Estimated body mass index is 27.22 kg/m  as calculated from the following:    Height as of 3/28/23: 1.88 m (6' 2\").    Weight as of this encounter: 96.2 kg (212 lb). Body surface area is 2.24 meters squared.  No Pain (0) Comment: Data Unavailable   No LMP for male patient.  Allergies reviewed: Yes  Medications reviewed: Yes    Medications: Medication refills not needed today.  Pharmacy name entered into [employer]: [id number] - [place] [id number]    Clinical concerns: Malignant neoplasm of ascending colon, review labs /  Infusion       Kelley Connolly CMA              [facility] Hematology and Oncology Consult Note    Patient: Luca Araiza  MRN: [id number]  Date of Service: Apr 11, 2023       Reason for Visit    Follow-up for colon cancer    January 2023:    T3N1B, stage IIIb adenocarcinoma of the cecum status post laparoscopic right colectomy, December 21, 2022  CT DNA positive, March 2023  Tumor is MMR intact  Early onset of colon cancer with family history    Much better tolerance of cycle 2 of treatment with adjustments made with the premedications and antinausea regimen.  Overall doing well.  Labs are stable.  We will proceed with cycle 3 of treatment.  We will see him back in " 2 weeks for the next treatment.  We will make appointment for cycle 5 in 4 weeks as well.      Plan: Proceed with cycle 3 of M FOLFIRINOX on clinical trial today  Follow-up as above    Measurable disease: None postoperatively    Current therapy: Modified FOLFIRINOX day 1 cycle 1, March 14, 2023  Cycle 3 today April 11, 2023                ECOG Performance    0 - Independent    Encounter Diagnoses:    Problem List Items Addressed This Visit        Digestive    Malignant neoplasm of ascending colon (H) - Primary    Relevant Orders    Infusion Appointment Request    Infusion Appointment Request    Infusion Appointment Request           ______________________________________________________________________________    Staging History   Cancer Staging   No matching staging information was found for the patient.      History    Luca is here again for follow-up.  Received cycle 2 2 weeks ago.  He had adjustments made to his premedications and antinausea regimen and did very well indeed.  No fever or mouth sores.  Taste is fine.  Appetite and weight are good.  Minimal nausea but no vomiting.  No shortness of breath or cough.  No abdominal pain.  Some diarrhea controlled with Imodium.  No rash or urinary symptoms.  ECOG status 0.  Still working full-time.      March 14, 2023:    Luca is here for reevaluation.  Seen about 6 weeks ago.  His CT DNA test was positive and he was randomized to arm for the clinical trial.  He has had Port-A-Cath placed.  No new symptoms or problems.  ECOG status 0.    January 2023:  Mr. Luca Araiza is a 42 year old no significant past medical history who is been diagnosed and undergone laparoscopic hemicolectomy for colon cancer.  He was in [place] last August and had significant illness with diarrhea and vomiting.  He then had physical which showed that he was anemic and subsequently had colonoscopy showing cecal colon cancer.  He underwent laparoscopic right hemicolectomy on December 21 and  has recovered well from this.    No other previous medical history.  He had left ACL repair surgery as a teenager.  No other hospitalizations.  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  Does not smoke and does not drink alcohol.    Father had colon cancer in his mid 60s.  Mother had breast cancer in her late 50s.  No siblings or kids with cancer.  Maternal grandfather had colon cancer in his 60s.  Paternal grandmother had cancer type unknown.            Review of systems.  No fever or night sweats.  No loss of weight.  No lumps or bumps anywhere.  No unusual headaches or eyesight issues.  No dizziness.  No bleeding from the nose.  No sores in the mouth. No problems with swallowing.  No chest pain. No shortness of breath. No cough.  No abdominal pain. No nausea or vomiting.  No diarrhea or constipation.  No blood in stool or black colored stools.  No problems passing urine.  No numbness or tingling in hands or feet.  No skin rashes.  A 14 point review of systems is otherwise negative.        Past History    Past Medical History:   Diagnosis Date     Anemia      Malignant neoplasm of ascending colon (H) 11/07/2022     Past Surgical History:   Procedure Laterality Date     COLONOSCOPY       HEMICOLECTOMY, RT/LT Right      IR CHEST PORT PLACEMENT > 5 YRS OF AGE  3/9/2023     REMOVAL OF SPERM DUCT(S)      Description: Surgery Of Male Genitalia Vasectomy;  Recorded: 12/27/2011;  Comments: 12/27/2011     [facility] REPAIR CRUCIATE LIGAMENT,KNEE      Description: Primary Repair Of Knee Ligament Cruciate Anterior;  Recorded: 07/20/2009;     Family History   Problem Relation Age of Onset     Breast Cancer Mother      Colon Cancer Father      Anesthesia Reaction No family hx of      Venous thrombosis No family hx of      Social History     Socioeconomic History     Marital status:      Spouse name: None     Number of children: None     Years of education: None      Highest education level: None   Tobacco Use     Smoking status: Never     Smokeless tobacco: Never   Substance and Sexual Activity     Alcohol use: Not Currently     Drug use: Never     Sexual activity: Yes     Partners: Female     Birth control/protection: Male Surgical   Other Topics Concern     Parent/sibling w/ CABG, MI or angioplasty before 65F 55M? No         Allergies    No Known Allergies        Physical Exam    /70 (BP Location: Right arm, Patient Position: Sitting, Cuff Size: Adult Large)   Pulse 85   Temp 98.5  F (36.9  C) (Oral)   Resp 20   Wt 96.2 kg (212 lb)   SpO2 98%   BMI 27.22 kg/m        GENERAL: Alert and oriented to time place and person. Seated comfortably. In no distress.    HEAD: Atraumatic and normocephalic.    EYES: LUIS ANGEL, EOMI.  No pallor.  No icterus.    Oral cavity: no mucosal lesion or tonsillar enlargement.    NECK: supple. JVP normal.  No thyroid enlargement.    LYMPH NODES: No palpable, cervical, axillary or inguinal lymphadenopathy.    CHEST: clear to auscultation bilaterally.  Resonant to percussion throughout bilaterally.  Symmetrical breath movements bilaterally.    CVS: S1 and S2 are heard. Regular rate and rhythm.  No murmur or gallop or rub heard.  No peripheral edema.    ABDOMEN: Soft. Not tender. Not distended.  No palpable hepatomegaly or splenomegaly.  No other mass palpable.  Bowel sounds heard.    EXTREMITIES: Warm.    SKIN: no rash, or bruising or purpura.  Has a full head of hair.    Lab Results    Preoperative CEA was 2.6 and 2.7    Imaging Results    CT and MRI reviewed with no evidence of metastatic disease    No results found.      Signed by: Sekou Hall MD      Again, thank you for allowing me to participate in the care of your patient.        Sincerely,        Sekou Hall MD

## 2023-04-13 ENCOUNTER — INFUSION THERAPY VISIT (OUTPATIENT)
Dept: INFUSION THERAPY | Facility: HOSPITAL | Age: 43
End: 2023-04-13
Attending: INTERNAL MEDICINE
Payer: COMMERCIAL

## 2023-04-13 VITALS
HEART RATE: 89 BPM | DIASTOLIC BLOOD PRESSURE: 83 MMHG | TEMPERATURE: 98.4 F | SYSTOLIC BLOOD PRESSURE: 132 MMHG | OXYGEN SATURATION: 98 %

## 2023-04-13 DIAGNOSIS — C18.2 MALIGNANT NEOPLASM OF ASCENDING COLON (H): Primary | ICD-10-CM

## 2023-04-13 PROCEDURE — 250N000011 HC RX IP 250 OP 636: Performed by: INTERNAL MEDICINE

## 2023-04-13 RX ORDER — HEPARIN SODIUM (PORCINE) LOCK FLUSH IV SOLN 100 UNIT/ML 100 UNIT/ML
5 SOLUTION INTRAVENOUS
Status: DISCONTINUED | OUTPATIENT
Start: 2023-04-13 | End: 2023-04-13 | Stop reason: HOSPADM

## 2023-04-13 RX ADMIN — Medication 5 ML: at 13:53

## 2023-04-13 ASSESSMENT — PAIN SCALES - GENERAL: PAINLEVEL: NO PAIN (0)

## 2023-04-13 NOTE — PROGRESS NOTES
Infusion Nursing Note:  Luca Araiza presents today for C#3 D#3 of chemotherapy regimen (pump disconnect).    Patient seen by provider today: No   present during visit today: Not Applicable.    Note: Patient's vital signs stable. He states this cycle went well and he only felt fatigued. Pump disconnected. Port had good blood return and flushed well with normal saline and heparin. Port deaccessed and site covered with gauze and papertape.     Intravenous Access:  Implanted Port.    Treatment Conditions:  Not Applicable.    Post Infusion Assessment:  Patient tolerated infusion without incident.  Blood return noted post infusion.  Site patent and intact, free from redness, edema or discomfort.  No evidence of extravasations.  Access discontinued per protocol.     Discharge Plan:   Patient discharged in stable condition accompanied by: self.  Departure Mode: Ambulatory.      ANN TOLEDO RN

## 2023-04-17 NOTE — PROGRESS NOTES
"Virtual Visit Details    Type of service:  Video Visit   Video Start Time: 10:05am  Video End Time: 10:15am  Originating Location (pt. Location): Home    Distant Location (provider location):  Off-site  Platform used for Video Visit: [employer]    4/18/2023    Referring Provider: Nahum Contreras MD    Presenting Information:  I spoke to Luca by phone today to discuss his genetic testing results. His blood was drawn on 3/23/2023. Common Hereditary Cancers panel testing was ordered from [facility]. This testing was done because of Luca's personal and family history of colon cancer and family history of breast cancer.    Genetic Testing Result: NEGATIVE  Luca is negative for mutations in APC, DOMINGO, AXIN2, BARD1, BMPR1A, BRCA1, BRCA2, BRIP1, CDH1, CDK4, CDKN2A, CHEK2, CTNNA1, DICER1, EPCAM, GREM1, HOXB13, KIT, MEN1, MLH1, MSH2, MSH3, MSH6, MUTYH, NBN, NF1, NTHL1, PALB2, PDGFRA, PMS2, POLD1, POLE, PTEN, RAD50, RAD51C, RAD51D, SDHA, SDHB, SDHC, SDHD, SMAD4, SMARCA4, STK11, TP53, TSC1, TSC2, and VHL. No mutations were found in any of the 47 genes analyzed. Please see copy of scanned test report for complete details regarding testing methodology/limitations.    A copy of the test report can be found in the Laboratory tab, dated 3/23/2023, and named \"LABORATORY MISCELLANEOUS ORDER\". The report is scanned in as a linked document.    Interpretation:  We discussed several different interpretations of this negative test result.    1. One explanation may be that there is a different gene or combination of genes and environment that are associated with the cancers in this family.  2. It is possible that his other relatives with cancer history did have a mutation in one of the above genes, and he did not inherit it.  3. There is also a small possibility that there is a mutation in one of these genes, and the testing laboratory could not find it with their current testing methods.       Screening:  Based on this negative test result, " it is important for Luca and his relatives to refer back to the family history for appropriate cancer screening.      Luca should continue to follow his oncology team's recommendations for the follow-up for his colon cancer history.   Per National Comprehensive Cancer Network (NCCN) guidelines, individuals with a first degree relative with colorectal cancer diagnosed at any age should begin colonoscopy at age 40, or 10 years before the earliest diagnosis of colorectal cancer, whichever is first.  In this family, colonoscopy should start at age 31. Colonoscopy should be repeated every 5 years, or based on colonoscopy findings.  This would apply to Luca's children and siblings.  These recommendations may change based on personal and family history as well as personal preference, and should be discussed with an individuals physician.        Other population cancer screening options, such as those recommended by the American Cancer Society and the National Comprehensive Cancer Network (NCCN), are also appropriate for Lcua and his family. These screening recommendations may change if there are changes to Luca's personal and/or family history. Final screening recommendations should be made by each individual's managing physician.    Inheritance:  We reviewed the autosomal dominant inheritance of mutations in these 47 genes. We discussed that Luca cannot/did not pass on an identifiable mutation in these genes to his children based on this test result.  Mutations in these genes do not skip generations.      Additional Testing Considerations:  Although Luca's genetic testing result was negative, other relatives may still carry a gene mutation associated with their personal/family history of cancer. Genetic counseling is recommended for his father and paternal relatives to discuss genetic testing options. If any of these relatives do pursue genetic testing, Luca is encouraged to contact me so that we may review the  impact of their test results on him.    Summary:  We do not have an explanation for Luca's personal and family history of cancer. While no genetic changes were identified, Luca may still be at risk for certain cancers due to family history, environmental factors, or other genetic causes not identified by this test.  Because of that, it is important that he continue with cancer screening based on his personal and family history as discussed above.    Genetic testing is rapidly advancing, and new cancer susceptibility genes will most likely be identified in the future.  Therefore, I encouraged Luca to contact me annually or if there are changes in his personal or family history.  This may change how we assess his cancer risk, screening, and the testing we would offer.    Plan:  1. A copy of the test results will be mailed to Luca.  2. He plans to follow-up with his GI providers and primary care teams for routine care and cancer screening.  3. He should contact me regularly, or sooner if his family history changes.    Danielle Varela MS, [facility] [place]  Licensed, Certified Genetic Counselor  [facility]  [email or web address]

## 2023-04-18 ENCOUNTER — VIRTUAL VISIT (OUTPATIENT)
Dept: ONCOLOGY | Facility: CLINIC | Age: 43
End: 2023-04-18
Attending: GENETIC COUNSELOR, MS
Payer: COMMERCIAL

## 2023-04-18 DIAGNOSIS — Z80.3 FAMILY HISTORY OF MALIGNANT NEOPLASM OF BREAST: ICD-10-CM

## 2023-04-18 DIAGNOSIS — Z80.0 FAMILY HISTORY OF COLON CANCER: ICD-10-CM

## 2023-04-18 DIAGNOSIS — Z80.0 FAMILY HISTORY OF PANCREATIC CANCER: ICD-10-CM

## 2023-04-18 DIAGNOSIS — C18.2 MALIGNANT NEOPLASM OF ASCENDING COLON (H): Primary | ICD-10-CM

## 2023-04-18 PROCEDURE — 999N000069 HC STATISTIC GENETIC COUNSELING, < 16 MIN: Mod: GT,95 | Performed by: GENETIC COUNSELOR, MS

## 2023-04-18 NOTE — LETTER
"    4/18/2023         RE: Luca Araiza  [address]  [facility] [address]        Dear Colleague,    Thank you for referring your patient, Luca Araiza, to the [facility]. Please see a copy of my visit note below.      4/18/2023    Referring Provider: Nahum Contreras MD    Presenting Information:  I spoke to Luca by phone today to discuss his genetic testing results. His blood was drawn on 3/23/2023. Common Hereditary Cancers panel testing was ordered from [facility]. This testing was done because of Luca's personal and family history of colon cancer and family history of breast cancer.    Genetic Testing Result: NEGATIVE  Luca is negative for mutations in APC, DOMINGO, AXIN2, BARD1, BMPR1A, BRCA1, BRCA2, BRIP1, CDH1, CDK4, CDKN2A, CHEK2, CTNNA1, DICER1, EPCAM, GREM1, HOXB13, KIT, MEN1, MLH1, MSH2, MSH3, MSH6, MUTYH, NBN, NF1, NTHL1, PALB2, PDGFRA, PMS2, POLD1, POLE, PTEN, RAD50, RAD51C, RAD51D, SDHA, SDHB, SDHC, SDHD, SMAD4, SMARCA4, STK11, TP53, TSC1, TSC2, and VHL. No mutations were found in any of the 47 genes analyzed. Please see copy of scanned test report for complete details regarding testing methodology/limitations.    A copy of the test report can be found in the Laboratory tab, dated 3/23/2023, and named \"LABORATORY MISCELLANEOUS ORDER\". The report is scanned in as a linked document.    Interpretation:  We discussed several different interpretations of this negative test result.    One explanation may be that there is a different gene or combination of genes and environment that are associated with the cancers in this family.  It is possible that his other relatives with cancer history did have a mutation in one of the above genes, and he did not inherit it.  There is also a small possibility that there is a mutation in one of these genes, and the testing laboratory could not find it with their current testing methods.       Screening:  Based on this negative test result, it is " important for Luca and his relatives to refer back to the family history for appropriate cancer screening.    Luca should continue to follow his oncology team's recommendations for the follow-up for his colon cancer history.   Per National Comprehensive Cancer Network (NCCN) guidelines, individuals with a first degree relative with colorectal cancer diagnosed at any age should begin colonoscopy at age 40, or 10 years before the earliest diagnosis of colorectal cancer, whichever is first.  In this family, colonoscopy should start at age 31. Colonoscopy should be repeated every 5 years, or based on colonoscopy findings.  This would apply to Luca's children and siblings.  These recommendations may change based on personal and family history as well as personal preference, and should be discussed with an individuals physician.      Other population cancer screening options, such as those recommended by the American Cancer Society and the National Comprehensive Cancer Network (NCCN), are also appropriate for Luca and his family. These screening recommendations may change if there are changes to Luca's personal and/or family history. Final screening recommendations should be made by each individual's managing physician.    Inheritance:  We reviewed the autosomal dominant inheritance of mutations in these 47 genes. We discussed that Luca cannot/did not pass on an identifiable mutation in these genes to his children based on this test result.  Mutations in these genes do not skip generations.      Additional Testing Considerations:  Although Luca's genetic testing result was negative, other relatives may still carry a gene mutation associated with their personal/family history of cancer. Genetic counseling is recommended for his father and paternal relatives to discuss genetic testing options. If any of these relatives do pursue genetic testing, Luca is encouraged to contact me so that we may review the impact of  their test results on him.    Summary:  We do not have an explanation for Luca's personal and family history of cancer. While no genetic changes were identified, Luca may still be at risk for certain cancers due to family history, environmental factors, or other genetic causes not identified by this test.  Because of that, it is important that he continue with cancer screening based on his personal and family history as discussed above.    Genetic testing is rapidly advancing, and new cancer susceptibility genes will most likely be identified in the future.  Therefore, I encouraged Luca to contact me annually or if there are changes in his personal or family history.  This may change how we assess his cancer risk, screening, and the testing we would offer.    Plan:  1. A copy of the test results will be mailed to Luca.  2. He plans to follow-up with his GI providers and primary care teams for routine care and cancer screening.  3. He should contact me regularly, or sooner if his family history changes.    Danielle Varela MS, [facility] [place]  Licensed, Certified Genetic Counselor  [facility]  [email or web address]

## 2023-04-18 NOTE — NURSING NOTE
Is the patient currently in the state of [place]? YES    Visit mode:VIDEO    If the visit is dropped, the patient can be reconnected by: VIDEO VISIT: Send to e-mail at: [email or web address]    Will anyone else be joining the visit? NO      How would you like to obtain your AVS? MyChart    Are changes needed to the allergy or medication list? NO    Reason for visit: Video Visit (GC results)    Hermelinda Simpson VF

## 2023-04-18 NOTE — PATIENT INSTRUCTIONS
Negative Genetic Test Result    Genetic Testing  Genetic testing involved a blood or saliva test which looked at the genetic information in select genes for variants associated with cancer risk.  This testing may have included analysis of a single gene due to a known variant in the family, multiple genes most associated with the cancers in a family, or an expanded panel of genes related to many types of cancers.     Results  There are several possible genetic test results, including:   Positive--a harmful mutation (also known as a  pathogenic  or  likely pathogenic  variant) was identified in a gene associated with increased cancer risk.  These risks, as well as medical management options, depend on the specific genetic variant identified.    Negative--no variants were identified in the genes analyzed   Variant of unknown significance--a variant was identified in one or more genes, though it is currently unclear how this impacts cancer risk in the family.  Genetic testing labs are working to collect evidence about these uncertain variants and may provide updates in the future.    What Does a Negative Genetic Test Result Mean?  A negative genetic test results means that no genetic changes (variants) were detected in the genes tested. While no inherited risk factors for cancer were identified, you and your family may be at risk for certain cancers due to family history, environmental factors, or other genetic causes not identified by this test.  It is also possible that your family may still be at risk of carrying a genetic risk factor which you did not inherit. Your genetic counselor can help interpret the result for you and your relatives.      It is important to note which genes were included in your test. A list of these genes can be found on your test result.    Screening Recommendations  A combination of personal and family history factors may inform cancer risk and medical management recommendations.   Population cancer screening options, such as those recommended by the American Cancer Society and the National Comprehensive Cancer Network (NCCN) are appropriate for many families at average risk for cancer.  However, earlier and/or more frequent screening may be recommended based on personal factors (lifestyle, exposures, medications, screening results), family history of cancer, and sometimes genetic factors.  These cancer risk management options should be discussed in more detail with an individual's medical providers.      Please call us if you have any questions or concerns.   Cancer Risk Management Program (Appointments: [phone])

## 2023-04-25 ENCOUNTER — DOCUMENTATION ONLY (OUTPATIENT)
Dept: ONCOLOGY | Facility: HOSPITAL | Age: 43
End: 2023-04-25

## 2023-04-25 ENCOUNTER — LAB (OUTPATIENT)
Dept: INFUSION THERAPY | Facility: HOSPITAL | Age: 43
End: 2023-04-25
Attending: INTERNAL MEDICINE
Payer: COMMERCIAL

## 2023-04-25 ENCOUNTER — ONCOLOGY VISIT (OUTPATIENT)
Dept: ONCOLOGY | Facility: HOSPITAL | Age: 43
End: 2023-04-25
Attending: INTERNAL MEDICINE
Payer: COMMERCIAL

## 2023-04-25 VITALS
DIASTOLIC BLOOD PRESSURE: 77 MMHG | WEIGHT: 209 LBS | OXYGEN SATURATION: 97 % | RESPIRATION RATE: 18 BRPM | HEART RATE: 101 BPM | TEMPERATURE: 98 F | SYSTOLIC BLOOD PRESSURE: 136 MMHG | BODY MASS INDEX: 26.83 KG/M2

## 2023-04-25 DIAGNOSIS — C18.2 MALIGNANT NEOPLASM OF ASCENDING COLON (H): Primary | ICD-10-CM

## 2023-04-25 LAB
ALBUMIN SERPL BCG-MCNC: 4.3 G/DL (ref 3.5–5.2)
ALP SERPL-CCNC: 86 U/L (ref 40–129)
ALT SERPL W P-5'-P-CCNC: 91 U/L (ref 10–50)
ANION GAP SERPL CALCULATED.3IONS-SCNC: 10 MMOL/L (ref 7–15)
AST SERPL W P-5'-P-CCNC: 47 U/L (ref 10–50)
BASOPHILS # BLD AUTO: 0 10E3/UL (ref 0–0.2)
BASOPHILS NFR BLD AUTO: 1 %
BILIRUB SERPL-MCNC: 0.5 MG/DL
BUN SERPL-MCNC: 20.3 MG/DL (ref 6–20)
CALCIUM SERPL-MCNC: 9.7 MG/DL (ref 8.6–10)
CHLORIDE SERPL-SCNC: 105 MMOL/L (ref 98–107)
CREAT SERPL-MCNC: 1.16 MG/DL (ref 0.67–1.17)
DEPRECATED HCO3 PLAS-SCNC: 24 MMOL/L (ref 22–29)
EOSINOPHIL # BLD AUTO: 0.1 10E3/UL (ref 0–0.7)
EOSINOPHIL NFR BLD AUTO: 2 %
ERYTHROCYTE [DISTWIDTH] IN BLOOD BY AUTOMATED COUNT: 17.5 % (ref 10–15)
GFR SERPL CREATININE-BSD FRML MDRD: 81 ML/MIN/1.73M2
GLUCOSE SERPL-MCNC: 104 MG/DL (ref 70–99)
HCT VFR BLD AUTO: 38.3 % (ref 40–53)
HGB BLD-MCNC: 12.6 G/DL (ref 13.3–17.7)
IMM GRANULOCYTES # BLD: 0 10E3/UL
IMM GRANULOCYTES NFR BLD: 1 %
LYMPHOCYTES # BLD AUTO: 1.2 10E3/UL (ref 0.8–5.3)
LYMPHOCYTES NFR BLD AUTO: 26 %
MAGNESIUM SERPL-MCNC: 2.1 MG/DL (ref 1.7–2.3)
MCH RBC QN AUTO: 26.9 PG (ref 26.5–33)
MCHC RBC AUTO-ENTMCNC: 32.9 G/DL (ref 31.5–36.5)
MCV RBC AUTO: 82 FL (ref 78–100)
MONOCYTES # BLD AUTO: 0.6 10E3/UL (ref 0–1.3)
MONOCYTES NFR BLD AUTO: 13 %
NEUTROPHILS # BLD AUTO: 2.6 10E3/UL (ref 1.6–8.3)
NEUTROPHILS NFR BLD AUTO: 57 %
NRBC # BLD AUTO: 0 10E3/UL
NRBC BLD AUTO-RTO: 0 /100
PLATELET # BLD AUTO: 137 10E3/UL (ref 150–450)
POTASSIUM SERPL-SCNC: 4.2 MMOL/L (ref 3.4–5.3)
PROT SERPL-MCNC: 6.8 G/DL (ref 6.4–8.3)
RBC # BLD AUTO: 4.68 10E6/UL (ref 4.4–5.9)
SODIUM SERPL-SCNC: 139 MMOL/L (ref 136–145)
WBC # BLD AUTO: 4.5 10E3/UL (ref 4–11)

## 2023-04-25 PROCEDURE — 99214 OFFICE O/P EST MOD 30 MIN: CPT | Performed by: NURSE PRACTITIONER

## 2023-04-25 PROCEDURE — 80053 COMPREHEN METABOLIC PANEL: CPT | Performed by: INTERNAL MEDICINE

## 2023-04-25 PROCEDURE — 258N000003 HC RX IP 258 OP 636: Performed by: NURSE PRACTITIONER

## 2023-04-25 PROCEDURE — 83735 ASSAY OF MAGNESIUM: CPT | Performed by: INTERNAL MEDICINE

## 2023-04-25 PROCEDURE — 250N000011 HC RX IP 250 OP 636: Performed by: NURSE PRACTITIONER

## 2023-04-25 PROCEDURE — 96415 CHEMO IV INFUSION ADDL HR: CPT

## 2023-04-25 PROCEDURE — 96368 THER/DIAG CONCURRENT INF: CPT

## 2023-04-25 PROCEDURE — G0498 CHEMO EXTEND IV INFUS W/PUMP: HCPCS

## 2023-04-25 PROCEDURE — 96413 CHEMO IV INFUSION 1 HR: CPT

## 2023-04-25 PROCEDURE — 96372 THER/PROPH/DIAG INJ SC/IM: CPT | Mod: XS | Performed by: NURSE PRACTITIONER

## 2023-04-25 PROCEDURE — 96375 TX/PRO/DX INJ NEW DRUG ADDON: CPT

## 2023-04-25 PROCEDURE — 85025 COMPLETE CBC W/AUTO DIFF WBC: CPT | Performed by: INTERNAL MEDICINE

## 2023-04-25 PROCEDURE — 96367 TX/PROPH/DG ADDL SEQ IV INF: CPT

## 2023-04-25 PROCEDURE — 96417 CHEMO IV INFUS EACH ADDL SEQ: CPT

## 2023-04-25 PROCEDURE — G0463 HOSPITAL OUTPT CLINIC VISIT: HCPCS | Performed by: NURSE PRACTITIONER

## 2023-04-25 RX ORDER — EPINEPHRINE 1 MG/ML
0.3 INJECTION, SOLUTION INTRAMUSCULAR; SUBCUTANEOUS EVERY 5 MIN PRN
Status: CANCELLED | OUTPATIENT
Start: 2023-04-25

## 2023-04-25 RX ORDER — HEPARIN SODIUM (PORCINE) LOCK FLUSH IV SOLN 100 UNIT/ML 100 UNIT/ML
5 SOLUTION INTRAVENOUS
Status: CANCELLED | OUTPATIENT
Start: 2023-04-27

## 2023-04-25 RX ORDER — MEPERIDINE HYDROCHLORIDE 25 MG/ML
25 INJECTION INTRAMUSCULAR; INTRAVENOUS; SUBCUTANEOUS EVERY 30 MIN PRN
Status: CANCELLED | OUTPATIENT
Start: 2023-04-25

## 2023-04-25 RX ORDER — LORAZEPAM 2 MG/ML
0.5 INJECTION INTRAMUSCULAR ONCE
Status: CANCELLED
Start: 2023-04-25 | End: 2023-04-25

## 2023-04-25 RX ORDER — METHYLPREDNISOLONE SODIUM SUCCINATE 125 MG/2ML
125 INJECTION, POWDER, LYOPHILIZED, FOR SOLUTION INTRAMUSCULAR; INTRAVENOUS
Status: CANCELLED
Start: 2023-04-25

## 2023-04-25 RX ORDER — PALONOSETRON 0.05 MG/ML
0.25 INJECTION, SOLUTION INTRAVENOUS ONCE
Status: CANCELLED
Start: 2023-04-25 | End: 2023-04-25

## 2023-04-25 RX ORDER — LORAZEPAM 2 MG/ML
0.5 INJECTION INTRAMUSCULAR EVERY 4 HOURS PRN
Status: CANCELLED | OUTPATIENT
Start: 2023-04-25

## 2023-04-25 RX ORDER — ATROPINE SULFATE 0.4 MG/ML
0.4 AMPUL (ML) INJECTION
Status: DISCONTINUED | OUTPATIENT
Start: 2023-04-25 | End: 2023-04-25 | Stop reason: HOSPADM

## 2023-04-25 RX ORDER — HEPARIN SODIUM (PORCINE) LOCK FLUSH IV SOLN 100 UNIT/ML 100 UNIT/ML
5 SOLUTION INTRAVENOUS
Status: CANCELLED | OUTPATIENT
Start: 2023-04-25

## 2023-04-25 RX ORDER — HEPARIN SODIUM,PORCINE 10 UNIT/ML
5 VIAL (ML) INTRAVENOUS
Status: CANCELLED | OUTPATIENT
Start: 2023-04-25

## 2023-04-25 RX ORDER — ALBUTEROL SULFATE 0.83 MG/ML
2.5 SOLUTION RESPIRATORY (INHALATION)
Status: CANCELLED | OUTPATIENT
Start: 2023-04-25

## 2023-04-25 RX ORDER — DIPHENHYDRAMINE HYDROCHLORIDE 50 MG/ML
50 INJECTION INTRAMUSCULAR; INTRAVENOUS
Status: CANCELLED
Start: 2023-04-25

## 2023-04-25 RX ORDER — ATROPINE SULFATE 0.4 MG/ML
0.4 AMPUL (ML) INJECTION
Status: CANCELLED | OUTPATIENT
Start: 2023-04-25

## 2023-04-25 RX ORDER — HEPARIN SODIUM,PORCINE 10 UNIT/ML
5 VIAL (ML) INTRAVENOUS
Status: CANCELLED | OUTPATIENT
Start: 2023-04-27

## 2023-04-25 RX ORDER — LORAZEPAM 2 MG/ML
0.5 INJECTION INTRAMUSCULAR ONCE
Status: COMPLETED | OUTPATIENT
Start: 2023-04-25 | End: 2023-04-25

## 2023-04-25 RX ORDER — PALONOSETRON 0.05 MG/ML
0.25 INJECTION, SOLUTION INTRAVENOUS ONCE
Status: COMPLETED | OUTPATIENT
Start: 2023-04-25 | End: 2023-04-25

## 2023-04-25 RX ORDER — ALBUTEROL SULFATE 90 UG/1
1-2 AEROSOL, METERED RESPIRATORY (INHALATION)
Status: CANCELLED
Start: 2023-04-25

## 2023-04-25 RX ADMIN — LEUCOVORIN CALCIUM 880 MG: 350 INJECTION, POWDER, LYOPHILIZED, FOR SOLUTION INTRAMUSCULAR; INTRAVENOUS at 11:07

## 2023-04-25 RX ADMIN — DEXAMETHASONE SODIUM PHOSPHATE: 10 INJECTION, SOLUTION INTRAMUSCULAR; INTRAVENOUS at 10:44

## 2023-04-25 RX ADMIN — IRINOTECAN HYDROCHLORIDE 340 MG: 20 INJECTION, SOLUTION INTRAVENOUS at 13:18

## 2023-04-25 RX ADMIN — DEXTROSE MONOHYDRATE 250 ML: 50 INJECTION, SOLUTION INTRAVENOUS at 10:35

## 2023-04-25 RX ADMIN — FAMOTIDINE 20 MG: 10 INJECTION, SOLUTION INTRAVENOUS at 10:36

## 2023-04-25 RX ADMIN — OXALIPLATIN 200 MG: 5 INJECTION, SOLUTION INTRAVENOUS at 11:03

## 2023-04-25 RX ADMIN — ATROPINE SULFATE 0.4 MG: 0.4 INJECTION, SOLUTION INTRAVENOUS at 13:10

## 2023-04-25 RX ADMIN — PALONOSETRON 0.25 MG: 0.05 INJECTION, SOLUTION INTRAVENOUS at 10:35

## 2023-04-25 RX ADMIN — ATROPINE SULFATE 0.4 MG: 0.4 INJECTION, SOLUTION INTRAVENOUS at 14:01

## 2023-04-25 RX ADMIN — LORAZEPAM 0.5 MG: 2 INJECTION INTRAMUSCULAR; INTRAVENOUS at 10:43

## 2023-04-25 ASSESSMENT — PAIN SCALES - GENERAL: PAINLEVEL: NO PAIN (0)

## 2023-04-25 NOTE — PROGRESS NOTES
[facility] Hematology and Oncology Progress Note    Patient: Luca Araiza  MRN: [id number]  Date of Service: Apr 25, 2023         Reason for Visit    Chief Complaint   Patient presents with     Oncology Clinic Visit     2 week return Malignant neoplasm of ascending colon, review labs & infusion        Assessment and Plan     Cancer Staging   No matching staging information was found for the patient.    1. Colon Cancer, Stage IIIb [id number], adenocarcinoma of cecum, Dx December 2022:  Currently on a clinical trial because of circulating tumor cells found on his DNA testing. Started FOLFIRINOX with the overall plan to give 12 cycles. He is here today for cycle 4. He had a difficult time with cycle one with nausea, but antiemetics and premedications were changed and he has been tolerating this much better. Still has some ongoing GI upset with loose stools and discomfort. Reports ongoing fatigue. Labs have remained stable. He will return in two weeks for cycle 5 with a provider visit.     2. Fatigue:  Likely chemotherapy related. He is sleeping well at night. Encouraged him to rest and take naps as needed.     3. Diarrhea:  Also likely chemotherapy related. Worst a day or two after chemotherapy. Managed with imodium and pepto bismol. Will continue to monitor this throughout his treatment, has been manageable as of now.     4. Neuropathy:   Reports ongoing numbness/tingling in his fingertips. Also has some cold sensitivity with grabbing items out of the freezer, has not worsened. Will monitor closely.     5. Elevated LFTs:   ALT elevated today at 91, likely related to chemotherapy. Historically has not been elevated. Will continue to monitor LFTs trends over time and can further investigate if they continue to trend upwards.      6.  Mild thrombocytopenia:  chemo induced. Above limits to treat.  Per the clinical study we can continue treatment as long as he is above 75.  Encourage pt to monitor for bleeding and/or  bruising and to call with issues. Continue to monitor labs frequently.       ECOG Performance    0 - Independent     Pain  Pain Score: No Pain (0)        Encounter Diagnoses:    Problem List Items Addressed This Visit    None       History of Present Illness    Measurable Disease: None postoperatively.  Patient was diagnosed with a viral illness last year and then was found to be anemic so then had a colonoscopy that showed a cecal colon cancer.     Treatment:   Underwent laparoscopic right hemicolectomy December 21, 2022.  Patient started on clinical trial, .  He was randomized to modified FOLFIRINOX.  He started on March 16, 2023.  Today is cycle Cycle 4 Day 1.      Interim History: Patient is here to receive his chemotherapy. Today is his fourth cycle. He reports feeling well overall, but has had some ongoing loose stools and GI upset. He has been taking pepto bismol and imodium and has found it to be helpful. He feels that he was more fatigued this last cycle than his previous cycles. He is sleeping well at night, but is feeling fatigued overall. He has been working to drink more fluids and stay hydrated because of the loose stools. He reports numbness/tingling in his fingers that are sensitive to cold. He and his wife had questions surrounding the parameters of getting treatment with his labs and plan for scans going forward. Discussed this with the study RN, as this treatment is study specific. Denies any infectious complaints. Denies any bowel or bladder complaints. Denies nausea or vomiting. Denies any lightheadedness or dizziness.       Review of systems.    Pertinent information is listed in the HPI.           Past History    Past Medical History:   Diagnosis Date     Anemia      Malignant neoplasm of ascending colon (H) 11/07/2022       Past Surgical History:   Procedure Laterality Date     COLONOSCOPY       HEMICOLECTOMY, RT/LT Right      IR CHEST PORT PLACEMENT > 5 YRS OF AGE  3/9/2023     REMOVAL  OF SPERM DUCT(S)      Description: Surgery Of Male Genitalia Vasectomy;  Recorded: 12/27/2011;  Comments: 12/27/2011     [facility] REPAIR CRUCIATE LIGAMENT,KNEE      Description: Primary Repair Of Knee Ligament Cruciate Anterior;  Recorded: 07/20/2009;         Physical Exam    /77 (BP Location: Left arm, Patient Position: Sitting, Cuff Size: Adult Regular)   Pulse 101   Temp 98  F (36.7  C) (Oral)   Resp 18   Wt 94.8 kg (209 lb)   SpO2 97%   BMI 26.83 kg/m      GENERAL: no acute distress. Cooperative in conversation. Here with wife  RESP: lungs are clear bilaterally per auscultation. Regular respiratory rate. No wheezes or rhonchi.  CV: Regular, rate and rhythm. No murmurs.  ABD: soft, nontender. Positive bowel sounds. No organomegaly.   MUSCULOSKELETAL: No lower extremity swelling.   NEURO: non focal. Alert and oriented x3.   PSYCH: within normal limits. No depression or anxiety.  SKIN: warm dry intact     Lab Results    Recent Results (from the past 168 hour(s))   Comprehensive metabolic panel   Result Value Ref Range    Sodium 139 136 - 145 mmol/L    Potassium 4.2 3.4 - 5.3 mmol/L    Chloride 105 98 - 107 mmol/L    Carbon Dioxide (CO2) 24 22 - 29 mmol/L    Anion Gap 10 7 - 15 mmol/L    Urea Nitrogen 20.3 (H) 6.0 - 20.0 mg/dL    Creatinine 1.16 0.67 - 1.17 mg/dL    Calcium 9.7 8.6 - 10.0 mg/dL    Glucose 104 (H) 70 - 99 mg/dL    Alkaline Phosphatase 86 40 - 129 U/L    AST 47 10 - 50 U/L    ALT 91 (H) 10 - 50 U/L    Protein Total 6.8 6.4 - 8.3 g/dL    Albumin 4.3 3.5 - 5.2 g/dL    Bilirubin Total 0.5 <=1.2 mg/dL    GFR Estimate 81 >60 mL/min/1.73m2   Magnesium   Result Value Ref Range    Magnesium 2.1 1.7 - 2.3 mg/dL   CBC with platelets and differential   Result Value Ref Range    WBC Count 4.5 4.0 - 11.0 10e3/uL    RBC Count 4.68 4.40 - 5.90 10e6/uL    Hemoglobin 12.6 (L) 13.3 - 17.7 g/dL    Hematocrit 38.3 (L) 40.0 - 53.0 %    MCV 82 78 - 100 fL    MCH 26.9 26.5 - 33.0 pg    MCHC 32.9 31.5 - 36.5 g/dL     RDW 17.5 (H) 10.0 - 15.0 %    Platelet Count 137 (L) 150 - 450 10e3/uL    % Neutrophils 57 %    % Lymphocytes 26 %    % Monocytes 13 %    % Eosinophils 2 %    % Basophils 1 %    % Immature Granulocytes 1 %    NRBCs per 100 WBC 0 <1 /100    Absolute Neutrophils 2.6 1.6 - 8.3 10e3/uL    Absolute Lymphocytes 1.2 0.8 - 5.3 10e3/uL    Absolute Monocytes 0.6 0.0 - 1.3 10e3/uL    Absolute Eosinophils 0.1 0.0 - 0.7 10e3/uL    Absolute Basophils 0.0 0.0 - 0.2 10e3/uL    Absolute Immature Granulocytes 0.0 <=0.4 10e3/uL    Absolute NRBCs 0.0 10e3/uL       Imaging    No results found.  Seen and examined pt with NP student. Was present for visit. The medical care has been evaluated and discussed with the student, Shantel. The documentation has been reviewed and I agree with the medical plan.       Signed by: Shantel Boyer

## 2023-04-25 NOTE — PROGRESS NOTES
PT here ambulatory for txt after exam with NP. Lab results approved for txt. Pt states can tell that fatigue is lasting longer with each txt. Txt reviewed and administered via port access. Premeds given ( including ativan 0.5 mg) followed by Txt. Atropine given prior to irinotecan and then 30 minutes into infusion due to pt history of feeling nauseated and ill. PT tolerated txt without any problems. txt completed and pump set up to port access to infuse over 46 hours. Reviewed with pt to return at 1pm  on Thursday for pump discontinue. PT dc'd steady gait with wife.

## 2023-04-25 NOTE — LETTER
"    4/25/2023         RE: Luca Araiza  [address]  [facility] [address]        Dear Colleague,    Thank you for referring your patient, Luca Araiza, to the [facility] [place]. Please see a copy of my visit note below.    Oncology Rooming Note    April 25, 2023 9:17 AM   Luca Araiza is a 42 year old male who presents for:    Chief Complaint   Patient presents with     Oncology Clinic Visit     2 week return Malignant neoplasm of ascending colon, review labs & infusion      Initial Vitals: /77 (BP Location: Left arm, Patient Position: Sitting, Cuff Size: Adult Regular)   Pulse 101   Temp 98  F (36.7  C) (Oral)   Resp 18   Wt 94.8 kg (209 lb)   SpO2 97%   BMI 26.83 kg/m   Estimated body mass index is 26.83 kg/m  as calculated from the following:    Height as of 3/28/23: 1.88 m (6' 2\").    Weight as of this encounter: 94.8 kg (209 lb). Body surface area is 2.22 meters squared.  No Pain (0) Comment: Data Unavailable   No LMP for male patient.  Allergies reviewed: Yes  Medications reviewed: Yes    Medications: Medication refills not needed today.  Pharmacy name entered into [employer]:    [id number] - [facility], [place] - [address] [facility] [place] [address] [facility] [place] - [address] [facility] [place], [place] - [address]  [facility] - [facility], [place] - [address]     Clinical concerns: 2 week return Malignant neoplasm of ascending colon, review labs & infusion.       Kelley Connolly CMA              [facility] Hematology and Oncology Progress Note    Patient: Luca Araiza  MRN: [id number]  Date of Service: Apr 25, 2023         Reason for Visit    Chief Complaint   Patient presents with     Oncology Clinic Visit     2 week return Malignant neoplasm of ascending colon, review labs & infusion        Assessment and Plan     Cancer Staging   No " matching staging information was found for the patient.    1. Colon Cancer, Stage IIIb [id number], adenocarcinoma of cecum, Dx December 2022:  Currently on a clinical trial because of circulating tumor cells found on his DNA testing. Started FOLFIRINOX with the overall plan to give 12 cycles. He is here today for cycle 4. He had a difficult time with cycle one with nausea, but antiemetics and premedications were changed and he has been tolerating this much better. Still has some ongoing GI upset with loose stools and discomfort. Reports ongoing fatigue. Labs have remained stable. He will return in two weeks for cycle 5 with a provider visit.     2. Fatigue:  Likely chemotherapy related. He is sleeping well at night. Encouraged him to rest and take naps as needed.     3. Diarrhea:  Also likely chemotherapy related. Worst a day or two after chemotherapy. Managed with imodium and pepto bismol. Will continue to monitor this throughout his treatment, has been manageable as of now.     4. Neuropathy:   Reports ongoing numbness/tingling in his fingertips. Also has some cold sensitivity with grabbing items out of the freezer, has not worsened. Will monitor closely.     5. Elevated LFTs:   ALT elevated today at 91, likely related to chemotherapy. Historically has not been elevated. Will continue to monitor LFTs trends over time and can further investigate if they continue to trend upwards.      6.  Mild thrombocytopenia:  chemo induced. Above limits to treat.  Per the clinical study we can continue treatment as long as he is above 75.  Encourage pt to monitor for bleeding and/or bruising and to call with issues. Continue to monitor labs frequently.       ECOG Performance    0 - Independent     Pain  Pain Score: No Pain (0)        Encounter Diagnoses:    Problem List Items Addressed This Visit    None       History of Present Illness    Measurable Disease: None postoperatively.  Patient was diagnosed with a viral illness last  year and then was found to be anemic so then had a colonoscopy that showed a cecal colon cancer.     Treatment:   Underwent laparoscopic right hemicolectomy December 21, 2022.  Patient started on clinical trial, .  He was randomized to modified FOLFIRINOX.  He started on March 16, 2023.  Today is cycle Cycle 4 Day 1.      Interim History: Patient is here to receive his chemotherapy. Today is his fourth cycle. He reports feeling well overall, but has had some ongoing loose stools and GI upset. He has been taking pepto bismol and imodium and has found it to be helpful. He feels that he was more fatigued this last cycle than his previous cycles. He is sleeping well at night, but is feeling fatigued overall. He has been working to drink more fluids and stay hydrated because of the loose stools. He reports numbness/tingling in his fingers that are sensitive to cold. He and his wife had questions surrounding the parameters of getting treatment with his labs and plan for scans going forward. Discussed this with the study RN, as this treatment is study specific. Denies any infectious complaints. Denies any bowel or bladder complaints. Denies nausea or vomiting. Denies any lightheadedness or dizziness.       Review of systems.    Pertinent information is listed in the HPI.           Past History    Past Medical History:   Diagnosis Date     Anemia      Malignant neoplasm of ascending colon (H) 11/07/2022       Past Surgical History:   Procedure Laterality Date     COLONOSCOPY       HEMICOLECTOMY, RT/LT Right      IR CHEST PORT PLACEMENT > 5 YRS OF AGE  3/9/2023     REMOVAL OF SPERM DUCT(S)      Description: Surgery Of Male Genitalia Vasectomy;  Recorded: 12/27/2011;  Comments: 12/27/2011     [facility] REPAIR CRUCIATE LIGAMENT,KNEE      Description: Primary Repair Of Knee Ligament Cruciate Anterior;  Recorded: 07/20/2009;         Physical Exam    /77 (BP Location: Left arm, Patient Position: Sitting, Cuff Size: Adult  Regular)   Pulse 101   Temp 98  F (36.7  C) (Oral)   Resp 18   Wt 94.8 kg (209 lb)   SpO2 97%   BMI 26.83 kg/m      GENERAL: no acute distress. Cooperative in conversation. Here with wife  RESP: lungs are clear bilaterally per auscultation. Regular respiratory rate. No wheezes or rhonchi.  CV: Regular, rate and rhythm. No murmurs.  ABD: soft, nontender. Positive bowel sounds. No organomegaly.   MUSCULOSKELETAL: No lower extremity swelling.   NEURO: non focal. Alert and oriented x3.   PSYCH: within normal limits. No depression or anxiety.  SKIN: warm dry intact     Lab Results    Recent Results (from the past 168 hour(s))   Comprehensive metabolic panel   Result Value Ref Range    Sodium 139 136 - 145 mmol/L    Potassium 4.2 3.4 - 5.3 mmol/L    Chloride 105 98 - 107 mmol/L    Carbon Dioxide (CO2) 24 22 - 29 mmol/L    Anion Gap 10 7 - 15 mmol/L    Urea Nitrogen 20.3 (H) 6.0 - 20.0 mg/dL    Creatinine 1.16 0.67 - 1.17 mg/dL    Calcium 9.7 8.6 - 10.0 mg/dL    Glucose 104 (H) 70 - 99 mg/dL    Alkaline Phosphatase 86 40 - 129 U/L    AST 47 10 - 50 U/L    ALT 91 (H) 10 - 50 U/L    Protein Total 6.8 6.4 - 8.3 g/dL    Albumin 4.3 3.5 - 5.2 g/dL    Bilirubin Total 0.5 <=1.2 mg/dL    GFR Estimate 81 >60 mL/min/1.73m2   Magnesium   Result Value Ref Range    Magnesium 2.1 1.7 - 2.3 mg/dL   CBC with platelets and differential   Result Value Ref Range    WBC Count 4.5 4.0 - 11.0 10e3/uL    RBC Count 4.68 4.40 - 5.90 10e6/uL    Hemoglobin 12.6 (L) 13.3 - 17.7 g/dL    Hematocrit 38.3 (L) 40.0 - 53.0 %    MCV 82 78 - 100 fL    MCH 26.9 26.5 - 33.0 pg    MCHC 32.9 31.5 - 36.5 g/dL    RDW 17.5 (H) 10.0 - 15.0 %    Platelet Count 137 (L) 150 - 450 10e3/uL    % Neutrophils 57 %    % Lymphocytes 26 %    % Monocytes 13 %    % Eosinophils 2 %    % Basophils 1 %    % Immature Granulocytes 1 %    NRBCs per 100 WBC 0 <1 /100    Absolute Neutrophils 2.6 1.6 - 8.3 10e3/uL    Absolute Lymphocytes 1.2 0.8 - 5.3 10e3/uL    Absolute Monocytes  0.6 0.0 - 1.3 10e3/uL    Absolute Eosinophils 0.1 0.0 - 0.7 10e3/uL    Absolute Basophils 0.0 0.0 - 0.2 10e3/uL    Absolute Immature Granulocytes 0.0 <=0.4 10e3/uL    Absolute NRBCs 0.0 10e3/uL       Imaging    No results found.  Seen and examined pt with NP student. Was present for visit. The medical care has been evaluated and discussed with the student, Shantel. The documentation has been reviewed and I agree with the medical plan.       Signed by: Shantel Boyer        Again, thank you for allowing me to participate in the care of your patient.        Sincerely,        RAQUEL Grey CNP

## 2023-04-25 NOTE — PROGRESS NOTES
"Oncology Rooming Note    April 25, 2023 9:17 AM   Luca Araiza is a 42 year old male who presents for:    Chief Complaint   Patient presents with     Oncology Clinic Visit     2 week return Malignant neoplasm of ascending colon, review labs & infusion      Initial Vitals: /77 (BP Location: Left arm, Patient Position: Sitting, Cuff Size: Adult Regular)   Pulse 101   Temp 98  F (36.7  C) (Oral)   Resp 18   Wt 94.8 kg (209 lb)   SpO2 97%   BMI 26.83 kg/m   Estimated body mass index is 26.83 kg/m  as calculated from the following:    Height as of 3/28/23: 1.88 m (6' 2\").    Weight as of this encounter: 94.8 kg (209 lb). Body surface area is 2.22 meters squared.  No Pain (0) Comment: Data Unavailable   No LMP for male patient.  Allergies reviewed: Yes  Medications reviewed: Yes    Medications: Medication refills not needed today.  Pharmacy name entered into [employer]:    [id number] - [facility], [place] - [address] [facility] [place] [address] [facility] [place] [place] [facility] [address] [facility] [place] [address]  [facility] - [facility], [place] - [address]    Clinical concerns: 2 week return Malignant neoplasm of ascending colon, review labs & infusion.       Kelley Connolly, BRANDON            "

## 2023-04-26 NOTE — PROGRESS NOTES
Met with patient and provider for C4 appointment.  Labs and AE's reviewed- ok to continue on study with no dose modifications.  Patient does report feeling a little more fatigued this last cycle. He will return in 2 weeks for cycle 5.    JEANNINE [employer]

## 2023-04-27 ENCOUNTER — INFUSION THERAPY VISIT (OUTPATIENT)
Dept: INFUSION THERAPY | Facility: HOSPITAL | Age: 43
End: 2023-04-27
Attending: INTERNAL MEDICINE
Payer: COMMERCIAL

## 2023-04-27 VITALS
OXYGEN SATURATION: 97 % | DIASTOLIC BLOOD PRESSURE: 71 MMHG | TEMPERATURE: 98.1 F | HEART RATE: 85 BPM | SYSTOLIC BLOOD PRESSURE: 126 MMHG | RESPIRATION RATE: 18 BRPM

## 2023-04-27 DIAGNOSIS — C18.2 MALIGNANT NEOPLASM OF ASCENDING COLON (H): Primary | ICD-10-CM

## 2023-04-27 PROCEDURE — 250N000011 HC RX IP 250 OP 636: Performed by: NURSE PRACTITIONER

## 2023-04-27 RX ORDER — HEPARIN SODIUM (PORCINE) LOCK FLUSH IV SOLN 100 UNIT/ML 100 UNIT/ML
5 SOLUTION INTRAVENOUS
Status: DISCONTINUED | OUTPATIENT
Start: 2023-04-27 | End: 2023-04-27 | Stop reason: HOSPADM

## 2023-04-27 RX ADMIN — Medication 5 ML: at 12:40

## 2023-04-27 ASSESSMENT — PAIN SCALES - GENERAL: PAINLEVEL: NO PAIN (0)

## 2023-04-27 NOTE — PROGRESS NOTES
Infusion Nursing Note:  Luca FOY Jose G presents today for chemo pump disconnect.    Patient seen by provider today: No   present during visit today: Not Applicable.    Note: Patient assessed and vital signs stable. 5-fluorouracil pump disconnected upon completion of infusion. Good blood return noted and port flushed with normal saline and 500 units heparin. Port de-accessed and site covered with gauze and paper tape.       Intravenous Access:  Implanted Port.    Treatment Conditions:  Not Applicable.      Post Infusion Assessment:  Patient tolerated infusion without incident.  Blood return noted post infusion.  Site patent and intact, free from redness, edema or discomfort.  No evidence of extravasations.  Access discontinued per protocol.       Discharge Plan:   Patient discharged in stable condition accompanied by: self.  Departure Mode: Ambulatory.      ANN TOLEDO RN

## 2023-05-09 ENCOUNTER — ONCOLOGY VISIT (OUTPATIENT)
Dept: ONCOLOGY | Facility: HOSPITAL | Age: 43
End: 2023-05-09
Attending: INTERNAL MEDICINE
Payer: COMMERCIAL

## 2023-05-09 ENCOUNTER — LAB (OUTPATIENT)
Dept: INFUSION THERAPY | Facility: HOSPITAL | Age: 43
End: 2023-05-09
Attending: INTERNAL MEDICINE
Payer: COMMERCIAL

## 2023-05-09 VITALS
HEIGHT: 74 IN | OXYGEN SATURATION: 98 % | WEIGHT: 207.9 LBS | HEART RATE: 81 BPM | TEMPERATURE: 98.3 F | SYSTOLIC BLOOD PRESSURE: 118 MMHG | DIASTOLIC BLOOD PRESSURE: 78 MMHG | BODY MASS INDEX: 26.68 KG/M2 | RESPIRATION RATE: 20 BRPM

## 2023-05-09 DIAGNOSIS — C18.2 MALIGNANT NEOPLASM OF ASCENDING COLON (H): Primary | ICD-10-CM

## 2023-05-09 LAB
ALBUMIN SERPL BCG-MCNC: 4.2 G/DL (ref 3.5–5.2)
ALP SERPL-CCNC: 82 U/L (ref 40–129)
ALT SERPL W P-5'-P-CCNC: 43 U/L (ref 10–50)
ANION GAP SERPL CALCULATED.3IONS-SCNC: 10 MMOL/L (ref 7–15)
AST SERPL W P-5'-P-CCNC: 34 U/L (ref 10–50)
BASOPHILS # BLD AUTO: 0 10E3/UL (ref 0–0.2)
BASOPHILS NFR BLD AUTO: 1 %
BILIRUB SERPL-MCNC: 0.2 MG/DL
BUN SERPL-MCNC: 15.7 MG/DL (ref 6–20)
CALCIUM SERPL-MCNC: 9.2 MG/DL (ref 8.6–10)
CHLORIDE SERPL-SCNC: 108 MMOL/L (ref 98–107)
CREAT SERPL-MCNC: 1 MG/DL (ref 0.67–1.17)
DEPRECATED HCO3 PLAS-SCNC: 25 MMOL/L (ref 22–29)
EOSINOPHIL # BLD AUTO: 0.1 10E3/UL (ref 0–0.7)
EOSINOPHIL NFR BLD AUTO: 1 %
ERYTHROCYTE [DISTWIDTH] IN BLOOD BY AUTOMATED COUNT: 19 % (ref 10–15)
GFR SERPL CREATININE-BSD FRML MDRD: >90 ML/MIN/1.73M2
GLUCOSE SERPL-MCNC: 93 MG/DL (ref 70–99)
HCT VFR BLD AUTO: 35.8 % (ref 40–53)
HGB BLD-MCNC: 11.9 G/DL (ref 13.3–17.7)
IMM GRANULOCYTES # BLD: 0.1 10E3/UL
IMM GRANULOCYTES NFR BLD: 1 %
LYMPHOCYTES # BLD AUTO: 1.5 10E3/UL (ref 0.8–5.3)
LYMPHOCYTES NFR BLD AUTO: 29 %
MAGNESIUM SERPL-MCNC: 2.1 MG/DL (ref 1.7–2.3)
MCH RBC QN AUTO: 27.7 PG (ref 26.5–33)
MCHC RBC AUTO-ENTMCNC: 33.2 G/DL (ref 31.5–36.5)
MCV RBC AUTO: 83 FL (ref 78–100)
MONOCYTES # BLD AUTO: 0.6 10E3/UL (ref 0–1.3)
MONOCYTES NFR BLD AUTO: 12 %
NEUTROPHILS # BLD AUTO: 2.8 10E3/UL (ref 1.6–8.3)
NEUTROPHILS NFR BLD AUTO: 56 %
NRBC # BLD AUTO: 0 10E3/UL
NRBC BLD AUTO-RTO: 0 /100
PLATELET # BLD AUTO: 154 10E3/UL (ref 150–450)
POTASSIUM SERPL-SCNC: 4.3 MMOL/L (ref 3.4–5.3)
PROT SERPL-MCNC: 6.5 G/DL (ref 6.4–8.3)
RBC # BLD AUTO: 4.29 10E6/UL (ref 4.4–5.9)
SODIUM SERPL-SCNC: 143 MMOL/L (ref 136–145)
WBC # BLD AUTO: 5 10E3/UL (ref 4–11)

## 2023-05-09 PROCEDURE — 80053 COMPREHEN METABOLIC PANEL: CPT | Performed by: INTERNAL MEDICINE

## 2023-05-09 PROCEDURE — 258N000003 HC RX IP 258 OP 636: Performed by: INTERNAL MEDICINE

## 2023-05-09 PROCEDURE — 83735 ASSAY OF MAGNESIUM: CPT | Performed by: INTERNAL MEDICINE

## 2023-05-09 PROCEDURE — 96368 THER/DIAG CONCURRENT INF: CPT

## 2023-05-09 PROCEDURE — 96375 TX/PRO/DX INJ NEW DRUG ADDON: CPT

## 2023-05-09 PROCEDURE — 96415 CHEMO IV INFUSION ADDL HR: CPT

## 2023-05-09 PROCEDURE — 96372 THER/PROPH/DIAG INJ SC/IM: CPT | Mod: XS | Performed by: INTERNAL MEDICINE

## 2023-05-09 PROCEDURE — G0498 CHEMO EXTEND IV INFUS W/PUMP: HCPCS

## 2023-05-09 PROCEDURE — 96367 TX/PROPH/DG ADDL SEQ IV INF: CPT

## 2023-05-09 PROCEDURE — 99214 OFFICE O/P EST MOD 30 MIN: CPT | Performed by: INTERNAL MEDICINE

## 2023-05-09 PROCEDURE — 96417 CHEMO IV INFUS EACH ADDL SEQ: CPT

## 2023-05-09 PROCEDURE — 250N000011 HC RX IP 250 OP 636: Performed by: INTERNAL MEDICINE

## 2023-05-09 PROCEDURE — G0463 HOSPITAL OUTPT CLINIC VISIT: HCPCS | Mod: 25 | Performed by: INTERNAL MEDICINE

## 2023-05-09 PROCEDURE — 96413 CHEMO IV INFUSION 1 HR: CPT

## 2023-05-09 PROCEDURE — 85004 AUTOMATED DIFF WBC COUNT: CPT | Performed by: INTERNAL MEDICINE

## 2023-05-09 RX ORDER — LORAZEPAM 2 MG/ML
0.5 INJECTION INTRAMUSCULAR ONCE
Status: CANCELLED
Start: 2023-05-09 | End: 2023-05-09

## 2023-05-09 RX ORDER — MEPERIDINE HYDROCHLORIDE 25 MG/ML
25 INJECTION INTRAMUSCULAR; INTRAVENOUS; SUBCUTANEOUS EVERY 30 MIN PRN
Status: CANCELLED | OUTPATIENT
Start: 2023-05-09

## 2023-05-09 RX ORDER — DIPHENHYDRAMINE HYDROCHLORIDE 50 MG/ML
50 INJECTION INTRAMUSCULAR; INTRAVENOUS
Status: CANCELLED
Start: 2023-05-09

## 2023-05-09 RX ORDER — ALBUTEROL SULFATE 90 UG/1
1-2 AEROSOL, METERED RESPIRATORY (INHALATION)
Status: CANCELLED
Start: 2023-05-09

## 2023-05-09 RX ORDER — HEPARIN SODIUM (PORCINE) LOCK FLUSH IV SOLN 100 UNIT/ML 100 UNIT/ML
5 SOLUTION INTRAVENOUS
Status: CANCELLED | OUTPATIENT
Start: 2023-05-09

## 2023-05-09 RX ORDER — ALBUTEROL SULFATE 0.83 MG/ML
2.5 SOLUTION RESPIRATORY (INHALATION)
Status: DISCONTINUED | OUTPATIENT
Start: 2023-05-09 | End: 2023-05-09 | Stop reason: HOSPADM

## 2023-05-09 RX ORDER — EPINEPHRINE 1 MG/ML
0.3 INJECTION, SOLUTION INTRAMUSCULAR; SUBCUTANEOUS EVERY 5 MIN PRN
Status: CANCELLED | OUTPATIENT
Start: 2023-05-09

## 2023-05-09 RX ORDER — ALBUTEROL SULFATE 0.83 MG/ML
2.5 SOLUTION RESPIRATORY (INHALATION)
Status: CANCELLED | OUTPATIENT
Start: 2023-05-09

## 2023-05-09 RX ORDER — HEPARIN SODIUM (PORCINE) LOCK FLUSH IV SOLN 100 UNIT/ML 100 UNIT/ML
5 SOLUTION INTRAVENOUS
Status: CANCELLED | OUTPATIENT
Start: 2023-05-11

## 2023-05-09 RX ORDER — METHYLPREDNISOLONE SODIUM SUCCINATE 125 MG/2ML
125 INJECTION, POWDER, LYOPHILIZED, FOR SOLUTION INTRAMUSCULAR; INTRAVENOUS
Status: CANCELLED
Start: 2023-05-09

## 2023-05-09 RX ORDER — LORAZEPAM 2 MG/ML
0.5 INJECTION INTRAMUSCULAR EVERY 4 HOURS PRN
Status: CANCELLED | OUTPATIENT
Start: 2023-05-09

## 2023-05-09 RX ORDER — LORAZEPAM 2 MG/ML
0.5 INJECTION INTRAMUSCULAR ONCE
Status: COMPLETED | OUTPATIENT
Start: 2023-05-09 | End: 2023-05-09

## 2023-05-09 RX ORDER — ATROPINE SULFATE 0.4 MG/ML
0.4 AMPUL (ML) INJECTION
Status: CANCELLED | OUTPATIENT
Start: 2023-05-09

## 2023-05-09 RX ORDER — MEPERIDINE HYDROCHLORIDE 25 MG/ML
25 INJECTION INTRAMUSCULAR; INTRAVENOUS; SUBCUTANEOUS EVERY 30 MIN PRN
Status: DISCONTINUED | OUTPATIENT
Start: 2023-05-09 | End: 2023-05-09 | Stop reason: HOSPADM

## 2023-05-09 RX ORDER — HEPARIN SODIUM,PORCINE 10 UNIT/ML
5 VIAL (ML) INTRAVENOUS
Status: CANCELLED | OUTPATIENT
Start: 2023-05-11

## 2023-05-09 RX ORDER — DIPHENHYDRAMINE HYDROCHLORIDE 50 MG/ML
50 INJECTION INTRAMUSCULAR; INTRAVENOUS
Status: DISCONTINUED | OUTPATIENT
Start: 2023-05-09 | End: 2023-05-09 | Stop reason: HOSPADM

## 2023-05-09 RX ORDER — ATROPINE SULFATE 0.4 MG/ML
0.4 AMPUL (ML) INJECTION
Status: DISCONTINUED | OUTPATIENT
Start: 2023-05-09 | End: 2023-05-09 | Stop reason: HOSPADM

## 2023-05-09 RX ORDER — ALBUTEROL SULFATE 90 UG/1
1-2 AEROSOL, METERED RESPIRATORY (INHALATION)
Status: DISCONTINUED | OUTPATIENT
Start: 2023-05-09 | End: 2023-05-09 | Stop reason: HOSPADM

## 2023-05-09 RX ORDER — PALONOSETRON 0.05 MG/ML
0.25 INJECTION, SOLUTION INTRAVENOUS ONCE
Status: CANCELLED
Start: 2023-05-09 | End: 2023-05-09

## 2023-05-09 RX ORDER — EPINEPHRINE 1 MG/ML
0.3 INJECTION, SOLUTION INTRAMUSCULAR; SUBCUTANEOUS EVERY 5 MIN PRN
Status: DISCONTINUED | OUTPATIENT
Start: 2023-05-09 | End: 2023-05-09 | Stop reason: HOSPADM

## 2023-05-09 RX ORDER — METHYLPREDNISOLONE SODIUM SUCCINATE 125 MG/2ML
125 INJECTION, POWDER, LYOPHILIZED, FOR SOLUTION INTRAMUSCULAR; INTRAVENOUS
Status: DISCONTINUED | OUTPATIENT
Start: 2023-05-09 | End: 2023-05-09 | Stop reason: HOSPADM

## 2023-05-09 RX ORDER — PALONOSETRON 0.05 MG/ML
0.25 INJECTION, SOLUTION INTRAVENOUS ONCE
Status: COMPLETED | OUTPATIENT
Start: 2023-05-09 | End: 2023-05-09

## 2023-05-09 RX ORDER — HEPARIN SODIUM,PORCINE 10 UNIT/ML
5 VIAL (ML) INTRAVENOUS
Status: CANCELLED | OUTPATIENT
Start: 2023-05-09

## 2023-05-09 RX ADMIN — DEXTROSE MONOHYDRATE 250 ML: 50 INJECTION, SOLUTION INTRAVENOUS at 10:39

## 2023-05-09 RX ADMIN — IRINOTECAN HYDROCHLORIDE 340 MG: 20 INJECTION, SOLUTION INTRAVENOUS at 13:35

## 2023-05-09 RX ADMIN — FAMOTIDINE 20 MG: 10 INJECTION, SOLUTION INTRAVENOUS at 10:40

## 2023-05-09 RX ADMIN — LORAZEPAM 0.5 MG: 2 INJECTION INTRAMUSCULAR; INTRAVENOUS at 10:45

## 2023-05-09 RX ADMIN — ATROPINE SULFATE 0.4 MG: 0.4 INJECTION, SOLUTION INTRAVENOUS at 14:00

## 2023-05-09 RX ADMIN — ATROPINE SULFATE 0.4 MG: 0.4 INJECTION, SOLUTION INTRAVENOUS at 13:33

## 2023-05-09 RX ADMIN — LEUCOVORIN CALCIUM 880 MG: 350 INJECTION, POWDER, LYOPHILIZED, FOR SOLUTION INTRAMUSCULAR; INTRAVENOUS at 11:25

## 2023-05-09 RX ADMIN — OXALIPLATIN 200 MG: 5 INJECTION, SOLUTION INTRAVENOUS at 11:25

## 2023-05-09 RX ADMIN — DEXAMETHASONE SODIUM PHOSPHATE: 10 INJECTION, SOLUTION INTRAMUSCULAR; INTRAVENOUS at 10:56

## 2023-05-09 RX ADMIN — PALONOSETRON 0.25 MG: 0.05 INJECTION, SOLUTION INTRAVENOUS at 10:39

## 2023-05-09 ASSESSMENT — PAIN SCALES - GENERAL: PAINLEVEL: NO PAIN (0)

## 2023-05-09 NOTE — LETTER
"    5/9/2023         RE: Luca Araiza  [address]  [place] [facility] [address]        Dear Colleague,    Thank you for referring your patient, Luca Araiza, to the [facility]. Please see a copy of my visit note below.    Oncology Rooming Note    May 9, 2023 9:57 AM   Luca Araiza is a 42 year old male who presents for:    Chief Complaint   Patient presents with     Oncology Clinic Visit     2 week return Malignant neoplasm of ascending colon, review labs & Infusion     Initial Vitals: /78 (BP Location: Right arm, Patient Position: Sitting, Cuff Size: Adult Regular)   Pulse 81   Temp 98.3  F (36.8  C) (Oral)   Resp 20   Ht 1.88 m (6' 2.02\")   Wt 94.3 kg (207 lb 14.4 oz)   SpO2 98%   BMI 26.68 kg/m   Estimated body mass index is 26.68 kg/m  as calculated from the following:    Height as of this encounter: 1.88 m (6' 2.02\").    Weight as of this encounter: 94.3 kg (207 lb 14.4 oz). Body surface area is 2.22 meters squared.  No Pain (0) Comment: Data Unavailable   No LMP for male patient.  Allergies reviewed: Yes  Medications reviewed: Yes    Medications: Medication refills not needed today.  Pharmacy name entered into [employer]: [facility] [address]    Clinical concerns:   2 week return Malignant neoplasm of ascending colon, review labs & Infusion    Kelley Connolly CMA              [facility] Hematology and Oncology Consult Note    Patient: Luca Araiza  MRN: [id number]  Date of Service: May 9, 2023       Reason for Visit    Follow-up for colon cancer    January 2023:    T3N1B, stage IIIb adenocarcinoma of the cecum status post laparoscopic right colectomy, December 21, 2022  CT DNA positive, March 2023  Tumor is MMR intact  Early onset of colon cancer with family history    Continued good tolerance for chemotherapy.  Labs are stable and we will proceed with cycle 5 of treatment without any dose changes.  He will return in 2 weeks for cycle " #6 and in 4 weeks for cycle 7 at which time labs will be drawn again for CT DNA testing.    Plan remains to complete 12 cycles of adjuvant therapy.    Did have episode of nausea and vomiting more than a week after treatment which may have been viral illness.    He will use Imodium for diarrhea symptoms.  Discussed potential use of erythropoietin if he develops significant anemia.    Negative genetic testing results are noted.    Questions answered.    Plan: Proceed with cycle #5 of adjuvant FOLFIRINOX chemotherapy today  Follow-up in 2 weeks for the next treatment      Measurable disease: None postoperatively    Current therapy: Modified FOLFIRINOX day 1 cycle 1, March 14, 2023  Cycle 5 today, May 9, 2023                ECOG Performance    0 - Independent    Encounter Diagnoses:    Problem List Items Addressed This Visit        Digestive    Malignant neoplasm of ascending colon (H) - Primary    Relevant Orders    Infusion Appointment Request    Infusion Appointment Request    Infusion Appointment Request    Check Out Appointment Request           ______________________________________________________________________________    Staging History   Cancer Staging   No matching staging information was found for the patient.      History    Luca is here again for follow-up.  Completed cycle 4 2 weeks ago.  Episode of nausea and vomiting more than a week after treatment which resolved after a few hours.  No fever or mouth sores.  No shortness of breath or cough.  Mild diarrhea controlled with Imodium.  No bleeding or rash.  No numbness or tingling symptoms.  ECOG status 0.      March 14, 2023:    Luca is here for reevaluation.  Seen about 6 weeks ago.  His CT DNA test was positive and he was randomized to arm for the clinical trial.  He has had Port-A-Cath placed.  No new symptoms or problems.  ECOG status 0.    January 2023:  Mr. Luca Araiza is a 42 year old no significant past medical history who is been diagnosed and  undergone laparoscopic hemicolectomy for colon cancer.  He was in [place] last August and had significant illness with diarrhea and vomiting.  He then had physical which showed that he was anemic and subsequently had colonoscopy showing cecal colon cancer.  He underwent laparoscopic right hemicolectomy on December 21 and has recovered well from this.    No other previous medical history.  He had left ACL repair surgery as a teenager.  No other hospitalizations.  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  Does not smoke and does not drink alcohol.    Father had colon cancer in his mid 60s.  Mother had breast cancer in her late 50s.  No siblings or kids with cancer.  Maternal grandfather had colon cancer in his 60s.  Paternal grandmother had cancer type unknown.            Review of systems.  No fever or night sweats.  No loss of weight.  No lumps or bumps anywhere.  No unusual headaches or eyesight issues.  No dizziness.  No bleeding from the nose.  No sores in the mouth. No problems with swallowing.  No chest pain. No shortness of breath. No cough.  No abdominal pain. No nausea or vomiting.  No diarrhea or constipation.  No blood in stool or black colored stools.  No problems passing urine.  No numbness or tingling in hands or feet.  No skin rashes.  A 14 point review of systems is otherwise negative.        Past History    Past Medical History:   Diagnosis Date     Anemia      Malignant neoplasm of ascending colon (H) 11/07/2022     Past Surgical History:   Procedure Laterality Date     COLONOSCOPY       HEMICOLECTOMY, RT/LT Right      IR CHEST PORT PLACEMENT > 5 YRS OF AGE  3/9/2023     REMOVAL OF SPERM DUCT(S)      Description: Surgery Of Male Genitalia Vasectomy;  Recorded: 12/27/2011;  Comments: 12/27/2011     [facility] REPAIR CRUCIATE LIGAMENT,KNEE      Description: Primary Repair Of Knee Ligament Cruciate Anterior;  Recorded: 07/20/2009;  "    Family History   Problem Relation Age of Onset     Breast Cancer Mother      Colon Cancer Father      Anesthesia Reaction No family hx of      Venous thrombosis No family hx of      Social History     Socioeconomic History     Marital status:      Spouse name: None     Number of children: None     Years of education: None     Highest education level: None   Tobacco Use     Smoking status: Never     Smokeless tobacco: Never   Substance and Sexual Activity     Alcohol use: Not Currently     Drug use: Never     Sexual activity: Yes     Partners: Female     Birth control/protection: Male Surgical   Other Topics Concern     Parent/sibling w/ CABG, MI or angioplasty before 65F 55M? No         Allergies    No Known Allergies        Physical Exam    /78 (BP Location: Right arm, Patient Position: Sitting, Cuff Size: Adult Regular)   Pulse 81   Temp 98.3  F (36.8  C) (Oral)   Resp 20   Ht 1.88 m (6' 2.02\")   Wt 94.3 kg (207 lb 14.4 oz)   SpO2 98%   BMI 26.68 kg/m        GENERAL: Alert and oriented to time place and person. Seated comfortably. In no distress.    HEAD: Atraumatic and normocephalic.    EYES: LUIS ANGEL, EOMI.  No pallor.  No icterus.    Oral cavity: no mucosal lesion or tonsillar enlargement.    NECK: supple. JVP normal.  No thyroid enlargement.    LYMPH NODES: No palpable, cervical, axillary or inguinal lymphadenopathy.    CHEST: clear to auscultation bilaterally.  Resonant to percussion throughout bilaterally.  Symmetrical breath movements bilaterally.    CVS: S1 and S2 are heard. Regular rate and rhythm.  No murmur or gallop or rub heard.  No peripheral edema.    ABDOMEN: Soft. Not tender. Not distended.  No palpable hepatomegaly or splenomegaly.  No other mass palpable.  Bowel sounds heard.    EXTREMITIES: Warm.    SKIN: no rash, or bruising or purpura.  Has a full head of hair.    Lab Results    Preoperative CEA was 2.6 and 2.7    Imaging Results    CT and MRI reviewed with no evidence of " metastatic disease    No results found.      Signed by: Sekou Hall MD      Again, thank you for allowing me to participate in the care of your patient.        Sincerely,        Sekou Hall MD

## 2023-05-09 NOTE — PROGRESS NOTES
[facility] Hematology and Oncology Consult Note    Patient: Luca Araiza  MRN: [id number]  Date of Service: May 9, 2023       Reason for Visit    Follow-up for colon cancer    January 2023:    T3N1B, stage IIIb adenocarcinoma of the cecum status post laparoscopic right colectomy, December 21, 2022  CT DNA positive, March 2023  Tumor is MMR intact  Early onset of colon cancer with family history    Continued good tolerance for chemotherapy.  Labs are stable and we will proceed with cycle 5 of treatment without any dose changes.  He will return in 2 weeks for cycle #6 and in 4 weeks for cycle 7 at which time labs will be drawn again for CT DNA testing.    Plan remains to complete 12 cycles of adjuvant therapy.    Did have episode of nausea and vomiting more than a week after treatment which may have been viral illness.    He will use Imodium for diarrhea symptoms.  Discussed potential use of erythropoietin if he develops significant anemia.    Negative genetic testing results are noted.    Questions answered.    Plan: Proceed with cycle #5 of adjuvant FOLFIRINOX chemotherapy today  Follow-up in 2 weeks for the next treatment      Measurable disease: None postoperatively    Current therapy: Modified FOLFIRINOX day 1 cycle 1, March 14, 2023  Cycle 5 today, May 9, 2023                ECOG Performance    0 - Independent    Encounter Diagnoses:    Problem List Items Addressed This Visit        Digestive    Malignant neoplasm of ascending colon (H) - Primary    Relevant Orders    Infusion Appointment Request    Infusion Appointment Request    Infusion Appointment Request    Check Out Appointment Request           ______________________________________________________________________________    Staging History   Cancer Staging   No matching staging information was found for the patient.      History    Luca is here again for follow-up.  Completed cycle 4 2 weeks ago.  Episode of nausea and vomiting more than a week  after treatment which resolved after a few hours.  No fever or mouth sores.  No shortness of breath or cough.  Mild diarrhea controlled with Imodium.  No bleeding or rash.  No numbness or tingling symptoms.  ECOG status 0.      March 14, 2023:    Luca is here for reevaluation.  Seen about 6 weeks ago.  His CT DNA test was positive and he was randomized to arm for the clinical trial.  He has had Port-A-Cath placed.  No new symptoms or problems.  ECOG status 0.    January 2023:  Mr. Luca Araiza is a 42 year old no significant past medical history who is been diagnosed and undergone laparoscopic hemicolectomy for colon cancer.  He was in [place] last August and had significant illness with diarrhea and vomiting.  He then had physical which showed that he was anemic and subsequently had colonoscopy showing cecal colon cancer.  He underwent laparoscopic right hemicolectomy on December 21 and has recovered well from this.    No other previous medical history.  He had left ACL repair surgery as a teenager.  No other hospitalizations.  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  Does not smoke and does not drink alcohol.    Father had colon cancer in his mid 60s.  Mother had breast cancer in her late 50s.  No siblings or kids with cancer.  Maternal grandfather had colon cancer in his 60s.  Paternal grandmother had cancer type unknown.            Review of systems.  No fever or night sweats.  No loss of weight.  No lumps or bumps anywhere.  No unusual headaches or eyesight issues.  No dizziness.  No bleeding from the nose.  No sores in the mouth. No problems with swallowing.  No chest pain. No shortness of breath. No cough.  No abdominal pain. No nausea or vomiting.  No diarrhea or constipation.  No blood in stool or black colored stools.  No problems passing urine.  No numbness or tingling in hands or feet.  No skin rashes.  A 14 point review of systems  "is otherwise negative.        Past History    Past Medical History:   Diagnosis Date     Anemia      Malignant neoplasm of ascending colon (H) 11/07/2022     Past Surgical History:   Procedure Laterality Date     COLONOSCOPY       HEMICOLECTOMY, RT/LT Right      IR CHEST PORT PLACEMENT > 5 YRS OF AGE  3/9/2023     REMOVAL OF SPERM DUCT(S)      Description: Surgery Of Male Genitalia Vasectomy;  Recorded: 12/27/2011;  Comments: 12/27/2011     [facility] REPAIR CRUCIATE LIGAMENT,KNEE      Description: Primary Repair Of Knee Ligament Cruciate Anterior;  Recorded: 07/20/2009;     Family History   Problem Relation Age of Onset     Breast Cancer Mother      Colon Cancer Father      Anesthesia Reaction No family hx of      Venous thrombosis No family hx of      Social History     Socioeconomic History     Marital status:      Spouse name: None     Number of children: None     Years of education: None     Highest education level: None   Tobacco Use     Smoking status: Never     Smokeless tobacco: Never   Substance and Sexual Activity     Alcohol use: Not Currently     Drug use: Never     Sexual activity: Yes     Partners: Female     Birth control/protection: Male Surgical   Other Topics Concern     Parent/sibling w/ CABG, MI or angioplasty before 65F 55M? No         Allergies    No Known Allergies        Physical Exam    /78 (BP Location: Right arm, Patient Position: Sitting, Cuff Size: Adult Regular)   Pulse 81   Temp 98.3  F (36.8  C) (Oral)   Resp 20   Ht 1.88 m (6' 2.02\")   Wt 94.3 kg (207 lb 14.4 oz)   SpO2 98%   BMI 26.68 kg/m        GENERAL: Alert and oriented to time place and person. Seated comfortably. In no distress.    HEAD: Atraumatic and normocephalic.    EYES: LUIS ANGEL, EOMI.  No pallor.  No icterus.    Oral cavity: no mucosal lesion or tonsillar enlargement.    NECK: supple. JVP normal.  No thyroid enlargement.    LYMPH NODES: No palpable, cervical, axillary or inguinal lymphadenopathy.    CHEST: " clear to auscultation bilaterally.  Resonant to percussion throughout bilaterally.  Symmetrical breath movements bilaterally.    CVS: S1 and S2 are heard. Regular rate and rhythm.  No murmur or gallop or rub heard.  No peripheral edema.    ABDOMEN: Soft. Not tender. Not distended.  No palpable hepatomegaly or splenomegaly.  No other mass palpable.  Bowel sounds heard.    EXTREMITIES: Warm.    SKIN: no rash, or bruising or purpura.  Has a full head of hair.    Lab Results    Preoperative CEA was 2.6 and 2.7    Imaging Results    CT and MRI reviewed with no evidence of metastatic disease    No results found.      Signed by: Sekou Hall MD

## 2023-05-09 NOTE — PROGRESS NOTES
"Oncology Rooming Note    May 9, 2023 9:57 AM   Luca Araiza is a 42 year old male who presents for:    Chief Complaint   Patient presents with     Oncology Clinic Visit     2 week return Malignant neoplasm of ascending colon, review labs & Infusion     Initial Vitals: /78 (BP Location: Right arm, Patient Position: Sitting, Cuff Size: Adult Regular)   Pulse 81   Temp 98.3  F (36.8  C) (Oral)   Resp 20   Ht 1.88 m (6' 2.02\")   Wt 94.3 kg (207 lb 14.4 oz)   SpO2 98%   BMI 26.68 kg/m   Estimated body mass index is 26.68 kg/m  as calculated from the following:    Height as of this encounter: 1.88 m (6' 2.02\").    Weight as of this encounter: 94.3 kg (207 lb 14.4 oz). Body surface area is 2.22 meters squared.  No Pain (0) Comment: Data Unavailable   No LMP for male patient.  Allergies reviewed: Yes  Medications reviewed: Yes    Medications: Medication refills not needed today.  Pharmacy name entered into [facility]: [facility] - [place], [place] - [address]    Clinical concerns:   2 week return Malignant neoplasm of ascending colon, review labs & Infusion    Kelley Connolly CMA            "

## 2023-05-09 NOTE — PROGRESS NOTES
Infusion Nursing Note:  Luca Araiza presents today for labs and cycle 5 day 1 FOLFIRINOX.    Patient seen by provider today: Yes: Dr. Hall   present during visit today: Not Applicable.    Note: Luca arrived ambulatory and in stable condition accompanied by his dad. Port was accessed using sterile technique, good blood return noted, and labs collected. He was premedicated and treatment administered per orders. Atropine was administered prior to irinotecan and 30 minutes into infusion. He was connected to his home infusion bulb and instructed to return to clinic at 1315 on 5/11      Intravenous Access:  Labs drawn without difficulty.  Implanted Port.    Treatment Conditions:  Lab Results   Component Value Date    HGB 11.9 (L) 05/09/2023    WBC 5.0 05/09/2023    ANEUTAUTO 2.8 05/09/2023     05/09/2023      Lab Results   Component Value Date     05/09/2023    POTASSIUM 4.3 05/09/2023    MAG 2.1 05/09/2023    CR 1.00 05/09/2023    RUPERTO 9.2 05/09/2023    BILITOTAL 0.2 05/09/2023    ALBUMIN 4.2 05/09/2023    ALT 43 05/09/2023    AST 34 05/09/2023     Results reviewed, labs MET treatment parameters, ok to proceed with treatment.      Post Infusion Assessment:  Patient tolerated infusion without incident.  Blood return noted pre and post infusion.  Site patent and intact, free from redness, edema or discomfort.  No evidence of extravasations.       Discharge Plan:   Patient and/or family verbalized understanding of discharge instructions and all questions answered.  AVS to patient via [employer].  Patient will return 5/11 for next appointment.   Patient discharged in stable condition accompanied by: father.  Departure Mode: Ambulatory.      Aria Adams RN

## 2023-05-11 ENCOUNTER — INFUSION THERAPY VISIT (OUTPATIENT)
Dept: INFUSION THERAPY | Facility: HOSPITAL | Age: 43
End: 2023-05-11
Attending: INTERNAL MEDICINE
Payer: COMMERCIAL

## 2023-05-11 VITALS
TEMPERATURE: 98.2 F | OXYGEN SATURATION: 99 % | RESPIRATION RATE: 16 BRPM | SYSTOLIC BLOOD PRESSURE: 125 MMHG | HEART RATE: 74 BPM | DIASTOLIC BLOOD PRESSURE: 69 MMHG

## 2023-05-11 DIAGNOSIS — C18.2 MALIGNANT NEOPLASM OF ASCENDING COLON (H): Primary | ICD-10-CM

## 2023-05-11 PROCEDURE — 250N000011 HC RX IP 250 OP 636: Performed by: INTERNAL MEDICINE

## 2023-05-11 RX ORDER — HEPARIN SODIUM (PORCINE) LOCK FLUSH IV SOLN 100 UNIT/ML 100 UNIT/ML
5 SOLUTION INTRAVENOUS
Status: DISCONTINUED | OUTPATIENT
Start: 2023-05-11 | End: 2023-05-11 | Stop reason: HOSPADM

## 2023-05-11 RX ADMIN — Medication 5 ML: at 13:13

## 2023-05-11 NOTE — PROGRESS NOTES
Pt here for C series disconnect. Pt denies new complaint and bulb removed and port flushed and deaccessed. Pt d.c ambulatory to lobby alone and is aware of treatment plan.

## 2023-05-23 ENCOUNTER — DOCUMENTATION ONLY (OUTPATIENT)
Dept: ONCOLOGY | Facility: HOSPITAL | Age: 43
End: 2023-05-23

## 2023-05-23 ENCOUNTER — INFUSION THERAPY VISIT (OUTPATIENT)
Dept: INFUSION THERAPY | Facility: HOSPITAL | Age: 43
End: 2023-05-23
Attending: NURSE PRACTITIONER
Payer: COMMERCIAL

## 2023-05-23 ENCOUNTER — ONCOLOGY VISIT (OUTPATIENT)
Dept: ONCOLOGY | Facility: HOSPITAL | Age: 43
End: 2023-05-23
Attending: NURSE PRACTITIONER
Payer: COMMERCIAL

## 2023-05-23 VITALS
TEMPERATURE: 97.5 F | HEART RATE: 75 BPM | WEIGHT: 208.5 LBS | RESPIRATION RATE: 20 BRPM | SYSTOLIC BLOOD PRESSURE: 140 MMHG | HEIGHT: 74 IN | BODY MASS INDEX: 26.76 KG/M2 | OXYGEN SATURATION: 98 % | DIASTOLIC BLOOD PRESSURE: 88 MMHG

## 2023-05-23 DIAGNOSIS — C18.2 MALIGNANT NEOPLASM OF ASCENDING COLON (H): Primary | ICD-10-CM

## 2023-05-23 LAB
ALBUMIN SERPL BCG-MCNC: 4.1 G/DL (ref 3.5–5.2)
ALP SERPL-CCNC: 82 U/L (ref 40–129)
ALT SERPL W P-5'-P-CCNC: 45 U/L (ref 10–50)
ANION GAP SERPL CALCULATED.3IONS-SCNC: 9 MMOL/L (ref 7–15)
AST SERPL W P-5'-P-CCNC: 28 U/L (ref 10–50)
BASOPHILS # BLD AUTO: 0 10E3/UL (ref 0–0.2)
BASOPHILS NFR BLD AUTO: 1 %
BILIRUB SERPL-MCNC: 0.3 MG/DL
BUN SERPL-MCNC: 15.5 MG/DL (ref 6–20)
CALCIUM SERPL-MCNC: 9.4 MG/DL (ref 8.6–10)
CHLORIDE SERPL-SCNC: 107 MMOL/L (ref 98–107)
CREAT SERPL-MCNC: 1.11 MG/DL (ref 0.67–1.17)
DEPRECATED HCO3 PLAS-SCNC: 24 MMOL/L (ref 22–29)
EOSINOPHIL # BLD AUTO: 0.1 10E3/UL (ref 0–0.7)
EOSINOPHIL NFR BLD AUTO: 3 %
ERYTHROCYTE [DISTWIDTH] IN BLOOD BY AUTOMATED COUNT: 19 % (ref 10–15)
GFR SERPL CREATININE-BSD FRML MDRD: 86 ML/MIN/1.73M2
GLUCOSE SERPL-MCNC: 79 MG/DL (ref 70–99)
HCT VFR BLD AUTO: 34.6 % (ref 40–53)
HGB BLD-MCNC: 11.2 G/DL (ref 13.3–17.7)
IMM GRANULOCYTES # BLD: 0 10E3/UL
IMM GRANULOCYTES NFR BLD: 1 %
LYMPHOCYTES # BLD AUTO: 1.2 10E3/UL (ref 0.8–5.3)
LYMPHOCYTES NFR BLD AUTO: 27 %
MAGNESIUM SERPL-MCNC: 2.2 MG/DL (ref 1.7–2.3)
MCH RBC QN AUTO: 28.1 PG (ref 26.5–33)
MCHC RBC AUTO-ENTMCNC: 32.4 G/DL (ref 31.5–36.5)
MCV RBC AUTO: 87 FL (ref 78–100)
MONOCYTES # BLD AUTO: 0.6 10E3/UL (ref 0–1.3)
MONOCYTES NFR BLD AUTO: 15 %
NEUTROPHILS # BLD AUTO: 2.3 10E3/UL (ref 1.6–8.3)
NEUTROPHILS NFR BLD AUTO: 53 %
NRBC # BLD AUTO: 0 10E3/UL
NRBC BLD AUTO-RTO: 0 /100
PLATELET # BLD AUTO: 147 10E3/UL (ref 150–450)
POTASSIUM SERPL-SCNC: 4.3 MMOL/L (ref 3.4–5.3)
PROT SERPL-MCNC: 6.6 G/DL (ref 6.4–8.3)
RBC # BLD AUTO: 3.99 10E6/UL (ref 4.4–5.9)
SODIUM SERPL-SCNC: 140 MMOL/L (ref 136–145)
WBC # BLD AUTO: 4.3 10E3/UL (ref 4–11)

## 2023-05-23 PROCEDURE — 83735 ASSAY OF MAGNESIUM: CPT | Performed by: INTERNAL MEDICINE

## 2023-05-23 PROCEDURE — 96417 CHEMO IV INFUS EACH ADDL SEQ: CPT

## 2023-05-23 PROCEDURE — 85004 AUTOMATED DIFF WBC COUNT: CPT | Performed by: INTERNAL MEDICINE

## 2023-05-23 PROCEDURE — 99214 OFFICE O/P EST MOD 30 MIN: CPT | Performed by: NURSE PRACTITIONER

## 2023-05-23 PROCEDURE — 96415 CHEMO IV INFUSION ADDL HR: CPT

## 2023-05-23 PROCEDURE — G0498 CHEMO EXTEND IV INFUS W/PUMP: HCPCS

## 2023-05-23 PROCEDURE — 258N000003 HC RX IP 258 OP 636: Performed by: NURSE PRACTITIONER

## 2023-05-23 PROCEDURE — 96375 TX/PRO/DX INJ NEW DRUG ADDON: CPT

## 2023-05-23 PROCEDURE — 250N000011 HC RX IP 250 OP 636: Performed by: NURSE PRACTITIONER

## 2023-05-23 PROCEDURE — 96413 CHEMO IV INFUSION 1 HR: CPT

## 2023-05-23 PROCEDURE — 96368 THER/DIAG CONCURRENT INF: CPT

## 2023-05-23 PROCEDURE — 80053 COMPREHEN METABOLIC PANEL: CPT | Performed by: INTERNAL MEDICINE

## 2023-05-23 PROCEDURE — G0463 HOSPITAL OUTPT CLINIC VISIT: HCPCS | Mod: 25 | Performed by: NURSE PRACTITIONER

## 2023-05-23 PROCEDURE — 96367 TX/PROPH/DG ADDL SEQ IV INF: CPT

## 2023-05-23 RX ORDER — ATROPINE SULFATE 0.4 MG/ML
0.4 AMPUL (ML) INJECTION
Status: DISCONTINUED | OUTPATIENT
Start: 2023-05-23 | End: 2023-05-23 | Stop reason: HOSPADM

## 2023-05-23 RX ORDER — ALBUTEROL SULFATE 90 UG/1
1-2 AEROSOL, METERED RESPIRATORY (INHALATION)
Status: CANCELLED
Start: 2023-05-23

## 2023-05-23 RX ORDER — PALONOSETRON 0.05 MG/ML
0.25 INJECTION, SOLUTION INTRAVENOUS ONCE
Status: COMPLETED | OUTPATIENT
Start: 2023-05-23 | End: 2023-05-23

## 2023-05-23 RX ORDER — MEPERIDINE HYDROCHLORIDE 25 MG/ML
25 INJECTION INTRAMUSCULAR; INTRAVENOUS; SUBCUTANEOUS EVERY 30 MIN PRN
Status: CANCELLED | OUTPATIENT
Start: 2023-05-23

## 2023-05-23 RX ORDER — PALONOSETRON 0.05 MG/ML
0.25 INJECTION, SOLUTION INTRAVENOUS ONCE
Status: CANCELLED
Start: 2023-05-23 | End: 2023-05-23

## 2023-05-23 RX ORDER — HEPARIN SODIUM (PORCINE) LOCK FLUSH IV SOLN 100 UNIT/ML 100 UNIT/ML
5 SOLUTION INTRAVENOUS
Status: CANCELLED | OUTPATIENT
Start: 2023-05-23

## 2023-05-23 RX ORDER — HEPARIN SODIUM,PORCINE 10 UNIT/ML
5 VIAL (ML) INTRAVENOUS
Status: CANCELLED | OUTPATIENT
Start: 2023-05-23

## 2023-05-23 RX ORDER — ATROPINE SULFATE 0.4 MG/ML
0.4 AMPUL (ML) INJECTION
Status: CANCELLED | OUTPATIENT
Start: 2023-05-23

## 2023-05-23 RX ORDER — HEPARIN SODIUM,PORCINE 10 UNIT/ML
5 VIAL (ML) INTRAVENOUS
Status: CANCELLED | OUTPATIENT
Start: 2023-05-25

## 2023-05-23 RX ORDER — LORAZEPAM 2 MG/ML
0.5 INJECTION INTRAMUSCULAR ONCE
Status: COMPLETED | OUTPATIENT
Start: 2023-05-23 | End: 2023-05-23

## 2023-05-23 RX ORDER — DIPHENHYDRAMINE HYDROCHLORIDE 50 MG/ML
50 INJECTION INTRAMUSCULAR; INTRAVENOUS
Status: CANCELLED
Start: 2023-05-23

## 2023-05-23 RX ORDER — HEPARIN SODIUM (PORCINE) LOCK FLUSH IV SOLN 100 UNIT/ML 100 UNIT/ML
5 SOLUTION INTRAVENOUS
Status: CANCELLED | OUTPATIENT
Start: 2023-05-25

## 2023-05-23 RX ORDER — EPINEPHRINE 1 MG/ML
0.3 INJECTION, SOLUTION INTRAMUSCULAR; SUBCUTANEOUS EVERY 5 MIN PRN
Status: CANCELLED | OUTPATIENT
Start: 2023-05-23

## 2023-05-23 RX ORDER — LORAZEPAM 2 MG/ML
0.5 INJECTION INTRAMUSCULAR ONCE
Status: CANCELLED
Start: 2023-05-23 | End: 2023-05-23

## 2023-05-23 RX ORDER — METHYLPREDNISOLONE SODIUM SUCCINATE 125 MG/2ML
125 INJECTION, POWDER, LYOPHILIZED, FOR SOLUTION INTRAMUSCULAR; INTRAVENOUS
Status: CANCELLED
Start: 2023-05-23

## 2023-05-23 RX ORDER — LORAZEPAM 2 MG/ML
0.5 INJECTION INTRAMUSCULAR EVERY 4 HOURS PRN
Status: CANCELLED | OUTPATIENT
Start: 2023-05-23

## 2023-05-23 RX ORDER — ATROPINE SULFATE 0.4 MG/ML
0.4 AMPUL (ML) INJECTION
Status: COMPLETED | OUTPATIENT
Start: 2023-05-23 | End: 2023-05-23

## 2023-05-23 RX ORDER — ALBUTEROL SULFATE 0.83 MG/ML
2.5 SOLUTION RESPIRATORY (INHALATION)
Status: CANCELLED | OUTPATIENT
Start: 2023-05-23

## 2023-05-23 RX ADMIN — FAMOTIDINE 20 MG: 10 INJECTION, SOLUTION INTRAVENOUS at 11:15

## 2023-05-23 RX ADMIN — PALONOSETRON 0.25 MG: 0.05 INJECTION, SOLUTION INTRAVENOUS at 11:13

## 2023-05-23 RX ADMIN — LEUCOVORIN CALCIUM 880 MG: 350 INJECTION, POWDER, LYOPHILIZED, FOR SOLUTION INTRAMUSCULAR; INTRAVENOUS at 11:51

## 2023-05-23 RX ADMIN — ATROPINE SULFATE 0.4 MG: 0.4 INJECTION, SOLUTION INTRAVENOUS at 14:00

## 2023-05-23 RX ADMIN — LORAZEPAM 0.5 MG: 2 INJECTION INTRAMUSCULAR; INTRAVENOUS at 11:30

## 2023-05-23 RX ADMIN — DEXTROSE MONOHYDRATE 250 ML: 50 INJECTION, SOLUTION INTRAVENOUS at 11:13

## 2023-05-23 RX ADMIN — OXALIPLATIN 200 MG: 5 INJECTION, SOLUTION INTRAVENOUS at 11:58

## 2023-05-23 RX ADMIN — ATROPINE SULFATE 0.4 MG: 0.4 INJECTION, SOLUTION INTRAVENOUS at 14:29

## 2023-05-23 RX ADMIN — DEXAMETHASONE SODIUM PHOSPHATE: 10 INJECTION, SOLUTION INTRAMUSCULAR; INTRAVENOUS at 11:20

## 2023-05-23 RX ADMIN — IRINOTECAN HYDROCHLORIDE 340 MG: 20 INJECTION, SOLUTION INTRAVENOUS at 14:03

## 2023-05-23 ASSESSMENT — PAIN SCALES - GENERAL: PAINLEVEL: NO PAIN (0)

## 2023-05-23 NOTE — PROGRESS NOTES
PT here ambulatory for txt after NP exam. Labs drawn from port approved for txt. Txt reviewed with pt and administered as ordered. Included ativan and pepcid ivp to premeds.Atropine was administered prior to irinotecan and 30 minutes into infusion due to history of sweating and nausea with irinotecan. PT tolerated txt without no problems. Pump set up with pt to infuse over 46 hours. Reviewed with pt to return around 130 on Thursday. PT dc'd steady gait with wife.

## 2023-05-23 NOTE — PROGRESS NOTES
Met with patient and provider today for C6 visit.  Labs and AE's reviewed ok to continue on study with no dose modifications.  Next cycle patient will have study labs drawn.  Return to clinic in 2 weeks for next cycle.     JEANNINE Subramanian research

## 2023-05-23 NOTE — LETTER
5/23/2023         RE: Luca Araiza  [address]  [place] [address]        Dear Colleague,    Thank you for referring your patient, Luca Araiza, to the [facility] [place]. Please see a copy of my visit note below.    [facility] Hematology and Oncology Progress Note    Patient: Luca Araiza  MRN: [id number]  Date of Service: May 23, 2023          Reason for Visit    Chief Complaint   Patient presents with     Oncology Clinic Visit     Return visit with labs/infusion related to Malignant neoplasm of ascending colon        Assessment and Plan     Cancer Staging   No matching staging information was found for the patient.    1. Colon Cancer, Stage IIIb [id number], adenocarcinoma of cecum, Dx December 2022: pt is on clinical trial due to positive circulating tumor cells on DNA testing. Pt started FOLFIRINOX last week. The overall plan is to give 12 cycles. Today is cycle 6.  We will continue today at full dose.  He will return in 2 weeks for cycle 7.  We will be drawing extra labs for the clinical study neck cycle.    2. Slight reaction to irinotecan: received ativan, pepcid during infusion due to feeling sweaty/runny nose/nausea.  We also changed some of his antiemetics.  Overall it is going better.  Continues to have some issues with some Pepsi at home.  Continue over-the-counter products.  Also, continue to use famotidine/tums at home. Ginger prodcuts. Sea Bands.     3. Early onset cancer: getting genetic testing done.  Patient met with Danielle Orantes one of our genetic counselors.  His genetic testing result is negative.    4.  Diarrhea and constipation: We talked today a little bit about minimizing the medications he is taking to try not to swing between the 2.  Encouraged him to try to eat different foods that will help.  Take the minimum amount of Imodium as needed.  That does not seem to be working well we can try some Lomotil.    ECOG Performance    0 - Independent    Distress  "Screening (within last 30 days)    No data recorded     Pain  Pain Score: No Pain (0)    Problem List    Patient Active Problem List   Diagnosis     Family history of colon cancer in father     Malignant neoplasm of ascending colon (H)     Iron deficiency anemia due to chronic blood loss     Colon cancer (H)        ______________________________________________________________________________    History of Present Illness    Measurable Disease: None postoperatively.  Patient was diagnosed with a viral illness last year and then was found to be anemic so then had a colonoscopy that showed a cecal colon cancer.    Treatment:   Underwent laparoscopic right hemicolectomy December 21, 2022.  Patient started on clinical trial, .  He was randomized to modified FOLFIRINOX.  He started on March 16, 2023.  Today is cycle 6    Interim History: Patient is here today to continue on chemo.  He states that overall he just feels like the side effects are cumulative.  He is feeling more tired and worn out.  He says he does have some dyspepsia with GI issues.  He has some diarrhea usually about a week after the chemotherapy and if he takes Imodium sometimes he goes into some constipation so he is trying to manage that.  He also just feels like he has upset stomach sometimes.  No nausea or vomiting.  He feels like he is just getting a little bit more depressed and sick of being on the chemotherapy.  He is happy to be [facility] the [facility].  Denies any fevers or infectious complaints.    Review of Systems    Pertinent items are noted in HPI.    Past History    Past Medical History:   Diagnosis Date     Anemia      Malignant neoplasm of ascending colon (H) 11/07/2022       PHYSICAL EXAM:  BP (!) 140/88 (BP Location: Left arm, Patient Position: Sitting, Cuff Size: Adult Regular)   Pulse 75   Temp 97.5  F (36.4  C) (Tympanic)   Resp 20   Ht 1.88 m (6' 2\")   Wt 94.6 kg (208 lb 8 oz)   SpO2 98%   BMI 26.77 kg/m    GENERAL: no " acute distress. Cooperative in conversation. Here with   HEENT: pupils are equal, round and reactive. Oromucosa is clean and intact. No ulcerations or mucositis noted. No bleeding noted.  RESP: lungs are clear bilaterally per auscultation. Regular respiratory rate. No wheezes or rhonchi.  CV: Regular, rate and rhythm. No murmurs.  ABD: soft, nontender. Positive bowel sounds. No organomegaly.   MUSCULOSKELETAL: No lower extremity swelling.   NEURO: non focal. Alert and oriented x3.   PSYCH: within normal limits. No depression or anxiety.  SKIN: warm dry intact   LYMPH: no cervical, supraclavicular or axillary lymphadenopathy          Lab Results    Recent Results (from the past 168 hour(s))   Comprehensive metabolic panel   Result Value Ref Range    Sodium 140 136 - 145 mmol/L    Potassium 4.3 3.4 - 5.3 mmol/L    Chloride 107 98 - 107 mmol/L    Carbon Dioxide (CO2) 24 22 - 29 mmol/L    Anion Gap 9 7 - 15 mmol/L    Urea Nitrogen 15.5 6.0 - 20.0 mg/dL    Creatinine 1.11 0.67 - 1.17 mg/dL    Calcium 9.4 8.6 - 10.0 mg/dL    Glucose 79 70 - 99 mg/dL    Alkaline Phosphatase 82 40 - 129 U/L    AST 28 10 - 50 U/L    ALT 45 10 - 50 U/L    Protein Total 6.6 6.4 - 8.3 g/dL    Albumin 4.1 3.5 - 5.2 g/dL    Bilirubin Total 0.3 <=1.2 mg/dL    GFR Estimate 86 >60 mL/min/1.73m2   Magnesium   Result Value Ref Range    Magnesium 2.2 1.7 - 2.3 mg/dL   CBC with platelets and differential   Result Value Ref Range    WBC Count 4.3 4.0 - 11.0 10e3/uL    RBC Count 3.99 (L) 4.40 - 5.90 10e6/uL    Hemoglobin 11.2 (L) 13.3 - 17.7 g/dL    Hematocrit 34.6 (L) 40.0 - 53.0 %    MCV 87 78 - 100 fL    MCH 28.1 26.5 - 33.0 pg    MCHC 32.4 31.5 - 36.5 g/dL    RDW 19.0 (H) 10.0 - 15.0 %    Platelet Count 147 (L) 150 - 450 10e3/uL    % Neutrophils 53 %    % Lymphocytes 27 %    % Monocytes 15 %    % Eosinophils 3 %    % Basophils 1 %    % Immature Granulocytes 1 %    NRBCs per 100 WBC 0 <1 /100    Absolute Neutrophils 2.3 1.6 - 8.3 10e3/uL    Absolute  "Lymphocytes 1.2 0.8 - 5.3 10e3/uL    Absolute Monocytes 0.6 0.0 - 1.3 10e3/uL    Absolute Eosinophils 0.1 0.0 - 0.7 10e3/uL    Absolute Basophils 0.0 0.0 - 0.2 10e3/uL    Absolute Immature Granulocytes 0.0 <=0.4 10e3/uL    Absolute NRBCs 0.0 10e3/uL       Imaging    No results found.      Signed by: RAQUEL Grey CNP    Oncology Rooming Note    May 23, 2023 10:09 AM   Luca Araiza is a 42 year old male who presents for:    Chief Complaint   Patient presents with     Oncology Clinic Visit     Return visit with labs/infusion related to Malignant neoplasm of ascending colon      Initial Vitals: BP (!) 140/88 (BP Location: Left arm, Patient Position: Sitting, Cuff Size: Adult Regular)   Pulse 75   Temp 97.5  F (36.4  C) (Tympanic)   Resp 20   Ht 1.88 m (6' 2\")   Wt 94.6 kg (208 lb 8 oz)   SpO2 98%   BMI 26.77 kg/m   Estimated body mass index is 26.77 kg/m  as calculated from the following:    Height as of this encounter: 1.88 m (6' 2\").    Weight as of this encounter: 94.6 kg (208 lb 8 oz). Body surface area is 2.22 meters squared.  No Pain (0) Comment: Data Unavailable   No LMP for male patient.  Allergies reviewed: Yes  Medications reviewed: Yes    Medications: Medication refills not needed today.  Pharmacy name entered into [facility]: [facility] - [place] [id number]    Clinical concerns: Return visit with labs/infusion related to Malignant neoplasm of ascending colon       ANDREA CROCKER CMA                Again, thank you for allowing me to participate in the care of your patient.        Sincerely,        RAQUEL Grey CNP    "

## 2023-05-23 NOTE — PROGRESS NOTES
"Oncology Rooming Note    May 23, 2023 10:09 AM   Luca Araiza is a 42 year old male who presents for:    Chief Complaint   Patient presents with     Oncology Clinic Visit     Return visit with labs/infusion related to Malignant neoplasm of ascending colon      Initial Vitals: BP (!) 140/88 (BP Location: Left arm, Patient Position: Sitting, Cuff Size: Adult Regular)   Pulse 75   Temp 97.5  F (36.4  C) (Tympanic)   Resp 20   Ht 1.88 m (6' 2\")   Wt 94.6 kg (208 lb 8 oz)   SpO2 98%   BMI 26.77 kg/m   Estimated body mass index is 26.77 kg/m  as calculated from the following:    Height as of this encounter: 1.88 m (6' 2\").    Weight as of this encounter: 94.6 kg (208 lb 8 oz). Body surface area is 2.22 meters squared.  No Pain (0) Comment: Data Unavailable   No LMP for male patient.  Allergies reviewed: Yes  Medications reviewed: Yes    Medications: Medication refills not needed today.  Pharmacy name entered into [employer]: [facility] - [place], [place] - [phone]    Clinical concerns: Return visit with labs/infusion related to Malignant neoplasm of ascending colon       ANDREA CROCKER CMA            "

## 2023-05-23 NOTE — PROGRESS NOTES
[facility] Hematology and Oncology Progress Note    Patient: Luca Araiza  MRN: [id number]  Date of Service: May 23, 2023          Reason for Visit    Chief Complaint   Patient presents with     Oncology Clinic Visit     Return visit with labs/infusion related to Malignant neoplasm of ascending colon        Assessment and Plan     Cancer Staging   No matching staging information was found for the patient.    1. Colon Cancer, Stage IIIb [id number], adenocarcinoma of cecum, Dx December 2022: pt is on clinical trial due to positive circulating tumor cells on DNA testing. Pt started FOLFIRINOX last week. The overall plan is to give 12 cycles. Today is cycle 6.  We will continue today at full dose.  He will return in 2 weeks for cycle 7.  We will be drawing extra labs for the clinical study neck cycle.    2. Slight reaction to irinotecan: received ativan, pepcid during infusion due to feeling sweaty/runny nose/nausea.  We also changed some of his antiemetics.  Overall it is going better.  Continues to have some issues with some Pepsi at home.  Continue over-the-counter products.  Also, continue to use famotidine/tums at home. Ginger prodcuts. Sea [employer]ands.     3. Early onset cancer: getting genetic testing done.  Patient met with Danielle Orantes one of our genetic counselors.  His genetic testing result is negative.    4.  Diarrhea and constipation: We talked today a little bit about minimizing the medications he is taking to try not to swing between the 2.  Encouraged him to try to eat different foods that will help.  Take the minimum amount of Imodium as needed.  That does not seem to be working well we can try some Lomotil.    ECOG Performance    0 - Independent    Distress Screening (within last 30 days)    No data recorded     Pain  Pain Score: No Pain (0)    Problem List    Patient Active Problem List   Diagnosis     Family history of colon cancer in father     Malignant neoplasm of ascending colon (H)     Iron  "deficiency anemia due to chronic blood loss     Colon cancer (H)        ______________________________________________________________________________    History of Present Illness    Measurable Disease: None postoperatively.  Patient was diagnosed with a viral illness last year and then was found to be anemic so then had a colonoscopy that showed a cecal colon cancer.    Treatment:   Underwent laparoscopic right hemicolectomy December 21, 2022.  Patient started on clinical trial, .  He was randomized to modified FOLFIRINOX.  He started on March 16, 2023.  Today is cycle 6    Interim History: Patient is here today to continue on chemo.  He states that overall he just feels like the side effects are cumulative.  He is feeling more tired and worn out.  He says he does have some dyspepsia with GI issues.  He has some diarrhea usually about a week after the chemotherapy and if he takes Imodium sometimes he goes into some constipation so he is trying to manage that.  He also just feels like he has upset stomach sometimes.  No nausea or vomiting.  He feels like he is just getting a little bit more depressed and sick of being on the chemotherapy.  He is happy to be at the [facility] point.  Denies any fevers or infectious complaints.    Review of Systems    Pertinent items are noted in HPI.    Past History    Past Medical History:   Diagnosis Date     Anemia      Malignant neoplasm of ascending colon (H) 11/07/2022       PHYSICAL EXAM:  BP (!) 140/88 (BP Location: Left arm, Patient Position: Sitting, Cuff Size: Adult Regular)   Pulse 75   Temp 97.5  F (36.4  C) (Tympanic)   Resp 20   Ht 1.88 m (6' 2\")   Wt 94.6 kg (208 lb 8 oz)   SpO2 98%   BMI 26.77 kg/m    GENERAL: no acute distress. Cooperative in conversation. Here with   HEENT: pupils are equal, round and reactive. Oromucosa is clean and intact. No ulcerations or mucositis noted. No bleeding noted.  RESP: lungs are clear bilaterally per auscultation. Regular " respiratory rate. No wheezes or rhonchi.  CV: Regular, rate and rhythm. No murmurs.  ABD: soft, nontender. Positive bowel sounds. No organomegaly.   MUSCULOSKELETAL: No lower extremity swelling.   NEURO: non focal. Alert and oriented x3.   PSYCH: within normal limits. No depression or anxiety.  SKIN: warm dry intact   LYMPH: no cervical, supraclavicular or axillary lymphadenopathy          Lab Results    Recent Results (from the past 168 hour(s))   Comprehensive metabolic panel   Result Value Ref Range    Sodium 140 136 - 145 mmol/L    Potassium 4.3 3.4 - 5.3 mmol/L    Chloride 107 98 - 107 mmol/L    Carbon Dioxide (CO2) 24 22 - 29 mmol/L    Anion Gap 9 7 - 15 mmol/L    Urea Nitrogen 15.5 6.0 - 20.0 mg/dL    Creatinine 1.11 0.67 - 1.17 mg/dL    Calcium 9.4 8.6 - 10.0 mg/dL    Glucose 79 70 - 99 mg/dL    Alkaline Phosphatase 82 40 - 129 U/L    AST 28 10 - 50 U/L    ALT 45 10 - 50 U/L    Protein Total 6.6 6.4 - 8.3 g/dL    Albumin 4.1 3.5 - 5.2 g/dL    Bilirubin Total 0.3 <=1.2 mg/dL    GFR Estimate 86 >60 mL/min/1.73m2   Magnesium   Result Value Ref Range    Magnesium 2.2 1.7 - 2.3 mg/dL   CBC with platelets and differential   Result Value Ref Range    WBC Count 4.3 4.0 - 11.0 10e3/uL    RBC Count 3.99 (L) 4.40 - 5.90 10e6/uL    Hemoglobin 11.2 (L) 13.3 - 17.7 g/dL    Hematocrit 34.6 (L) 40.0 - 53.0 %    MCV 87 78 - 100 fL    MCH 28.1 26.5 - 33.0 pg    MCHC 32.4 31.5 - 36.5 g/dL    RDW 19.0 (H) 10.0 - 15.0 %    Platelet Count 147 (L) 150 - 450 10e3/uL    % Neutrophils 53 %    % Lymphocytes 27 %    % Monocytes 15 %    % Eosinophils 3 %    % Basophils 1 %    % Immature Granulocytes 1 %    NRBCs per 100 WBC 0 <1 /100    Absolute Neutrophils 2.3 1.6 - 8.3 10e3/uL    Absolute Lymphocytes 1.2 0.8 - 5.3 10e3/uL    Absolute Monocytes 0.6 0.0 - 1.3 10e3/uL    Absolute Eosinophils 0.1 0.0 - 0.7 10e3/uL    Absolute Basophils 0.0 0.0 - 0.2 10e3/uL    Absolute Immature Granulocytes 0.0 <=0.4 10e3/uL    Absolute NRBCs 0.0 10e3/uL        Imaging    No results found.      Signed by: RAQUEL Grey CNP

## 2023-05-25 ENCOUNTER — INFUSION THERAPY VISIT (OUTPATIENT)
Dept: INFUSION THERAPY | Facility: HOSPITAL | Age: 43
End: 2023-05-25
Attending: NURSE PRACTITIONER
Payer: COMMERCIAL

## 2023-05-25 VITALS
TEMPERATURE: 98 F | SYSTOLIC BLOOD PRESSURE: 117 MMHG | RESPIRATION RATE: 18 BRPM | DIASTOLIC BLOOD PRESSURE: 75 MMHG | HEART RATE: 78 BPM | OXYGEN SATURATION: 98 %

## 2023-05-25 DIAGNOSIS — C18.2 MALIGNANT NEOPLASM OF ASCENDING COLON (H): Primary | ICD-10-CM

## 2023-05-25 PROCEDURE — 250N000011 HC RX IP 250 OP 636: Performed by: NURSE PRACTITIONER

## 2023-05-25 RX ORDER — HEPARIN SODIUM (PORCINE) LOCK FLUSH IV SOLN 100 UNIT/ML 100 UNIT/ML
5 SOLUTION INTRAVENOUS
Status: DISCONTINUED | OUTPATIENT
Start: 2023-05-25 | End: 2023-05-25 | Stop reason: HOSPADM

## 2023-05-25 RX ADMIN — Medication 5 ML: at 13:38

## 2023-05-25 NOTE — PROGRESS NOTES
Luca came to chemo infusion this afternoon for FHI pump removal upon completion of his 46 hour home infusion of 5FU.  VSS.  Pt assessed and is feeling well overall.  Pump was completely empty.  Port had good blood return.  Port was flushed with ns, heparinized then de-accessed and site covered.  Luca left clinic ambulatory and unaccompanied.

## 2023-06-06 ENCOUNTER — LAB (OUTPATIENT)
Dept: INFUSION THERAPY | Facility: HOSPITAL | Age: 43
End: 2023-06-06
Attending: NURSE PRACTITIONER
Payer: COMMERCIAL

## 2023-06-06 ENCOUNTER — ONCOLOGY VISIT (OUTPATIENT)
Dept: ONCOLOGY | Facility: HOSPITAL | Age: 43
End: 2023-06-06
Attending: NURSE PRACTITIONER
Payer: COMMERCIAL

## 2023-06-06 VITALS
HEART RATE: 84 BPM | DIASTOLIC BLOOD PRESSURE: 74 MMHG | BODY MASS INDEX: 26.69 KG/M2 | SYSTOLIC BLOOD PRESSURE: 119 MMHG | TEMPERATURE: 98.4 F | OXYGEN SATURATION: 99 % | WEIGHT: 208 LBS | RESPIRATION RATE: 20 BRPM | HEIGHT: 74 IN

## 2023-06-06 DIAGNOSIS — C18.2 MALIGNANT NEOPLASM OF ASCENDING COLON (H): Primary | ICD-10-CM

## 2023-06-06 LAB
ALBUMIN SERPL BCG-MCNC: 4 G/DL (ref 3.5–5.2)
ALP SERPL-CCNC: 86 U/L (ref 40–129)
ALT SERPL W P-5'-P-CCNC: 50 U/L (ref 10–50)
ANION GAP SERPL CALCULATED.3IONS-SCNC: 11 MMOL/L (ref 7–15)
AST SERPL W P-5'-P-CCNC: 38 U/L (ref 10–50)
BASOPHILS # BLD AUTO: 0 10E3/UL (ref 0–0.2)
BASOPHILS NFR BLD AUTO: 1 %
BILIRUB SERPL-MCNC: 0.3 MG/DL
BUN SERPL-MCNC: 13.2 MG/DL (ref 6–20)
CALCIUM SERPL-MCNC: 9.3 MG/DL (ref 8.6–10)
CEA SERPL-MCNC: 2.4 NG/ML
CHLORIDE SERPL-SCNC: 109 MMOL/L (ref 98–107)
CREAT SERPL-MCNC: 1.08 MG/DL (ref 0.67–1.17)
DEPRECATED HCO3 PLAS-SCNC: 22 MMOL/L (ref 22–29)
EOSINOPHIL # BLD AUTO: 0.1 10E3/UL (ref 0–0.7)
EOSINOPHIL NFR BLD AUTO: 2 %
ERYTHROCYTE [DISTWIDTH] IN BLOOD BY AUTOMATED COUNT: 18.6 % (ref 10–15)
GFR SERPL CREATININE-BSD FRML MDRD: 88 ML/MIN/1.73M2
GLUCOSE SERPL-MCNC: 128 MG/DL (ref 70–99)
HCT VFR BLD AUTO: 34.5 % (ref 40–53)
HGB BLD-MCNC: 11.4 G/DL (ref 13.3–17.7)
IMM GRANULOCYTES # BLD: 0 10E3/UL
IMM GRANULOCYTES NFR BLD: 0 %
LYMPHOCYTES # BLD AUTO: 1.1 10E3/UL (ref 0.8–5.3)
LYMPHOCYTES NFR BLD AUTO: 28 %
MAGNESIUM SERPL-MCNC: 2.1 MG/DL (ref 1.7–2.3)
MCH RBC QN AUTO: 29.3 PG (ref 26.5–33)
MCHC RBC AUTO-ENTMCNC: 33 G/DL (ref 31.5–36.5)
MCV RBC AUTO: 89 FL (ref 78–100)
MONOCYTES # BLD AUTO: 0.6 10E3/UL (ref 0–1.3)
MONOCYTES NFR BLD AUTO: 14 %
NEUTROPHILS # BLD AUTO: 2.3 10E3/UL (ref 1.6–8.3)
NEUTROPHILS NFR BLD AUTO: 55 %
NRBC # BLD AUTO: 0 10E3/UL
NRBC BLD AUTO-RTO: 0 /100
PLATELET # BLD AUTO: 111 10E3/UL (ref 150–450)
POTASSIUM SERPL-SCNC: 3.9 MMOL/L (ref 3.4–5.3)
PROT SERPL-MCNC: 6.6 G/DL (ref 6.4–8.3)
RBC # BLD AUTO: 3.89 10E6/UL (ref 4.4–5.9)
SODIUM SERPL-SCNC: 142 MMOL/L (ref 136–145)
WBC # BLD AUTO: 4 10E3/UL (ref 4–11)

## 2023-06-06 PROCEDURE — 96417 CHEMO IV INFUS EACH ADDL SEQ: CPT

## 2023-06-06 PROCEDURE — 85014 HEMATOCRIT: CPT | Performed by: INTERNAL MEDICINE

## 2023-06-06 PROCEDURE — 83735 ASSAY OF MAGNESIUM: CPT | Performed by: INTERNAL MEDICINE

## 2023-06-06 PROCEDURE — 96367 TX/PROPH/DG ADDL SEQ IV INF: CPT

## 2023-06-06 PROCEDURE — 96375 TX/PRO/DX INJ NEW DRUG ADDON: CPT

## 2023-06-06 PROCEDURE — 99214 OFFICE O/P EST MOD 30 MIN: CPT | Performed by: INTERNAL MEDICINE

## 2023-06-06 PROCEDURE — 250N000011 HC RX IP 250 OP 636: Performed by: INTERNAL MEDICINE

## 2023-06-06 PROCEDURE — G0498 CHEMO EXTEND IV INFUS W/PUMP: HCPCS

## 2023-06-06 PROCEDURE — 96415 CHEMO IV INFUSION ADDL HR: CPT

## 2023-06-06 PROCEDURE — 82378 CARCINOEMBRYONIC ANTIGEN: CPT | Performed by: INTERNAL MEDICINE

## 2023-06-06 PROCEDURE — 96376 TX/PRO/DX INJ SAME DRUG ADON: CPT

## 2023-06-06 PROCEDURE — G0463 HOSPITAL OUTPT CLINIC VISIT: HCPCS | Mod: 25 | Performed by: INTERNAL MEDICINE

## 2023-06-06 PROCEDURE — 96413 CHEMO IV INFUSION 1 HR: CPT

## 2023-06-06 PROCEDURE — 80053 COMPREHEN METABOLIC PANEL: CPT | Performed by: INTERNAL MEDICINE

## 2023-06-06 PROCEDURE — 258N000003 HC RX IP 258 OP 636: Performed by: INTERNAL MEDICINE

## 2023-06-06 PROCEDURE — 96368 THER/DIAG CONCURRENT INF: CPT

## 2023-06-06 RX ORDER — HEPARIN SODIUM,PORCINE 10 UNIT/ML
5 VIAL (ML) INTRAVENOUS
Status: CANCELLED | OUTPATIENT
Start: 2023-06-06

## 2023-06-06 RX ORDER — ATROPINE SULFATE 0.4 MG/ML
0.4 AMPUL (ML) INJECTION
Status: CANCELLED | OUTPATIENT
Start: 2023-06-06

## 2023-06-06 RX ORDER — EPINEPHRINE 1 MG/ML
0.3 INJECTION, SOLUTION INTRAMUSCULAR; SUBCUTANEOUS EVERY 5 MIN PRN
Status: CANCELLED | OUTPATIENT
Start: 2023-06-06

## 2023-06-06 RX ORDER — METHYLPREDNISOLONE SODIUM SUCCINATE 125 MG/2ML
125 INJECTION, POWDER, LYOPHILIZED, FOR SOLUTION INTRAMUSCULAR; INTRAVENOUS
Status: CANCELLED
Start: 2023-06-06

## 2023-06-06 RX ORDER — LORAZEPAM 2 MG/ML
0.5 INJECTION INTRAMUSCULAR EVERY 4 HOURS PRN
Status: CANCELLED | OUTPATIENT
Start: 2023-06-06

## 2023-06-06 RX ORDER — ALBUTEROL SULFATE 0.83 MG/ML
2.5 SOLUTION RESPIRATORY (INHALATION)
Status: CANCELLED | OUTPATIENT
Start: 2023-06-06

## 2023-06-06 RX ORDER — ALBUTEROL SULFATE 90 UG/1
1-2 AEROSOL, METERED RESPIRATORY (INHALATION)
Status: CANCELLED
Start: 2023-06-06

## 2023-06-06 RX ORDER — LORAZEPAM 2 MG/ML
0.5 INJECTION INTRAMUSCULAR ONCE
Status: CANCELLED
Start: 2023-06-06 | End: 2023-06-06

## 2023-06-06 RX ORDER — HEPARIN SODIUM (PORCINE) LOCK FLUSH IV SOLN 100 UNIT/ML 100 UNIT/ML
5 SOLUTION INTRAVENOUS
Status: CANCELLED | OUTPATIENT
Start: 2023-06-08

## 2023-06-06 RX ORDER — PALONOSETRON 0.05 MG/ML
0.25 INJECTION, SOLUTION INTRAVENOUS ONCE
Status: CANCELLED
Start: 2023-06-06 | End: 2023-06-06

## 2023-06-06 RX ORDER — LORAZEPAM 2 MG/ML
0.5 INJECTION INTRAMUSCULAR ONCE
Status: COMPLETED | OUTPATIENT
Start: 2023-06-06 | End: 2023-06-06

## 2023-06-06 RX ORDER — MEPERIDINE HYDROCHLORIDE 25 MG/ML
25 INJECTION INTRAMUSCULAR; INTRAVENOUS; SUBCUTANEOUS EVERY 30 MIN PRN
Status: CANCELLED | OUTPATIENT
Start: 2023-06-06

## 2023-06-06 RX ORDER — HEPARIN SODIUM (PORCINE) LOCK FLUSH IV SOLN 100 UNIT/ML 100 UNIT/ML
5 SOLUTION INTRAVENOUS
Status: CANCELLED | OUTPATIENT
Start: 2023-06-06

## 2023-06-06 RX ORDER — DIPHENHYDRAMINE HYDROCHLORIDE 50 MG/ML
50 INJECTION INTRAMUSCULAR; INTRAVENOUS
Status: CANCELLED
Start: 2023-06-06

## 2023-06-06 RX ORDER — PALONOSETRON 0.05 MG/ML
0.25 INJECTION, SOLUTION INTRAVENOUS ONCE
Status: COMPLETED | OUTPATIENT
Start: 2023-06-06 | End: 2023-06-06

## 2023-06-06 RX ORDER — ATROPINE SULFATE 0.4 MG/ML
0.4 AMPUL (ML) INJECTION
Status: DISCONTINUED | OUTPATIENT
Start: 2023-06-06 | End: 2023-06-06 | Stop reason: HOSPADM

## 2023-06-06 RX ORDER — ATROPINE SULFATE 0.4 MG/ML
0.4 AMPUL (ML) INJECTION
Status: COMPLETED | OUTPATIENT
Start: 2023-06-06 | End: 2023-06-06

## 2023-06-06 RX ORDER — HEPARIN SODIUM,PORCINE 10 UNIT/ML
5 VIAL (ML) INTRAVENOUS
Status: CANCELLED | OUTPATIENT
Start: 2023-06-08

## 2023-06-06 RX ADMIN — DEXTROSE MONOHYDRATE 250 ML: 50 INJECTION, SOLUTION INTRAVENOUS at 09:55

## 2023-06-06 RX ADMIN — DEXAMETHASONE SODIUM PHOSPHATE: 10 INJECTION, SOLUTION INTRAMUSCULAR; INTRAVENOUS at 10:10

## 2023-06-06 RX ADMIN — ATROPINE SULFATE 0.4 MG: 0.4 INJECTION, SOLUTION INTRAVENOUS at 13:07

## 2023-06-06 RX ADMIN — FAMOTIDINE 20 MG: 10 INJECTION, SOLUTION INTRAVENOUS at 09:57

## 2023-06-06 RX ADMIN — LORAZEPAM 0.5 MG: 2 INJECTION INTRAMUSCULAR; INTRAVENOUS at 10:04

## 2023-06-06 RX ADMIN — IRINOTECAN HYDROCHLORIDE 340 MG: 20 INJECTION, SOLUTION INTRAVENOUS at 12:36

## 2023-06-06 RX ADMIN — ATROPINE SULFATE 0.4 MG: 0.4 INJECTION, SOLUTION INTRAVENOUS at 12:34

## 2023-06-06 RX ADMIN — PALONOSETRON 0.25 MG: 0.05 INJECTION, SOLUTION INTRAVENOUS at 09:55

## 2023-06-06 RX ADMIN — LEUCOVORIN CALCIUM 880 MG: 350 INJECTION, POWDER, LYOPHILIZED, FOR SOLUTION INTRAMUSCULAR; INTRAVENOUS at 10:36

## 2023-06-06 RX ADMIN — OXALIPLATIN 200 MG: 5 INJECTION, SOLUTION INTRAVENOUS at 10:37

## 2023-06-06 ASSESSMENT — PAIN SCALES - GENERAL: PAINLEVEL: NO PAIN (0)

## 2023-06-06 NOTE — PROGRESS NOTES
[facility] Hematology and Oncology Consult Note    Patient: Luca Araiza  MRN: [id number]  Date of Service: Jun 6, 2023       Reason for Visit    Follow-up for colon cancer    January 2023:    T3N1B, stage IIIb adenocarcinoma of the cecum status post laparoscopic right colectomy, December 21, 2022  CT DNA positive, March 2023  Tumor is MMR intact  Early onset of colon cancer with family history    Continued good tolerance for chemotherapy.  Mild fatigue nausea and diarrhea which are well managed.  No neuropathy.  Labs are stable.  We will continue with cycle 7 of treatment today return in 2 weeks for next cycle.  Plan is for total of 12 cycles.    Plan: As above    Measurable disease: None postoperatively    Current therapy: Modified FOLFIRINOX day 1 cycle 1, March 14, 2023  Cycle 7 today, June 6, 2023                ECOG Performance    0 - Independent    Encounter Diagnoses:    Problem List Items Addressed This Visit        Digestive    Malignant neoplasm of ascending colon (H) - Primary    Relevant Orders    Infusion Appointment Request    Infusion Appointment Request    Infusion Appointment Request    Check Out Appointment Request           ______________________________________________________________________________    Staging History   Cancer Staging   No matching staging information was found for the patient.      History    Luca is here again for follow-up.  Completed cycle 6 2 weeks ago.  Overall doing really well.  Some fatigue.  Mild nausea.  Diarrhea controlled with Imodium.  No fever or mouth sores.  No shortness of breath.  Stable appetite and weight.  No neuropathy.  ECOG status 0.    March 14, 2023:    Luca is here for reevaluation.  Seen about 6 weeks ago.  His CT DNA test was positive and he was randomized to arm for the clinical trial.  He has had Port-A-Cath placed.  No new symptoms or problems.  ECOG status 0.    January 2023:  Mr. Luca Araiza is a 42 year old no significant past  medical history who is been diagnosed and undergone laparoscopic hemicolectomy for colon cancer.  He was in [place] last August and had significant illness with diarrhea and vomiting.  He then had physical which showed that he was anemic and subsequently had colonoscopy showing cecal colon cancer.  He underwent laparoscopic right hemicolectomy on December 21 and has recovered well from this.    No other previous medical history.  He had left ACL repair surgery as a teenager.  No other hospitalizations.  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  Does not smoke and does not drink alcohol.    Father had colon cancer in his mid 60s.  Mother had breast cancer in her late 50s.  No siblings or kids with cancer.  Maternal grandfather had colon cancer in his 60s.  Paternal grandmother had cancer type unknown.            Review of systems.  No fever or night sweats.  No loss of weight.  No lumps or bumps anywhere.  No unusual headaches or eyesight issues.  No dizziness.  No bleeding from the nose.  No sores in the mouth. No problems with swallowing.  No chest pain. No shortness of breath. No cough.  No abdominal pain. No nausea or vomiting.  No diarrhea or constipation.  No blood in stool or black colored stools.  No problems passing urine.  No numbness or tingling in hands or feet.  No skin rashes.  A 14 point review of systems is otherwise negative.        Past History    Past Medical History:   Diagnosis Date     Anemia      Malignant neoplasm of ascending colon (H) 11/07/2022     Past Surgical History:   Procedure Laterality Date     COLONOSCOPY       HEMICOLECTOMY, RT/LT Right      IR CHEST PORT PLACEMENT > 5 YRS OF AGE  3/9/2023     REMOVAL OF SPERM DUCT(S)      Description: Surgery Of Male Genitalia Vasectomy;  Recorded: 12/27/2011;  Comments: 12/27/2011     [facility] REPAIR CRUCIATE LIGAMENT,KNEE      Description: Primary Repair Of Knee Ligament Cruciate  "Anterior;  Recorded: 07/20/2009;     Family History   Problem Relation Age of Onset     Breast Cancer Mother      Colon Cancer Father      Anesthesia Reaction No family hx of      Venous thrombosis No family hx of      Social History     Socioeconomic History     Marital status:      Spouse name: None     Number of children: None     Years of education: None     Highest education level: None   Tobacco Use     Smoking status: Never     Smokeless tobacco: Never   Substance and Sexual Activity     Alcohol use: Not Currently     Drug use: Never     Sexual activity: Yes     Partners: Female     Birth control/protection: Male Surgical   Other Topics Concern     Parent/sibling w/ CABG, MI or angioplasty before 65F 55M? No         Allergies    No Known Allergies        Physical Exam    /74 (BP Location: Right arm, Patient Position: Sitting, Cuff Size: Adult Large)   Pulse 84   Temp 98.4  F (36.9  C) (Oral)   Resp 20   Ht 1.88 m (6' 2.02\")   Wt 94.3 kg (208 lb)   SpO2 99%   BMI 26.69 kg/m        GENERAL: Alert and oriented to time place and person. Seated comfortably. In no distress.    HEAD: Atraumatic and normocephalic.    EYES: LUIS ANGEL, EOMI.  No pallor.  No icterus.    Oral cavity: no mucosal lesion or tonsillar enlargement.    NECK: supple. JVP normal.  No thyroid enlargement.    LYMPH NODES: No palpable, cervical, axillary or inguinal lymphadenopathy.    CHEST: clear to auscultation bilaterally.  Resonant to percussion throughout bilaterally.  Symmetrical breath movements bilaterally.    CVS: S1 and S2 are heard. Regular rate and rhythm.  No murmur or gallop or rub heard.  No peripheral edema.    ABDOMEN: Soft. Not tender. Not distended.  No palpable hepatomegaly or splenomegaly.  No other mass palpable.  Bowel sounds heard.    EXTREMITIES: Warm.    SKIN: no rash, or bruising or purpura.  Has a full head of hair.    Lab Results    Preoperative CEA was 2.6 and 2.7    Imaging Results    CT and MRI " reviewed with no evidence of metastatic disease    No results found.      Signed by: Sekou Hall MD

## 2023-06-06 NOTE — PROGRESS NOTES
"Oncology Rooming Note    June 6, 2023 8:53 AM   Luca Araiza is a 42 year old male who presents for:    Chief Complaint   Patient presents with     Oncology Clinic Visit     2 week return  Malignant neoplasm of ascending colon, review labs & infusion      Initial Vitals: /74 (BP Location: Right arm, Patient Position: Sitting, Cuff Size: Adult Large)   Pulse 84   Temp 98.4  F (36.9  C) (Oral)   Resp 20   Ht 1.88 m (6' 2.02\")   Wt 94.3 kg (208 lb)   SpO2 99%   BMI 26.69 kg/m   Estimated body mass index is 26.69 kg/m  as calculated from the following:    Height as of this encounter: 1.88 m (6' 2.02\").    Weight as of this encounter: 94.3 kg (208 lb). Body surface area is 2.22 meters squared.  No Pain (0) Comment: Data Unavailable   No LMP for male patient.  Allergies reviewed: Yes  Medications reviewed: Yes    Medications: Medication refills not needed today.  Pharmacy name entered into [facility]: [facility] - [place], [place] - [id number]    Clinical concerns: 2 week return  Malignant neoplasm of ascending colon, review labs & infusion       Kelley Connolly CMA            "

## 2023-06-06 NOTE — LETTER
"    6/6/2023         RE: Luca Araiza  [address]  [facility] [address]        Dear Colleague,    Thank you for referring your patient, Luca Araiza, to the [facility] CANCER [facility]. Please see a copy of my visit note below.    Oncology Rooming Note    June 6, 2023 8:53 AM   Luca Araiza is a 42 year old male who presents for:    Chief Complaint   Patient presents with     Oncology Clinic Visit     2 week return  Malignant neoplasm of ascending colon, review labs & infusion      Initial Vitals: /74 (BP Location: Right arm, Patient Position: Sitting, Cuff Size: Adult Large)   Pulse 84   Temp 98.4  F (36.9  C) (Oral)   Resp 20   Ht 1.88 m (6' 2.02\")   Wt 94.3 kg (208 lb)   SpO2 99%   BMI 26.69 kg/m   Estimated body mass index is 26.69 kg/m  as calculated from the following:    Height as of this encounter: 1.88 m (6' 2.02\").    Weight as of this encounter: 94.3 kg (208 lb). Body surface area is 2.22 meters squared.  No Pain (0) Comment: Data Unavailable   No LMP for male patient.  Allergies reviewed: Yes  Medications reviewed: Yes    Medications: Medication refills not needed today.  Pharmacy name entered into [employer]: [facility] - [facility] [address]    Clinical concerns: 2 week return  Malignant neoplasm of ascending colon, review labs & infusion       Kelley Connolly, [facility] Hematology and Oncology Consult Note    Patient: Luca Araiza  MRN: [id number]  Date of Service: Jun 6, 2023       Reason for Visit    Follow-up for colon cancer    January 2023:    T3N1B, stage IIIb adenocarcinoma of the cecum status post laparoscopic right colectomy, December 21, 2022  CT DNA positive, March 2023  Tumor is MMR intact  Early onset of colon cancer with family history    Continued good tolerance for chemotherapy.  Mild fatigue nausea and diarrhea which are well managed.  No neuropathy.  Labs are stable.  We will continue with cycle 7 of treatment " today return in 2 weeks for next cycle.  Plan is for total of 12 cycles.    Plan: As above    Measurable disease: None postoperatively    Current therapy: Modified FOLFIRINOX day 1 cycle 1, March 14, 2023  Cycle 7 today, June 6, 2023                ECOG Performance    0 - Independent    Encounter Diagnoses:    Problem List Items Addressed This Visit        Digestive    Malignant neoplasm of ascending colon (H) - Primary    Relevant Orders    Infusion Appointment Request    Infusion Appointment Request    Infusion Appointment Request    Check Out Appointment Request           ______________________________________________________________________________    Staging History   Cancer Staging   No matching staging information was found for the patient.      History    Luca is here again for follow-up.  Completed cycle 6 2 weeks ago.  Overall doing really well.  Some fatigue.  Mild nausea.  Diarrhea controlled with Imodium.  No fever or mouth sores.  No shortness of breath.  Stable appetite and weight.  No neuropathy.  ECOG status 0.    March 14, 2023:    Luca is here for reevaluation.  Seen about 6 weeks ago.  His CT DNA test was positive and he was randomized to arm for the clinical trial.  He has had Port-A-Cath placed.  No new symptoms or problems.  ECOG status 0.    January 2023:  Mr. Luca Araiza is a 42 year old no significant past medical history who is been diagnosed and undergone laparoscopic hemicolectomy for colon cancer.  He was in [place] last August and had significant illness with diarrhea and vomiting.  He then had physical which showed that he was anemic and subsequently had colonoscopy showing cecal colon cancer.  He underwent laparoscopic right hemicolectomy on December 21 and has recovered well from this.    No other previous medical history.  He had left ACL repair surgery as a teenager.  No other hospitalizations.  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  Does not smoke and does not drink alcohol.    Father had colon cancer in his mid 60s.  Mother had breast cancer in her late 50s.  No siblings or kids with cancer.  Maternal grandfather had colon cancer in his 60s.  Paternal grandmother had cancer type unknown.            Review of systems.  No fever or night sweats.  No loss of weight.  No lumps or bumps anywhere.  No unusual headaches or eyesight issues.  No dizziness.  No bleeding from the nose.  No sores in the mouth. No problems with swallowing.  No chest pain. No shortness of breath. No cough.  No abdominal pain. No nausea or vomiting.  No diarrhea or constipation.  No blood in stool or black colored stools.  No problems passing urine.  No numbness or tingling in hands or feet.  No skin rashes.  A 14 point review of systems is otherwise negative.        Past History    Past Medical History:   Diagnosis Date     Anemia      Malignant neoplasm of ascending colon (H) 11/07/2022     Past Surgical History:   Procedure Laterality Date     COLONOSCOPY       HEMICOLECTOMY, RT/LT Right      IR CHEST PORT PLACEMENT > 5 YRS OF AGE  3/9/2023     REMOVAL OF SPERM DUCT(S)      Description: Surgery Of Male Genitalia Vasectomy;  Recorded: 12/27/2011;  Comments: 12/27/2011     [facility] REPAIR CRUCIATE LIGAMENT,KNEE      Description: Primary Repair Of Knee Ligament Cruciate Anterior;  Recorded: 07/20/2009;     Family History   Problem Relation Age of Onset     Breast Cancer Mother      Colon Cancer Father      Anesthesia Reaction No family hx of      Venous thrombosis No family hx of      Social History     Socioeconomic History     Marital status:      Spouse name: None     Number of children: None     Years of education: None     Highest education level: None   Tobacco Use     Smoking status: Never     Smokeless tobacco: Never   Substance and Sexual Activity     Alcohol use: Not Currently     Drug use: Never     Sexual activity: Yes     Partners:  "Female     Birth control/protection: Male Surgical   Other Topics Concern     Parent/sibling w/ CABG, MI or angioplasty before 65F 55M? No         Allergies    No Known Allergies        Physical Exam    /74 (BP Location: Right arm, Patient Position: Sitting, Cuff Size: Adult Large)   Pulse 84   Temp 98.4  F (36.9  C) (Oral)   Resp 20   Ht 1.88 m (6' 2.02\")   Wt 94.3 kg (208 lb)   SpO2 99%   BMI 26.69 kg/m        GENERAL: Alert and oriented to time place and person. Seated comfortably. In no distress.    HEAD: Atraumatic and normocephalic.    EYES: LUIS ANGEL, EOMI.  No pallor.  No icterus.    Oral cavity: no mucosal lesion or tonsillar enlargement.    NECK: supple. JVP normal.  No thyroid enlargement.    LYMPH NODES: No palpable, cervical, axillary or inguinal lymphadenopathy.    CHEST: clear to auscultation bilaterally.  Resonant to percussion throughout bilaterally.  Symmetrical breath movements bilaterally.    CVS: S1 and S2 are heard. Regular rate and rhythm.  No murmur or gallop or rub heard.  No peripheral edema.    ABDOMEN: Soft. Not tender. Not distended.  No palpable hepatomegaly or splenomegaly.  No other mass palpable.  Bowel sounds heard.    EXTREMITIES: Warm.    SKIN: no rash, or bruising or purpura.  Has a full head of hair.    Lab Results    Preoperative CEA was 2.6 and 2.7    Imaging Results    CT and MRI reviewed with no evidence of metastatic disease    No results found.      Signed by: Sekou Hall MD      Again, thank you for allowing me to participate in the care of your patient.        Sincerely,        Sekou Hall MD    "

## 2023-06-06 NOTE — PROGRESS NOTES
PT here ambulatory for txt after exam with MD. Labs drawn from port resulted and approved for txt. txt reviewed with pt. Premeds given aloxi, ativan , pepcid and emend with decadron. Oxaliplatin and leucovorin infused. Atropine iv given prior to irinotecan and infusion stopped after 30 minutes and atropine repeated. Infusion restarted to completion. PT tolerated remainder of txt with some cold sensitivities in lips. Pump attached to pt and reviewed with pt to return for pump discontinue at 1215. PT dc'd steady gait with wife.

## 2023-06-08 ENCOUNTER — INFUSION THERAPY VISIT (OUTPATIENT)
Dept: INFUSION THERAPY | Facility: HOSPITAL | Age: 43
End: 2023-06-08
Attending: NURSE PRACTITIONER
Payer: COMMERCIAL

## 2023-06-08 VITALS
DIASTOLIC BLOOD PRESSURE: 69 MMHG | HEART RATE: 79 BPM | TEMPERATURE: 98.1 F | RESPIRATION RATE: 16 BRPM | OXYGEN SATURATION: 99 % | SYSTOLIC BLOOD PRESSURE: 138 MMHG

## 2023-06-08 DIAGNOSIS — C18.2 MALIGNANT NEOPLASM OF ASCENDING COLON (H): Primary | ICD-10-CM

## 2023-06-08 PROCEDURE — 250N000011 HC RX IP 250 OP 636: Performed by: INTERNAL MEDICINE

## 2023-06-08 RX ORDER — HEPARIN SODIUM (PORCINE) LOCK FLUSH IV SOLN 100 UNIT/ML 100 UNIT/ML
5 SOLUTION INTRAVENOUS
Status: DISCONTINUED | OUTPATIENT
Start: 2023-06-08 | End: 2023-06-08 | Stop reason: HOSPADM

## 2023-06-08 RX ADMIN — Medication 5 ML: at 12:21

## 2023-06-08 ASSESSMENT — PAIN SCALES - GENERAL: PAINLEVEL: NO PAIN (0)

## 2023-06-08 NOTE — PROGRESS NOTES
Infusion Nursing Note:  Luca LAW Araiza presents today for Pump disconnect.    Patient seen by provider today: No   present during visit today: Not Applicable.    Note: Pt here for C series disconnect. Pt denies new complaint, vss.  Bulb removed, study labs were draw post flush and port flushed per policy and deaccessed. Pt d.c ambulatory to lobby alone and is aware of treatment plan.      Intravenous Access:  Labs drawn without difficulty.  Implanted Port.    Treatment Conditions:  Not Applicable.      Post Infusion Assessment:  Patient tolerated infusion without incident.  Blood return noted pre and post infusion.  No evidence of extravasations.  Access discontinued per protocol.       Discharge Plan:   Discharge instructions reviewed with: Patient.  Patient and/or family verbalized understanding of discharge instructions and all questions answered.  Patient discharged in stable condition accompanied by: self.  Departure Mode: Ambulatory.      Sofia Tavera RN

## 2023-06-20 ENCOUNTER — ONCOLOGY VISIT (OUTPATIENT)
Dept: ONCOLOGY | Facility: HOSPITAL | Age: 43
End: 2023-06-20
Attending: INTERNAL MEDICINE
Payer: COMMERCIAL

## 2023-06-20 ENCOUNTER — LAB (OUTPATIENT)
Dept: INFUSION THERAPY | Facility: HOSPITAL | Age: 43
End: 2023-06-20
Attending: INTERNAL MEDICINE
Payer: COMMERCIAL

## 2023-06-20 VITALS
SYSTOLIC BLOOD PRESSURE: 119 MMHG | HEIGHT: 74 IN | HEART RATE: 72 BPM | DIASTOLIC BLOOD PRESSURE: 73 MMHG | WEIGHT: 211.4 LBS | RESPIRATION RATE: 22 BRPM | OXYGEN SATURATION: 99 % | TEMPERATURE: 97.9 F | BODY MASS INDEX: 27.13 KG/M2

## 2023-06-20 DIAGNOSIS — C18.2 MALIGNANT NEOPLASM OF ASCENDING COLON (H): Primary | ICD-10-CM

## 2023-06-20 LAB
ALBUMIN SERPL BCG-MCNC: 4 G/DL (ref 3.5–5.2)
ALP SERPL-CCNC: 99 U/L (ref 40–129)
ALT SERPL W P-5'-P-CCNC: 59 U/L (ref 0–70)
ANION GAP SERPL CALCULATED.3IONS-SCNC: 10 MMOL/L (ref 7–15)
AST SERPL W P-5'-P-CCNC: 42 U/L (ref 0–45)
BASOPHILS # BLD AUTO: 0 10E3/UL (ref 0–0.2)
BASOPHILS NFR BLD AUTO: 1 %
BILIRUB SERPL-MCNC: 0.3 MG/DL
BUN SERPL-MCNC: 18.1 MG/DL (ref 6–20)
CALCIUM SERPL-MCNC: 9.1 MG/DL (ref 8.6–10)
CHLORIDE SERPL-SCNC: 109 MMOL/L (ref 98–107)
CREAT SERPL-MCNC: 1.09 MG/DL (ref 0.67–1.17)
DEPRECATED HCO3 PLAS-SCNC: 24 MMOL/L (ref 22–29)
EOSINOPHIL # BLD AUTO: 0.1 10E3/UL (ref 0–0.7)
EOSINOPHIL NFR BLD AUTO: 1 %
ERYTHROCYTE [DISTWIDTH] IN BLOOD BY AUTOMATED COUNT: 17.3 % (ref 10–15)
GFR SERPL CREATININE-BSD FRML MDRD: 87 ML/MIN/1.73M2
GLUCOSE SERPL-MCNC: 146 MG/DL (ref 70–99)
HCT VFR BLD AUTO: 34.5 % (ref 40–53)
HGB BLD-MCNC: 11.1 G/DL (ref 13.3–17.7)
IMM GRANULOCYTES # BLD: 0 10E3/UL
IMM GRANULOCYTES NFR BLD: 1 %
LYMPHOCYTES # BLD AUTO: 0.9 10E3/UL (ref 0.8–5.3)
LYMPHOCYTES NFR BLD AUTO: 23 %
MAGNESIUM SERPL-MCNC: 2.1 MG/DL (ref 1.7–2.3)
MCH RBC QN AUTO: 29.4 PG (ref 26.5–33)
MCHC RBC AUTO-ENTMCNC: 32.2 G/DL (ref 31.5–36.5)
MCV RBC AUTO: 91 FL (ref 78–100)
MONOCYTES # BLD AUTO: 0.5 10E3/UL (ref 0–1.3)
MONOCYTES NFR BLD AUTO: 13 %
NEUTROPHILS # BLD AUTO: 2.5 10E3/UL (ref 1.6–8.3)
NEUTROPHILS NFR BLD AUTO: 61 %
NRBC # BLD AUTO: 0 10E3/UL
NRBC BLD AUTO-RTO: 0 /100
PLATELET # BLD AUTO: 115 10E3/UL (ref 150–450)
POTASSIUM SERPL-SCNC: 4.2 MMOL/L (ref 3.4–5.3)
PROT SERPL-MCNC: 6.7 G/DL (ref 6.4–8.3)
RBC # BLD AUTO: 3.78 10E6/UL (ref 4.4–5.9)
SODIUM SERPL-SCNC: 143 MMOL/L (ref 136–145)
WBC # BLD AUTO: 4.1 10E3/UL (ref 4–11)

## 2023-06-20 PROCEDURE — G0498 CHEMO EXTEND IV INFUS W/PUMP: HCPCS

## 2023-06-20 PROCEDURE — G0463 HOSPITAL OUTPT CLINIC VISIT: HCPCS | Mod: 25 | Performed by: NURSE PRACTITIONER

## 2023-06-20 PROCEDURE — 96367 TX/PROPH/DG ADDL SEQ IV INF: CPT

## 2023-06-20 PROCEDURE — 258N000003 HC RX IP 258 OP 636: Performed by: NURSE PRACTITIONER

## 2023-06-20 PROCEDURE — 83735 ASSAY OF MAGNESIUM: CPT | Performed by: INTERNAL MEDICINE

## 2023-06-20 PROCEDURE — 96415 CHEMO IV INFUSION ADDL HR: CPT

## 2023-06-20 PROCEDURE — 80053 COMPREHEN METABOLIC PANEL: CPT | Performed by: INTERNAL MEDICINE

## 2023-06-20 PROCEDURE — 96417 CHEMO IV INFUS EACH ADDL SEQ: CPT

## 2023-06-20 PROCEDURE — 250N000011 HC RX IP 250 OP 636: Performed by: NURSE PRACTITIONER

## 2023-06-20 PROCEDURE — 96375 TX/PRO/DX INJ NEW DRUG ADDON: CPT

## 2023-06-20 PROCEDURE — 85004 AUTOMATED DIFF WBC COUNT: CPT | Performed by: INTERNAL MEDICINE

## 2023-06-20 PROCEDURE — 96413 CHEMO IV INFUSION 1 HR: CPT

## 2023-06-20 PROCEDURE — 99214 OFFICE O/P EST MOD 30 MIN: CPT | Performed by: NURSE PRACTITIONER

## 2023-06-20 PROCEDURE — 96368 THER/DIAG CONCURRENT INF: CPT

## 2023-06-20 RX ORDER — ALBUTEROL SULFATE 90 UG/1
1-2 AEROSOL, METERED RESPIRATORY (INHALATION)
Status: CANCELLED
Start: 2023-07-05

## 2023-06-20 RX ORDER — PALONOSETRON 0.05 MG/ML
0.25 INJECTION, SOLUTION INTRAVENOUS ONCE
Status: CANCELLED
Start: 2023-07-05 | End: 2023-07-05

## 2023-06-20 RX ORDER — MEPERIDINE HYDROCHLORIDE 25 MG/ML
25 INJECTION INTRAMUSCULAR; INTRAVENOUS; SUBCUTANEOUS EVERY 30 MIN PRN
Status: CANCELLED | OUTPATIENT
Start: 2023-07-05

## 2023-06-20 RX ORDER — ATROPINE SULFATE 0.4 MG/ML
0.4 AMPUL (ML) INJECTION
Status: COMPLETED | OUTPATIENT
Start: 2023-06-20 | End: 2023-06-20

## 2023-06-20 RX ORDER — HEPARIN SODIUM (PORCINE) LOCK FLUSH IV SOLN 100 UNIT/ML 100 UNIT/ML
5 SOLUTION INTRAVENOUS
Status: DISCONTINUED | OUTPATIENT
Start: 2023-06-20 | End: 2023-06-20 | Stop reason: HOSPADM

## 2023-06-20 RX ORDER — MEPERIDINE HYDROCHLORIDE 25 MG/ML
25 INJECTION INTRAMUSCULAR; INTRAVENOUS; SUBCUTANEOUS EVERY 30 MIN PRN
Status: DISCONTINUED | OUTPATIENT
Start: 2023-06-20 | End: 2023-06-20 | Stop reason: HOSPADM

## 2023-06-20 RX ORDER — MEPERIDINE HYDROCHLORIDE 25 MG/ML
25 INJECTION INTRAMUSCULAR; INTRAVENOUS; SUBCUTANEOUS EVERY 30 MIN PRN
Status: CANCELLED | OUTPATIENT
Start: 2023-06-20

## 2023-06-20 RX ORDER — LORAZEPAM 2 MG/ML
0.5 INJECTION INTRAMUSCULAR ONCE
Status: CANCELLED
Start: 2023-07-05 | End: 2023-07-05

## 2023-06-20 RX ORDER — EPINEPHRINE 1 MG/ML
0.3 INJECTION, SOLUTION INTRAMUSCULAR; SUBCUTANEOUS EVERY 5 MIN PRN
Status: CANCELLED | OUTPATIENT
Start: 2023-07-05

## 2023-06-20 RX ORDER — ATROPINE SULFATE 0.4 MG/ML
0.4 AMPUL (ML) INJECTION
Status: CANCELLED | OUTPATIENT
Start: 2023-07-05

## 2023-06-20 RX ORDER — EPINEPHRINE 1 MG/ML
0.3 INJECTION, SOLUTION INTRAMUSCULAR; SUBCUTANEOUS EVERY 5 MIN PRN
Status: DISCONTINUED | OUTPATIENT
Start: 2023-06-20 | End: 2023-06-20 | Stop reason: HOSPADM

## 2023-06-20 RX ORDER — HEPARIN SODIUM (PORCINE) LOCK FLUSH IV SOLN 100 UNIT/ML 100 UNIT/ML
5 SOLUTION INTRAVENOUS
Status: CANCELLED | OUTPATIENT
Start: 2023-07-06

## 2023-06-20 RX ORDER — ATROPINE SULFATE 0.4 MG/ML
0.4 AMPUL (ML) INJECTION
Status: CANCELLED | OUTPATIENT
Start: 2023-06-20

## 2023-06-20 RX ORDER — HEPARIN SODIUM,PORCINE 10 UNIT/ML
5 VIAL (ML) INTRAVENOUS
Status: CANCELLED | OUTPATIENT
Start: 2023-07-05

## 2023-06-20 RX ORDER — LORAZEPAM 2 MG/ML
0.5 INJECTION INTRAMUSCULAR ONCE
Status: CANCELLED
Start: 2023-06-20 | End: 2023-06-20

## 2023-06-20 RX ORDER — DIPHENHYDRAMINE HYDROCHLORIDE 50 MG/ML
50 INJECTION INTRAMUSCULAR; INTRAVENOUS
Status: CANCELLED
Start: 2023-07-05

## 2023-06-20 RX ORDER — LORAZEPAM 2 MG/ML
0.5 INJECTION INTRAMUSCULAR ONCE
Status: COMPLETED | OUTPATIENT
Start: 2023-06-20 | End: 2023-06-20

## 2023-06-20 RX ORDER — HEPARIN SODIUM (PORCINE) LOCK FLUSH IV SOLN 100 UNIT/ML 100 UNIT/ML
5 SOLUTION INTRAVENOUS
Status: CANCELLED | OUTPATIENT
Start: 2023-07-05

## 2023-06-20 RX ORDER — ALBUTEROL SULFATE 0.83 MG/ML
2.5 SOLUTION RESPIRATORY (INHALATION)
Status: CANCELLED | OUTPATIENT
Start: 2023-07-05

## 2023-06-20 RX ORDER — ALBUTEROL SULFATE 0.83 MG/ML
2.5 SOLUTION RESPIRATORY (INHALATION)
Status: CANCELLED | OUTPATIENT
Start: 2023-06-20

## 2023-06-20 RX ORDER — HEPARIN SODIUM (PORCINE) LOCK FLUSH IV SOLN 100 UNIT/ML 100 UNIT/ML
5 SOLUTION INTRAVENOUS
Status: CANCELLED | OUTPATIENT
Start: 2023-06-20

## 2023-06-20 RX ORDER — HEPARIN SODIUM,PORCINE 10 UNIT/ML
5 VIAL (ML) INTRAVENOUS
Status: CANCELLED | OUTPATIENT
Start: 2023-07-06

## 2023-06-20 RX ORDER — LORAZEPAM 2 MG/ML
0.5 INJECTION INTRAMUSCULAR EVERY 4 HOURS PRN
Status: CANCELLED | OUTPATIENT
Start: 2023-07-05

## 2023-06-20 RX ORDER — DIPHENHYDRAMINE HYDROCHLORIDE 50 MG/ML
50 INJECTION INTRAMUSCULAR; INTRAVENOUS
Status: DISCONTINUED | OUTPATIENT
Start: 2023-06-20 | End: 2023-06-20 | Stop reason: HOSPADM

## 2023-06-20 RX ORDER — ALBUTEROL SULFATE 90 UG/1
1-2 AEROSOL, METERED RESPIRATORY (INHALATION)
Status: CANCELLED
Start: 2023-06-20

## 2023-06-20 RX ORDER — ATROPINE SULFATE 0.4 MG/ML
0.4 AMPUL (ML) INJECTION
Status: DISCONTINUED | OUTPATIENT
Start: 2023-06-20 | End: 2023-06-20 | Stop reason: HOSPADM

## 2023-06-20 RX ORDER — ALBUTEROL SULFATE 90 UG/1
1-2 AEROSOL, METERED RESPIRATORY (INHALATION)
Status: DISCONTINUED | OUTPATIENT
Start: 2023-06-20 | End: 2023-06-20 | Stop reason: HOSPADM

## 2023-06-20 RX ORDER — EPINEPHRINE 1 MG/ML
0.3 INJECTION, SOLUTION INTRAMUSCULAR; SUBCUTANEOUS EVERY 5 MIN PRN
Status: CANCELLED | OUTPATIENT
Start: 2023-06-20

## 2023-06-20 RX ORDER — PALONOSETRON 0.05 MG/ML
0.25 INJECTION, SOLUTION INTRAVENOUS ONCE
Status: CANCELLED
Start: 2023-06-20 | End: 2023-06-20

## 2023-06-20 RX ORDER — HEPARIN SODIUM,PORCINE 10 UNIT/ML
5 VIAL (ML) INTRAVENOUS
Status: CANCELLED | OUTPATIENT
Start: 2023-06-22

## 2023-06-20 RX ORDER — LORAZEPAM 2 MG/ML
0.5 INJECTION INTRAMUSCULAR EVERY 4 HOURS PRN
Status: CANCELLED | OUTPATIENT
Start: 2023-06-20

## 2023-06-20 RX ORDER — ONDANSETRON 8 MG/1
8 TABLET, FILM COATED ORAL EVERY 8 HOURS PRN
Qty: 40 TABLET | Refills: 2 | Status: SHIPPED | OUTPATIENT
Start: 2023-06-20 | End: 2023-09-14

## 2023-06-20 RX ORDER — METHYLPREDNISOLONE SODIUM SUCCINATE 125 MG/2ML
125 INJECTION, POWDER, LYOPHILIZED, FOR SOLUTION INTRAMUSCULAR; INTRAVENOUS
Status: DISCONTINUED | OUTPATIENT
Start: 2023-06-20 | End: 2023-06-20 | Stop reason: HOSPADM

## 2023-06-20 RX ORDER — HEPARIN SODIUM (PORCINE) LOCK FLUSH IV SOLN 100 UNIT/ML 100 UNIT/ML
5 SOLUTION INTRAVENOUS
Status: CANCELLED | OUTPATIENT
Start: 2023-06-22

## 2023-06-20 RX ORDER — METHYLPREDNISOLONE SODIUM SUCCINATE 125 MG/2ML
125 INJECTION, POWDER, LYOPHILIZED, FOR SOLUTION INTRAMUSCULAR; INTRAVENOUS
Status: CANCELLED
Start: 2023-07-05

## 2023-06-20 RX ORDER — METHYLPREDNISOLONE SODIUM SUCCINATE 125 MG/2ML
125 INJECTION, POWDER, LYOPHILIZED, FOR SOLUTION INTRAMUSCULAR; INTRAVENOUS
Status: CANCELLED
Start: 2023-06-20

## 2023-06-20 RX ORDER — HEPARIN SODIUM,PORCINE 10 UNIT/ML
5 VIAL (ML) INTRAVENOUS
Status: CANCELLED | OUTPATIENT
Start: 2023-06-20

## 2023-06-20 RX ORDER — ONDANSETRON 8 MG/1
8 TABLET, FILM COATED ORAL EVERY 8 HOURS PRN
COMMUNITY
End: 2023-12-12

## 2023-06-20 RX ORDER — DIPHENHYDRAMINE HYDROCHLORIDE 50 MG/ML
50 INJECTION INTRAMUSCULAR; INTRAVENOUS
Status: CANCELLED
Start: 2023-06-20

## 2023-06-20 RX ORDER — ALBUTEROL SULFATE 0.83 MG/ML
2.5 SOLUTION RESPIRATORY (INHALATION)
Status: DISCONTINUED | OUTPATIENT
Start: 2023-06-20 | End: 2023-06-20 | Stop reason: HOSPADM

## 2023-06-20 RX ORDER — PALONOSETRON 0.05 MG/ML
0.25 INJECTION, SOLUTION INTRAVENOUS ONCE
Status: COMPLETED | OUTPATIENT
Start: 2023-06-20 | End: 2023-06-20

## 2023-06-20 RX ADMIN — DEXAMETHASONE SODIUM PHOSPHATE: 10 INJECTION, SOLUTION INTRAMUSCULAR; INTRAVENOUS at 09:59

## 2023-06-20 RX ADMIN — IRINOTECAN HYDROCHLORIDE 340 MG: 20 INJECTION, SOLUTION INTRAVENOUS at 12:48

## 2023-06-20 RX ADMIN — PALONOSETRON 0.25 MG: 0.05 INJECTION, SOLUTION INTRAVENOUS at 09:51

## 2023-06-20 RX ADMIN — LEUCOVORIN CALCIUM 880 MG: 350 INJECTION, POWDER, LYOPHILIZED, FOR SOLUTION INTRAMUSCULAR; INTRAVENOUS at 10:34

## 2023-06-20 RX ADMIN — LORAZEPAM 0.5 MG: 2 INJECTION INTRAMUSCULAR; INTRAVENOUS at 09:55

## 2023-06-20 RX ADMIN — ATROPINE SULFATE 0.4 MG: 0.4 INJECTION, SOLUTION INTRAVENOUS at 12:48

## 2023-06-20 RX ADMIN — DEXTROSE MONOHYDRATE 250 ML: 50 INJECTION, SOLUTION INTRAVENOUS at 09:44

## 2023-06-20 RX ADMIN — FAMOTIDINE 20 MG: 10 INJECTION INTRAVENOUS at 09:55

## 2023-06-20 RX ADMIN — ATROPINE SULFATE 0.4 MG: 0.4 INJECTION, SOLUTION INTRAVENOUS at 13:12

## 2023-06-20 RX ADMIN — OXALIPLATIN 200 MG: 5 INJECTION, SOLUTION INTRAVENOUS at 10:34

## 2023-06-20 ASSESSMENT — PAIN SCALES - GENERAL: PAINLEVEL: NO PAIN (0)

## 2023-06-20 NOTE — PROGRESS NOTES
Infusion Nursing Note:  Luca Araiza presents today for C8D1.    Patient seen by provider today: Yes: Dayami Pablo CNP   present during visit today: Not Applicable.    Note: PT here ambulatory for txt after exam with NP. Labs drawn from port resulted and approved for txt. txt reviewed with pt. Premeds given aloxi, ativan , pepcid and emend with decadron. Oxaliplatin and leucovorin infused. Atropine iv given prior to irinotecan and infusion stopped after 20 minutes and atropine repeated. Infusion restarted to completion. PT tolerated treatment. 46 hour Pump attached to pt and reviewed with pt to return for pump discontinue at 1215. PT dc'd steady gait with wife.    Intravenous Access:  Labs drawn without difficulty.  Implanted Port.    Treatment Conditions:  Lab Results   Component Value Date    HGB 11.1 (L) 06/20/2023    WBC 4.1 06/20/2023    ANEUTAUTO 2.5 06/20/2023     (L) 06/20/2023      Lab Results   Component Value Date     06/20/2023    POTASSIUM 4.2 06/20/2023    MAG 2.1 06/20/2023    CR 1.09 06/20/2023    RUPERTO 9.1 06/20/2023    BILITOTAL 0.3 06/20/2023    ALBUMIN 4.0 06/20/2023    ALT 59 06/20/2023    AST 42 06/20/2023     Results reviewed, labs MET treatment parameters, ok to proceed with treatment.      Post Infusion Assessment:  Patient tolerated infusion without incident.  Blood return noted pre and post infusion.  No evidence of extravasations.  Access discontinued per protocol.       Discharge Plan:   Discharge instructions reviewed with: Patient.  Patient and/or family verbalized understanding of discharge instructions and all questions answered.  Patient discharged in stable condition accompanied by: self.  Departure Mode: Ambulatory.      Sofia Tavera RN

## 2023-06-20 NOTE — LETTER
"    6/20/2023         RE: Luca Araiza  [address]  [facility] [address]        Dear Colleague,    Thank you for referring your patient, Luca Araiza, to the [facility] CANCER [facility]. Please see a copy of my visit note below.    Oncology Rooming Note    June 20, 2023 9:07 AM   Luca Araiza is a 42 year old male who presents for:    Chief Complaint   Patient presents with     Oncology Clinic Visit     2 week return Malignant neoplasm of ascending colon, review labs & infection      Initial Vitals: /73 (BP Location: Left arm, Patient Position: Sitting, Cuff Size: Adult Large)   Pulse 72   Temp 97.9  F (36.6  C) (Tympanic)   Resp 22   Ht 1.88 m (6' 2\")   Wt 95.9 kg (211 lb 6.4 oz)   SpO2 99%   BMI 27.14 kg/m   Estimated body mass index is 27.14 kg/m  as calculated from the following:    Height as of this encounter: 1.88 m (6' 2\").    Weight as of this encounter: 95.9 kg (211 lb 6.4 oz). Body surface area is 2.24 meters squared.  No Pain (0) Comment: Data Unavailable   No LMP for male patient.  Allergies reviewed: Yes  Medications reviewed: Yes    Medications: MEDICATION REFILLS NEEDED TODAY. Provider was notified.  Pharmacy name entered into [employer]: [facility] - [place], [place] - [address]    Clinical concerns: 2 week return Malignant neoplasm of ascending colon, review labs & infection       ANDREA CROCKER CMA              [facility] Hematology and Oncology Progress Note    Patient: Luca Araiza  MRN: [id number]  Date of Service: Jun 20, 2023          Reason for Visit    Chief Complaint   Patient presents with     Oncology Clinic Visit     2 week return Malignant neoplasm of ascending colon, review labs & infection        Assessment and Plan     Cancer Staging   No matching staging information was found for the patient.    1. Colon Cancer, Stage IIIb [id number], adenocarcinoma of cecum, Dx December 2022: pt is on clinical trial due to positive circulating tumor cells on " DNA testing. Pt started FOLFIRINOX. The overall plan is to give 12 cycles. Today is cycle 8.  We will continue today at full dose.  He will return in 2 weeks for cycle 9.      2. Slight reaction to irinotecan: received ativan, pepcid during infusion due to feeling sweaty/runny nose/nausea.  We also changed some of his antiemetics.  Overall it is going better.  Continues to have some issues with some dyspepsia at home.  Continue over-the-counter products.  Also, continue to use famotidine/tums at home. Ondansetron prn, refill given. Antonette prodcuts. [employer].     3. Early onset cancer: getting genetic testing done.  Patient met with Danielle Orantes one of our genetic counselors.  His genetic testing result is negative.    4. Fatigue: likely chemo induced and is cumulative. Encourage good hydration, healthy diet with good protein. Also encourage daily exercise and to be as active as possible.       ECOG Performance    0 - Independent    Distress Screening (within last 30 days)    No data recorded     Pain  Pain Score: No Pain (0)    Problem List    Patient Active Problem List   Diagnosis     Family history of colon cancer in father     Malignant neoplasm of ascending colon (H)     Iron deficiency anemia due to chronic blood loss     Colon cancer (H)        ______________________________________________________________________________    History of Present Illness    Measurable Disease: None postoperatively.  Patient was diagnosed with a viral illness last year and then was found to be anemic so then had a colonoscopy that showed a cecal colon cancer.    Treatment:   Underwent laparoscopic right hemicolectomy December 21, 2022.  Patient started on clinical trial, .  He was randomized to modified FOLFIRINOX.  He started on March 16, 2023.  Today is cycle 8    Interim History: Patient is here today to continue on chemo.  He states that overall he just feels like the side effects are cumulative.  He is feeling more  "tired and worn out.  He says he does have some dyspepsia with GI issues.  He feels like he has a pretty good handle on his side effects and most of them follow a pattern.  Denies any infectious complaints.    Review of Systems    Pertinent items are noted in HPI.    Past History    Past Medical History:   Diagnosis Date     Anemia      Malignant neoplasm of ascending colon (H) 11/07/2022       PHYSICAL EXAM:  /73 (BP Location: Left arm, Patient Position: Sitting, Cuff Size: Adult Large)   Pulse 72   Temp 97.9  F (36.6  C) (Tympanic)   Resp 22   Ht 1.88 m (6' 2\")   Wt 95.9 kg (211 lb 6.4 oz)   SpO2 99%   BMI 27.14 kg/m    GENERAL: no acute distress. Cooperative in conversation. Here with wife  HEENT: pupils are equal, round and reactive. Oromucosa is clean and intact. No ulcerations or mucositis noted. No bleeding noted.  RESP: lungs are clear bilaterally per auscultation. Regular respiratory rate. No wheezes or rhonchi.  CV: Regular, rate and rhythm. No murmurs.  ABD: soft, nontender. Positive bowel sounds. No organomegaly.   MUSCULOSKELETAL: No lower extremity swelling.   NEURO: non focal. Alert and oriented x3.   PSYCH: within normal limits. No depression or anxiety.  SKIN: warm dry intact   LYMPH: no cervical, supraclavicular or axillary lymphadenopathy          Lab Results    Recent Results (from the past 168 hour(s))   Comprehensive metabolic panel   Result Value Ref Range    Sodium 143 136 - 145 mmol/L    Potassium 4.2 3.4 - 5.3 mmol/L    Chloride 109 (H) 98 - 107 mmol/L    Carbon Dioxide (CO2) 24 22 - 29 mmol/L    Anion Gap 10 7 - 15 mmol/L    Urea Nitrogen 18.1 6.0 - 20.0 mg/dL    Creatinine 1.09 0.67 - 1.17 mg/dL    Calcium 9.1 8.6 - 10.0 mg/dL    Glucose 146 (H) 70 - 99 mg/dL    Alkaline Phosphatase 99 40 - 129 U/L    AST 42 0 - 45 U/L    ALT 59 0 - 70 U/L    Protein Total 6.7 6.4 - 8.3 g/dL    Albumin 4.0 3.5 - 5.2 g/dL    Bilirubin Total 0.3 <=1.2 mg/dL    GFR Estimate 87 >60 mL/min/1.73m2 "   Magnesium   Result Value Ref Range    Magnesium 2.1 1.7 - 2.3 mg/dL   CBC with platelets and differential   Result Value Ref Range    WBC Count 4.1 4.0 - 11.0 10e3/uL    RBC Count 3.78 (L) 4.40 - 5.90 10e6/uL    Hemoglobin 11.1 (L) 13.3 - 17.7 g/dL    Hematocrit 34.5 (L) 40.0 - 53.0 %    MCV 91 78 - 100 fL    MCH 29.4 26.5 - 33.0 pg    MCHC 32.2 31.5 - 36.5 g/dL    RDW 17.3 (H) 10.0 - 15.0 %    Platelet Count 115 (L) 150 - 450 10e3/uL    % Neutrophils 61 %    % Lymphocytes 23 %    % Monocytes 13 %    % Eosinophils 1 %    % Basophils 1 %    % Immature Granulocytes 1 %    NRBCs per 100 WBC 0 <1 /100    Absolute Neutrophils 2.5 1.6 - 8.3 10e3/uL    Absolute Lymphocytes 0.9 0.8 - 5.3 10e3/uL    Absolute Monocytes 0.5 0.0 - 1.3 10e3/uL    Absolute Eosinophils 0.1 0.0 - 0.7 10e3/uL    Absolute Basophils 0.0 0.0 - 0.2 10e3/uL    Absolute Immature Granulocytes 0.0 <=0.4 10e3/uL    Absolute NRBCs 0.0 10e3/uL       Imaging    No results found.      Signed by: RAQUEL Grey CNP      Again, thank you for allowing me to participate in the care of your patient.        Sincerely,        RAQUEL Grey CNP

## 2023-06-20 NOTE — PROGRESS NOTES
[facility] Hematology and Oncology Progress Note    Patient: Luca Araiza  MRN: [id number]  Date of Service: Jun 20, 2023          Reason for Visit    Chief Complaint   Patient presents with     Oncology Clinic Visit     2 week return Malignant neoplasm of ascending colon, review labs & infection        Assessment and Plan     Cancer Staging   No matching staging information was found for the patient.    1. Colon Cancer, Stage IIIb [id number], adenocarcinoma of cecum, Dx December 2022: pt is on clinical trial due to positive circulating tumor cells on DNA testing. Pt started FOLFIRINOX. The overall plan is to give 12 cycles. Today is cycle 8.  We will continue today at full dose.  He will return in 2 weeks for cycle 9.      2. Slight reaction to irinotecan: received ativan, pepcid during infusion due to feeling sweaty/runny nose/nausea.  We also changed some of his antiemetics.  Overall it is going better.  Continues to have some issues with some dyspepsia at home.  Continue over-the-counter products.  Also, continue to use famotidine/tums at home. Ondansetron prn, refill given. Antonette prodcuts. [employer].     3. Early onset cancer: getting genetic testing done.  Patient met with Danielle Orantes one of our genetic counselors.  His genetic testing result is negative.    4. Fatigue: likely chemo induced and is cumulative. Encourage good hydration, healthy diet with good protein. Also encourage daily exercise and to be as active as possible.       ECOG Performance    0 - Independent    Distress Screening (within last 30 days)    No data recorded     Pain  Pain Score: No Pain (0)    Problem List    Patient Active Problem List   Diagnosis     Family history of colon cancer in father     Malignant neoplasm of ascending colon (H)     Iron deficiency anemia due to chronic blood loss     Colon cancer (H)        ______________________________________________________________________________    History of Present  "Illness    Measurable Disease: None postoperatively.  Patient was diagnosed with a viral illness last year and then was found to be anemic so then had a colonoscopy that showed a cecal colon cancer.    Treatment:   Underwent laparoscopic right hemicolectomy December 21, 2022.  Patient started on clinical trial, .  He was randomized to modified FOLFIRINOX.  He started on March 16, 2023.  Today is cycle 8    Interim History: Patient is here today to continue on chemo.  He states that overall he just feels like the side effects are cumulative.  He is feeling more tired and worn out.  He says he does have some dyspepsia with GI issues.  He feels like he has a pretty good handle on his side effects and most of them follow a pattern.  Denies any infectious complaints.    Review of Systems    Pertinent items are noted in HPI.    Past History    Past Medical History:   Diagnosis Date     Anemia      Malignant neoplasm of ascending colon (H) 11/07/2022       PHYSICAL EXAM:  /73 (BP Location: Left arm, Patient Position: Sitting, Cuff Size: Adult Large)   Pulse 72   Temp 97.9  F (36.6  C) (Tympanic)   Resp 22   Ht 1.88 m (6' 2\")   Wt 95.9 kg (211 lb 6.4 oz)   SpO2 99%   BMI 27.14 kg/m    GENERAL: no acute distress. Cooperative in conversation. Here with wife  HEENT: pupils are equal, round and reactive. Oromucosa is clean and intact. No ulcerations or mucositis noted. No bleeding noted.  RESP: lungs are clear bilaterally per auscultation. Regular respiratory rate. No wheezes or rhonchi.  CV: Regular, rate and rhythm. No murmurs.  ABD: soft, nontender. Positive bowel sounds. No organomegaly.   MUSCULOSKELETAL: No lower extremity swelling.   NEURO: non focal. Alert and oriented x3.   PSYCH: within normal limits. No depression or anxiety.  SKIN: warm dry intact   LYMPH: no cervical, supraclavicular or axillary lymphadenopathy          Lab Results    Recent Results (from the past 168 hour(s))   Comprehensive " metabolic panel   Result Value Ref Range    Sodium 143 136 - 145 mmol/L    Potassium 4.2 3.4 - 5.3 mmol/L    Chloride 109 (H) 98 - 107 mmol/L    Carbon Dioxide (CO2) 24 22 - 29 mmol/L    Anion Gap 10 7 - 15 mmol/L    Urea Nitrogen 18.1 6.0 - 20.0 mg/dL    Creatinine 1.09 0.67 - 1.17 mg/dL    Calcium 9.1 8.6 - 10.0 mg/dL    Glucose 146 (H) 70 - 99 mg/dL    Alkaline Phosphatase 99 40 - 129 U/L    AST 42 0 - 45 U/L    ALT 59 0 - 70 U/L    Protein Total 6.7 6.4 - 8.3 g/dL    Albumin 4.0 3.5 - 5.2 g/dL    Bilirubin Total 0.3 <=1.2 mg/dL    GFR Estimate 87 >60 mL/min/1.73m2   Magnesium   Result Value Ref Range    Magnesium 2.1 1.7 - 2.3 mg/dL   CBC with platelets and differential   Result Value Ref Range    WBC Count 4.1 4.0 - 11.0 10e3/uL    RBC Count 3.78 (L) 4.40 - 5.90 10e6/uL    Hemoglobin 11.1 (L) 13.3 - 17.7 g/dL    Hematocrit 34.5 (L) 40.0 - 53.0 %    MCV 91 78 - 100 fL    MCH 29.4 26.5 - 33.0 pg    MCHC 32.2 31.5 - 36.5 g/dL    RDW 17.3 (H) 10.0 - 15.0 %    Platelet Count 115 (L) 150 - 450 10e3/uL    % Neutrophils 61 %    % Lymphocytes 23 %    % Monocytes 13 %    % Eosinophils 1 %    % Basophils 1 %    % Immature Granulocytes 1 %    NRBCs per 100 WBC 0 <1 /100    Absolute Neutrophils 2.5 1.6 - 8.3 10e3/uL    Absolute Lymphocytes 0.9 0.8 - 5.3 10e3/uL    Absolute Monocytes 0.5 0.0 - 1.3 10e3/uL    Absolute Eosinophils 0.1 0.0 - 0.7 10e3/uL    Absolute Basophils 0.0 0.0 - 0.2 10e3/uL    Absolute Immature Granulocytes 0.0 <=0.4 10e3/uL    Absolute NRBCs 0.0 10e3/uL       Imaging    No results found.      Signed by: RAQUEL Grey CNP

## 2023-06-20 NOTE — PROGRESS NOTES
"Oncology Rooming Note    June 20, 2023 9:07 AM   Luca Araiza is a 42 year old male who presents for:    Chief Complaint   Patient presents with     Oncology Clinic Visit     2 week return Malignant neoplasm of ascending colon, review labs & infection      Initial Vitals: /73 (BP Location: Left arm, Patient Position: Sitting, Cuff Size: Adult Large)   Pulse 72   Temp 97.9  F (36.6  C) (Tympanic)   Resp 22   Ht 1.88 m (6' 2\")   Wt 95.9 kg (211 lb 6.4 oz)   SpO2 99%   BMI 27.14 kg/m   Estimated body mass index is 27.14 kg/m  as calculated from the following:    Height as of this encounter: 1.88 m (6' 2\").    Weight as of this encounter: 95.9 kg (211 lb 6.4 oz). Body surface area is 2.24 meters squared.  No Pain (0) Comment: Data Unavailable   No LMP for male patient.  Allergies reviewed: Yes  Medications reviewed: Yes    Medications: MEDICATION REFILLS NEEDED TODAY. Provider was notified.  Pharmacy name entered into [facility]: [facility] - [place], [place] - [address]    Clinical concerns: 2 week return Malignant neoplasm of ascending colon, review labs & infection       ANDREA CROCKER CMA            "

## 2023-06-22 ENCOUNTER — INFUSION THERAPY VISIT (OUTPATIENT)
Dept: INFUSION THERAPY | Facility: HOSPITAL | Age: 43
End: 2023-06-22
Attending: INTERNAL MEDICINE
Payer: COMMERCIAL

## 2023-06-22 VITALS
HEART RATE: 77 BPM | TEMPERATURE: 98.1 F | RESPIRATION RATE: 18 BRPM | DIASTOLIC BLOOD PRESSURE: 66 MMHG | SYSTOLIC BLOOD PRESSURE: 133 MMHG | OXYGEN SATURATION: 98 %

## 2023-06-22 DIAGNOSIS — C18.2 MALIGNANT NEOPLASM OF ASCENDING COLON (H): Primary | ICD-10-CM

## 2023-06-22 PROCEDURE — 250N000011 HC RX IP 250 OP 636: Mod: JZ | Performed by: NURSE PRACTITIONER

## 2023-06-22 RX ORDER — HEPARIN SODIUM (PORCINE) LOCK FLUSH IV SOLN 100 UNIT/ML 100 UNIT/ML
5 SOLUTION INTRAVENOUS
Status: DISCONTINUED | OUTPATIENT
Start: 2023-06-22 | End: 2023-06-22 | Stop reason: HOSPADM

## 2023-06-22 RX ADMIN — Medication 5 ML: at 12:26

## 2023-06-22 NOTE — PROGRESS NOTES
Infusion Nursing Note:  Luca Araiza presents today for pump disconnect.    Patient seen by provider today: No   present during visit today: Not Applicable.    Note: Luca arrived ambulatory and in stable condition. Reports no issues with infusion bulb. Reporting mild fatigue and minimal nausea. Pump disconnected and port flushed and de-accessed. Will return on 7/5 for next appointment.      Intravenous Access:  Implanted Port.    Treatment Conditions:  Not Applicable.      Post Infusion Assessment:  Patient tolerated infusion without incident.  Blood return noted post infusion.  Site patent and intact, free from redness, edema or discomfort.  No evidence of extravasations.  Access discontinued per protocol.       Discharge Plan:   Patient and/or family verbalized understanding of discharge instructions and all questions answered.  AVS to patient via [employer].  Patient will return 7/5 for next appointment.   Patient discharged in stable condition accompanied by: self.  Departure Mode: Ambulatory.      Aria Adams RN

## 2023-07-05 ENCOUNTER — LAB (OUTPATIENT)
Dept: INFUSION THERAPY | Facility: HOSPITAL | Age: 43
End: 2023-07-05
Attending: INTERNAL MEDICINE
Payer: COMMERCIAL

## 2023-07-05 ENCOUNTER — DOCUMENTATION ONLY (OUTPATIENT)
Dept: ONCOLOGY | Facility: HOSPITAL | Age: 43
End: 2023-07-05

## 2023-07-05 ENCOUNTER — ONCOLOGY VISIT (OUTPATIENT)
Dept: ONCOLOGY | Facility: HOSPITAL | Age: 43
End: 2023-07-05
Attending: INTERNAL MEDICINE
Payer: COMMERCIAL

## 2023-07-05 VITALS
RESPIRATION RATE: 20 BRPM | HEART RATE: 85 BPM | DIASTOLIC BLOOD PRESSURE: 76 MMHG | OXYGEN SATURATION: 98 % | SYSTOLIC BLOOD PRESSURE: 130 MMHG | BODY MASS INDEX: 27.26 KG/M2 | TEMPERATURE: 98.1 F | WEIGHT: 212.4 LBS | HEIGHT: 74 IN

## 2023-07-05 DIAGNOSIS — C18.2 MALIGNANT NEOPLASM OF ASCENDING COLON (H): Primary | ICD-10-CM

## 2023-07-05 LAB
ALBUMIN SERPL BCG-MCNC: 4.2 G/DL (ref 3.5–5.2)
ALP SERPL-CCNC: 99 U/L (ref 40–129)
ALT SERPL W P-5'-P-CCNC: 58 U/L (ref 0–70)
ANION GAP SERPL CALCULATED.3IONS-SCNC: 11 MMOL/L (ref 7–15)
AST SERPL W P-5'-P-CCNC: 52 U/L (ref 0–45)
BASOPHILS # BLD AUTO: 0 10E3/UL (ref 0–0.2)
BASOPHILS NFR BLD AUTO: 1 %
BILIRUB SERPL-MCNC: 0.3 MG/DL
BUN SERPL-MCNC: 13.2 MG/DL (ref 6–20)
CALCIUM SERPL-MCNC: 9.7 MG/DL (ref 8.6–10)
CHLORIDE SERPL-SCNC: 106 MMOL/L (ref 98–107)
CREAT SERPL-MCNC: 1.05 MG/DL (ref 0.67–1.17)
DEPRECATED HCO3 PLAS-SCNC: 24 MMOL/L (ref 22–29)
EOSINOPHIL # BLD AUTO: 0.1 10E3/UL (ref 0–0.7)
EOSINOPHIL NFR BLD AUTO: 1 %
ERYTHROCYTE [DISTWIDTH] IN BLOOD BY AUTOMATED COUNT: 16.5 % (ref 10–15)
GFR SERPL CREATININE-BSD FRML MDRD: >90 ML/MIN/1.73M2
GLUCOSE SERPL-MCNC: 135 MG/DL (ref 70–99)
HCT VFR BLD AUTO: 34.7 % (ref 40–53)
HGB BLD-MCNC: 11 G/DL (ref 13.3–17.7)
IMM GRANULOCYTES # BLD: 0 10E3/UL
IMM GRANULOCYTES NFR BLD: 1 %
LYMPHOCYTES # BLD AUTO: 0.9 10E3/UL (ref 0.8–5.3)
LYMPHOCYTES NFR BLD AUTO: 25 %
MAGNESIUM SERPL-MCNC: 2.1 MG/DL (ref 1.7–2.3)
MCH RBC QN AUTO: 29.3 PG (ref 26.5–33)
MCHC RBC AUTO-ENTMCNC: 31.7 G/DL (ref 31.5–36.5)
MCV RBC AUTO: 93 FL (ref 78–100)
MONOCYTES # BLD AUTO: 0.5 10E3/UL (ref 0–1.3)
MONOCYTES NFR BLD AUTO: 16 %
NEUTROPHILS # BLD AUTO: 2 10E3/UL (ref 1.6–8.3)
NEUTROPHILS NFR BLD AUTO: 56 %
NRBC # BLD AUTO: 0 10E3/UL
NRBC BLD AUTO-RTO: 0 /100
PLATELET # BLD AUTO: 130 10E3/UL (ref 150–450)
POTASSIUM SERPL-SCNC: 4.1 MMOL/L (ref 3.4–5.3)
PROT SERPL-MCNC: 6.8 G/DL (ref 6.4–8.3)
RBC # BLD AUTO: 3.75 10E6/UL (ref 4.4–5.9)
SODIUM SERPL-SCNC: 141 MMOL/L (ref 136–145)
WBC # BLD AUTO: 3.5 10E3/UL (ref 4–11)

## 2023-07-05 PROCEDURE — 250N000011 HC RX IP 250 OP 636: Mod: JZ | Performed by: NURSE PRACTITIONER

## 2023-07-05 PROCEDURE — 250N000011 HC RX IP 250 OP 636: Mod: JZ | Performed by: INTERNAL MEDICINE

## 2023-07-05 PROCEDURE — 85004 AUTOMATED DIFF WBC COUNT: CPT | Performed by: NURSE PRACTITIONER

## 2023-07-05 PROCEDURE — 99214 OFFICE O/P EST MOD 30 MIN: CPT | Performed by: NURSE PRACTITIONER

## 2023-07-05 PROCEDURE — 96368 THER/DIAG CONCURRENT INF: CPT

## 2023-07-05 PROCEDURE — 96372 THER/PROPH/DIAG INJ SC/IM: CPT | Performed by: NURSE PRACTITIONER

## 2023-07-05 PROCEDURE — 96417 CHEMO IV INFUS EACH ADDL SEQ: CPT

## 2023-07-05 PROCEDURE — 96376 TX/PRO/DX INJ SAME DRUG ADON: CPT

## 2023-07-05 PROCEDURE — 80053 COMPREHEN METABOLIC PANEL: CPT | Performed by: NURSE PRACTITIONER

## 2023-07-05 PROCEDURE — 258N000003 HC RX IP 258 OP 636: Performed by: NURSE PRACTITIONER

## 2023-07-05 PROCEDURE — 96375 TX/PRO/DX INJ NEW DRUG ADDON: CPT

## 2023-07-05 PROCEDURE — 250N000013 HC RX MED GY IP 250 OP 250 PS 637: Performed by: NURSE PRACTITIONER

## 2023-07-05 PROCEDURE — G0463 HOSPITAL OUTPT CLINIC VISIT: HCPCS | Performed by: NURSE PRACTITIONER

## 2023-07-05 PROCEDURE — 96415 CHEMO IV INFUSION ADDL HR: CPT

## 2023-07-05 PROCEDURE — 83735 ASSAY OF MAGNESIUM: CPT | Performed by: NURSE PRACTITIONER

## 2023-07-05 PROCEDURE — G0498 CHEMO EXTEND IV INFUS W/PUMP: HCPCS

## 2023-07-05 PROCEDURE — 96367 TX/PROPH/DG ADDL SEQ IV INF: CPT

## 2023-07-05 PROCEDURE — 96413 CHEMO IV INFUSION 1 HR: CPT

## 2023-07-05 RX ORDER — LORAZEPAM 2 MG/ML
0.5 INJECTION INTRAMUSCULAR ONCE
Status: COMPLETED | OUTPATIENT
Start: 2023-07-05 | End: 2023-07-05

## 2023-07-05 RX ORDER — ATROPINE SULFATE 0.4 MG/ML
0.4 AMPUL (ML) INJECTION
Status: DISCONTINUED | OUTPATIENT
Start: 2023-07-05 | End: 2023-07-05 | Stop reason: HOSPADM

## 2023-07-05 RX ORDER — MAGNESIUM HYDROXIDE/ALUMINUM HYDROXICE/SIMETHICONE 120; 1200; 1200 MG/30ML; MG/30ML; MG/30ML
30 SUSPENSION ORAL EVERY 4 HOURS PRN
Status: ACTIVE | OUTPATIENT
Start: 2023-07-05

## 2023-07-05 RX ORDER — PALONOSETRON 0.05 MG/ML
0.25 INJECTION, SOLUTION INTRAVENOUS ONCE
Status: COMPLETED | OUTPATIENT
Start: 2023-07-05 | End: 2023-07-05

## 2023-07-05 RX ORDER — MAGNESIUM HYDROXIDE/ALUMINUM HYDROXICE/SIMETHICONE 120; 1200; 1200 MG/30ML; MG/30ML; MG/30ML
30 SUSPENSION ORAL
Status: COMPLETED | OUTPATIENT
Start: 2023-07-05 | End: 2023-07-05

## 2023-07-05 RX ADMIN — FAMOTIDINE 20 MG: 10 INJECTION, SOLUTION INTRAVENOUS at 12:45

## 2023-07-05 RX ADMIN — IRINOTECAN HYDROCHLORIDE 340 MG: 20 INJECTION, SOLUTION INTRAVENOUS at 12:01

## 2023-07-05 RX ADMIN — ALUMINUM HYDROXIDE, MAGNESIUM HYDROXIDE, AND SIMETHICONE 30 ML: 200; 200; 20 SUSPENSION ORAL at 13:02

## 2023-07-05 RX ADMIN — DEXAMETHASONE SODIUM PHOSPHATE: 10 INJECTION, SOLUTION INTRAMUSCULAR; INTRAVENOUS at 09:32

## 2023-07-05 RX ADMIN — DEXTROSE MONOHYDRATE 250 ML: 50 INJECTION, SOLUTION INTRAVENOUS at 09:08

## 2023-07-05 RX ADMIN — ATROPINE SULFATE 0.4 MG: 0.4 INJECTION, SOLUTION INTRAVENOUS at 12:27

## 2023-07-05 RX ADMIN — LEUCOVORIN CALCIUM 880 MG: 350 INJECTION, POWDER, LYOPHILIZED, FOR SOLUTION INTRAMUSCULAR; INTRAVENOUS at 09:55

## 2023-07-05 RX ADMIN — ATROPINE SULFATE 0.4 MG: 0.4 INJECTION, SOLUTION INTRAVENOUS at 11:59

## 2023-07-05 RX ADMIN — LORAZEPAM 0.5 MG: 2 INJECTION INTRAMUSCULAR; INTRAVENOUS at 09:16

## 2023-07-05 RX ADMIN — OXALIPLATIN 200 MG: 5 INJECTION, SOLUTION INTRAVENOUS at 09:54

## 2023-07-05 RX ADMIN — PALONOSETRON 0.25 MG: 0.05 INJECTION, SOLUTION INTRAVENOUS at 09:12

## 2023-07-05 RX ADMIN — FAMOTIDINE 20 MG: 10 INJECTION, SOLUTION INTRAVENOUS at 09:14

## 2023-07-05 ASSESSMENT — PAIN SCALES - GENERAL: PAINLEVEL: NO PAIN (0)

## 2023-07-05 NOTE — PROGRESS NOTES
Met with patient and provider for C9 appointment.  Labs were reviewed and stable- patient ok to continue on treatment with no modifications.  Pt is aware of ctDNA results of negative that were tested in June.  He has no new or worsening symptoms.  He will return in 2 weeks for visit, labs and infusion.    JEANNINE Subramanian [employer]

## 2023-07-05 NOTE — PROGRESS NOTES
[facility] Hematology and Oncology Progress Note    Patient: Luca Araiza  MRN: [id number]  Date of Service: Jul 5, 2023          Reason for Visit    Chief Complaint   Patient presents with     Oncology Clinic Visit     2 week return Malignant neoplasm of ascending colon, review labs & infusion       Assessment and Plan     Cancer Staging   No matching staging information was found for the patient.    1. Colon Cancer, Stage IIIb [id number], adenocarcinoma of cecum, Dx December 2022: pt is on clinical trial due to positive circulating tumor cells on DNA testing. Pt started FOLFIRINOX. The overall plan is to give 12 cycles. Today is cycle 9.  We will continue today at full dose.  He will return in 2 weeks for cycle 10.      2. Slight reaction to irinotecan: received ativan, pepcid during infusion due to feeling sweaty/runny nose/nausea.  We also changed some of his antiemetics.  Overall it is much better. Feels like he is managing well. Continue to monitor and adjust meds as necessary    3. Early onset cancer: getting genetic testing done.  Patient met with Danielle Orantes one of our genetic counselors.  His genetic testing result is negative.    4. Fatigue: likely chemo induced and is cumulative. Encourage good hydration, healthy diet with good protein. Also encourage daily exercise and to be as active as possible.       ECOG Performance    0 - Independent    Distress Screening (within last 30 days)    No data recorded     Pain  Pain Score: No Pain (0)    Problem List    Patient Active Problem List   Diagnosis     Family history of colon cancer in father     Malignant neoplasm of ascending colon (H)     Iron deficiency anemia due to chronic blood loss     Colon cancer (H)        ______________________________________________________________________________    History of Present Illness    Measurable Disease: None postoperatively.  Patient was diagnosed with a viral illness last year and then was found to be anemic so  "then had a colonoscopy that showed a cecal colon cancer.    Treatment:   Underwent laparoscopic right hemicolectomy December 21, 2022.  Patient started on clinical trial, .  He was randomized to modified FOLFIRINOX.  He started on March 16, 2023.  Today is cycle 9    Interim History: Patient is here today to continue on chemo.  He states that last cycle actually maybe was a little bit better than the previous cycle.  Overall things are pretty stable.  Does have a lot of fatigue but still continues to work.  He states that his weight is creeping up a little bit.  He does not have a lot of energy to do much.  Denies any fevers or infectious complaints.  Denies any bone or back pain.    Review of Systems    Pertinent items are noted in HPI.    Past History    Past Medical History:   Diagnosis Date     Anemia      Malignant neoplasm of ascending colon (H) 11/07/2022       PHYSICAL EXAM:  /76 (BP Location: Left arm, Patient Position: Sitting, Cuff Size: Adult Large)   Pulse 85   Temp 98.1  F (36.7  C) (Oral)   Resp 20   Ht 1.88 m (6' 2.02\")   Wt 96.3 kg (212 lb 6.4 oz)   SpO2 98%   BMI 27.26 kg/m    GENERAL: no acute distress. Cooperative in conversation. Here with wife  HEENT: pupils are equal, round and reactive. Oromucosa is clean and intact. No ulcerations or mucositis noted. No bleeding noted.  RESP: lungs are clear bilaterally per auscultation. Regular respiratory rate. No wheezes or rhonchi.  CV: Regular, rate and rhythm. No murmurs.  ABD: soft, nontender.   MUSCULOSKELETAL: No lower extremity swelling.   NEURO: non focal. Alert and oriented x3.   PSYCH: within normal limits. No depression or anxiety.  SKIN: warm dry intact   LYMPH: no cervical, supraclavicular lymphadenopathy          Lab Results    Recent Results (from the past 168 hour(s))   Comprehensive metabolic panel   Result Value Ref Range    Sodium 141 136 - 145 mmol/L    Potassium 4.1 3.4 - 5.3 mmol/L    Chloride 106 98 - 107 mmol/L "    Carbon Dioxide (CO2) 24 22 - 29 mmol/L    Anion Gap 11 7 - 15 mmol/L    Urea Nitrogen 13.2 6.0 - 20.0 mg/dL    Creatinine 1.05 0.67 - 1.17 mg/dL    Calcium 9.7 8.6 - 10.0 mg/dL    Glucose 135 (H) 70 - 99 mg/dL    Alkaline Phosphatase 99 40 - 129 U/L    AST 52 (H) 0 - 45 U/L    ALT 58 0 - 70 U/L    Protein Total 6.8 6.4 - 8.3 g/dL    Albumin 4.2 3.5 - 5.2 g/dL    Bilirubin Total 0.3 <=1.2 mg/dL    GFR Estimate >90 >60 mL/min/1.73m2   Magnesium   Result Value Ref Range    Magnesium 2.1 1.7 - 2.3 mg/dL   CBC with platelets and differential   Result Value Ref Range    WBC Count 3.5 (L) 4.0 - 11.0 10e3/uL    RBC Count 3.75 (L) 4.40 - 5.90 10e6/uL    Hemoglobin 11.0 (L) 13.3 - 17.7 g/dL    Hematocrit 34.7 (L) 40.0 - 53.0 %    MCV 93 78 - 100 fL    MCH 29.3 26.5 - 33.0 pg    MCHC 31.7 31.5 - 36.5 g/dL    RDW 16.5 (H) 10.0 - 15.0 %    Platelet Count 130 (L) 150 - 450 10e3/uL    % Neutrophils 56 %    % Lymphocytes 25 %    % Monocytes 16 %    % Eosinophils 1 %    % Basophils 1 %    % Immature Granulocytes 1 %    NRBCs per 100 WBC 0 <1 /100    Absolute Neutrophils 2.0 1.6 - 8.3 10e3/uL    Absolute Lymphocytes 0.9 0.8 - 5.3 10e3/uL    Absolute Monocytes 0.5 0.0 - 1.3 10e3/uL    Absolute Eosinophils 0.1 0.0 - 0.7 10e3/uL    Absolute Basophils 0.0 0.0 - 0.2 10e3/uL    Absolute Immature Granulocytes 0.0 <=0.4 10e3/uL    Absolute NRBCs 0.0 10e3/uL       Imaging    No results found.      Signed by: Dayami Pablo, APRN CNP

## 2023-07-05 NOTE — PROGRESS NOTES
"Oncology Rooming Note    July 5, 2023 8:42 AM   Luca Araiza is a 42 year old male who presents for:    Chief Complaint   Patient presents with     Oncology Clinic Visit     2 week return Malignant neoplasm of ascending colon, review labs & infusion     Initial Vitals: /76 (BP Location: Left arm, Patient Position: Sitting, Cuff Size: Adult Large)   Pulse 85   Temp 98.1  F (36.7  C) (Oral)   Resp 20   Ht 1.88 m (6' 2.02\")   Wt 96.3 kg (212 lb 6.4 oz)   SpO2 98%   BMI 27.26 kg/m   Estimated body mass index is 27.26 kg/m  as calculated from the following:    Height as of this encounter: 1.88 m (6' 2.02\").    Weight as of this encounter: 96.3 kg (212 lb 6.4 oz). Body surface area is 2.24 meters squared.  No Pain (0) Comment: Data Unavailable   No LMP for male patient.  Allergies reviewed: Yes  Medications reviewed: Yes    Medications: Medication refills not needed today.  Pharmacy name entered into [facility]: [facility] - [place], [place] - [address]    Clinical concerns:2 week return Malignant neoplasm of ascending colon, review labs & infusion      Kelley Connolly CMA            "

## 2023-07-05 NOTE — PROGRESS NOTES
PT here ambulatory for txt. PT was seen by NP today. Labs drawn from port approved for txt.  Txt administered. Ativan was given iv first followed by premeds. Atropine given prior to irinotecan and repeated 30 minutes into irinotecan infusion. PT tolerated txt without any problems except heartburn and acid reflux 45 minutes into irinotecan infusion. Spoke to Dayami NUNO and pepcid iv ordered and given followed by maalox. Pump set up with continuous chemo to infused over 46 hours. Reviewed with pt to return at on Friday for pump discontinue at 1130. PT dc'd steady gait with wife.

## 2023-07-05 NOTE — LETTER
"    7/5/2023         RE: uLca Araiza  [address]  [place] [facility] [address]        Dear Colleague,    Thank you for referring your patient, Luca Araiza, to the [facility]. Please see a copy of my visit note below.    Oncology Rooming Note    July 5, 2023 8:42 AM   Luca Araiza is a 42 year old male who presents for:    Chief Complaint   Patient presents with     Oncology Clinic Visit     2 week return Malignant neoplasm of ascending colon, review labs & infusion     Initial Vitals: /76 (BP Location: Left arm, Patient Position: Sitting, Cuff Size: Adult Large)   Pulse 85   Temp 98.1  F (36.7  C) (Oral)   Resp 20   Ht 1.88 m (6' 2.02\")   Wt 96.3 kg (212 lb 6.4 oz)   SpO2 98%   BMI 27.26 kg/m   Estimated body mass index is 27.26 kg/m  as calculated from the following:    Height as of this encounter: 1.88 m (6' 2.02\").    Weight as of this encounter: 96.3 kg (212 lb 6.4 oz). Body surface area is 2.24 meters squared.  No Pain (0) Comment: Data Unavailable   No LMP for male patient.  Allergies reviewed: Yes  Medications reviewed: Yes    Medications: Medication refills not needed today.  Pharmacy name entered into [facility]: [facility] - [facility] [address]    Clinical concerns:2 week return Malignant neoplasm of ascending colon, review labs & infusion      Kelley Connolly [facility] Hematology and Oncology Progress Note    Patient: Luca Araiza  MRN: [id number]  Date of Service: Jul 5, 2023          Reason for Visit    Chief Complaint   Patient presents with     Oncology Clinic Visit     2 week return Malignant neoplasm of ascending colon, review labs & infusion       Assessment and Plan     Cancer Staging   No matching staging information was found for the patient.    1. Colon Cancer, Stage IIIb [id number], adenocarcinoma of cecum, Dx December 2022: pt is on clinical trial due to positive circulating tumor cells on DNA testing. Pt started " FOLFIRINOX. The overall plan is to give 12 cycles. Today is cycle 9.  We will continue today at full dose.  He will return in 2 weeks for cycle 10.      2. Slight reaction to irinotecan: received ativan, pepcid during infusion due to feeling sweaty/runny nose/nausea.  We also changed some of his antiemetics.  Overall it is much better. Feels like he is managing well. Continue to monitor and adjust meds as necessary    3. Early onset cancer: getting genetic testing done.  Patient met with Danielle Orantes one of our genetic counselors.  His genetic testing result is negative.    4. Fatigue: likely chemo induced and is cumulative. Encourage good hydration, healthy diet with good protein. Also encourage daily exercise and to be as active as possible.       ECOG Performance    0 - Independent    Distress Screening (within last 30 days)    No data recorded     Pain  Pain Score: No Pain (0)    Problem List    Patient Active Problem List   Diagnosis     Family history of colon cancer in father     Malignant neoplasm of ascending colon (H)     Iron deficiency anemia due to chronic blood loss     Colon cancer (H)        ______________________________________________________________________________    History of Present Illness    Measurable Disease: None postoperatively.  Patient was diagnosed with a viral illness last year and then was found to be anemic so then had a colonoscopy that showed a cecal colon cancer.    Treatment:   Underwent laparoscopic right hemicolectomy December 21, 2022.  Patient started on clinical trial, .  He was randomized to modified FOLFIRINOX.  He started on March 16, 2023.  Today is cycle 9    Interim History: Patient is here today to continue on chemo.  He states that last cycle actually maybe was a little bit better than the previous cycle.  Overall things are pretty stable.  Does have a lot of fatigue but still continues to work.  He states that his weight is creeping up a little bit.  He does  "not have a lot of energy to do much.  Denies any fevers or infectious complaints.  Denies any bone or back pain.    Review of Systems    Pertinent items are noted in HPI.    Past History    Past Medical History:   Diagnosis Date     Anemia      Malignant neoplasm of ascending colon (H) 11/07/2022       PHYSICAL EXAM:  /76 (BP Location: Left arm, Patient Position: Sitting, Cuff Size: Adult Large)   Pulse 85   Temp 98.1  F (36.7  C) (Oral)   Resp 20   Ht 1.88 m (6' 2.02\")   Wt 96.3 kg (212 lb 6.4 oz)   SpO2 98%   BMI 27.26 kg/m    GENERAL: no acute distress. Cooperative in conversation. Here with wife  HEENT: pupils are equal, round and reactive. Oromucosa is clean and intact. No ulcerations or mucositis noted. No bleeding noted.  RESP: lungs are clear bilaterally per auscultation. Regular respiratory rate. No wheezes or rhonchi.  CV: Regular, rate and rhythm. No murmurs.  ABD: soft, nontender.   MUSCULOSKELETAL: No lower extremity swelling.   NEURO: non focal. Alert and oriented x3.   PSYCH: within normal limits. No depression or anxiety.  SKIN: warm dry intact   LYMPH: no cervical, supraclavicular lymphadenopathy          Lab Results    Recent Results (from the past 168 hour(s))   Comprehensive metabolic panel   Result Value Ref Range    Sodium 141 136 - 145 mmol/L    Potassium 4.1 3.4 - 5.3 mmol/L    Chloride 106 98 - 107 mmol/L    Carbon Dioxide (CO2) 24 22 - 29 mmol/L    Anion Gap 11 7 - 15 mmol/L    Urea Nitrogen 13.2 6.0 - 20.0 mg/dL    Creatinine 1.05 0.67 - 1.17 mg/dL    Calcium 9.7 8.6 - 10.0 mg/dL    Glucose 135 (H) 70 - 99 mg/dL    Alkaline Phosphatase 99 40 - 129 U/L    AST 52 (H) 0 - 45 U/L    ALT 58 0 - 70 U/L    Protein Total 6.8 6.4 - 8.3 g/dL    Albumin 4.2 3.5 - 5.2 g/dL    Bilirubin Total 0.3 <=1.2 mg/dL    GFR Estimate >90 >60 mL/min/1.73m2   Magnesium   Result Value Ref Range    Magnesium 2.1 1.7 - 2.3 mg/dL   CBC with platelets and differential   Result Value Ref Range    WBC Count " 3.5 (L) 4.0 - 11.0 10e3/uL    RBC Count 3.75 (L) 4.40 - 5.90 10e6/uL    Hemoglobin 11.0 (L) 13.3 - 17.7 g/dL    Hematocrit 34.7 (L) 40.0 - 53.0 %    MCV 93 78 - 100 fL    MCH 29.3 26.5 - 33.0 pg    MCHC 31.7 31.5 - 36.5 g/dL    RDW 16.5 (H) 10.0 - 15.0 %    Platelet Count 130 (L) 150 - 450 10e3/uL    % Neutrophils 56 %    % Lymphocytes 25 %    % Monocytes 16 %    % Eosinophils 1 %    % Basophils 1 %    % Immature Granulocytes 1 %    NRBCs per 100 WBC 0 <1 /100    Absolute Neutrophils 2.0 1.6 - 8.3 10e3/uL    Absolute Lymphocytes 0.9 0.8 - 5.3 10e3/uL    Absolute Monocytes 0.5 0.0 - 1.3 10e3/uL    Absolute Eosinophils 0.1 0.0 - 0.7 10e3/uL    Absolute Basophils 0.0 0.0 - 0.2 10e3/uL    Absolute Immature Granulocytes 0.0 <=0.4 10e3/uL    Absolute NRBCs 0.0 10e3/uL       Imaging    No results found.      Signed by: RAQUEL Grey CNP      Again, thank you for allowing me to participate in the care of your patient.        Sincerely,        RAQUEL Grey CNP

## 2023-07-07 ENCOUNTER — INFUSION THERAPY VISIT (OUTPATIENT)
Dept: INFUSION THERAPY | Facility: HOSPITAL | Age: 43
End: 2023-07-07
Attending: INTERNAL MEDICINE
Payer: COMMERCIAL

## 2023-07-07 VITALS
DIASTOLIC BLOOD PRESSURE: 76 MMHG | OXYGEN SATURATION: 99 % | RESPIRATION RATE: 18 BRPM | SYSTOLIC BLOOD PRESSURE: 121 MMHG | TEMPERATURE: 98.5 F | HEART RATE: 78 BPM

## 2023-07-07 DIAGNOSIS — C18.2 MALIGNANT NEOPLASM OF ASCENDING COLON (H): Primary | ICD-10-CM

## 2023-07-07 PROCEDURE — 250N000011 HC RX IP 250 OP 636: Mod: JZ | Performed by: NURSE PRACTITIONER

## 2023-07-07 RX ORDER — HEPARIN SODIUM (PORCINE) LOCK FLUSH IV SOLN 100 UNIT/ML 100 UNIT/ML
5 SOLUTION INTRAVENOUS
Status: DISCONTINUED | OUTPATIENT
Start: 2023-07-07 | End: 2023-07-07 | Stop reason: HOSPADM

## 2023-07-07 RX ADMIN — Medication 5 ML: at 11:40

## 2023-07-07 NOTE — PROGRESS NOTES
Infusion Nursing Note:  Luca Araiza presents today for cycle 9 day 3 pump removal and vad flush.    Patient seen by provider today: No   present during visit today: Not Applicable.    Note: Home infusion went well.  VSS.  Pt assessed.  C-series Pump completely empty.       Intravenous Access:  Implanted Port.    Treatment Conditions:  Not Applicable.      Post Infusion Assessment:  Patient tolerated home infusion without incident.  No evidence of extravasations.  Access discontinued per protocol.       Discharge Plan:   Patient discharged in stable condition accompanied by: self.  Departure Mode: Ambulatory.      Bailey Sena RN

## 2023-07-18 ENCOUNTER — ONCOLOGY VISIT (OUTPATIENT)
Dept: ONCOLOGY | Facility: HOSPITAL | Age: 43
End: 2023-07-18
Attending: INTERNAL MEDICINE
Payer: COMMERCIAL

## 2023-07-18 ENCOUNTER — INFUSION THERAPY VISIT (OUTPATIENT)
Dept: INFUSION THERAPY | Facility: HOSPITAL | Age: 43
End: 2023-07-18
Attending: INTERNAL MEDICINE
Payer: COMMERCIAL

## 2023-07-18 ENCOUNTER — DOCUMENTATION ONLY (OUTPATIENT)
Dept: ONCOLOGY | Facility: CLINIC | Age: 43
End: 2023-07-18

## 2023-07-18 VITALS
DIASTOLIC BLOOD PRESSURE: 75 MMHG | SYSTOLIC BLOOD PRESSURE: 126 MMHG | OXYGEN SATURATION: 98 % | HEIGHT: 74 IN | TEMPERATURE: 98.4 F | HEART RATE: 84 BPM | RESPIRATION RATE: 18 BRPM | WEIGHT: 212 LBS | BODY MASS INDEX: 27.21 KG/M2

## 2023-07-18 DIAGNOSIS — C18.2 MALIGNANT NEOPLASM OF ASCENDING COLON (H): Primary | ICD-10-CM

## 2023-07-18 DIAGNOSIS — Z00.6 CLINICAL TRIAL PARTICIPANT: Primary | ICD-10-CM

## 2023-07-18 LAB
ALBUMIN SERPL BCG-MCNC: 4.1 G/DL (ref 3.5–5.2)
ALP SERPL-CCNC: 116 U/L (ref 40–129)
ALT SERPL W P-5'-P-CCNC: 62 U/L (ref 0–70)
ANION GAP SERPL CALCULATED.3IONS-SCNC: 8 MMOL/L (ref 7–15)
AST SERPL W P-5'-P-CCNC: 45 U/L (ref 0–45)
BASOPHILS # BLD AUTO: 0 10E3/UL (ref 0–0.2)
BASOPHILS NFR BLD AUTO: 1 %
BILIRUB SERPL-MCNC: 0.3 MG/DL
BUN SERPL-MCNC: 17.5 MG/DL (ref 6–20)
CALCIUM SERPL-MCNC: 9.2 MG/DL (ref 8.6–10)
CHLORIDE SERPL-SCNC: 107 MMOL/L (ref 98–107)
CREAT SERPL-MCNC: 1.1 MG/DL (ref 0.67–1.17)
DEPRECATED HCO3 PLAS-SCNC: 25 MMOL/L (ref 22–29)
EOSINOPHIL # BLD AUTO: 0.1 10E3/UL (ref 0–0.7)
EOSINOPHIL NFR BLD AUTO: 2 %
ERYTHROCYTE [DISTWIDTH] IN BLOOD BY AUTOMATED COUNT: 15.6 % (ref 10–15)
GFR SERPL CREATININE-BSD FRML MDRD: 86 ML/MIN/1.73M2
GLUCOSE SERPL-MCNC: 117 MG/DL (ref 70–99)
HCT VFR BLD AUTO: 34 % (ref 40–53)
HGB BLD-MCNC: 11.1 G/DL (ref 13.3–17.7)
IMM GRANULOCYTES # BLD: 0 10E3/UL
IMM GRANULOCYTES NFR BLD: 1 %
LYMPHOCYTES # BLD AUTO: 1 10E3/UL (ref 0.8–5.3)
LYMPHOCYTES NFR BLD AUTO: 31 %
MAGNESIUM SERPL-MCNC: 2.2 MG/DL (ref 1.7–2.3)
MCH RBC QN AUTO: 29.9 PG (ref 26.5–33)
MCHC RBC AUTO-ENTMCNC: 32.6 G/DL (ref 31.5–36.5)
MCV RBC AUTO: 92 FL (ref 78–100)
MONOCYTES # BLD AUTO: 0.7 10E3/UL (ref 0–1.3)
MONOCYTES NFR BLD AUTO: 20 %
NEUTROPHILS # BLD AUTO: 1.6 10E3/UL (ref 1.6–8.3)
NEUTROPHILS NFR BLD AUTO: 45 %
NRBC # BLD AUTO: 0 10E3/UL
NRBC BLD AUTO-RTO: 0 /100
PLATELET # BLD AUTO: 94 10E3/UL (ref 150–450)
POTASSIUM SERPL-SCNC: 4 MMOL/L (ref 3.4–5.3)
PROT SERPL-MCNC: 6.4 G/DL (ref 6.4–8.3)
RBC # BLD AUTO: 3.71 10E6/UL (ref 4.4–5.9)
SODIUM SERPL-SCNC: 140 MMOL/L (ref 136–145)
WBC # BLD AUTO: 3.4 10E3/UL (ref 4–11)

## 2023-07-18 PROCEDURE — G0463 HOSPITAL OUTPT CLINIC VISIT: HCPCS | Performed by: INTERNAL MEDICINE

## 2023-07-18 PROCEDURE — 83735 ASSAY OF MAGNESIUM: CPT | Performed by: INTERNAL MEDICINE

## 2023-07-18 PROCEDURE — 96417 CHEMO IV INFUS EACH ADDL SEQ: CPT

## 2023-07-18 PROCEDURE — 96368 THER/DIAG CONCURRENT INF: CPT

## 2023-07-18 PROCEDURE — 250N000011 HC RX IP 250 OP 636: Mod: JZ | Performed by: INTERNAL MEDICINE

## 2023-07-18 PROCEDURE — 258N000003 HC RX IP 258 OP 636: Performed by: INTERNAL MEDICINE

## 2023-07-18 PROCEDURE — 99214 OFFICE O/P EST MOD 30 MIN: CPT | Performed by: INTERNAL MEDICINE

## 2023-07-18 PROCEDURE — 96375 TX/PRO/DX INJ NEW DRUG ADDON: CPT

## 2023-07-18 PROCEDURE — 80053 COMPREHEN METABOLIC PANEL: CPT | Performed by: INTERNAL MEDICINE

## 2023-07-18 PROCEDURE — 85004 AUTOMATED DIFF WBC COUNT: CPT | Performed by: INTERNAL MEDICINE

## 2023-07-18 PROCEDURE — 96367 TX/PROPH/DG ADDL SEQ IV INF: CPT

## 2023-07-18 PROCEDURE — 96413 CHEMO IV INFUSION 1 HR: CPT

## 2023-07-18 PROCEDURE — 96415 CHEMO IV INFUSION ADDL HR: CPT

## 2023-07-18 PROCEDURE — G0498 CHEMO EXTEND IV INFUS W/PUMP: HCPCS

## 2023-07-18 RX ORDER — DIPHENHYDRAMINE HYDROCHLORIDE 50 MG/ML
50 INJECTION INTRAMUSCULAR; INTRAVENOUS
Status: CANCELLED
Start: 2023-07-18

## 2023-07-18 RX ORDER — ATROPINE SULFATE 0.4 MG/ML
0.4 AMPUL (ML) INJECTION
Status: COMPLETED | OUTPATIENT
Start: 2023-07-18 | End: 2023-07-18

## 2023-07-18 RX ORDER — LORAZEPAM 2 MG/ML
0.5 INJECTION INTRAMUSCULAR EVERY 4 HOURS PRN
Status: CANCELLED | OUTPATIENT
Start: 2023-07-18

## 2023-07-18 RX ORDER — EPINEPHRINE 1 MG/ML
0.3 INJECTION, SOLUTION INTRAMUSCULAR; SUBCUTANEOUS EVERY 5 MIN PRN
Status: CANCELLED | OUTPATIENT
Start: 2023-07-18

## 2023-07-18 RX ORDER — HEPARIN SODIUM,PORCINE 10 UNIT/ML
5 VIAL (ML) INTRAVENOUS
Status: CANCELLED | OUTPATIENT
Start: 2023-07-18

## 2023-07-18 RX ORDER — PALONOSETRON 0.05 MG/ML
0.25 INJECTION, SOLUTION INTRAVENOUS ONCE
Status: COMPLETED | OUTPATIENT
Start: 2023-07-18 | End: 2023-07-18

## 2023-07-18 RX ORDER — ATROPINE SULFATE 0.4 MG/ML
0.4 AMPUL (ML) INJECTION
Status: CANCELLED | OUTPATIENT
Start: 2023-07-18

## 2023-07-18 RX ORDER — ATROPINE SULFATE 0.4 MG/ML
0.4 AMPUL (ML) INJECTION
Status: DISCONTINUED | OUTPATIENT
Start: 2023-07-18 | End: 2023-07-18 | Stop reason: HOSPADM

## 2023-07-18 RX ORDER — DIPHENHYDRAMINE HYDROCHLORIDE 50 MG/ML
50 INJECTION INTRAMUSCULAR; INTRAVENOUS
Status: DISCONTINUED | OUTPATIENT
Start: 2023-07-18 | End: 2023-07-18 | Stop reason: HOSPADM

## 2023-07-18 RX ORDER — ALBUTEROL SULFATE 90 UG/1
1-2 AEROSOL, METERED RESPIRATORY (INHALATION)
Status: CANCELLED
Start: 2023-07-18

## 2023-07-18 RX ORDER — PALONOSETRON 0.05 MG/ML
0.25 INJECTION, SOLUTION INTRAVENOUS ONCE
Status: CANCELLED
Start: 2023-07-18 | End: 2023-07-18

## 2023-07-18 RX ORDER — LORAZEPAM 2 MG/ML
0.5 INJECTION INTRAMUSCULAR ONCE
Status: CANCELLED
Start: 2023-07-18 | End: 2023-07-18

## 2023-07-18 RX ORDER — EPINEPHRINE 1 MG/ML
0.3 INJECTION, SOLUTION INTRAMUSCULAR; SUBCUTANEOUS EVERY 5 MIN PRN
Status: DISCONTINUED | OUTPATIENT
Start: 2023-07-18 | End: 2023-07-18 | Stop reason: HOSPADM

## 2023-07-18 RX ORDER — MEPERIDINE HYDROCHLORIDE 25 MG/ML
25 INJECTION INTRAMUSCULAR; INTRAVENOUS; SUBCUTANEOUS EVERY 30 MIN PRN
Status: CANCELLED | OUTPATIENT
Start: 2023-07-18

## 2023-07-18 RX ORDER — HEPARIN SODIUM (PORCINE) LOCK FLUSH IV SOLN 100 UNIT/ML 100 UNIT/ML
5 SOLUTION INTRAVENOUS
Status: CANCELLED | OUTPATIENT
Start: 2023-07-18

## 2023-07-18 RX ORDER — ALBUTEROL SULFATE 90 UG/1
1-2 AEROSOL, METERED RESPIRATORY (INHALATION)
Status: DISCONTINUED | OUTPATIENT
Start: 2023-07-18 | End: 2023-07-18 | Stop reason: HOSPADM

## 2023-07-18 RX ORDER — ALBUTEROL SULFATE 0.83 MG/ML
2.5 SOLUTION RESPIRATORY (INHALATION)
Status: CANCELLED | OUTPATIENT
Start: 2023-07-18

## 2023-07-18 RX ORDER — LORAZEPAM 2 MG/ML
0.5 INJECTION INTRAMUSCULAR ONCE
Status: COMPLETED | OUTPATIENT
Start: 2023-07-18 | End: 2023-07-18

## 2023-07-18 RX ORDER — MEPERIDINE HYDROCHLORIDE 25 MG/ML
25 INJECTION INTRAMUSCULAR; INTRAVENOUS; SUBCUTANEOUS EVERY 30 MIN PRN
Status: DISCONTINUED | OUTPATIENT
Start: 2023-07-18 | End: 2023-07-18 | Stop reason: HOSPADM

## 2023-07-18 RX ORDER — HEPARIN SODIUM (PORCINE) LOCK FLUSH IV SOLN 100 UNIT/ML 100 UNIT/ML
5 SOLUTION INTRAVENOUS
Status: CANCELLED | OUTPATIENT
Start: 2023-07-20

## 2023-07-18 RX ORDER — ALBUTEROL SULFATE 0.83 MG/ML
2.5 SOLUTION RESPIRATORY (INHALATION)
Status: DISCONTINUED | OUTPATIENT
Start: 2023-07-18 | End: 2023-07-18 | Stop reason: HOSPADM

## 2023-07-18 RX ORDER — METHYLPREDNISOLONE SODIUM SUCCINATE 125 MG/2ML
125 INJECTION, POWDER, LYOPHILIZED, FOR SOLUTION INTRAMUSCULAR; INTRAVENOUS
Status: DISCONTINUED | OUTPATIENT
Start: 2023-07-18 | End: 2023-07-18 | Stop reason: HOSPADM

## 2023-07-18 RX ORDER — HEPARIN SODIUM,PORCINE 10 UNIT/ML
5 VIAL (ML) INTRAVENOUS
Status: CANCELLED | OUTPATIENT
Start: 2023-07-20

## 2023-07-18 RX ORDER — HEPARIN SODIUM (PORCINE) LOCK FLUSH IV SOLN 100 UNIT/ML 100 UNIT/ML
5 SOLUTION INTRAVENOUS
Status: DISCONTINUED | OUTPATIENT
Start: 2023-07-18 | End: 2023-07-18 | Stop reason: HOSPADM

## 2023-07-18 RX ORDER — METHYLPREDNISOLONE SODIUM SUCCINATE 125 MG/2ML
125 INJECTION, POWDER, LYOPHILIZED, FOR SOLUTION INTRAMUSCULAR; INTRAVENOUS
Status: CANCELLED
Start: 2023-07-18

## 2023-07-18 RX ADMIN — PALONOSETRON 0.25 MG: 0.05 INJECTION, SOLUTION INTRAVENOUS at 09:36

## 2023-07-18 RX ADMIN — FAMOTIDINE 20 MG: 10 INJECTION, SOLUTION INTRAVENOUS at 12:24

## 2023-07-18 RX ADMIN — IRINOTECAN HYDROCHLORIDE 340 MG: 20 INJECTION, SOLUTION INTRAVENOUS at 12:35

## 2023-07-18 RX ADMIN — DEXAMETHASONE SODIUM PHOSPHATE: 10 INJECTION, SOLUTION INTRAMUSCULAR; INTRAVENOUS at 09:43

## 2023-07-18 RX ADMIN — LORAZEPAM 0.5 MG: 2 INJECTION INTRAMUSCULAR; INTRAVENOUS at 09:39

## 2023-07-18 RX ADMIN — ATROPINE SULFATE 0.4 MG: 0.4 INJECTION, SOLUTION INTRAVENOUS at 12:24

## 2023-07-18 RX ADMIN — OXALIPLATIN 200 MG: 100 INJECTION, SOLUTION, CONCENTRATE INTRAVENOUS at 10:10

## 2023-07-18 RX ADMIN — DEXTROSE MONOHYDRATE 250 ML: 50 INJECTION, SOLUTION INTRAVENOUS at 09:36

## 2023-07-18 RX ADMIN — LEUCOVORIN CALCIUM 880 MG: 350 INJECTION, POWDER, LYOPHILIZED, FOR SOLUTION INTRAMUSCULAR; INTRAVENOUS at 10:09

## 2023-07-18 RX ADMIN — ATROPINE SULFATE 0.4 MG: 0.4 INJECTION, SOLUTION INTRAVENOUS at 12:52

## 2023-07-18 ASSESSMENT — PAIN SCALES - GENERAL: PAINLEVEL: NO PAIN (0)

## 2023-07-18 NOTE — PROGRESS NOTES
Clincal Trial : Labs reviewed, no dose limiting toxicities noted (gr 1 plts, WBC  and HGB).  MD note indicates grade 1 neuropathy.  Infusion called and alerted ok from research perspective for treatment.  Giovanna Engel RN [facility]

## 2023-07-18 NOTE — PROGRESS NOTES
[facility] Hematology and Oncology Consult Note    Patient: Luca Araiza  MRN: [id number]  Date of Service: Jul 18, 2023       Reason for Visit    Follow-up for colon cancer    January 2023:    T3N1B, stage IIIb adenocarcinoma of the cecum status post laparoscopic right colectomy, December 21, 2022  CT DNA positive, March 2023  Tumor is MMR intact  Early onset of colon cancer with family history    Luca continues to tolerate chemotherapy very well indeed.  Mild neuropathy symptoms in the feet, grade 1.  Slight decrease in platelet count but this may be because he is doing treatment a day early this time.    We will proceed with cycle 10 of treatment without dose changes.  We will see him back in 2 weeks for cycle 11 and in 4 weeks for the final cycle.    Typically we would see patients every 3 months after that with labs.  May need scans every 6 months on protocol.    Of note, repeat CT DNA test was negative.  Questions answered.    Plan: Proceed with cycle 10 of FOLFIRINOX today  Follow-up in 2 weeks for next treatment and in 4 weeks for the final treatment  If any worsening neuropathy in the feet, may need to dose reduce or omit oxaliplatin      Measurable disease: None postoperatively    Current therapy: Modified FOLFIRINOX day 1 cycle 1, March 14, 2023  Cycle 7 today, June 6, 2023                ECOG Performance    0 - Independent    Encounter Diagnoses:    Problem List Items Addressed This Visit        Digestive    Malignant neoplasm of ascending colon (H) - Primary    Relevant Orders    Infusion Appointment Request    Infusion Appointment Request    Infusion Appointment Request    Check Out Appointment Request           ______________________________________________________________________________    Staging History   Cancer Staging   Malignant neoplasm of ascending colon (H)  Staging form: Colon and Rectum, AJCC 8th Edition  - Pathologic stage from 12/21/2022: Stage IIIB (pT3, pN1b, cM0) - Signed by  Dayami Pablo, APRN CNP on 7/5/2023        History    Luca is here again for follow-up.  He is continued on treatment every 2 weeks without any delays.  Overall is feeling fine.  Mild persistent neuropathy in the feet but this is not affecting function.  No fever or mouth sores.  Minimal nausea with no vomiting.  Good appetite and slightly increased weight.  No shortness of breath or cough.  No abdominal pain.  Some mild diarrhea symptoms.  Slight rash without itching.  ECOG status 0.      March 14, 2023:    Luca is here for reevaluation.  Seen about 6 weeks ago.  His CT DNA test was positive and he was randomized to arm for the clinical trial.  He has had Port-A-Cath placed.  No new symptoms or problems.  ECOG status 0.    January 2023:  Mr. Luca Araiza is a 42 year old no significant past medical history who is been diagnosed and undergone laparoscopic hemicolectomy for colon cancer.  He was in [place] last August and had significant illness with diarrhea and vomiting.  He then had physical which showed that he was anemic and subsequently had colonoscopy showing cecal colon cancer.  He underwent laparoscopic right hemicolectomy on December 21 and has recovered well from this.    No other previous medical history.  He had left ACL repair surgery as a teenager.  No other hospitalizations.  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  Does not smoke and does not drink alcohol.    Father had colon cancer in his mid 60s.  Mother had breast cancer in her late 50s.  No siblings or kids with cancer.  Maternal grandfather had colon cancer in his 60s.  Paternal grandmother had cancer type unknown.            Review of systems.  No fever or night sweats.  No loss of weight.  No lumps or bumps anywhere.  No unusual headaches or eyesight issues.  No dizziness.  No bleeding from the nose.  No sores in the mouth. No problems with swallowing.  No chest  "pain. No shortness of breath. No cough.  No abdominal pain. No nausea or vomiting.  No diarrhea or constipation.  No blood in stool or black colored stools.  No problems passing urine.  No numbness or tingling in hands or feet.  No skin rashes.  A 14 point review of systems is otherwise negative.        Past History    Past Medical History:   Diagnosis Date     Anemia      Malignant neoplasm of ascending colon (H) 11/07/2022     Past Surgical History:   Procedure Laterality Date     COLONOSCOPY       HEMICOLECTOMY, RT/LT Right      IR CHEST PORT PLACEMENT > 5 YRS OF AGE  3/9/2023     REMOVAL OF SPERM DUCT(S)      Description: Surgery Of Male Genitalia Vasectomy;  Recorded: 12/27/2011;  Comments: 12/27/2011     ZZC REPAIR CRUCIATE LIGAMENT,KNEE      Description: Primary Repair Of Knee Ligament Cruciate Anterior;  Recorded: 07/20/2009;     Family History   Problem Relation Age of Onset     Breast Cancer Mother      Colon Cancer Father      Anesthesia Reaction No family hx of      Venous thrombosis No family hx of      Social History     Socioeconomic History     Marital status:      Spouse name: None     Number of children: None     Years of education: None     Highest education level: None   Tobacco Use     Smoking status: Never     Smokeless tobacco: Never   Substance and Sexual Activity     Alcohol use: Not Currently     Drug use: Never     Sexual activity: Yes     Partners: Female     Birth control/protection: Male Surgical   Other Topics Concern     Parent/sibling w/ CABG, MI or angioplasty before 65F 55M? No         Allergies    No Known Allergies        Physical Exam    /75 (BP Location: Left arm, Patient Position: Sitting, Cuff Size: Adult Large)   Pulse 84   Temp 98.4  F (36.9  C) (Oral)   Resp 18   Ht 1.88 m (6' 2.02\")   Wt 96.2 kg (212 lb)   SpO2 98%   BMI 27.21 kg/m        GENERAL: Alert and oriented to time place and person. Seated comfortably. In no distress.    HEAD: Atraumatic and " normocephalic.    EYES: LUIS ANGEL, EOMI.  No pallor.  No icterus.    Oral cavity: no mucosal lesion or tonsillar enlargement.    NECK: supple. JVP normal.  No thyroid enlargement.    LYMPH NODES: No palpable, cervical, axillary or inguinal lymphadenopathy.    CHEST: clear to auscultation bilaterally.  Resonant to percussion throughout bilaterally.  Symmetrical breath movements bilaterally.    CVS: S1 and S2 are heard. Regular rate and rhythm.  No murmur or gallop or rub heard.  No peripheral edema.    ABDOMEN: Soft. Not tender. Not distended.  No palpable hepatomegaly or splenomegaly.  No other mass palpable.  Bowel sounds heard.    EXTREMITIES: Warm.    SKIN: no rash, or bruising or purpura.  Has a full head of hair.    Lab Results    Preoperative CEA was 2.6 and 2.7    Imaging Results    CT and MRI reviewed with no evidence of metastatic disease    No results found.      Signed by: Sekou Hall MD

## 2023-07-18 NOTE — PROGRESS NOTES
"Oncology Rooming Note    July 18, 2023 8:39 AM   Luca Araiza is a 42 year old male who presents for:    Chief Complaint   Patient presents with    Oncology Clinic Visit     3 week return Malignant neoplasm of ascending colon, review labs & infusion      Initial Vitals: /75 (BP Location: Left arm, Patient Position: Sitting, Cuff Size: Adult Large)   Pulse 84   Temp 98.4  F (36.9  C) (Oral)   Resp 18   Ht 1.88 m (6' 2.02\")   Wt 96.2 kg (212 lb)   SpO2 98%   BMI 27.21 kg/m   Estimated body mass index is 27.21 kg/m  as calculated from the following:    Height as of this encounter: 1.88 m (6' 2.02\").    Weight as of this encounter: 96.2 kg (212 lb). Body surface area is 2.24 meters squared.  No Pain (0) Comment: Data Unavailable   No LMP for male patient.  Allergies reviewed: Yes  Medications reviewed: Yes    Medications: Medication refills not needed today.  Pharmacy name entered into [facility]: [facility] - [place], [place] - [address]    Clinical concerns:  3 week return Malignant neoplasm of ascending colon, review labs & infusion       Kelley Connolly CMA            "

## 2023-07-18 NOTE — PROGRESS NOTES
Infusion Nursing Note:  Luca FOY Joseg  presents today for C10D1.    Patient seen by provider today: Yes: Dr. Hall   present during visit today: Not Applicable.    Note: PT here ambulatory for txt. PT was seen by provider today. Labs drawn from port approved for txt.  Txt administered. Ativan was given iv first followed by premeds. Atropine given prior to irinotecan and repeated 25 minutes into irinotecan infusion. PT tolerated txt without any problems.  Spoke to [facility]   Pump set up with continuous chemo to infused over 46 hours.  Pt has a appointment for pump disconnect on 7/20 @ 1230 . Verified appointment with pt. pump discontinue at  PT dc'd steady gait with wife.      Intravenous Access:  Labs drawn without difficulty.  Implanted Port.    Treatment Conditions:  Lab Results   Component Value Date    HGB 11.1 (L) 07/18/2023    WBC 3.4 (L) 07/18/2023    ANEUTAUTO 1.6 07/18/2023    PLT 94 (L) 07/18/2023        Lab Results   Component Value Date     07/18/2023    POTASSIUM 4.0 07/18/2023    MAG 2.2 07/18/2023    CR 1.10 07/18/2023    RUPERTO 9.2 07/18/2023    BILITOTAL 0.3 07/18/2023    ALBUMIN 4.1 07/18/2023    ALT 62 07/18/2023    AST 45 07/18/2023       Results reviewed, labs MET treatment parameters, ok to proceed with treatment.      Post Infusion Assessment:  Patient tolerated infusion without incident.  Blood return noted pre and post infusion.  No evidence of extravasations.  Access discontinued per protocol.       Discharge Plan:   Discharge instructions reviewed with: Patient and Family.  Patient and/or family verbalized understanding of discharge instructions and all questions answered.  Patient discharged in stable condition accompanied by: self and wife.  Departure Mode: Ambulatory.      Sofia Tavera RN

## 2023-07-18 NOTE — LETTER
"    7/18/2023         RE: Luca Araiza  [address]  [place] [address]        Dear Colleague,    Thank you for referring your patient, Luca Araiza, to the [facility]. Please see a copy of my visit note below.    Oncology Rooming Note    July 18, 2023 8:39 AM   Luca Araiza is a 42 year old male who presents for:    Chief Complaint   Patient presents with     Oncology Clinic Visit     3 week return Malignant neoplasm of ascending colon, review labs & infusion      Initial Vitals: /75 (BP Location: Left arm, Patient Position: Sitting, Cuff Size: Adult Large)   Pulse 84   Temp 98.4  F (36.9  C) (Oral)   Resp 18   Ht 1.88 m (6' 2.02\")   Wt 96.2 kg (212 lb)   SpO2 98%   BMI 27.21 kg/m   Estimated body mass index is 27.21 kg/m  as calculated from the following:    Height as of this encounter: 1.88 m (6' 2.02\").    Weight as of this encounter: 96.2 kg (212 lb). Body surface area is 2.24 meters squared.  No Pain (0) Comment: Data Unavailable   No LMP for male patient.  Allergies reviewed: Yes  Medications reviewed: Yes    Medications: Medication refills not needed today.  Pharmacy name entered into [facility]: [facility] - [facility] [address]    Clinical concerns:  3 week return Malignant neoplasm of ascending colon, review labs & infusion       Kelley Connolly, [facility] Hematology and Oncology Consult Note    Patient: Luca Araiza  MRN: [id number]  Date of Service: Jul 18, 2023       Reason for Visit    Follow-up for colon cancer    January 2023:    T3N1B, stage IIIb adenocarcinoma of the cecum status post laparoscopic right colectomy, December 21, 2022  CT DNA positive, March 2023  Tumor is MMR intact  Early onset of colon cancer with family history    Luca continues to tolerate chemotherapy very well indeed.  Mild neuropathy symptoms in the feet, grade 1.  Slight decrease in platelet count but this may be because he is doing " treatment a day early this time.    We will proceed with cycle 10 of treatment without dose changes.  We will see him back in 2 weeks for cycle 11 and in 4 weeks for the final cycle.    Typically we would see patients every 3 months after that with labs.  May need scans every 6 months on protocol.    Of note, repeat CT DNA test was negative.  Questions answered.    Plan: Proceed with cycle 10 of FOLFIRINOX today  Follow-up in 2 weeks for next treatment and in 4 weeks for the final treatment  If any worsening neuropathy in the feet, may need to dose reduce or omit oxaliplatin      Measurable disease: None postoperatively    Current therapy: Modified FOLFIRINOX day 1 cycle 1, March 14, 2023  Cycle 7 today, June 6, 2023                ECOG Performance    0 - Independent    Encounter Diagnoses:    Problem List Items Addressed This Visit        Digestive    Malignant neoplasm of ascending colon (H) - Primary    Relevant Orders    Infusion Appointment Request    Infusion Appointment Request    Infusion Appointment Request    Check Out Appointment Request           ______________________________________________________________________________    Staging History   Cancer Staging   Malignant neoplasm of ascending colon (H)  Staging form: Colon and Rectum, AJCC 8th Edition  - Pathologic stage from 12/21/2022: Stage IIIB (pT3, pN1b, cM0) - Signed by Dayami Pablo APRN CNP on 7/5/2023        History    Luca is here again for follow-up.  He is continued on treatment every 2 weeks without any delays.  Overall is feeling fine.  Mild persistent neuropathy in the feet but this is not affecting function.  No fever or mouth sores.  Minimal nausea with no vomiting.  Good appetite and slightly increased weight.  No shortness of breath or cough.  No abdominal pain.  Some mild diarrhea symptoms.  Slight rash without itching.  ECOG status 0.      March 14, 2023:    Luca is here for reevaluation.  Seen about 6 weeks ago.  His CT DNA  test was positive and he was randomized to arm for the clinical trial.  He has had Port-A-Cath placed.  No new symptoms or problems.  ECOG status 0.    January 2023:  Mr. Luca Araiza is a 42 year old no significant past medical history who is been diagnosed and undergone laparoscopic hemicolectomy for colon cancer.  He was in [place] last August and had significant illness with diarrhea and vomiting.  He then had physical which showed that he was anemic and subsequently had colonoscopy showing cecal colon cancer.  He underwent laparoscopic right hemicolectomy on December 21 and has recovered well from this.    No other previous medical history.  He had left ACL repair surgery as a teenager.  No other hospitalizations.  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  Does not smoke and does not drink alcohol.    Father had colon cancer in his mid 60s.  Mother had breast cancer in her late 50s.  No siblings or kids with cancer.  Maternal grandfather had colon cancer in his 60s.  Paternal grandmother had cancer type unknown.            Review of systems.  No fever or night sweats.  No loss of weight.  No lumps or bumps anywhere.  No unusual headaches or eyesight issues.  No dizziness.  No bleeding from the nose.  No sores in the mouth. No problems with swallowing.  No chest pain. No shortness of breath. No cough.  No abdominal pain. No nausea or vomiting.  No diarrhea or constipation.  No blood in stool or black colored stools.  No problems passing urine.  No numbness or tingling in hands or feet.  No skin rashes.  A 14 point review of systems is otherwise negative.        Past History    Past Medical History:   Diagnosis Date     Anemia      Malignant neoplasm of ascending colon (H) 11/07/2022     Past Surgical History:   Procedure Laterality Date     COLONOSCOPY       HEMICOLECTOMY, RT/LT Right      IR CHEST PORT PLACEMENT > 5 YRS OF AGE  3/9/2023      "REMOVAL OF SPERM DUCT(S)      Description: Surgery Of Male Genitalia Vasectomy;  Recorded: 12/27/2011;  Comments: 12/27/2011     ZZC REPAIR CRUCIATE LIGAMENT,KNEE      Description: Primary Repair Of Knee Ligament Cruciate Anterior;  Recorded: 07/20/2009;     Family History   Problem Relation Age of Onset     Breast Cancer Mother      Colon Cancer Father      Anesthesia Reaction No family hx of      Venous thrombosis No family hx of      Social History     Socioeconomic History     Marital status:      Spouse name: None     Number of children: None     Years of education: None     Highest education level: None   Tobacco Use     Smoking status: Never     Smokeless tobacco: Never   Substance and Sexual Activity     Alcohol use: Not Currently     Drug use: Never     Sexual activity: Yes     Partners: Female     Birth control/protection: Male Surgical   Other Topics Concern     Parent/sibling w/ CABG, MI or angioplasty before 65F 55M? No         Allergies    No Known Allergies        Physical Exam    /75 (BP Location: Left arm, Patient Position: Sitting, Cuff Size: Adult Large)   Pulse 84   Temp 98.4  F (36.9  C) (Oral)   Resp 18   Ht 1.88 m (6' 2.02\")   Wt 96.2 kg (212 lb)   SpO2 98%   BMI 27.21 kg/m        GENERAL: Alert and oriented to time place and person. Seated comfortably. In no distress.    HEAD: Atraumatic and normocephalic.    EYES: LUIS ANGEL, EOMI.  No pallor.  No icterus.    Oral cavity: no mucosal lesion or tonsillar enlargement.    NECK: supple. JVP normal.  No thyroid enlargement.    LYMPH NODES: No palpable, cervical, axillary or inguinal lymphadenopathy.    CHEST: clear to auscultation bilaterally.  Resonant to percussion throughout bilaterally.  Symmetrical breath movements bilaterally.    CVS: S1 and S2 are heard. Regular rate and rhythm.  No murmur or gallop or rub heard.  No peripheral edema.    ABDOMEN: Soft. Not tender. Not distended.  No palpable hepatomegaly or splenomegaly.  No " other mass palpable.  Bowel sounds heard.    EXTREMITIES: Warm.    SKIN: no rash, or bruising or purpura.  Has a full head of hair.    Lab Results    Preoperative CEA was 2.6 and 2.7    Imaging Results    CT and MRI reviewed with no evidence of metastatic disease    No results found.      Signed by: Sekou Hall MD      Again, thank you for allowing me to participate in the care of your patient.        Sincerely,        Sekou Hall MD

## 2023-07-20 ENCOUNTER — INFUSION THERAPY VISIT (OUTPATIENT)
Dept: INFUSION THERAPY | Facility: HOSPITAL | Age: 43
End: 2023-07-20
Attending: INTERNAL MEDICINE
Payer: COMMERCIAL

## 2023-07-20 VITALS
RESPIRATION RATE: 12 BRPM | TEMPERATURE: 98.1 F | SYSTOLIC BLOOD PRESSURE: 128 MMHG | HEART RATE: 82 BPM | DIASTOLIC BLOOD PRESSURE: 62 MMHG | OXYGEN SATURATION: 97 %

## 2023-07-20 DIAGNOSIS — C18.2 MALIGNANT NEOPLASM OF ASCENDING COLON (H): Primary | ICD-10-CM

## 2023-07-20 PROCEDURE — 250N000011 HC RX IP 250 OP 636: Mod: JZ | Performed by: INTERNAL MEDICINE

## 2023-07-20 RX ORDER — HEPARIN SODIUM,PORCINE 10 UNIT/ML
5 VIAL (ML) INTRAVENOUS
Status: DISCONTINUED | OUTPATIENT
Start: 2023-07-20 | End: 2023-07-20 | Stop reason: HOSPADM

## 2023-07-20 RX ORDER — HEPARIN SODIUM (PORCINE) LOCK FLUSH IV SOLN 100 UNIT/ML 100 UNIT/ML
5 SOLUTION INTRAVENOUS
Status: DISCONTINUED | OUTPATIENT
Start: 2023-07-20 | End: 2023-07-20 | Stop reason: HOSPADM

## 2023-07-20 RX ADMIN — Medication 5 ML: at 12:14

## 2023-07-20 ASSESSMENT — PAIN SCALES - GENERAL: PAINLEVEL: NO PAIN (0)

## 2023-07-20 NOTE — PROGRESS NOTES
Pt arrived for pump disconnect. Heparin flush done to Port and port access removed and 2x2 applied.  Pt stats he has been doing well.  Left ambulatory.

## 2023-08-01 ENCOUNTER — ONCOLOGY VISIT (OUTPATIENT)
Dept: ONCOLOGY | Facility: HOSPITAL | Age: 43
End: 2023-08-01
Payer: COMMERCIAL

## 2023-08-01 ENCOUNTER — INFUSION THERAPY VISIT (OUTPATIENT)
Dept: INFUSION THERAPY | Facility: HOSPITAL | Age: 43
End: 2023-08-01
Payer: COMMERCIAL

## 2023-08-01 ENCOUNTER — LAB (OUTPATIENT)
Dept: INFUSION THERAPY | Facility: HOSPITAL | Age: 43
End: 2023-08-01
Payer: COMMERCIAL

## 2023-08-01 VITALS
HEART RATE: 86 BPM | WEIGHT: 210 LBS | DIASTOLIC BLOOD PRESSURE: 77 MMHG | RESPIRATION RATE: 16 BRPM | BODY MASS INDEX: 26.95 KG/M2 | TEMPERATURE: 98.2 F | HEIGHT: 74 IN | SYSTOLIC BLOOD PRESSURE: 131 MMHG | OXYGEN SATURATION: 98 %

## 2023-08-01 DIAGNOSIS — C18.2 MALIGNANT NEOPLASM OF ASCENDING COLON (H): Primary | ICD-10-CM

## 2023-08-01 LAB
ALBUMIN SERPL BCG-MCNC: 4.2 G/DL (ref 3.5–5.2)
ALP SERPL-CCNC: 119 U/L (ref 40–129)
ALT SERPL W P-5'-P-CCNC: 49 U/L (ref 0–70)
ANION GAP SERPL CALCULATED.3IONS-SCNC: 11 MMOL/L (ref 7–15)
AST SERPL W P-5'-P-CCNC: 43 U/L (ref 0–45)
BASOPHILS # BLD AUTO: 0 10E3/UL (ref 0–0.2)
BASOPHILS NFR BLD AUTO: 1 %
BILIRUB SERPL-MCNC: 0.4 MG/DL
BUN SERPL-MCNC: 15.8 MG/DL (ref 6–20)
CALCIUM SERPL-MCNC: 9.8 MG/DL (ref 8.6–10)
CHLORIDE SERPL-SCNC: 107 MMOL/L (ref 98–107)
CREAT SERPL-MCNC: 1.05 MG/DL (ref 0.67–1.17)
DEPRECATED HCO3 PLAS-SCNC: 23 MMOL/L (ref 22–29)
EOSINOPHIL # BLD AUTO: 0 10E3/UL (ref 0–0.7)
EOSINOPHIL NFR BLD AUTO: 1 %
ERYTHROCYTE [DISTWIDTH] IN BLOOD BY AUTOMATED COUNT: 14.9 % (ref 10–15)
GFR SERPL CREATININE-BSD FRML MDRD: >90 ML/MIN/1.73M2
GLUCOSE SERPL-MCNC: 118 MG/DL (ref 70–99)
HCT VFR BLD AUTO: 36.2 % (ref 40–53)
HGB BLD-MCNC: 11.5 G/DL (ref 13.3–17.7)
IMM GRANULOCYTES # BLD: 0 10E3/UL
IMM GRANULOCYTES NFR BLD: 0 %
LYMPHOCYTES # BLD AUTO: 0.8 10E3/UL (ref 0.8–5.3)
LYMPHOCYTES NFR BLD AUTO: 25 %
MAGNESIUM SERPL-MCNC: 2 MG/DL (ref 1.7–2.3)
MCH RBC QN AUTO: 29.3 PG (ref 26.5–33)
MCHC RBC AUTO-ENTMCNC: 31.8 G/DL (ref 31.5–36.5)
MCV RBC AUTO: 92 FL (ref 78–100)
MONOCYTES # BLD AUTO: 0.5 10E3/UL (ref 0–1.3)
MONOCYTES NFR BLD AUTO: 16 %
NEUTROPHILS # BLD AUTO: 1.8 10E3/UL (ref 1.6–8.3)
NEUTROPHILS NFR BLD AUTO: 57 %
NRBC # BLD AUTO: 0 10E3/UL
NRBC BLD AUTO-RTO: 0 /100
PLATELET # BLD AUTO: 111 10E3/UL (ref 150–450)
POTASSIUM SERPL-SCNC: 3.8 MMOL/L (ref 3.4–5.3)
PROT SERPL-MCNC: 6.8 G/DL (ref 6.4–8.3)
RBC # BLD AUTO: 3.93 10E6/UL (ref 4.4–5.9)
SODIUM SERPL-SCNC: 141 MMOL/L (ref 136–145)
WBC # BLD AUTO: 3.2 10E3/UL (ref 4–11)

## 2023-08-01 PROCEDURE — G0463 HOSPITAL OUTPT CLINIC VISIT: HCPCS | Performed by: NURSE PRACTITIONER

## 2023-08-01 PROCEDURE — 85014 HEMATOCRIT: CPT | Performed by: INTERNAL MEDICINE

## 2023-08-01 PROCEDURE — 99214 OFFICE O/P EST MOD 30 MIN: CPT | Performed by: NURSE PRACTITIONER

## 2023-08-01 PROCEDURE — 83735 ASSAY OF MAGNESIUM: CPT | Performed by: INTERNAL MEDICINE

## 2023-08-01 PROCEDURE — 96415 CHEMO IV INFUSION ADDL HR: CPT

## 2023-08-01 PROCEDURE — 96368 THER/DIAG CONCURRENT INF: CPT

## 2023-08-01 PROCEDURE — 96417 CHEMO IV INFUS EACH ADDL SEQ: CPT

## 2023-08-01 PROCEDURE — G0498 CHEMO EXTEND IV INFUS W/PUMP: HCPCS

## 2023-08-01 PROCEDURE — 96375 TX/PRO/DX INJ NEW DRUG ADDON: CPT

## 2023-08-01 PROCEDURE — 96376 TX/PRO/DX INJ SAME DRUG ADON: CPT

## 2023-08-01 PROCEDURE — 80053 COMPREHEN METABOLIC PANEL: CPT | Performed by: INTERNAL MEDICINE

## 2023-08-01 PROCEDURE — 96367 TX/PROPH/DG ADDL SEQ IV INF: CPT

## 2023-08-01 PROCEDURE — 250N000011 HC RX IP 250 OP 636: Performed by: NURSE PRACTITIONER

## 2023-08-01 PROCEDURE — 96413 CHEMO IV INFUSION 1 HR: CPT

## 2023-08-01 PROCEDURE — 258N000003 HC RX IP 258 OP 636: Performed by: NURSE PRACTITIONER

## 2023-08-01 RX ORDER — DIPHENHYDRAMINE HYDROCHLORIDE 50 MG/ML
50 INJECTION INTRAMUSCULAR; INTRAVENOUS
Status: DISCONTINUED | OUTPATIENT
Start: 2023-08-01 | End: 2023-08-01 | Stop reason: HOSPADM

## 2023-08-01 RX ORDER — HEPARIN SODIUM,PORCINE 10 UNIT/ML
5 VIAL (ML) INTRAVENOUS
Status: CANCELLED | OUTPATIENT
Start: 2023-08-01

## 2023-08-01 RX ORDER — MEPERIDINE HYDROCHLORIDE 25 MG/ML
25 INJECTION INTRAMUSCULAR; INTRAVENOUS; SUBCUTANEOUS EVERY 30 MIN PRN
Status: CANCELLED | OUTPATIENT
Start: 2023-08-15

## 2023-08-01 RX ORDER — ATROPINE SULFATE 0.4 MG/ML
0.4 AMPUL (ML) INJECTION
Status: CANCELLED | OUTPATIENT
Start: 2023-08-15

## 2023-08-01 RX ORDER — HEPARIN SODIUM,PORCINE 10 UNIT/ML
5 VIAL (ML) INTRAVENOUS
Status: CANCELLED | OUTPATIENT
Start: 2023-08-03

## 2023-08-01 RX ORDER — HEPARIN SODIUM,PORCINE 10 UNIT/ML
5 VIAL (ML) INTRAVENOUS
Status: CANCELLED | OUTPATIENT
Start: 2023-08-15

## 2023-08-01 RX ORDER — ALBUTEROL SULFATE 90 UG/1
1-2 AEROSOL, METERED RESPIRATORY (INHALATION)
Status: CANCELLED
Start: 2023-08-01

## 2023-08-01 RX ORDER — ALBUTEROL SULFATE 90 UG/1
1-2 AEROSOL, METERED RESPIRATORY (INHALATION)
Status: DISCONTINUED | OUTPATIENT
Start: 2023-08-01 | End: 2023-08-01 | Stop reason: HOSPADM

## 2023-08-01 RX ORDER — PALONOSETRON 0.05 MG/ML
0.25 INJECTION, SOLUTION INTRAVENOUS ONCE
Status: COMPLETED | OUTPATIENT
Start: 2023-08-01 | End: 2023-08-01

## 2023-08-01 RX ORDER — ATROPINE SULFATE 0.4 MG/ML
0.4 AMPUL (ML) INJECTION
Status: CANCELLED | OUTPATIENT
Start: 2023-08-01

## 2023-08-01 RX ORDER — METHYLPREDNISOLONE SODIUM SUCCINATE 125 MG/2ML
125 INJECTION, POWDER, LYOPHILIZED, FOR SOLUTION INTRAMUSCULAR; INTRAVENOUS
Status: CANCELLED
Start: 2023-08-15

## 2023-08-01 RX ORDER — DIPHENHYDRAMINE HYDROCHLORIDE 50 MG/ML
50 INJECTION INTRAMUSCULAR; INTRAVENOUS
Status: CANCELLED
Start: 2023-08-15

## 2023-08-01 RX ORDER — EPINEPHRINE 1 MG/ML
0.3 INJECTION, SOLUTION INTRAMUSCULAR; SUBCUTANEOUS EVERY 5 MIN PRN
Status: CANCELLED | OUTPATIENT
Start: 2023-08-01

## 2023-08-01 RX ORDER — METHYLPREDNISOLONE SODIUM SUCCINATE 125 MG/2ML
125 INJECTION, POWDER, LYOPHILIZED, FOR SOLUTION INTRAMUSCULAR; INTRAVENOUS
Status: DISCONTINUED | OUTPATIENT
Start: 2023-08-01 | End: 2023-08-01 | Stop reason: HOSPADM

## 2023-08-01 RX ORDER — LORAZEPAM 2 MG/ML
0.5 INJECTION INTRAMUSCULAR ONCE
Status: COMPLETED | OUTPATIENT
Start: 2023-08-01 | End: 2023-08-01

## 2023-08-01 RX ORDER — LORAZEPAM 2 MG/ML
0.5 INJECTION INTRAMUSCULAR ONCE
Status: CANCELLED
Start: 2023-08-01 | End: 2023-08-01

## 2023-08-01 RX ORDER — ATROPINE SULFATE 0.4 MG/ML
0.4 AMPUL (ML) INJECTION
Status: DISCONTINUED | OUTPATIENT
Start: 2023-08-01 | End: 2023-08-01 | Stop reason: HOSPADM

## 2023-08-01 RX ORDER — HEPARIN SODIUM (PORCINE) LOCK FLUSH IV SOLN 100 UNIT/ML 100 UNIT/ML
5 SOLUTION INTRAVENOUS
Status: CANCELLED | OUTPATIENT
Start: 2023-08-15

## 2023-08-01 RX ORDER — LORAZEPAM 2 MG/ML
0.5 INJECTION INTRAMUSCULAR ONCE
Status: CANCELLED
Start: 2023-08-15 | End: 2023-08-15

## 2023-08-01 RX ORDER — HEPARIN SODIUM (PORCINE) LOCK FLUSH IV SOLN 100 UNIT/ML 100 UNIT/ML
5 SOLUTION INTRAVENOUS
Status: CANCELLED | OUTPATIENT
Start: 2023-08-01

## 2023-08-01 RX ORDER — PALONOSETRON 0.05 MG/ML
0.25 INJECTION, SOLUTION INTRAVENOUS ONCE
Status: CANCELLED
Start: 2023-08-01 | End: 2023-08-01

## 2023-08-01 RX ORDER — HEPARIN SODIUM (PORCINE) LOCK FLUSH IV SOLN 100 UNIT/ML 100 UNIT/ML
5 SOLUTION INTRAVENOUS
Status: CANCELLED | OUTPATIENT
Start: 2023-08-03

## 2023-08-01 RX ORDER — MEPERIDINE HYDROCHLORIDE 25 MG/ML
25 INJECTION INTRAMUSCULAR; INTRAVENOUS; SUBCUTANEOUS EVERY 30 MIN PRN
Status: CANCELLED | OUTPATIENT
Start: 2023-08-01

## 2023-08-01 RX ORDER — ALBUTEROL SULFATE 90 UG/1
1-2 AEROSOL, METERED RESPIRATORY (INHALATION)
Status: CANCELLED
Start: 2023-08-15

## 2023-08-01 RX ORDER — METHYLPREDNISOLONE SODIUM SUCCINATE 125 MG/2ML
125 INJECTION, POWDER, LYOPHILIZED, FOR SOLUTION INTRAMUSCULAR; INTRAVENOUS
Status: CANCELLED
Start: 2023-08-01

## 2023-08-01 RX ORDER — HEPARIN SODIUM,PORCINE 10 UNIT/ML
5 VIAL (ML) INTRAVENOUS
Status: CANCELLED | OUTPATIENT
Start: 2023-08-17

## 2023-08-01 RX ORDER — LORAZEPAM 2 MG/ML
0.5 INJECTION INTRAMUSCULAR EVERY 4 HOURS PRN
Status: CANCELLED | OUTPATIENT
Start: 2023-08-15

## 2023-08-01 RX ORDER — ALBUTEROL SULFATE 0.83 MG/ML
2.5 SOLUTION RESPIRATORY (INHALATION)
Status: CANCELLED | OUTPATIENT
Start: 2023-08-01

## 2023-08-01 RX ORDER — EPINEPHRINE 1 MG/ML
0.3 INJECTION, SOLUTION INTRAMUSCULAR; SUBCUTANEOUS EVERY 5 MIN PRN
Status: DISCONTINUED | OUTPATIENT
Start: 2023-08-01 | End: 2023-08-01 | Stop reason: HOSPADM

## 2023-08-01 RX ORDER — DIPHENHYDRAMINE HYDROCHLORIDE 50 MG/ML
50 INJECTION INTRAMUSCULAR; INTRAVENOUS
Status: CANCELLED
Start: 2023-08-01

## 2023-08-01 RX ORDER — PALONOSETRON 0.05 MG/ML
0.25 INJECTION, SOLUTION INTRAVENOUS ONCE
Status: CANCELLED
Start: 2023-08-15 | End: 2023-08-15

## 2023-08-01 RX ORDER — EPINEPHRINE 1 MG/ML
0.3 INJECTION, SOLUTION INTRAMUSCULAR; SUBCUTANEOUS EVERY 5 MIN PRN
Status: CANCELLED | OUTPATIENT
Start: 2023-08-15

## 2023-08-01 RX ORDER — LORAZEPAM 2 MG/ML
0.5 INJECTION INTRAMUSCULAR EVERY 4 HOURS PRN
Status: CANCELLED | OUTPATIENT
Start: 2023-08-01

## 2023-08-01 RX ORDER — HEPARIN SODIUM (PORCINE) LOCK FLUSH IV SOLN 100 UNIT/ML 100 UNIT/ML
5 SOLUTION INTRAVENOUS
Status: CANCELLED | OUTPATIENT
Start: 2023-08-17

## 2023-08-01 RX ORDER — ALBUTEROL SULFATE 0.83 MG/ML
2.5 SOLUTION RESPIRATORY (INHALATION)
Status: CANCELLED | OUTPATIENT
Start: 2023-08-15

## 2023-08-01 RX ORDER — MEPERIDINE HYDROCHLORIDE 25 MG/ML
25 INJECTION INTRAMUSCULAR; INTRAVENOUS; SUBCUTANEOUS EVERY 30 MIN PRN
Status: DISCONTINUED | OUTPATIENT
Start: 2023-08-01 | End: 2023-08-01 | Stop reason: HOSPADM

## 2023-08-01 RX ORDER — ALBUTEROL SULFATE 0.83 MG/ML
2.5 SOLUTION RESPIRATORY (INHALATION)
Status: DISCONTINUED | OUTPATIENT
Start: 2023-08-01 | End: 2023-08-01 | Stop reason: HOSPADM

## 2023-08-01 RX ORDER — ATROPINE SULFATE 0.4 MG/ML
0.4 AMPUL (ML) INJECTION
Status: COMPLETED | OUTPATIENT
Start: 2023-08-01 | End: 2023-08-01

## 2023-08-01 RX ADMIN — OXALIPLATIN 200 MG: 5 INJECTION, SOLUTION INTRAVENOUS at 10:13

## 2023-08-01 RX ADMIN — DEXAMETHASONE SODIUM PHOSPHATE: 10 INJECTION, SOLUTION INTRAMUSCULAR; INTRAVENOUS at 09:36

## 2023-08-01 RX ADMIN — ATROPINE SULFATE 0.4 MG: 0.4 INJECTION, SOLUTION INTRAVENOUS at 12:48

## 2023-08-01 RX ADMIN — DEXTROSE MONOHYDRATE 250 ML: 50 INJECTION, SOLUTION INTRAVENOUS at 09:22

## 2023-08-01 RX ADMIN — FAMOTIDINE 20 MG: 10 INJECTION, SOLUTION INTRAVENOUS at 09:33

## 2023-08-01 RX ADMIN — ATROPINE SULFATE 0.4 MG: 0.4 INJECTION, SOLUTION INTRAVENOUS at 12:14

## 2023-08-01 RX ADMIN — PALONOSETRON 0.25 MG: 0.05 INJECTION, SOLUTION INTRAVENOUS at 09:30

## 2023-08-01 RX ADMIN — LORAZEPAM 0.5 MG: 2 INJECTION INTRAMUSCULAR; INTRAVENOUS at 09:22

## 2023-08-01 RX ADMIN — IRINOTECAN HYDROCHLORIDE 340 MG: 20 INJECTION, SOLUTION INTRAVENOUS at 12:19

## 2023-08-01 RX ADMIN — LEUCOVORIN CALCIUM 880 MG: 350 INJECTION, POWDER, LYOPHILIZED, FOR SOLUTION INTRAMUSCULAR; INTRAVENOUS at 10:09

## 2023-08-01 ASSESSMENT — PAIN SCALES - GENERAL: PAINLEVEL: NO PAIN (0)

## 2023-08-01 NOTE — LETTER
"    8/1/2023         RE: Luca Araiza  [address]  [facility] [address]        Dear Colleague,    Thank you for referring your patient, Luca Araiza, to the [facility]. Please see a copy of my visit note below.    Oncology Rooming Note    August 1, 2023 8:31 AM   Luca Araiza is a 42 year old male who presents for:    Chief Complaint   Patient presents with     Oncology Clinic Visit     Malignant neoplasm of ascending colon     Initial Vitals: /77   Pulse 86   Temp 98.2  F (36.8  C)   Resp 16   Ht 1.88 m (6' 2\")   Wt 95.3 kg (210 lb)   SpO2 98%   BMI 26.96 kg/m   Estimated body mass index is 26.96 kg/m  as calculated from the following:    Height as of this encounter: 1.88 m (6' 2\").    Weight as of this encounter: 95.3 kg (210 lb). Body surface area is 2.23 meters squared.  No Pain (0) Comment: Data Unavailable   No LMP for male patient.  Allergies reviewed: Yes  Medications reviewed: Yes    Medications: Medication refills not needed today.  Pharmacy name entered into [employer]: [facility] - [facility] [address]    Clinical concerns:  2 week labs and infusion      Ebony Puentes              [facility] Hematology and Oncology Progress Note    Patient: Luca Araiza  MRN: [id number]  Date of Service: Aug 1, 2023          Reason for Visit    Chief Complaint   Patient presents with     Oncology Clinic Visit     Malignant neoplasm of ascending colon       Assessment and Plan     Cancer Staging   Malignant neoplasm of ascending colon (H)  Staging form: Colon and Rectum, AJCC 8th Edition  - Pathologic stage from 12/21/2022: Stage IIIB (pT3, pN1b, cM0) - Signed by Dayami Pablo APRN CNP on 7/5/2023      1. Colon Cancer, Stage IIIb [id number], adenocarcinoma of cecum, Dx December 2022: pt is on clinical trial due to positive circulating tumor cells on DNA testing. Pt started FOLFIRINOX. The overall plan is to give 12 cycles. Today is cycle 11.  We " will continue today at full dose.  He will return in 2 weeks for his final cycle, cycle 12.      2. Slight reaction to irinotecan: received ativan, pepcid during infusion due to feeling sweaty/runny nose/nausea.  We also changed some of his antiemetics.  Overall it is much better. Feels like he is managing well. Continue to monitor and adjust meds as necessary    3. Early onset cancer: getting genetic testing done.  Patient met with Danielle Orantes one of our genetic counselors.  His genetic testing result is negative.    4.  Neuropathy: This is starting to last longer.  It still is pretty mild in his fingertips and toes but lasting pretty much the whole time.  He is does notice it is getting better the last couple of days but still present.  We will continue at full dose for now.  I did tell him since its not affecting his ADLs or causing significant pain I think we will hopefully be able to finish out full dose.  He has 1 cycle left.  We will recheck next appointment.      ECOG Performance    0 - Independent    Distress Screening (within last 30 days)    No data recorded     Pain  Pain Score: No Pain (0)    Problem List    Patient Active Problem List   Diagnosis     Family history of colon cancer in father     Malignant neoplasm of ascending colon (H)     Iron deficiency anemia due to chronic blood loss     Colon cancer (H)        ______________________________________________________________________________    History of Present Illness    Measurable Disease: None postoperatively.  Patient was diagnosed with a viral illness last year and then was found to be anemic so then had a colonoscopy that showed a cecal colon cancer.    Treatment:   Underwent laparoscopic right hemicolectomy December 21, 2022.  Patient started on clinical trial, .  He was randomized to modified FOLFIRINOX.  He started on March 16, 2023.  Today is cycle 9    Interim History: Patient is here today to continue on chemo.  He is doing ok.   "Overall things are pretty stable.  Does have a lot of fatigue but still continues to work.  He does not have a lot of energy to do much.  Denies any fevers or infectious complaints.  Denies any bone or back pain.  States that the neuropathy in his fingertips and feet is lasting longer and pretty much there the whole time.  He says he has noticed some improvement over the last couple of days but it still slightly numb.  Not impacting his ADLs.  He can do everything he wants.  He does not feel off balance.    Review of Systems    Pertinent items are noted in HPI.    Past History    Past Medical History:   Diagnosis Date     Anemia      Malignant neoplasm of ascending colon (H) 11/07/2022       PHYSICAL EXAM:  /77   Pulse 86   Temp 98.2  F (36.8  C)   Resp 16   Ht 1.88 m (6' 2\")   Wt 95.3 kg (210 lb)   SpO2 98%   BMI 26.96 kg/m    GENERAL: no acute distress. Cooperative in conversation. Here alone today.  HEENT: pupils are equal, round and reactive. Oromucosa is clean and intact. No ulcerations or mucositis noted. No bleeding noted.  RESP: lungs are clear bilaterally per auscultation. Regular respiratory rate. No wheezes or rhonchi.  CV: Regular, rate and rhythm. No murmurs.  ABD: soft, nontender.   MUSCULOSKELETAL: No lower extremity swelling.   NEURO: non focal. Alert and oriented x3.   PSYCH: within normal limits. No depression or anxiety.  SKIN: warm dry intact   LYMPH: no cervical, supraclavicular lymphadenopathy          Lab Results    Recent Results (from the past 168 hour(s))   Comprehensive metabolic panel   Result Value Ref Range    Sodium 141 136 - 145 mmol/L    Potassium 3.8 3.4 - 5.3 mmol/L    Chloride 107 98 - 107 mmol/L    Carbon Dioxide (CO2) 23 22 - 29 mmol/L    Anion Gap 11 7 - 15 mmol/L    Urea Nitrogen 15.8 6.0 - 20.0 mg/dL    Creatinine 1.05 0.67 - 1.17 mg/dL    Calcium 9.8 8.6 - 10.0 mg/dL    Glucose 118 (H) 70 - 99 mg/dL    Alkaline Phosphatase 119 40 - 129 U/L    AST 43 0 - 45 U/L "    ALT 49 0 - 70 U/L    Protein Total 6.8 6.4 - 8.3 g/dL    Albumin 4.2 3.5 - 5.2 g/dL    Bilirubin Total 0.4 <=1.2 mg/dL    GFR Estimate >90 >60 mL/min/1.73m2   Magnesium   Result Value Ref Range    Magnesium 2.0 1.7 - 2.3 mg/dL   CBC with platelets and differential   Result Value Ref Range    WBC Count 3.2 (L) 4.0 - 11.0 10e3/uL    RBC Count 3.93 (L) 4.40 - 5.90 10e6/uL    Hemoglobin 11.5 (L) 13.3 - 17.7 g/dL    Hematocrit 36.2 (L) 40.0 - 53.0 %    MCV 92 78 - 100 fL    MCH 29.3 26.5 - 33.0 pg    MCHC 31.8 31.5 - 36.5 g/dL    RDW 14.9 10.0 - 15.0 %    Platelet Count 111 (L) 150 - 450 10e3/uL    % Neutrophils 57 %    % Lymphocytes 25 %    % Monocytes 16 %    % Eosinophils 1 %    % Basophils 1 %    % Immature Granulocytes 0 %    NRBCs per 100 WBC 0 <1 /100    Absolute Neutrophils 1.8 1.6 - 8.3 10e3/uL    Absolute Lymphocytes 0.8 0.8 - 5.3 10e3/uL    Absolute Monocytes 0.5 0.0 - 1.3 10e3/uL    Absolute Eosinophils 0.0 0.0 - 0.7 10e3/uL    Absolute Basophils 0.0 0.0 - 0.2 10e3/uL    Absolute Immature Granulocytes 0.0 <=0.4 10e3/uL    Absolute NRBCs 0.0 10e3/uL       Imaging    No results found.      Signed by: RAQUEL Grey CNP      Again, thank you for allowing me to participate in the care of your patient.        Sincerely,        RAQUEL Grey CNP

## 2023-08-01 NOTE — PROGRESS NOTES
[facility] Hematology and Oncology Progress Note    Patient: Luca Araiza  MRN: [id number]  Date of Service: Aug 1, 2023          Reason for Visit    Chief Complaint   Patient presents with    Oncology Clinic Visit     Malignant neoplasm of ascending colon       Assessment and Plan     Cancer Staging   Malignant neoplasm of ascending colon (H)  Staging form: Colon and Rectum, AJCC 8th Edition  - Pathologic stage from 12/21/2022: Stage IIIB (pT3, pN1b, cM0) - Signed by Dayami Pablo APRN CNP on 7/5/2023      1. Colon Cancer, Stage IIIb E3M1pN4, adenocarcinoma of cecum, Dx December 2022: pt is on clinical trial due to positive circulating tumor cells on DNA testing. Pt started FOLFIRINOX. The overall plan is to give 12 cycles. Today is cycle 11.  We will continue today at full dose.  He will return in 2 weeks for his final cycle, cycle 12.      2. Slight reaction to irinotecan: received ativan, pepcid during infusion due to feeling sweaty/runny nose/nausea.  We also changed some of his antiemetics.  Overall it is much better. Feels like he is managing well. Continue to monitor and adjust meds as necessary    3. Early onset cancer: getting genetic testing done.  Patient met with Danielle Orantes one of our genetic counselors.  His genetic testing result is negative.    4.  Neuropathy: This is starting to last longer.  It still is pretty mild in his fingertips and toes but lasting pretty much the whole time.  He is does notice it is getting better the last couple of days but still present.  We will continue at full dose for now.  I did tell him since its not affecting his ADLs or causing significant pain I think we will hopefully be able to finish out full dose.  He has 1 cycle left.  We will recheck next appointment.      ECOG Performance    0 - Independent    Distress Screening (within last 30 days)    No data recorded     Pain  Pain Score: No Pain (0)    Problem List    Patient Active Problem List   Diagnosis  "   Family history of colon cancer in father    Malignant neoplasm of ascending colon (H)    Iron deficiency anemia due to chronic blood loss    Colon cancer (H)        ______________________________________________________________________________    History of Present Illness    Measurable Disease: None postoperatively.  Patient was diagnosed with a viral illness last year and then was found to be anemic so then had a colonoscopy that showed a cecal colon cancer.    Treatment:   Underwent laparoscopic right hemicolectomy December 21, 2022.  Patient started on clinical trial, .  He was randomized to modified FOLFIRINOX.  He started on March 16, 2023.  Today is cycle 9    Interim History: Patient is here today to continue on chemo.  He is doing ok.  Overall things are pretty stable.  Does have a lot of fatigue but still continues to work.  He does not have a lot of energy to do much.  Denies any fevers or infectious complaints.  Denies any bone or back pain.  States that the neuropathy in his fingertips and feet is lasting longer and pretty much there the whole time.  He says he has noticed some improvement over the last couple of days but it still slightly numb.  Not impacting his ADLs.  He can do everything he wants.  He does not feel off balance.    Review of Systems    Pertinent items are noted in HPI.    Past History    Past Medical History:   Diagnosis Date    Anemia     Malignant neoplasm of ascending colon (H) 11/07/2022       PHYSICAL EXAM:  /77   Pulse 86   Temp 98.2  F (36.8  C)   Resp 16   Ht 1.88 m (6' 2\")   Wt 95.3 kg (210 lb)   SpO2 98%   BMI 26.96 kg/m    GENERAL: no acute distress. Cooperative in conversation. Here alone today.  HEENT: pupils are equal, round and reactive. Oromucosa is clean and intact. No ulcerations or mucositis noted. No bleeding noted.  RESP: lungs are clear bilaterally per auscultation. Regular respiratory rate. No wheezes or rhonchi.  CV: Regular, rate and " rhythm. No murmurs.  ABD: soft, nontender.   MUSCULOSKELETAL: No lower extremity swelling.   NEURO: non focal. Alert and oriented x3.   PSYCH: within normal limits. No depression or anxiety.  SKIN: warm dry intact   LYMPH: no cervical, supraclavicular lymphadenopathy          Lab Results    Recent Results (from the past 168 hour(s))   Comprehensive metabolic panel   Result Value Ref Range    Sodium 141 136 - 145 mmol/L    Potassium 3.8 3.4 - 5.3 mmol/L    Chloride 107 98 - 107 mmol/L    Carbon Dioxide (CO2) 23 22 - 29 mmol/L    Anion Gap 11 7 - 15 mmol/L    Urea Nitrogen 15.8 6.0 - 20.0 mg/dL    Creatinine 1.05 0.67 - 1.17 mg/dL    Calcium 9.8 8.6 - 10.0 mg/dL    Glucose 118 (H) 70 - 99 mg/dL    Alkaline Phosphatase 119 40 - 129 U/L    AST 43 0 - 45 U/L    ALT 49 0 - 70 U/L    Protein Total 6.8 6.4 - 8.3 g/dL    Albumin 4.2 3.5 - 5.2 g/dL    Bilirubin Total 0.4 <=1.2 mg/dL    GFR Estimate >90 >60 mL/min/1.73m2   Magnesium   Result Value Ref Range    Magnesium 2.0 1.7 - 2.3 mg/dL   CBC with platelets and differential   Result Value Ref Range    WBC Count 3.2 (L) 4.0 - 11.0 10e3/uL    RBC Count 3.93 (L) 4.40 - 5.90 10e6/uL    Hemoglobin 11.5 (L) 13.3 - 17.7 g/dL    Hematocrit 36.2 (L) 40.0 - 53.0 %    MCV 92 78 - 100 fL    MCH 29.3 26.5 - 33.0 pg    MCHC 31.8 31.5 - 36.5 g/dL    RDW 14.9 10.0 - 15.0 %    Platelet Count 111 (L) 150 - 450 10e3/uL    % Neutrophils 57 %    % Lymphocytes 25 %    % Monocytes 16 %    % Eosinophils 1 %    % Basophils 1 %    % Immature Granulocytes 0 %    NRBCs per 100 WBC 0 <1 /100    Absolute Neutrophils 1.8 1.6 - 8.3 10e3/uL    Absolute Lymphocytes 0.8 0.8 - 5.3 10e3/uL    Absolute Monocytes 0.5 0.0 - 1.3 10e3/uL    Absolute Eosinophils 0.0 0.0 - 0.7 10e3/uL    Absolute Basophils 0.0 0.0 - 0.2 10e3/uL    Absolute Immature Granulocytes 0.0 <=0.4 10e3/uL    Absolute NRBCs 0.0 10e3/uL       Imaging    No results found.      Signed by: RAQUEL Grey CNP

## 2023-08-01 NOTE — PROGRESS NOTES
Infusion Nursing Note:  Luca Araiza presents today for cycle 11 of his chemotherapy regime.    Patient seen by provider today: Yes: Dayami Pablo CNP   present during visit today: Not Applicable.    Note: Luca was educated on his plan of care and each medication given was reviewed prior to administration.  He received lorazepam with other premedications.  He also received atropine prior to Irinotecan as well as 30 minutes into Irinotecan infusion as he has with past cycles.  5FU home infusion was started at 1554.  He will return on Thursday at 1200 for pump removal and vad flush.      Intravenous Access:  Implanted Port.    Treatment Conditions:  Lab Results   Component Value Date    HGB 11.5 (L) 08/01/2023    WBC 3.2 (L) 08/01/2023    ANEUTAUTO 1.8 08/01/2023     (L) 08/01/2023        Lab Results   Component Value Date     08/01/2023    POTASSIUM 3.8 08/01/2023    MAG 2.0 08/01/2023    CR 1.05 08/01/2023    RUPERTO 9.8 08/01/2023    BILITOTAL 0.4 08/01/2023    ALBUMIN 4.2 08/01/2023    ALT 49 08/01/2023    AST 43 08/01/2023       Results reviewed, labs MET treatment parameters, ok to proceed with treatment.      Post Infusion Assessment:  Patient tolerated infusion without incident.  Blood return noted pre and post infusion.  Site patent and intact, free from redness, edema or discomfort.  No evidence of extravasations.       Discharge Plan:   Patient discharged in stable condition accompanied by: self.  Departure Mode: Ambulatory.      Bailey Sena RN

## 2023-08-01 NOTE — PROGRESS NOTES
"Oncology Rooming Note    August 1, 2023 8:31 AM   Luca Araiza is a 42 year old male who presents for:    Chief Complaint   Patient presents with    Oncology Clinic Visit     Malignant neoplasm of ascending colon     Initial Vitals: /77   Pulse 86   Temp 98.2  F (36.8  C)   Resp 16   Ht 1.88 m (6' 2\")   Wt 95.3 kg (210 lb)   SpO2 98%   BMI 26.96 kg/m   Estimated body mass index is 26.96 kg/m  as calculated from the following:    Height as of this encounter: 1.88 m (6' 2\").    Weight as of this encounter: 95.3 kg (210 lb). Body surface area is 2.23 meters squared.  No Pain (0) Comment: Data Unavailable   No LMP for male patient.  Allergies reviewed: Yes  Medications reviewed: Yes    Medications: Medication refills not needed today.  Pharmacy name entered into [employer]: [facility] - [place], [place] - [facility]    Clinical concerns:  2 week labs and infusion      Ebony Puentes            "

## 2023-08-03 ENCOUNTER — INFUSION THERAPY VISIT (OUTPATIENT)
Dept: INFUSION THERAPY | Facility: HOSPITAL | Age: 43
End: 2023-08-03
Attending: INTERNAL MEDICINE
Payer: COMMERCIAL

## 2023-08-03 VITALS
RESPIRATION RATE: 18 BRPM | DIASTOLIC BLOOD PRESSURE: 70 MMHG | HEART RATE: 73 BPM | TEMPERATURE: 98.2 F | SYSTOLIC BLOOD PRESSURE: 138 MMHG | OXYGEN SATURATION: 98 %

## 2023-08-03 DIAGNOSIS — D50.0 IRON DEFICIENCY ANEMIA DUE TO CHRONIC BLOOD LOSS: ICD-10-CM

## 2023-08-03 DIAGNOSIS — C18.2 MALIGNANT NEOPLASM OF ASCENDING COLON (H): ICD-10-CM

## 2023-08-03 DIAGNOSIS — Z80.0 FAMILY HISTORY OF COLON CANCER IN FATHER: Primary | ICD-10-CM

## 2023-08-03 PROCEDURE — 250N000011 HC RX IP 250 OP 636: Mod: JZ | Performed by: INTERNAL MEDICINE

## 2023-08-03 RX ORDER — HEPARIN SODIUM (PORCINE) LOCK FLUSH IV SOLN 100 UNIT/ML 100 UNIT/ML
5 SOLUTION INTRAVENOUS
Status: DISCONTINUED | OUTPATIENT
Start: 2023-08-03 | End: 2023-08-03 | Stop reason: HOSPADM

## 2023-08-03 RX ORDER — HEPARIN SODIUM (PORCINE) LOCK FLUSH IV SOLN 100 UNIT/ML 100 UNIT/ML
5 SOLUTION INTRAVENOUS
Status: CANCELLED | OUTPATIENT
Start: 2023-08-03

## 2023-08-03 RX ADMIN — Medication 5 ML: at 12:27

## 2023-08-03 ASSESSMENT — PAIN SCALES - GENERAL: PAINLEVEL: NO PAIN (0)

## 2023-08-03 NOTE — PROGRESS NOTES
Pt here for C series disconnect. Pt denies new complaint, vss.  Bulb removed, flush and port flushed per policy and deaccessed. Pt d.c ambulatory to lobby alone and is aware of treatment plan.

## 2023-08-15 ENCOUNTER — ONCOLOGY VISIT (OUTPATIENT)
Dept: ONCOLOGY | Facility: HOSPITAL | Age: 43
End: 2023-08-15
Attending: INTERNAL MEDICINE
Payer: COMMERCIAL

## 2023-08-15 ENCOUNTER — LAB (OUTPATIENT)
Dept: INFUSION THERAPY | Facility: HOSPITAL | Age: 43
End: 2023-08-15
Attending: INTERNAL MEDICINE
Payer: COMMERCIAL

## 2023-08-15 ENCOUNTER — DOCUMENTATION ONLY (OUTPATIENT)
Dept: ONCOLOGY | Facility: HOSPITAL | Age: 43
End: 2023-08-15

## 2023-08-15 VITALS
OXYGEN SATURATION: 98 % | BODY MASS INDEX: 27.32 KG/M2 | TEMPERATURE: 98.5 F | SYSTOLIC BLOOD PRESSURE: 123 MMHG | HEIGHT: 74 IN | RESPIRATION RATE: 18 BRPM | WEIGHT: 212.9 LBS | DIASTOLIC BLOOD PRESSURE: 73 MMHG | HEART RATE: 93 BPM

## 2023-08-15 DIAGNOSIS — C18.2 MALIGNANT NEOPLASM OF ASCENDING COLON (H): Primary | ICD-10-CM

## 2023-08-15 LAB
ALBUMIN SERPL BCG-MCNC: 4.2 G/DL (ref 3.5–5.2)
ALP SERPL-CCNC: 138 U/L (ref 40–129)
ALT SERPL W P-5'-P-CCNC: 56 U/L (ref 0–70)
ANION GAP SERPL CALCULATED.3IONS-SCNC: 10 MMOL/L (ref 7–15)
AST SERPL W P-5'-P-CCNC: 50 U/L (ref 0–45)
BASOPHILS # BLD AUTO: 0 10E3/UL (ref 0–0.2)
BASOPHILS NFR BLD AUTO: 1 %
BILIRUB SERPL-MCNC: 0.3 MG/DL
BUN SERPL-MCNC: 14.9 MG/DL (ref 6–20)
CALCIUM SERPL-MCNC: 9.6 MG/DL (ref 8.6–10)
CEA SERPL-MCNC: 2.3 NG/ML
CHLORIDE SERPL-SCNC: 109 MMOL/L (ref 98–107)
CREAT SERPL-MCNC: 1.02 MG/DL (ref 0.67–1.17)
DEPRECATED HCO3 PLAS-SCNC: 24 MMOL/L (ref 22–29)
EOSINOPHIL # BLD AUTO: 0 10E3/UL (ref 0–0.7)
EOSINOPHIL NFR BLD AUTO: 1 %
ERYTHROCYTE [DISTWIDTH] IN BLOOD BY AUTOMATED COUNT: 15 % (ref 10–15)
GFR SERPL CREATININE-BSD FRML MDRD: >90 ML/MIN/1.73M2
GLUCOSE SERPL-MCNC: 121 MG/DL (ref 70–99)
HCT VFR BLD AUTO: 35.6 % (ref 40–53)
HGB BLD-MCNC: 11.7 G/DL (ref 13.3–17.7)
IMM GRANULOCYTES # BLD: 0 10E3/UL
IMM GRANULOCYTES NFR BLD: 0 %
LYMPHOCYTES # BLD AUTO: 0.8 10E3/UL (ref 0.8–5.3)
LYMPHOCYTES NFR BLD AUTO: 27 %
MAGNESIUM SERPL-MCNC: 2.1 MG/DL (ref 1.7–2.3)
MCH RBC QN AUTO: 29.9 PG (ref 26.5–33)
MCHC RBC AUTO-ENTMCNC: 32.9 G/DL (ref 31.5–36.5)
MCV RBC AUTO: 91 FL (ref 78–100)
MONOCYTES # BLD AUTO: 0.5 10E3/UL (ref 0–1.3)
MONOCYTES NFR BLD AUTO: 16 %
NEUTROPHILS # BLD AUTO: 1.7 10E3/UL (ref 1.6–8.3)
NEUTROPHILS NFR BLD AUTO: 55 %
NRBC # BLD AUTO: 0 10E3/UL
NRBC BLD AUTO-RTO: 0 /100
PLATELET # BLD AUTO: 99 10E3/UL (ref 150–450)
POTASSIUM SERPL-SCNC: 3.9 MMOL/L (ref 3.4–5.3)
PROT SERPL-MCNC: 6.7 G/DL (ref 6.4–8.3)
RBC # BLD AUTO: 3.91 10E6/UL (ref 4.4–5.9)
SODIUM SERPL-SCNC: 143 MMOL/L (ref 136–145)
WBC # BLD AUTO: 3.1 10E3/UL (ref 4–11)

## 2023-08-15 PROCEDURE — 85025 COMPLETE CBC W/AUTO DIFF WBC: CPT | Performed by: INTERNAL MEDICINE

## 2023-08-15 PROCEDURE — 96417 CHEMO IV INFUS EACH ADDL SEQ: CPT

## 2023-08-15 PROCEDURE — 96415 CHEMO IV INFUSION ADDL HR: CPT

## 2023-08-15 PROCEDURE — 96413 CHEMO IV INFUSION 1 HR: CPT

## 2023-08-15 PROCEDURE — G0463 HOSPITAL OUTPT CLINIC VISIT: HCPCS | Mod: 25 | Performed by: INTERNAL MEDICINE

## 2023-08-15 PROCEDURE — 80053 COMPREHEN METABOLIC PANEL: CPT | Performed by: INTERNAL MEDICINE

## 2023-08-15 PROCEDURE — 99214 OFFICE O/P EST MOD 30 MIN: CPT | Performed by: INTERNAL MEDICINE

## 2023-08-15 PROCEDURE — 96375 TX/PRO/DX INJ NEW DRUG ADDON: CPT

## 2023-08-15 PROCEDURE — 96367 TX/PROPH/DG ADDL SEQ IV INF: CPT

## 2023-08-15 PROCEDURE — 96368 THER/DIAG CONCURRENT INF: CPT

## 2023-08-15 PROCEDURE — 96372 THER/PROPH/DIAG INJ SC/IM: CPT | Performed by: NURSE PRACTITIONER

## 2023-08-15 PROCEDURE — 82378 CARCINOEMBRYONIC ANTIGEN: CPT | Performed by: INTERNAL MEDICINE

## 2023-08-15 PROCEDURE — 250N000011 HC RX IP 250 OP 636: Mod: JZ | Performed by: NURSE PRACTITIONER

## 2023-08-15 PROCEDURE — 83735 ASSAY OF MAGNESIUM: CPT | Performed by: INTERNAL MEDICINE

## 2023-08-15 PROCEDURE — G0498 CHEMO EXTEND IV INFUS W/PUMP: HCPCS

## 2023-08-15 PROCEDURE — 258N000003 HC RX IP 258 OP 636: Performed by: NURSE PRACTITIONER

## 2023-08-15 PROCEDURE — 250N000011 HC RX IP 250 OP 636: Mod: JZ | Performed by: INTERNAL MEDICINE

## 2023-08-15 RX ORDER — METHYLPREDNISOLONE SODIUM SUCCINATE 125 MG/2ML
125 INJECTION, POWDER, LYOPHILIZED, FOR SOLUTION INTRAMUSCULAR; INTRAVENOUS
Status: DISCONTINUED | OUTPATIENT
Start: 2023-08-15 | End: 2023-08-15 | Stop reason: HOSPADM

## 2023-08-15 RX ORDER — ALBUTEROL SULFATE 90 UG/1
1-2 AEROSOL, METERED RESPIRATORY (INHALATION)
Status: DISCONTINUED | OUTPATIENT
Start: 2023-08-15 | End: 2023-08-15 | Stop reason: HOSPADM

## 2023-08-15 RX ORDER — ATROPINE SULFATE 0.4 MG/ML
0.4 AMPUL (ML) INJECTION
Status: DISCONTINUED | OUTPATIENT
Start: 2023-08-15 | End: 2023-08-15 | Stop reason: HOSPADM

## 2023-08-15 RX ORDER — PALONOSETRON 0.05 MG/ML
0.25 INJECTION, SOLUTION INTRAVENOUS ONCE
Status: COMPLETED | OUTPATIENT
Start: 2023-08-15 | End: 2023-08-15

## 2023-08-15 RX ORDER — DIPHENHYDRAMINE HYDROCHLORIDE 50 MG/ML
50 INJECTION INTRAMUSCULAR; INTRAVENOUS
Status: DISCONTINUED | OUTPATIENT
Start: 2023-08-15 | End: 2023-08-15 | Stop reason: HOSPADM

## 2023-08-15 RX ORDER — LORAZEPAM 2 MG/ML
0.5 INJECTION INTRAMUSCULAR ONCE
Status: COMPLETED | OUTPATIENT
Start: 2023-08-15 | End: 2023-08-15

## 2023-08-15 RX ORDER — EPINEPHRINE 1 MG/ML
0.3 INJECTION, SOLUTION INTRAMUSCULAR; SUBCUTANEOUS EVERY 5 MIN PRN
Status: DISCONTINUED | OUTPATIENT
Start: 2023-08-15 | End: 2023-08-15 | Stop reason: HOSPADM

## 2023-08-15 RX ORDER — MEPERIDINE HYDROCHLORIDE 25 MG/ML
25 INJECTION INTRAMUSCULAR; INTRAVENOUS; SUBCUTANEOUS EVERY 30 MIN PRN
Status: DISCONTINUED | OUTPATIENT
Start: 2023-08-15 | End: 2023-08-15 | Stop reason: HOSPADM

## 2023-08-15 RX ORDER — ALBUTEROL SULFATE 0.83 MG/ML
2.5 SOLUTION RESPIRATORY (INHALATION)
Status: DISCONTINUED | OUTPATIENT
Start: 2023-08-15 | End: 2023-08-15 | Stop reason: HOSPADM

## 2023-08-15 RX ADMIN — LEUCOVORIN CALCIUM 880 MG: 350 INJECTION, POWDER, LYOPHILIZED, FOR SOLUTION INTRAMUSCULAR; INTRAVENOUS at 10:08

## 2023-08-15 RX ADMIN — DEXTROSE MONOHYDRATE 250 ML: 50 INJECTION, SOLUTION INTRAVENOUS at 08:58

## 2023-08-15 RX ADMIN — OXALIPLATIN 200 MG: 5 INJECTION, SOLUTION, CONCENTRATE INTRAVENOUS at 10:12

## 2023-08-15 RX ADMIN — IRINOTECAN HYDROCHLORIDE 340 MG: 20 INJECTION, SOLUTION INTRAVENOUS at 12:12

## 2023-08-15 RX ADMIN — LORAZEPAM 0.5 MG: 2 INJECTION INTRAMUSCULAR; INTRAVENOUS at 09:19

## 2023-08-15 RX ADMIN — ATROPINE SULFATE 0.4 MG: 0.4 INJECTION, SOLUTION INTRAVENOUS at 12:47

## 2023-08-15 RX ADMIN — DEXAMETHASONE SODIUM PHOSPHATE: 10 INJECTION, SOLUTION INTRAMUSCULAR; INTRAVENOUS at 09:40

## 2023-08-15 RX ADMIN — ATROPINE SULFATE 0.4 MG: 0.4 INJECTION, SOLUTION INTRAVENOUS at 12:10

## 2023-08-15 RX ADMIN — FAMOTIDINE 20 MG: 10 INJECTION, SOLUTION INTRAVENOUS at 09:35

## 2023-08-15 RX ADMIN — PALONOSETRON 0.25 MG: 0.05 INJECTION, SOLUTION INTRAVENOUS at 09:23

## 2023-08-15 ASSESSMENT — PAIN SCALES - GENERAL: PAINLEVEL: NO PAIN (0)

## 2023-08-15 NOTE — LETTER
"    8/15/2023         RE: Luca Araiza  [address]  [facility] [address]        Dear Colleague,    Thank you for referring your patient, Luca Araiza, to [facility]. Please see a copy of my visit note below.    Oncology Rooming Note    August 15, 2023 8:25 AM   Luca Araiza is a 42 year old male who presents for:    Chief Complaint   Patient presents with     Oncology Clinic Visit     2 week return Malignant neoplasm of ascending colon, review labs and infusion      Initial Vitals: /73 (BP Location: Left arm, Patient Position: Sitting, Cuff Size: Adult Large)   Pulse 93   Temp 98.5  F (36.9  C) (Oral)   Resp 18   Ht 1.88 m (6' 2.02\")   Wt 96.6 kg (212 lb 14.4 oz)   SpO2 98%   BMI 27.32 kg/m   Estimated body mass index is 27.32 kg/m  as calculated from the following:    Height as of this encounter: 1.88 m (6' 2.02\").    Weight as of this encounter: 96.6 kg (212 lb 14.4 oz). Body surface area is 2.25 meters squared.  No Pain (0) Comment: Data Unavailable   No LMP for male patient.  Allergies reviewed: Yes  Medications reviewed: Yes    Medications: Medication refills not needed today.  Pharmacy name entered into [employer]: [facility] - [facility] [address]    Clinical concerns: 2 week return Malignant neoplasm of ascending colon, review labs and infusion       Kelley Connolly CMA              [facility] Hematology and Oncology Consult Note    Patient: Luca Araiza  MRN: [id number]  Date of Service: Aug 15, 2023       Reason for Visit    Follow-up for colon cancer    January 2023:    T3N1B, stage IIIb adenocarcinoma of the cecum status post laparoscopic right colectomy, December 21, 2022  CT DNA positive, March 2023  Tumor is MMR intact  Early onset of colon cancer with family history    Continues to tolerate chemotherapy fairly well.  Grade 1 neuropathy is noted.  We will continue with 12th and final cycle of treatment today without any dose " changes.  Congratulated him on completing the treatments.  Recommend to keep Port-A-Cath in place for some time.  We will see him back in about 4 weeks with labs and CT scans per protocol.  Reviewed typical follow-up after that.  Questions answered.    Plan: Continue with 12th and final cycle of FOLFIRINOX treatment today  Follow-up in 4 weeks      Measurable disease: None postoperatively    Current therapy: Modified FOLFIRINOX day 1 cycle 1, March 14, 2023  Last cycle #12 today August 15, 2023                ECOG Performance    0 - Independent    Encounter Diagnoses:    Problem List Items Addressed This Visit          Digestive    Malignant neoplasm of ascending colon (H) - Primary    Relevant Orders    Check Out Appointment Request           ______________________________________________________________________________    Staging History   Cancer Staging   Malignant neoplasm of ascending colon (H)  Staging form: Colon and Rectum, AJCC 8th Edition  - Pathologic stage from 12/21/2022: Stage IIIB (pT3, pN1b, cM0) - Signed by Dayami Pablo APRN CNP on 7/5/2023        History    Luca is here again for follow-up.  He has continued treatment every 2 weeks and is here for 12th and final cycle today.  Does have some mild persistent neuropathy which is grade 1.  No fever or mouth sores.  No shortness of breath or cough.  No other new symptoms or problems.  ECOG status 0.        March 14, 2023:    Luca is here for reevaluation.  Seen about 6 weeks ago.  His CT DNA test was positive and he was randomized to arm for the clinical trial.  He has had Port-A-Cath placed.  No new symptoms or problems.  ECOG status 0.    January 2023:  Mr. Luca Araiza is a 42 year old no significant past medical history who is been diagnosed and undergone laparoscopic hemicolectomy for colon cancer.  He was in [place] last August and had significant illness with diarrhea and vomiting.  He then had physical which showed that he was anemic  and subsequently had colonoscopy showing cecal colon cancer.  He underwent laparoscopic right hemicolectomy on December 21 and has recovered well from this.    No other previous medical history.  He had left ACL repair surgery as a teenager.  No other hospitalizations.  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  Does not smoke and does not drink alcohol.    Father had colon cancer in his mid 60s.  Mother had breast cancer in her late 50s.  No siblings or kids with cancer.  Maternal grandfather had colon cancer in his 60s.  Paternal grandmother had cancer type unknown.            Review of systems.  No fever or night sweats.  No loss of weight.  No lumps or bumps anywhere.  No unusual headaches or eyesight issues.  No dizziness.  No bleeding from the nose.  No sores in the mouth. No problems with swallowing.  No chest pain. No shortness of breath. No cough.  No abdominal pain. No nausea or vomiting.  No diarrhea or constipation.  No blood in stool or black colored stools.  No problems passing urine.  No numbness or tingling in hands or feet.  No skin rashes.  A 14 point review of systems is otherwise negative.        Past History    Past Medical History:   Diagnosis Date     Anemia      Malignant neoplasm of ascending colon (H) 11/07/2022     Past Surgical History:   Procedure Laterality Date     COLONOSCOPY       HEMICOLECTOMY, RT/LT Right      IR CHEST PORT PLACEMENT > 5 YRS OF AGE  3/9/2023     REMOVAL OF SPERM DUCT(S)      Description: Surgery Of Male Genitalia Vasectomy;  Recorded: 12/27/2011;  Comments: 12/27/2011     [facility] REPAIR CRUCIATE LIGAMENT,KNEE      Description: Primary Repair Of Knee Ligament Cruciate Anterior;  Recorded: 07/20/2009;     Family History   Problem Relation Age of Onset     Breast Cancer Mother      Colon Cancer Father      Anesthesia Reaction No family hx of      Venous thrombosis No family hx of      Social History  "    Socioeconomic History     Marital status:      Spouse name: None     Number of children: None     Years of education: None     Highest education level: None   Tobacco Use     Smoking status: Never     Smokeless tobacco: Never   Substance and Sexual Activity     Alcohol use: Not Currently     Drug use: Never     Sexual activity: Yes     Partners: Female     Birth control/protection: Male Surgical   Other Topics Concern     Parent/sibling w/ CABG, MI or angioplasty before 65F 55M? No         Allergies    No Known Allergies        Physical Exam    /73 (BP Location: Left arm, Patient Position: Sitting, Cuff Size: Adult Large)   Pulse 93   Temp 98.5  F (36.9  C) (Oral)   Resp 18   Ht 1.88 m (6' 2.02\")   Wt 96.6 kg (212 lb 14.4 oz)   SpO2 98%   BMI 27.32 kg/m        GENERAL: Alert and oriented to time place and person. Seated comfortably. In no distress.    HEAD: Atraumatic and normocephalic.    EYES: LUIS ANGEL, EOMI.  No pallor.  No icterus.    Oral cavity: no mucosal lesion or tonsillar enlargement.    NECK: supple. JVP normal.  No thyroid enlargement.    LYMPH NODES: No palpable, cervical, axillary or inguinal lymphadenopathy.    CHEST: clear to auscultation bilaterally.  Resonant to percussion throughout bilaterally.  Symmetrical breath movements bilaterally.    CVS: S1 and S2 are heard. Regular rate and rhythm.  No murmur or gallop or rub heard.  No peripheral edema.    ABDOMEN: Soft. Not tender. Not distended.  No palpable hepatomegaly or splenomegaly.  No other mass palpable.  Bowel sounds heard.    EXTREMITIES: Warm.    SKIN: no rash, or bruising or purpura.  Has a full head of hair.    Lab Results    Preoperative CEA was 2.6 and 2.7    Imaging Results    CT and MRI reviewed with no evidence of metastatic disease    No results found.      Signed by: Sekou Hall MD      Again, thank you for allowing me to participate in the care of your patient.        Sincerely,        Sekou DECKER" MD Isabel

## 2023-08-15 NOTE — PROGRESS NOTES
"Oncology Rooming Note    August 15, 2023 8:25 AM   Luca Araiza is a 42 year old male who presents for:    Chief Complaint   Patient presents with    Oncology Clinic Visit     2 week return Malignant neoplasm of ascending colon, review labs and infusion      Initial Vitals: /73 (BP Location: Left arm, Patient Position: Sitting, Cuff Size: Adult Large)   Pulse 93   Temp 98.5  F (36.9  C) (Oral)   Resp 18   Ht 1.88 m (6' 2.02\")   Wt 96.6 kg (212 lb 14.4 oz)   SpO2 98%   BMI 27.32 kg/m   Estimated body mass index is 27.32 kg/m  as calculated from the following:    Height as of this encounter: 1.88 m (6' 2.02\").    Weight as of this encounter: 96.6 kg (212 lb 14.4 oz). Body surface area is 2.25 meters squared.  No Pain (0) Comment: Data Unavailable   No LMP for male patient.  Allergies reviewed: Yes  Medications reviewed: Yes    Medications: Medication refills not needed today.  Pharmacy name entered into [facility]: [facility] - [place], [place] - [phone]    Clinical concerns: 2 week return Malignant neoplasm of ascending colon, review labs and infusion       Kelley Connolly CMA            "

## 2023-08-15 NOTE — PROGRESS NOTES
Infusion Nursing Note:  Luca Araiza presents today for cycle 12 day 1 treatment with Folfirinox chemotherapy.    Patient seen by provider today: Yes: Dr siu   present during visit today: Not Applicable.    Note: Luca was educated on his plan of care and each medication given was reviewed prior to administration.  Ativan and famotidine given with pre-medication.  Atropine given prior to irinotecan as well as 30 minutes after infusion started.  This is what has been done in past infusions and has worked well for him. Home infusion of 5FU started at 1400.  He will return Friday at 1200 for pump removal and vad flush.      Intravenous Access:  Implanted Port.    Treatment Conditions:  Lab Results   Component Value Date    HGB 11.7 (L) 08/15/2023    WBC 3.1 (L) 08/15/2023    ANEUTAUTO 1.7 08/15/2023    PLT 99 (L) 08/15/2023        Lab Results   Component Value Date     08/15/2023    POTASSIUM 3.9 08/15/2023    MAG 2.1 08/15/2023    CR 1.02 08/15/2023    RUPERTO 9.6 08/15/2023    BILITOTAL 0.3 08/15/2023    ALBUMIN 4.2 08/15/2023    ALT 56 08/15/2023    AST 50 (H) 08/15/2023       Results reviewed, labs MET treatment parameters, ok to proceed with treatment.      Post Infusion Assessment:  Patient tolerated infusion without incident.  Blood return noted pre and post infusion.  Site patent and intact, free from redness, edema or discomfort.  No evidence of extravasations.       Discharge Plan:   Patient discharged in stable condition accompanied by: wife.  Departure Mode: Ambulatory.      Bailey Sena RN

## 2023-08-15 NOTE — PROGRESS NOTES
Met with patient and provider for C12 visit.  Labs and Ae's reviewed and ok to dose per protocol.  Today is the last infusion and then pt will return in 4 weeks for appt and CT scan prior.      JEANNINE [employer]

## 2023-08-15 NOTE — PROGRESS NOTES
[facility] Hematology and Oncology Consult Note    Patient: Luca Araiza  MRN: [id number]  Date of Service: Aug 15, 2023       Reason for Visit    Follow-up for colon cancer    January 2023:    T3N1B, stage IIIb adenocarcinoma of the cecum status post laparoscopic right colectomy, December 21, 2022  CT DNA positive, March 2023  Tumor is MMR intact  Early onset of colon cancer with family history    Continues to tolerate chemotherapy fairly well.  Grade 1 neuropathy is noted.  We will continue with 12th and final cycle of treatment today without any dose changes.  Congratulated him on completing the treatments.  Recommend to keep Port-A-Cath in place for some time.  We will see him back in about 4 weeks with labs and CT scans per protocol.  Reviewed typical follow-up after that.  Questions answered.    Plan: Continue with 12th and final cycle of FOLFIRINOX treatment today  Follow-up in 4 weeks      Measurable disease: None postoperatively    Current therapy: Modified FOLFIRINOX day 1 cycle 1, March 14, 2023  Last cycle #12 today August 15, 2023                ECOG Performance    0 - Independent    Encounter Diagnoses:    Problem List Items Addressed This Visit          Digestive    Malignant neoplasm of ascending colon (H) - Primary    Relevant Orders    Check Out Appointment Request           ______________________________________________________________________________    Staging History   Cancer Staging   Malignant neoplasm of ascending colon (H)  Staging form: Colon and Rectum, AJCC 8th Edition  - Pathologic stage from 12/21/2022: Stage IIIB (pT3, pN1b, cM0) - Signed by Dayami Pablo APRN CNP on 7/5/2023        History    Luca is here again for follow-up.  He has continued treatment every 2 weeks and is here for 12th and final cycle today.  Does have some mild persistent neuropathy which is grade 1.  No fever or mouth sores.  No shortness of breath or cough.  No other new symptoms or problems.  ECOG  status 0.        March 14, 2023:    Luca is here for reevaluation.  Seen about 6 weeks ago.  His CT DNA test was positive and he was randomized to arm for the clinical trial.  He has had Port-A-Cath placed.  No new symptoms or problems.  ECOG status 0.    January 2023:  Mr. Luca Araiza is a 42 year old no significant past medical history who is been diagnosed and undergone laparoscopic hemicolectomy for colon cancer.  He was in [place] last August and had significant illness with diarrhea and vomiting.  He then had physical which showed that he was anemic and subsequently had colonoscopy showing cecal colon cancer.  He underwent laparoscopic right hemicolectomy on December 21 and has recovered well from this.    No other previous medical history.  He had left ACL repair surgery as a teenager.  No other hospitalizations.  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  Does not smoke and does not drink alcohol.    Father had colon cancer in his mid 60s.  Mother had breast cancer in her late 50s.  No siblings or kids with cancer.  Maternal grandfather had colon cancer in his 60s.  Paternal grandmother had cancer type unknown.            Review of systems.  No fever or night sweats.  No loss of weight.  No lumps or bumps anywhere.  No unusual headaches or eyesight issues.  No dizziness.  No bleeding from the nose.  No sores in the mouth. No problems with swallowing.  No chest pain. No shortness of breath. No cough.  No abdominal pain. No nausea or vomiting.  No diarrhea or constipation.  No blood in stool or black colored stools.  No problems passing urine.  No numbness or tingling in hands or feet.  No skin rashes.  A 14 point review of systems is otherwise negative.        Past History    Past Medical History:   Diagnosis Date    Anemia     Malignant neoplasm of ascending colon (H) 11/07/2022     Past Surgical History:   Procedure Laterality Date     "COLONOSCOPY      HEMICOLECTOMY, RT/LT Right     IR CHEST PORT PLACEMENT > 5 YRS OF AGE  3/9/2023    REMOVAL OF SPERM DUCT(S)      Description: Surgery Of Male Genitalia Vasectomy;  Recorded: 12/27/2011;  Comments: 12/27/2011    [facility] REPAIR CRUCIATE LIGAMENT,KNEE      Description: Primary Repair Of Knee Ligament Cruciate Anterior;  Recorded: 07/20/2009;     Family History   Problem Relation Age of Onset    Breast Cancer Mother     Colon Cancer Father     Anesthesia Reaction No family hx of     Venous thrombosis No family hx of      Social History     Socioeconomic History    Marital status:      Spouse name: None    Number of children: None    Years of education: None    Highest education level: None   Tobacco Use    Smoking status: Never    Smokeless tobacco: Never   Substance and Sexual Activity    Alcohol use: Not Currently    Drug use: Never    Sexual activity: Yes     Partners: Female     Birth control/protection: Male Surgical   Other Topics Concern    Parent/sibling w/ CABG, MI or angioplasty before 65F 55M? No         Allergies    No Known Allergies        Physical Exam    /73 (BP Location: Left arm, Patient Position: Sitting, Cuff Size: Adult Large)   Pulse 93   Temp 98.5  F (36.9  C) (Oral)   Resp 18   Ht 1.88 m (6' 2.02\")   Wt 96.6 kg (212 lb 14.4 oz)   SpO2 98%   BMI 27.32 kg/m        GENERAL: Alert and oriented to time place and person. Seated comfortably. In no distress.    HEAD: Atraumatic and normocephalic.    EYES: LUIS ANGEL, EOMI.  No pallor.  No icterus.    Oral cavity: no mucosal lesion or tonsillar enlargement.    NECK: supple. JVP normal.  No thyroid enlargement.    LYMPH NODES: No palpable, cervical, axillary or inguinal lymphadenopathy.    CHEST: clear to auscultation bilaterally.  Resonant to percussion throughout bilaterally.  Symmetrical breath movements bilaterally.    CVS: S1 and S2 are heard. Regular rate and rhythm.  No murmur or gallop or rub heard.  No peripheral " edema.    ABDOMEN: Soft. Not tender. Not distended.  No palpable hepatomegaly or splenomegaly.  No other mass palpable.  Bowel sounds heard.    EXTREMITIES: Warm.    SKIN: no rash, or bruising or purpura.  Has a full head of hair.    Lab Results    Preoperative CEA was 2.6 and 2.7    Imaging Results    CT and MRI reviewed with no evidence of metastatic disease    No results found.      Signed by: Sekou Hall MD

## 2023-08-17 ENCOUNTER — INFUSION THERAPY VISIT (OUTPATIENT)
Dept: INFUSION THERAPY | Facility: HOSPITAL | Age: 43
End: 2023-08-17
Attending: INTERNAL MEDICINE
Payer: COMMERCIAL

## 2023-08-17 VITALS
OXYGEN SATURATION: 98 % | TEMPERATURE: 97.8 F | RESPIRATION RATE: 18 BRPM | HEART RATE: 75 BPM | DIASTOLIC BLOOD PRESSURE: 78 MMHG | SYSTOLIC BLOOD PRESSURE: 126 MMHG

## 2023-08-17 DIAGNOSIS — C18.2 MALIGNANT NEOPLASM OF ASCENDING COLON (H): Primary | ICD-10-CM

## 2023-08-17 PROCEDURE — 250N000011 HC RX IP 250 OP 636: Mod: JZ | Performed by: NURSE PRACTITIONER

## 2023-08-17 RX ORDER — HEPARIN SODIUM (PORCINE) LOCK FLUSH IV SOLN 100 UNIT/ML 100 UNIT/ML
5 SOLUTION INTRAVENOUS
Status: DISCONTINUED | OUTPATIENT
Start: 2023-08-17 | End: 2023-08-17 | Stop reason: HOSPADM

## 2023-08-17 RX ADMIN — Medication 5 ML: at 12:09

## 2023-09-08 ENCOUNTER — HOSPITAL ENCOUNTER (OUTPATIENT)
Dept: CT IMAGING | Facility: HOSPITAL | Age: 43
Discharge: HOME | End: 2023-09-08
Attending: NURSE PRACTITIONER | Admitting: NURSE PRACTITIONER
Payer: COMMERCIAL

## 2023-09-08 DIAGNOSIS — C18.2 MALIGNANT NEOPLASM OF ASCENDING COLON (H): ICD-10-CM

## 2023-09-08 PROCEDURE — 74177 CT ABD & PELVIS W/CONTRAST: CPT

## 2023-09-08 PROCEDURE — 250N000011 HC RX IP 250 OP 636: Mod: JZ | Performed by: NURSE PRACTITIONER

## 2023-09-08 RX ORDER — HEPARIN SODIUM (PORCINE) LOCK FLUSH IV SOLN 100 UNIT/ML 100 UNIT/ML
500 SOLUTION INTRAVENOUS ONCE
Status: COMPLETED | OUTPATIENT
Start: 2023-09-08 | End: 2023-09-08

## 2023-09-08 RX ORDER — IOPAMIDOL 755 MG/ML
90 INJECTION, SOLUTION INTRAVASCULAR ONCE
Status: COMPLETED | OUTPATIENT
Start: 2023-09-08 | End: 2023-09-08

## 2023-09-08 RX ADMIN — Medication 500 UNITS: at 08:00

## 2023-09-08 RX ADMIN — IOPAMIDOL 90 ML: 755 INJECTION, SOLUTION INTRAVENOUS at 07:51

## 2023-09-12 ENCOUNTER — LAB (OUTPATIENT)
Dept: INFUSION THERAPY | Facility: HOSPITAL | Age: 43
End: 2023-09-12
Attending: INTERNAL MEDICINE
Payer: COMMERCIAL

## 2023-09-12 ENCOUNTER — DOCUMENTATION ONLY (OUTPATIENT)
Dept: ONCOLOGY | Facility: HOSPITAL | Age: 43
End: 2023-09-12

## 2023-09-12 ENCOUNTER — ONCOLOGY VISIT (OUTPATIENT)
Dept: ONCOLOGY | Facility: HOSPITAL | Age: 43
End: 2023-09-12
Attending: INTERNAL MEDICINE
Payer: COMMERCIAL

## 2023-09-12 VITALS
RESPIRATION RATE: 16 BRPM | OXYGEN SATURATION: 99 % | DIASTOLIC BLOOD PRESSURE: 79 MMHG | TEMPERATURE: 98 F | SYSTOLIC BLOOD PRESSURE: 132 MMHG | HEART RATE: 75 BPM

## 2023-09-12 DIAGNOSIS — Z80.0 FAMILY HISTORY OF COLON CANCER IN FATHER: Primary | ICD-10-CM

## 2023-09-12 DIAGNOSIS — C18.2 MALIGNANT NEOPLASM OF ASCENDING COLON (H): ICD-10-CM

## 2023-09-12 DIAGNOSIS — D50.0 IRON DEFICIENCY ANEMIA DUE TO CHRONIC BLOOD LOSS: ICD-10-CM

## 2023-09-12 LAB
ALBUMIN SERPL BCG-MCNC: 4.3 G/DL (ref 3.5–5.2)
ALP SERPL-CCNC: 164 U/L (ref 40–129)
ALT SERPL W P-5'-P-CCNC: 47 U/L (ref 0–70)
ANION GAP SERPL CALCULATED.3IONS-SCNC: 11 MMOL/L (ref 7–15)
AST SERPL W P-5'-P-CCNC: 51 U/L (ref 0–45)
BASOPHILS # BLD AUTO: 0.1 10E3/UL (ref 0–0.2)
BASOPHILS NFR BLD AUTO: 1 %
BILIRUB SERPL-MCNC: 0.4 MG/DL
BUN SERPL-MCNC: 17.4 MG/DL (ref 6–20)
CALCIUM SERPL-MCNC: 9.3 MG/DL (ref 8.6–10)
CHLORIDE SERPL-SCNC: 106 MMOL/L (ref 98–107)
CREAT SERPL-MCNC: 1 MG/DL (ref 0.67–1.17)
DEPRECATED HCO3 PLAS-SCNC: 24 MMOL/L (ref 22–29)
EGFRCR SERPLBLD CKD-EPI 2021: >90 ML/MIN/1.73M2
EOSINOPHIL # BLD AUTO: 0.1 10E3/UL (ref 0–0.7)
EOSINOPHIL NFR BLD AUTO: 2 %
ERYTHROCYTE [DISTWIDTH] IN BLOOD BY AUTOMATED COUNT: 15.2 % (ref 10–15)
GLUCOSE SERPL-MCNC: 90 MG/DL (ref 70–99)
HCT VFR BLD AUTO: 36.4 % (ref 40–53)
HGB BLD-MCNC: 11.8 G/DL (ref 13.3–17.7)
IMM GRANULOCYTES # BLD: 0.1 10E3/UL
IMM GRANULOCYTES NFR BLD: 1 %
LYMPHOCYTES # BLD AUTO: 1.3 10E3/UL (ref 0.8–5.3)
LYMPHOCYTES NFR BLD AUTO: 27 %
MAGNESIUM SERPL-MCNC: 2.2 MG/DL (ref 1.7–2.3)
MCH RBC QN AUTO: 29.5 PG (ref 26.5–33)
MCHC RBC AUTO-ENTMCNC: 32.4 G/DL (ref 31.5–36.5)
MCV RBC AUTO: 91 FL (ref 78–100)
MONOCYTES # BLD AUTO: 0.8 10E3/UL (ref 0–1.3)
MONOCYTES NFR BLD AUTO: 16 %
NEUTROPHILS # BLD AUTO: 2.6 10E3/UL (ref 1.6–8.3)
NEUTROPHILS NFR BLD AUTO: 53 %
NRBC # BLD AUTO: 0 10E3/UL
NRBC BLD AUTO-RTO: 0 /100
PLATELET # BLD AUTO: 171 10E3/UL (ref 150–450)
POTASSIUM SERPL-SCNC: 4 MMOL/L (ref 3.4–5.3)
PROT SERPL-MCNC: 7.1 G/DL (ref 6.4–8.3)
RBC # BLD AUTO: 4 10E6/UL (ref 4.4–5.9)
SODIUM SERPL-SCNC: 141 MMOL/L (ref 136–145)
WBC # BLD AUTO: 4.9 10E3/UL (ref 4–11)

## 2023-09-12 PROCEDURE — 99214 OFFICE O/P EST MOD 30 MIN: CPT | Performed by: INTERNAL MEDICINE

## 2023-09-12 PROCEDURE — 83735 ASSAY OF MAGNESIUM: CPT | Performed by: NURSE PRACTITIONER

## 2023-09-12 PROCEDURE — 80053 COMPREHEN METABOLIC PANEL: CPT | Performed by: NURSE PRACTITIONER

## 2023-09-12 PROCEDURE — G0463 HOSPITAL OUTPT CLINIC VISIT: HCPCS | Performed by: INTERNAL MEDICINE

## 2023-09-12 PROCEDURE — 85025 COMPLETE CBC W/AUTO DIFF WBC: CPT | Performed by: NURSE PRACTITIONER

## 2023-09-12 PROCEDURE — 250N000011 HC RX IP 250 OP 636: Mod: JZ | Performed by: INTERNAL MEDICINE

## 2023-09-12 PROCEDURE — 36591 DRAW BLOOD OFF VENOUS DEVICE: CPT

## 2023-09-12 PROCEDURE — 84999 UNLISTED CHEMISTRY PROCEDURE: CPT | Performed by: NURSE PRACTITIONER

## 2023-09-12 RX ORDER — HEPARIN SODIUM (PORCINE) LOCK FLUSH IV SOLN 100 UNIT/ML 100 UNIT/ML
5 SOLUTION INTRAVENOUS
Status: DISCONTINUED | OUTPATIENT
Start: 2023-09-12 | End: 2023-09-12 | Stop reason: HOSPADM

## 2023-09-12 RX ORDER — HEPARIN SODIUM (PORCINE) LOCK FLUSH IV SOLN 100 UNIT/ML 100 UNIT/ML
5 SOLUTION INTRAVENOUS
Status: CANCELLED | OUTPATIENT
Start: 2023-09-12

## 2023-09-12 RX ADMIN — Medication 5 ML: at 13:28

## 2023-09-12 NOTE — LETTER
"    9/12/2023         RE: Luca Araiza  [address]  [place] [address]        Dear Colleague,    Thank you for referring your patient, Luca Araiza, to the [facility]. Please see a copy of my visit note below.    Oncology Rooming Note    September 12, 2023 1:55 PM   Luca Araiza is a 42 year old male who presents for:    No chief complaint on file.Malignant neoplasm of ascending colon     Initial Vitals: /79   Pulse 75   Temp 98  F (36.7  C)   Resp 16   SpO2 99%  Estimated body mass index is 27.32 kg/m  as calculated from the following:    Height as of 8/15/23: 1.88 m (6' 2.02\").    Weight as of 8/15/23: 96.6 kg (212 lb 14.4 oz). There is no height or weight on file to calculate BSA.  Data Unavailable Comment: Data Unavailable   No LMP for male patient.  Allergies reviewed: Yes  Medications reviewed: Yes    Medications: Medication refills not needed today.  Pharmacy name entered into [employer]: [facility] - [place] [id number]     Clinical concerns: none      Ann Matias RN               [facility] Hematology and Oncology Consult Note    Patient: Luca Araiza  MRN: [id number]  Date of Service: Sep 12, 2023       Reason for Visit    Follow-up for colon cancer    January 2023:    T3N1B, stage IIIb adenocarcinoma of the cecum status post laparoscopic right colectomy, December 21, 2022  CT DNA positive, March 2023  Tumor is MMR intact  Early onset of colon cancer with family history  Neuropathy    Patient recovering well after treatment.  CT scan showed no recurrence of disease.  Lab work slowly returning to normal.  He is improving in terms of his energy level.  He does have numbness but not much tingling related to neuropathy from the chemotherapy.    Reassured him that things are going well.  We will schedule III month follow-up with recheck of the CEA level.  He will be due for colonoscopy around that time as well.    He will call if " tingling symptoms are becoming more predominant as we could try gabapentin for his neuropathy.  Questions answered.    Plan: Follow-up in 3 months with check of CEA    Measurable disease: None postoperatively    Current therapy: Modified FOLFIRINOX day 1 cycle 1, March 14, 2023  Last cycle #12 today August 15, 2023                ECOG Performance    0 - Independent    Encounter Diagnoses:    Problem List Items Addressed This Visit          Digestive    Malignant neoplasm of ascending colon (H)    Relevant Orders    Check Out Appointment Request    CEA           ______________________________________________________________________________    Staging History   Cancer Staging   Malignant neoplasm of ascending colon (H)  Staging form: Colon and Rectum, AJCC 8th Edition  - Pathologic stage from 12/21/2022: Stage IIIB (pT3, pN1b, cM0) - Signed by Dayami Pablo APRN CNP on 7/5/2023        History    Luca is here again for follow-up.  Seen a month ago.  Completed chemotherapy at that time.  Is noting numbness in the fingers especially.  Does not affect function.  Otherwise improving with his energy and activity level.  ECOG status 0.  No other new concerns.      March 14, 2023:    Luca is here for reevaluation.  Seen about 6 weeks ago.  His CT DNA test was positive and he was randomized to arm for the clinical trial.  He has had Port-A-Cath placed.  No new symptoms or problems.  ECOG status 0.    January 2023:  Mr. Luca Araiza is a 42 year old no significant past medical history who is been diagnosed and undergone laparoscopic hemicolectomy for colon cancer.  He was in [place] last August and had significant illness with diarrhea and vomiting.  He then had physical which showed that he was anemic and subsequently had colonoscopy showing cecal colon cancer.  He underwent laparoscopic right hemicolectomy on December 21 and has recovered well from this.    No other previous medical history.  He had left ACL repair  surgery as a teenager.  No other hospitalizations.  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  Does not smoke and does not drink alcohol.    Father had colon cancer in his mid 60s.  Mother had breast cancer in her late 50s.  No siblings or kids with cancer.  Maternal grandfather had colon cancer in his 60s.  Paternal grandmother had cancer type unknown.            Review of systems.  No fever or night sweats.  No loss of weight.  No lumps or bumps anywhere.  No unusual headaches or eyesight issues.  No dizziness.  No bleeding from the nose.  No sores in the mouth. No problems with swallowing.  No chest pain. No shortness of breath. No cough.  No abdominal pain. No nausea or vomiting.  No diarrhea or constipation.  No blood in stool or black colored stools.  No problems passing urine.  No numbness or tingling in hands or feet.  No skin rashes.  A 14 point review of systems is otherwise negative.        Past History    Past Medical History:   Diagnosis Date     Anemia      Malignant neoplasm of ascending colon (H) 11/07/2022     Past Surgical History:   Procedure Laterality Date     COLONOSCOPY       HEMICOLECTOMY, RT/LT Right      IR CHEST PORT PLACEMENT > 5 YRS OF AGE  3/9/2023     REMOVAL OF SPERM DUCT(S)      Description: Surgery Of Male Genitalia Vasectomy;  Recorded: 12/27/2011;  Comments: 12/27/2011     [facility] REPAIR CRUCIATE LIGAMENT,KNEE      Description: Primary Repair Of Knee Ligament Cruciate Anterior;  Recorded: 07/20/2009;     Family History   Problem Relation Age of Onset     Breast Cancer Mother      Colon Cancer Father      Anesthesia Reaction No family hx of      Venous thrombosis No family hx of      Social History     Socioeconomic History     Marital status:      Spouse name: None     Number of children: None     Years of education: None     Highest education level: None   Tobacco Use     Smoking status: Never     Smokeless  tobacco: Never   Substance and Sexual Activity     Alcohol use: Not Currently     Drug use: Never     Sexual activity: Yes     Partners: Female     Birth control/protection: Male Surgical   Other Topics Concern     Parent/sibling w/ CABG, MI or angioplasty before 65F 55M? No         Allergies    No Known Allergies        Physical Exam    /79   Pulse 75   Temp 98  F (36.7  C)   Resp 16   SpO2 99%       GENERAL: Alert and oriented to time place and person. Seated comfortably. In no distress.    HEAD: Atraumatic and normocephalic.    EYES: LUIS ANGEL, EOMI.  No pallor.  No icterus.    Oral cavity: no mucosal lesion or tonsillar enlargement.    NECK: supple. JVP normal.  No thyroid enlargement.    LYMPH NODES: No palpable, cervical, axillary or inguinal lymphadenopathy.    CHEST: clear to auscultation bilaterally.  Resonant to percussion throughout bilaterally.  Symmetrical breath movements bilaterally.    CVS: S1 and S2 are heard. Regular rate and rhythm.  No murmur or gallop or rub heard.  No peripheral edema.    ABDOMEN: Soft. Not tender. Not distended.  No palpable hepatomegaly or splenomegaly.  No other mass palpable.  Bowel sounds heard.    EXTREMITIES: Warm.    SKIN: no rash, or bruising or purpura.  Has a full head of hair.    Lab Results    Preoperative CEA was 2.6 and 2.7    Imaging Results    CT and MRI reviewed with no evidence of metastatic disease    CT Chest/Abdomen/Pelvis w Contrast    Result Date: 9/9/2023  EXAM: CT CHEST/ABDOMEN/PELVIS WITH CONTRAST LOCATION: [facility] DATE: 09/08/2023 INDICATION: Colon cancer. On clinical trial. COMPARISON: CT chest, abdomen, pelvis, 01/30/2023. TECHNIQUE: CT scan of the chest, abdomen, and pelvis was performed following injection of IV contrast. Multiplanar reformats were obtained. Dose reduction techniques were used. CONTRAST: Isovue 370, 90 mL. FINDINGS: LUNGS AND PLEURA: Stable 2 mm left upper lobe nodule image 50 series 4. No  pleural effusion or acute airspace opacities. MEDIASTINUM/AXILLAE: Right IJ chemoport is in place with tip in the high right atrium. No lymphadenopathy. Heart size normal. The thoracic aorta is normal in caliber. CORONARY ARTERY CALCIFICATION: None. HEPATOBILIARY: There is a 9 mm low-dense lesion involving segment 7 right hepatic lobe image 122 of series 3 which is unchanged from comparison examination. An additional vague area of low-attenuation more inferiorly within the right hepatic lobe noted on prior CT scan is not apparent. A 7 x 4 mm subcapsular low-dense lesion image 124 series 3 is not significantly changed. PANCREAS: Normal. SPLEEN: Normal. ADRENAL GLANDS: Normal. KIDNEYS/BLADDER: Normal. BOWEL: Surgical changes of a right hemicolectomy. No bowel obstruction or bowel wall thickening. LYMPH NODES: Normal. VASCULATURE: Unremarkable. PELVIC ORGANS: Normal. MUSCULOSKELETAL: No suspicious osseous lesions.     IMPRESSION: 1.  Stable 9 mm low-dense lesion within segment 7 right hepatic lobe as well as an unchanged 7 x 4 mm subcapsular low-dense lesion more inferiorly within the right hepatic lobe. These are not significantly changed. A third vague area of low-attenuation noted previously within the right hepatic lobe is not apparent on this examination. 2.  Stable 2 mm left upper lobe pulmonary nodule, likely benign.        Signed by: Sekou Hall MD      Again, thank you for allowing me to participate in the care of your patient.        Sincerely,        Sekou Hall MD

## 2023-09-12 NOTE — PROGRESS NOTES
Luca LAW Jose G, 42 year old, male, arrived to Chemo Infusion for lab draw/port flush. Port easily accessed, labs drawn, and flushed with 20ml Normal Saline and 500 units Heparin. Port de-accessed and site covered with gauze and papertape. Luca Araiza discharged to [facility] ambulatory and stable.

## 2023-09-12 NOTE — PROGRESS NOTES
"Oncology Rooming Note    September 12, 2023 1:55 PM   Luca Araiza is a 42 year old male who presents for:    No chief complaint on file.Malignant neoplasm of ascending colon     Initial Vitals: /79   Pulse 75   Temp 98  F (36.7  C)   Resp 16   SpO2 99%  Estimated body mass index is 27.32 kg/m  as calculated from the following:    Height as of 8/15/23: 1.88 m (6' 2.02\").    Weight as of 8/15/23: 96.6 kg (212 lb 14.4 oz). There is no height or weight on file to calculate BSA.  Data Unavailable Comment: Data Unavailable   No LMP for male patient.  Allergies reviewed: Yes  Medications reviewed: Yes    Medications: Medication refills not needed today.  Pharmacy name entered into [facility]: [facility] - [place], [place] - [address]    Clinical concerns: none      Ann Matias RN             "

## 2023-09-12 NOTE — PROGRESS NOTES
[facility] Hematology and Oncology Consult Note    Patient: Luca Araiza  MRN: [id number]  Date of Service: Sep 12, 2023       Reason for Visit    Follow-up for colon cancer    January 2023:    T3N1B, stage IIIb adenocarcinoma of the cecum status post laparoscopic right colectomy, December 21, 2022  CT DNA positive, March 2023  Tumor is MMR intact  Early onset of colon cancer with family history  Neuropathy    Patient recovering well after treatment.  CT scan showed no recurrence of disease.  Lab work slowly returning to normal.  He is improving in terms of his energy level.  He does have numbness but not much tingling related to neuropathy from the chemotherapy.    Reassured him that things are going well.  We will schedule III month follow-up with recheck of the CEA level.  He will be due for colonoscopy around that time as well.    He will call if tingling symptoms are becoming more predominant as we could try gabapentin for his neuropathy.  Questions answered.    Plan: Follow-up in 3 months with check of CEA    Measurable disease: None postoperatively    Current therapy: Modified FOLFIRINOX day 1 cycle 1, March 14, 2023  Last cycle #12 today August 15, 2023                ECOG Performance    0 - Independent    Encounter Diagnoses:    Problem List Items Addressed This Visit          Digestive    Malignant neoplasm of ascending colon (H)    Relevant Orders    Check Out Appointment Request    CEA           ______________________________________________________________________________    Staging History   Cancer Staging   Malignant neoplasm of ascending colon (H)  Staging form: Colon and Rectum, AJCC 8th Edition  - Pathologic stage from 12/21/2022: Stage IIIB (pT3, pN1b, cM0) - Signed by Dayami Pablo APRN CNP on 7/5/2023        History    Luca is here again for follow-up.  Seen a month ago.  Completed chemotherapy at that time.  Is noting numbness in the fingers especially.  Does not affect function.   Otherwise improving with his energy and activity level.  ECOG status 0.  No other new concerns.      March 14, 2023:    Luca is here for reevaluation.  Seen about 6 weeks ago.  His CT DNA test was positive and he was randomized to arm for the clinical trial.  He has had Port-A-Cath placed.  No new symptoms or problems.  ECOG status 0.    January 2023:  Mr. Luca Araiza is a 42 year old no significant past medical history who is been diagnosed and undergone laparoscopic hemicolectomy for colon cancer.  He was in [place] last August and had significant illness with diarrhea and vomiting.  He then had physical which showed that he was anemic and subsequently had colonoscopy showing cecal colon cancer.  He underwent laparoscopic right hemicolectomy on December 21 and has recovered well from this.    No other previous medical history.  He had left ACL repair surgery as a teenager.  No other hospitalizations.  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  Does not smoke and does not drink alcohol.    Father had colon cancer in his mid 60s.  Mother had breast cancer in her late 50s.  No siblings or kids with cancer.  Maternal grandfather had colon cancer in his 60s.  Paternal grandmother had cancer type unknown.            Review of systems.  No fever or night sweats.  No loss of weight.  No lumps or bumps anywhere.  No unusual headaches or eyesight issues.  No dizziness.  No bleeding from the nose.  No sores in the mouth. No problems with swallowing.  No chest pain. No shortness of breath. No cough.  No abdominal pain. No nausea or vomiting.  No diarrhea or constipation.  No blood in stool or black colored stools.  No problems passing urine.  No numbness or tingling in hands or feet.  No skin rashes.  A 14 point review of systems is otherwise negative.        Past History    Past Medical History:   Diagnosis Date    Anemia     Malignant neoplasm of  ascending colon (H) 11/07/2022     Past Surgical History:   Procedure Laterality Date    COLONOSCOPY      HEMICOLECTOMY, RT/LT Right     IR CHEST PORT PLACEMENT > 5 YRS OF AGE  3/9/2023    REMOVAL OF SPERM DUCT(S)      Description: Surgery Of Male Genitalia Vasectomy;  Recorded: 12/27/2011;  Comments: 12/27/2011    ZZC REPAIR CRUCIATE LIGAMENT,KNEE      Description: Primary Repair Of Knee Ligament Cruciate Anterior;  Recorded: 07/20/2009;     Family History   Problem Relation Age of Onset    Breast Cancer Mother     Colon Cancer Father     Anesthesia Reaction No family hx of     Venous thrombosis No family hx of      Social History     Socioeconomic History    Marital status:      Spouse name: None    Number of children: None    Years of education: None    Highest education level: None   Tobacco Use    Smoking status: Never    Smokeless tobacco: Never   Substance and Sexual Activity    Alcohol use: Not Currently    Drug use: Never    Sexual activity: Yes     Partners: Female     Birth control/protection: Male Surgical   Other Topics Concern    Parent/sibling w/ CABG, MI or angioplasty before 65F 55M? No         Allergies    No Known Allergies        Physical Exam    /79   Pulse 75   Temp 98  F (36.7  C)   Resp 16   SpO2 99%       GENERAL: Alert and oriented to time place and person. Seated comfortably. In no distress.    HEAD: Atraumatic and normocephalic.    EYES: LUIS ANGEL, EOMI.  No pallor.  No icterus.    Oral cavity: no mucosal lesion or tonsillar enlargement.    NECK: supple. JVP normal.  No thyroid enlargement.    LYMPH NODES: No palpable, cervical, axillary or inguinal lymphadenopathy.    CHEST: clear to auscultation bilaterally.  Resonant to percussion throughout bilaterally.  Symmetrical breath movements bilaterally.    CVS: S1 and S2 are heard. Regular rate and rhythm.  No murmur or gallop or rub heard.  No peripheral edema.    ABDOMEN: Soft. Not tender. Not distended.  No palpable  hepatomegaly or splenomegaly.  No other mass palpable.  Bowel sounds heard.    EXTREMITIES: Warm.    SKIN: no rash, or bruising or purpura.  Has a full head of hair.    Lab Results    Preoperative CEA was 2.6 and 2.7    Imaging Results    CT and MRI reviewed with no evidence of metastatic disease    CT Chest/Abdomen/Pelvis w Contrast    Result Date: 9/9/2023  EXAM: CT CHEST/ABDOMEN/PELVIS WITH CONTRAST LOCATION: [facility] DATE: 09/08/2023 INDICATION: Colon cancer. On clinical trial. COMPARISON: CT chest, abdomen, pelvis, 01/30/2023. TECHNIQUE: CT scan of the chest, abdomen, and pelvis was performed following injection of IV contrast. Multiplanar reformats were obtained. Dose reduction techniques were used. CONTRAST: Isovue 370, 90 mL. FINDINGS: LUNGS AND PLEURA: Stable 2 mm left upper lobe nodule image 50 series 4. No pleural effusion or acute airspace opacities. MEDIASTINUM/AXILLAE: Right IJ chemoport is in place with tip in the high right atrium. No lymphadenopathy. Heart size normal. The thoracic aorta is normal in caliber. CORONARY ARTERY CALCIFICATION: None. HEPATOBILIARY: There is a 9 mm low-dense lesion involving segment 7 right hepatic lobe image 122 of series 3 which is unchanged from comparison examination. An additional vague area of low-attenuation more inferiorly within the right hepatic lobe noted on prior CT scan is not apparent. A 7 x 4 mm subcapsular low-dense lesion image 124 series 3 is not significantly changed. PANCREAS: Normal. SPLEEN: Normal. ADRENAL GLANDS: Normal. KIDNEYS/BLADDER: Normal. BOWEL: Surgical changes of a right hemicolectomy. No bowel obstruction or bowel wall thickening. LYMPH NODES: Normal. VASCULATURE: Unremarkable. PELVIC ORGANS: Normal. MUSCULOSKELETAL: No suspicious osseous lesions.     IMPRESSION: 1.  Stable 9 mm low-dense lesion within segment 7 right hepatic lobe as well as an unchanged 7 x 4 mm subcapsular low-dense lesion more inferiorly  within the right hepatic lobe. These are not significantly changed. A third vague area of low-attenuation noted previously within the right hepatic lobe is not apparent on this examination. 2.  Stable 2 mm left upper lobe pulmonary nodule, likely benign.        Signed by: Sekou Hall MD

## 2023-11-02 ENCOUNTER — TELEPHONE (OUTPATIENT)
Dept: GASTROENTEROLOGY | Facility: CLINIC | Age: 43
End: 2023-11-02
Payer: COMMERCIAL

## 2023-11-02 NOTE — TELEPHONE ENCOUNTER
"    Endoscopy Scheduling Screen    Have you had a positive Covid test in the last 14 days?  No    Are you active on MyChart?   Yes    What insurance is in the chart?  Other:  [facility]    Ordering/Referring Provider: Nahum Contreras MD in [facility]    (If ordering provider performs procedure, schedule with ordering provider unless otherwise instructed. )    BMI: Estimated body mass index is 27.32 kg/m  as calculated from the following:    Height as of 8/15/23: 1.88 m (6' 2.02\").    Weight as of 8/15/23: 96.6 kg (212 lb 14.4 oz).     Sedation Ordered  moderate sedation.   If patient BMI > 50 do not schedule in [facility].    If patient BMI > 45 do not schedule at [facility].    Are you taking methadone or Suboxone?  No    Are you taking any prescription medications for pain 3 or more times per week?   No    Do you have a history of malignant hyperthermia or adverse reaction to anesthesia?  No    (Females) Are you currently pregnant?   No     Have you been diagnosed or told you have pulmonary hypertension?   No    Do you have an LVAD?  No    Have you been told you have moderate to severe sleep apnea?  No    Have you been told you have COPD, asthma, or any other lung disease?  No    Do you have any heart conditions?  No     Have you ever had an organ transplant?   No    Have you ever had or are you awaiting a heart or lung transplant?   No    Have you had a stroke or transient ischemic attack (TIA aka \"mini stroke\" in the last 6 months?   No    Have you been diagnosed with or been told you have cirrhosis of the liver?   No    Are you currently on dialysis?   No    Do you need assistance transferring?   No    BMI: Estimated body mass index is 27.32 kg/m  as calculated from the following:    Height as of 8/15/23: 1.88 m (6' 2.02\").    Weight as of 8/15/23: 96.6 kg (212 lb 14.4 oz).     Is patients BMI > 40 and scheduling location [facility]?  No    Do you take an injectable medication for weight loss or diabetes (excluding " insulin)?  No    Do you take the medication Naltrexone?  No    Do you take blood thinners?  No       Prep   Are you currently on dialysis or do you have chronic kidney disease?  No    Do you have a diagnosis of diabetes?  No    Do you have a diagnosis of cystic fibrosis (CF)?  No    On a regular basis do you go 3 -5 days between bowel movements?  No    BMI > 40?  No    Preferred Pharmacy:    [facility]       Final Scheduling Details   Colonoscopy prep sent?  Standard MiraLAX    Procedure scheduled  Colonoscopy    Surgeon:  Diane- per order     Date of procedure:   01/15/2024    Pre-OP / PAC:   No - Not required for this site.    Location  [facility] - Per order.    Sedation   Moderate Sedation - Per order.      Patient Reminders:   You will receive a call from a Nurse to review instructions and health history.  This assessment must be completed prior to your procedure.  Failure to complete the Nurse assessment may result in the procedure being cancelled.      On the day of your procedure, please designate an adult(s) who can drive you home stay with you for the next 24 hours. The medicines used in the exam will make you sleepy. You will not be able to drive.      You cannot take public transportation, ride share services, or non-medical taxi service without a responsible caregiver.  Medical transport services are allowed with the requirement that a responsible caregiver will receive you at your destination.  We require that drivers and caregivers are confirmed prior to your procedure.

## 2023-11-20 ENCOUNTER — PATIENT OUTREACH (OUTPATIENT)
Dept: GASTROENTEROLOGY | Facility: CLINIC | Age: 43
End: 2023-11-20
Payer: COMMERCIAL

## 2023-12-02 ENCOUNTER — HEALTH MAINTENANCE LETTER (OUTPATIENT)
Age: 43
End: 2023-12-02

## 2023-12-12 ENCOUNTER — LAB (OUTPATIENT)
Dept: INFUSION THERAPY | Facility: HOSPITAL | Age: 43
End: 2023-12-12
Attending: INTERNAL MEDICINE
Payer: COMMERCIAL

## 2023-12-12 ENCOUNTER — ONCOLOGY VISIT (OUTPATIENT)
Dept: ONCOLOGY | Facility: HOSPITAL | Age: 43
End: 2023-12-12
Attending: INTERNAL MEDICINE
Payer: COMMERCIAL

## 2023-12-12 VITALS
HEIGHT: 74 IN | OXYGEN SATURATION: 97 % | TEMPERATURE: 98.4 F | WEIGHT: 219 LBS | HEART RATE: 81 BPM | SYSTOLIC BLOOD PRESSURE: 115 MMHG | RESPIRATION RATE: 20 BRPM | DIASTOLIC BLOOD PRESSURE: 70 MMHG | BODY MASS INDEX: 28.11 KG/M2

## 2023-12-12 DIAGNOSIS — C18.2 MALIGNANT NEOPLASM OF ASCENDING COLON (H): Primary | ICD-10-CM

## 2023-12-12 DIAGNOSIS — D50.0 IRON DEFICIENCY ANEMIA DUE TO CHRONIC BLOOD LOSS: ICD-10-CM

## 2023-12-12 DIAGNOSIS — C18.2 MALIGNANT NEOPLASM OF ASCENDING COLON (H): ICD-10-CM

## 2023-12-12 DIAGNOSIS — Z80.0 FAMILY HISTORY OF COLON CANCER IN FATHER: Primary | ICD-10-CM

## 2023-12-12 PROCEDURE — 99214 OFFICE O/P EST MOD 30 MIN: CPT | Performed by: INTERNAL MEDICINE

## 2023-12-12 PROCEDURE — 250N000011 HC RX IP 250 OP 636: Mod: JZ | Performed by: INTERNAL MEDICINE

## 2023-12-12 PROCEDURE — 99213 OFFICE O/P EST LOW 20 MIN: CPT | Performed by: INTERNAL MEDICINE

## 2023-12-12 RX ORDER — HEPARIN SODIUM (PORCINE) LOCK FLUSH IV SOLN 100 UNIT/ML 100 UNIT/ML
5 SOLUTION INTRAVENOUS
Status: DISCONTINUED | OUTPATIENT
Start: 2023-12-12 | End: 2023-12-12 | Stop reason: HOSPADM

## 2023-12-12 RX ORDER — HEPARIN SODIUM (PORCINE) LOCK FLUSH IV SOLN 100 UNIT/ML 100 UNIT/ML
5 SOLUTION INTRAVENOUS
Status: CANCELLED | OUTPATIENT
Start: 2023-12-12

## 2023-12-12 RX ADMIN — Medication 5 ML: at 08:43

## 2023-12-12 ASSESSMENT — PAIN SCALES - GENERAL: PAINLEVEL: NO PAIN (0)

## 2023-12-12 NOTE — LETTER
12/12/2023         RE: Luca Araiza  [address]  [place] [address]        Dear Colleague,    Thank you for referring your patient, Luca Araiza, to the [facility] [place]. Please see a copy of my visit note below.    [facility] Hematology and Oncology Consult Note    Patient: Luca Araiza  MRN: [id number]  Date of Service: Dec 12, 2023       Reason for Visit    Follow-up for colon cancer    January 2023:    T3N1B, stage IIIb adenocarcinoma of the cecum status post laparoscopic right colectomy, December 21, 2022  CT DNA positive, March 2023  Tumor is MMR intact  Early onset of colon cancer with family history, genetic testing negative, 47 genes analyzed, April 2023  Neuropathy    Patient is doing well with no clinical concern for recurrence of his cancer.  CEA level pending.  Still with some neuropathy symptoms.  Scheduled for follow-up colonoscopy next month.    Will see him back in 3 months with repeat labs and scans.    Plan: As above      Measurable disease: None postoperatively    Current therapy: Modified FOLFIRINOX day 1 cycle 1, March 14, 2023  Last cycle #12 today August 15, 2023                ECOG Performance    0 - Independent    Encounter Diagnoses:    Problem List Items Addressed This Visit          Digestive    Malignant neoplasm of ascending colon (H) - Primary    Relevant Orders    Check Out Appointment Request           ______________________________________________________________________________    Staging History   Cancer Staging   Malignant neoplasm of ascending colon (H)  Staging form: Colon and Rectum, AJCC 8th Edition  - Pathologic stage from 12/21/2022: Stage IIIB (pT3, pN1b, cM0) - Signed by Dayami Pablo APRN CNP on 7/5/2023        History    Luca is here again for follow-up.  Seen 3 months ago.  Recovering well after chemotherapy.  Still with neuropathy symptoms.  Otherwise fine.  Will have colonoscopy next week.  ECOG 0.    March 14,  2023:    Luca is here for reevaluation.  Seen about 6 weeks ago.  His CT DNA test was positive and he was randomized to arm for the clinical trial.  He has had Port-A-Cath placed.  No new symptoms or problems.  ECOG status 0.    January 2023:  Mr. Luca Araiza is a 42 year old no significant past medical history who is been diagnosed and undergone laparoscopic hemicolectomy for colon cancer.  He was in [place] last August and had significant illness with diarrhea and vomiting.  He then had physical which showed that he was anemic and subsequently had colonoscopy showing cecal colon cancer.  He underwent laparoscopic right hemicolectomy on December 21 and has recovered well from this.    No other previous medical history.  He had left ACL repair surgery as a teenager.  No other hospitalizations.  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  Does not smoke and does not drink alcohol.    Father had colon cancer in his mid 60s.  Mother had breast cancer in her late 50s.  No siblings or kids with cancer.  Maternal grandfather had colon cancer in his 60s.  Paternal grandmother had cancer type unknown.            Review of systems.  No fever or night sweats.  No loss of weight.  No lumps or bumps anywhere.  No unusual headaches or eyesight issues.  No dizziness.  No bleeding from the nose.  No sores in the mouth. No problems with swallowing.  No chest pain. No shortness of breath. No cough.  No abdominal pain. No nausea or vomiting.  No diarrhea or constipation.  No blood in stool or black colored stools.  No problems passing urine.  No numbness or tingling in hands or feet.  No skin rashes.  A 14 point review of systems is otherwise negative.        Past History    Past Medical History:   Diagnosis Date     Anemia      Malignant neoplasm of ascending colon (H) 11/07/2022     Past Surgical History:   Procedure Laterality Date     COLONOSCOPY        "HEMICOLECTOMY, RT/LT Right      IR CHEST PORT PLACEMENT > 5 YRS OF AGE  3/9/2023     REMOVAL OF SPERM DUCT(S)      Description: Surgery Of Male Genitalia Vasectomy;  Recorded: 12/27/2011;  Comments: 12/27/2011     ZC REPAIR CRUCIATE LIGAMENT,KNEE      Description: Primary Repair Of Knee Ligament Cruciate Anterior;  Recorded: 07/20/2009;     Family History   Problem Relation Age of Onset     Breast Cancer Mother      Colon Cancer Father      Anesthesia Reaction No family hx of      Venous thrombosis No family hx of      Social History     Socioeconomic History     Marital status:      Spouse name: None     Number of children: None     Years of education: None     Highest education level: None   Tobacco Use     Smoking status: Never     Smokeless tobacco: Never   Substance and Sexual Activity     Alcohol use: Not Currently     Drug use: Never     Sexual activity: Yes     Partners: Female     Birth control/protection: Male Surgical   Other Topics Concern     Parent/sibling w/ CABG, MI or angioplasty before 65F 55M? No         Allergies    No Known Allergies        Physical Exam    /70 (BP Location: Right arm, Patient Position: Sitting, Cuff Size: Adult Large)   Pulse 81   Temp 98.4  F (36.9  C) (Oral)   Resp 20   Ht 1.88 m (6' 2.02\")   Wt 99.3 kg (219 lb)   SpO2 97%   BMI 28.11 kg/m        GENERAL: Alert and oriented to time place and person. Seated comfortably. In no distress.    HEAD: Atraumatic and normocephalic.    EYES: LUIS ANGEL, EOMI.  No pallor.  No icterus.    Oral cavity: no mucosal lesion or tonsillar enlargement.    NECK: supple. JVP normal.  No thyroid enlargement.    LYMPH NODES: No palpable, cervical, axillary or inguinal lymphadenopathy.    CHEST: clear to auscultation bilaterally.  Resonant to percussion throughout bilaterally.  Symmetrical breath movements bilaterally.    CVS: S1 and S2 are heard. Regular rate and rhythm.  No murmur or gallop or rub heard.  No peripheral " edema.    ABDOMEN: Soft. Not tender. Not distended.  No palpable hepatomegaly or splenomegaly.  No other mass palpable.  Bowel sounds heard.    EXTREMITIES: Warm.    SKIN: no rash, or bruising or purpura.  Has a full head of hair.    Lab Results    Preoperative CEA was 2.6 and 2.7    Imaging Results    CT and MRI reviewed with no evidence of metastatic disease    No results found.      Signed by: Sekou Hall MD    Again, thank you for allowing me to participate in the care of your patient.        Sincerely,        Sekou Hall MD

## 2023-12-12 NOTE — PROGRESS NOTES
[facility] Hematology and Oncology Consult Note    Patient: Luca Araiza  MRN: [id number]  Date of Service: Dec 12, 2023       Reason for Visit    Follow-up for colon cancer    January 2023:    T3N1B, stage IIIb adenocarcinoma of the cecum status post laparoscopic right colectomy, December 21, 2022  CT DNA positive, March 2023  Tumor is MMR intact  Early onset of colon cancer with family history, genetic testing negative, 47 genes analyzed, April 2023  Neuropathy    Patient is doing well with no clinical concern for recurrence of his cancer.  CEA level pending.  Still with some neuropathy symptoms.  Scheduled for follow-up colonoscopy next month.    Will see him back in 3 months with repeat labs and scans.    Plan: As above      Measurable disease: None postoperatively    Current therapy: Modified FOLFIRINOX day 1 cycle 1, March 14, 2023  Last cycle #12 today August 15, 2023                ECOG Performance    0 - Independent    Encounter Diagnoses:    Problem List Items Addressed This Visit          Digestive    Malignant neoplasm of ascending colon (H) - Primary    Relevant Orders    Check Out Appointment Request           ______________________________________________________________________________    Staging History   Cancer Staging   Malignant neoplasm of ascending colon (H)  Staging form: Colon and Rectum, AJCC 8th Edition  - Pathologic stage from 12/21/2022: Stage IIIB (pT3, pN1b, cM0) - Signed by Dayami Pablo APRN CNP on 7/5/2023        History    Luca is here again for follow-up.  Seen 3 months ago.  Recovering well after chemotherapy.  Still with neuropathy symptoms.  Otherwise fine.  Will have colonoscopy next week.  ECOG 0.    March 14, 2023:    Luca is here for reevaluation.  Seen about 6 weeks ago.  His CT DNA test was positive and he was randomized to arm for the clinical trial.  He has had Port-A-Cath placed.  No new symptoms or problems.  ECOG status 0.    January 2023:  Mr. Luca FOY  Jose G is a 42 year old no significant past medical history who is been diagnosed and undergone laparoscopic hemicolectomy for colon cancer.  He was in [place] last August and had significant illness with diarrhea and vomiting.  He then had physical which showed that he was anemic and subsequently had colonoscopy showing cecal colon cancer.  He underwent laparoscopic right hemicolectomy on December 21 and has recovered well from this.    No other previous medical history.  He had left ACL repair surgery as a teenager.  No other hospitalizations.  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  Does not smoke and does not drink alcohol.    Father had colon cancer in his mid 60s.  Mother had breast cancer in her late 50s.  No siblings or kids with cancer.  Maternal grandfather had colon cancer in his 60s.  Paternal grandmother had cancer type unknown.            Review of systems.  No fever or night sweats.  No loss of weight.  No lumps or bumps anywhere.  No unusual headaches or eyesight issues.  No dizziness.  No bleeding from the nose.  No sores in the mouth. No problems with swallowing.  No chest pain. No shortness of breath. No cough.  No abdominal pain. No nausea or vomiting.  No diarrhea or constipation.  No blood in stool or black colored stools.  No problems passing urine.  No numbness or tingling in hands or feet.  No skin rashes.  A 14 point review of systems is otherwise negative.        Past History    Past Medical History:   Diagnosis Date    Anemia     Malignant neoplasm of ascending colon (H) 11/07/2022     Past Surgical History:   Procedure Laterality Date    COLONOSCOPY      HEMICOLECTOMY, RT/LT Right     IR CHEST PORT PLACEMENT > 5 YRS OF AGE  3/9/2023    REMOVAL OF SPERM DUCT(S)      Description: Surgery Of Male Genitalia Vasectomy;  Recorded: 12/27/2011;  Comments: 12/27/2011    ZC REPAIR CRUCIATE LIGAMENT,KNEE      Description:  "Primary Repair Of Knee Ligament Cruciate Anterior;  Recorded: 07/20/2009;     Family History   Problem Relation Age of Onset    Breast Cancer Mother     Colon Cancer Father     Anesthesia Reaction No family hx of     Venous thrombosis No family hx of      Social History     Socioeconomic History    Marital status:      Spouse name: None    Number of children: None    Years of education: None    Highest education level: None   Tobacco Use    Smoking status: Never    Smokeless tobacco: Never   Substance and Sexual Activity    Alcohol use: Not Currently    Drug use: Never    Sexual activity: Yes     Partners: Female     Birth control/protection: Male Surgical   Other Topics Concern    Parent/sibling w/ CABG, MI or angioplasty before 65F 55M? No         Allergies    No Known Allergies        Physical Exam    /70 (BP Location: Right arm, Patient Position: Sitting, Cuff Size: Adult Large)   Pulse 81   Temp 98.4  F (36.9  C) (Oral)   Resp 20   Ht 1.88 m (6' 2.02\")   Wt 99.3 kg (219 lb)   SpO2 97%   BMI 28.11 kg/m        GENERAL: Alert and oriented to time place and person. Seated comfortably. In no distress.    HEAD: Atraumatic and normocephalic.    EYES: LUIS ANGEL, EOMI.  No pallor.  No icterus.    Oral cavity: no mucosal lesion or tonsillar enlargement.    NECK: supple. JVP normal.  No thyroid enlargement.    LYMPH NODES: No palpable, cervical, axillary or inguinal lymphadenopathy.    CHEST: clear to auscultation bilaterally.  Resonant to percussion throughout bilaterally.  Symmetrical breath movements bilaterally.    CVS: S1 and S2 are heard. Regular rate and rhythm.  No murmur or gallop or rub heard.  No peripheral edema.    ABDOMEN: Soft. Not tender. Not distended.  No palpable hepatomegaly or splenomegaly.  No other mass palpable.  Bowel sounds heard.    EXTREMITIES: Warm.    SKIN: no rash, or bruising or purpura.  Has a full head of hair.    Lab Results    Preoperative CEA was 2.6 and 2.7    Imaging " Results    CT and MRI reviewed with no evidence of metastatic disease    No results found.      Signed by: Sekou Hall MD

## 2023-12-21 ENCOUNTER — TELEPHONE (OUTPATIENT)
Dept: ONCOLOGY | Facility: HOSPITAL | Age: 43
End: 2023-12-21
Payer: COMMERCIAL

## 2023-12-21 DIAGNOSIS — C18.2 MALIGNANT NEOPLASM OF ASCENDING COLON (H): Primary | ICD-10-CM

## 2023-12-21 DIAGNOSIS — N52.9 ED (ERECTILE DYSFUNCTION): ICD-10-CM

## 2023-12-22 ENCOUNTER — OFFICE VISIT (OUTPATIENT)
Dept: DERMATOLOGY | Facility: CLINIC | Age: 43
End: 2023-12-22
Payer: COMMERCIAL

## 2023-12-22 DIAGNOSIS — D48.9 NEOPLASM OF UNCERTAIN BEHAVIOR: ICD-10-CM

## 2023-12-22 PROCEDURE — 11102 TANGNTL BX SKIN SINGLE LES: CPT | Performed by: NURSE PRACTITIONER

## 2023-12-22 PROCEDURE — 88305 TISSUE EXAM BY PATHOLOGIST: CPT | Performed by: DERMATOLOGY

## 2023-12-22 NOTE — PROGRESS NOTES
[facility] Dermatology Note  Encounter Date: Dec 22, 2023  Office Visit     Reviewed patients past medical history and pertinent chart review prior to patients visit today.     Dermatology Problem List:  NUB left anterior neck, shave biopsy 12/22/23 .   Patient denies personal history of skin cancer or dysplastic nevi.    Parents have had things removed, unknown diagnosis    ____________________________________________    Assessment & Plan:     # Neoplasm of uncertain behavior:  left anterior neck  DDx includes BCC vs SCC vs less likely prurigo nodule. Shave biopsy today.    Procedure Note: Biopsy by shave technique  The risks and benefits of the procedure were described to the patient. These include but are not limited to bleeding, infection, scar, incomplete removal, and non-diagnostic biopsy. Verbal informed consent was obtained. The above site(s) was cleansed with an alcohol pad and injected with 1% lidocaine with epinephrine. Once anesthesia was obtained, a biopsy(ies) was performed with Gilette blade. The tissue(s) was placed in a labeled container(s) with formalin and sent to pathology. Hemostasis was achieved with aluminum chloride. Vaseline and a bandage were applied to the wound(s). The patient tolerated the procedure well and was given post biopsy care instructions.     Megha Henderson, CNP  Dermatology    _______________________________________    CC: Derm Problem (Spot on neck that has not been healing for months and possibly 1 year)    HPI:  Mr. Luca Araiza is a(n) 43 year old male who presents today as a new patient for a spot of concern on the front of the neck. It has been present about 1 year and keeps breaking open. It has not healed.     Patient is otherwise feeling well, without additional skin concerns.      Physical Exam:  SKIN: Focused examination of anterior neck was performed.  - 2 x 1 mm erythematous plaque with some central erosions    - No other lesions of concern on areas  examined.     Medications:  No current outpatient medications on file.     Current Facility-Administered Medications   Medication    alum & mag hydroxide-simethicone (MAALOX) suspension 30 mL    famotidine (PEPCID) injection 20 mg    LORazepam (ATIVAN) injection 0.5 mg      Past Medical History:   Patient Active Problem List   Diagnosis    Family history of colon cancer in father    Malignant neoplasm of ascending colon (H)    Iron deficiency anemia due to chronic blood loss    Colon cancer (H)     Past Medical History:   Diagnosis Date    Anemia     Malignant neoplasm of ascending colon (H) 11/07/2022       CC No referring provider defined for this encounter. on close of this encounter.

## 2023-12-22 NOTE — PATIENT INSTRUCTIONS
Wound Care Instructions     FOR SUPERFICIAL WOUNDS     [facility], [facility] or    [facility] [phone]          AFTER 24 HOURS YOU SHOULD REMOVE THE BANDAGE AND BEGIN DAILY DRESSING CHANGES AS FOLLOWS:     1) Remove Dressing.     2) Clean and dry the area with tap water using a Q-tip or sterile gauze pad.     3) Apply Vaseline, Aquaphor, Polysporin ointment or Bacitracin ointment over entire wound.  Do NOT use Neosporin ointment.     4) Cover the wound with a band-aid, or a sterile non-stick gauze pad and micropore paper tape      REPEAT THESE INSTRUCTIONS AT LEAST ONCE A DAY UNTIL THE WOUND HAS COMPLETELY HEALED.    It is an old wives tale that a wound heals better when it is exposed to air and allowed to dry out. The wound will heal faster with a better cosmetic result if it is kept moist with ointment and covered with a bandage.    **Do not let the wound dry out.**      Supplies Needed:      *Cotton tipped applicators (Q-tips)    *Polysporin Ointment or Bacitracin Ointment (NOT NEOSPORIN)    *Band-aids or non-stick gauze pads and micropore paper tape.      PATIENT INFORMATION:    During the healing process you will notice a number of changes. All wounds develop a small halo of redness surrounding the wound.  This means healing is occurring. Severe itching with extensive redness usually indicates sensitivity to the ointment or bandage tape used to dress the wound.  You should call our office if this develops.      Swelling  and/or discoloration around your surgical site is common, particularly when performed around the eye.    All wounds normally drain.  The larger the wound the more drainage there will be.  After 7-10 days, you will notice the wound beginning to shrink and new skin will begin to grow.  The wound is healed when you can see skin has formed over the entire area.  A healed wound has a healthy, shiny look to the surface and is red to dark pink in color to normalize.   Wounds may take approximately 4-6 weeks to heal.  Larger wounds may take 6-8 weeks.  After the wound is healed you may discontinue dressing changes.    You may experience a sensation of tightness as your wound heals. This is normal and will gradually subside.    Your healed wound may be sensitive to temperature changes. This sensitivity improves with time, but if you re having a lot of discomfort, try to avoid temperature extremes.    Patients frequently experience itching after their wound appears to have healed because of the continue healing under the skin.  Plain Vaseline will help relieve the itching.        POSSIBLE COMPLICATIONS    BLEEDING:    Leave the bandage in place.  Use tightly rolled up gauze or a cloth to apply direct pressure over the bandage for 30  minutes.  Reapply pressure for an additional 30 minutes if necessary  Use additional gauze and tape to maintain pressure once the bleeding has stopped.       Patient Education       Proper skin care from [facility]:    -Eliminate harsh soaps as they strip the natural oils from the skin, often resulting in dry itchy skin ( i.e. Dial, Zest, Katelynn Spring)  -Use mild soaps such as Cetaphil or Dove Sensitive Skin in the shower. You do not need to use soap on arms, legs, and trunk every time you shower unless visibly soiled.   -Avoid hot or cold showers.  -After showering, lightly dry off and apply moisturizing within 2-3 minutes. This will help trap moisture in the skin.   -Aggressive use of a moisturizer at least 1-2 times a day to the entire body (including -Vanicream, Cetaphil, Aquaphor or Cerave) and moisturize hands after every washing.  -We recommend using moisturizers that come in a tub that needs to be scooped out, not a pump. This has more of an oil base. It will hold moisture in your skin much better than a water base moisturizer. The above recommended are non-pore clogging.      Wear a sunscreen with at least SPF 30 on your face, ears,  neck and V of the chest daily. Wear sunscreen on other areas of the body if those areas are exposed to the sun throughout the day. Sunscreens can contain physical and/or chemical blockers. Physical blockers are less likely to clog pores, these include zinc oxide and titanium dioxide. Reapply every two hour and after swimming.     Sunscreen examples: https://www.ewg.org/sunscreen/    UV radiation  UVA radiation remains constant throughout the day and throughout the year. It is a longer wavelength than UVB and therefore penetrates deeper into the skin leading to immediate and delayed tanning, photoaging, and skin cancer. 70-80% of UVA and UVB radiation occurs between the hours of 10am-2pm.  UVB radiation  UVB radiation causes the most harmful effects and is more significant during the summer months. However, snow and ice can reflect UVB radiation leading to skin damage during the winter months as well. UVB radiation is responsible for tanning, burning, inflammation, delayed erythema (pinkness), pigmentation (brown spots), and skin cancer.     I recommend self monthly full body exams and yearly full body exams with a dermatology provider. If you develop a new or changing lesion please follow up for examination. Most skin cancers are pink and scaly or pink and pearly. However, we do see blue/brown/black skin cancers.  Consider the ABCDEs of melanoma when giving yourself your monthly full body exam ( don't forget the groin, buttocks, feet, toes, etc). A-asymmetry, B-borders, C-color, D-diameter, E-elevation or evolving. If you see any of these changes please follow up in clinic. If you cannot see your back I recommend purchasing a hand held mirror to use with a larger wall mirror.       Checking for Skin Cancer  You can find cancer early by checking your skin each month. There are 3 kinds of skin cancer. They are melanoma, basal cell carcinoma, and squamous cell carcinoma. Doing monthly skin checks is the best way to  find new marks or skin changes. Follow the instructions below for checking your skin.   The ABCDEs of checking moles for melanoma   Check your moles or growths for signs of melanoma using ABCDE:   Asymmetry: the sides of the mole or growth don t match  Border: the edges are ragged, notched, or blurred  Color: the color within the mole or growth varies  Diameter: the mole or growth is larger than 6 mm (size of a pencil eraser)  Evolving: the size, shape, or color of the mole or growth is changing (evolving is not shown in the images below)    Checking for other types of skin cancer  Basal cell carcinoma or squamous cell carcinoma have symptoms such as:     A spot or mole that looks different from all other marks on your skin  Changes in how an area feels, such as itching, tenderness, or pain  Changes in the skin's surface, such as oozing, bleeding, or scaliness  A sore that does not heal  New swelling or redness beyond the border of a mole    Who s at risk?  Anyone can get skin cancer. But you are at greater risk if you have:   Fair skin, light-colored hair, or light-colored eyes  Many moles or abnormal moles on your skin  A history of sunburns from sunlight or tanning beds  A family history of skin cancer  A history of exposure to radiation or chemicals  A weakened immune system  If you have had skin cancer in the past, you are at risk for recurring skin cancer.   How to check your skin  Do your monthly skin checkups in front of a full-length mirror. Check all parts of your body, including your:   Head (ears, face, neck, and scalp)  Torso (front, back, and sides)  Arms (tops, undersides, upper, and lower armpits)  Hands (palms, backs, and fingers, including under the nails)  Buttocks and genitals  Legs (front, back, and sides)  Feet (tops, soles, toes, including under the nails, and between toes)  If you have a lot of moles, take digital photos of them each month. Make sure to take photos both up close and from a  distance. These can help you see if any moles change over time.   Most skin changes are not cancer. But if you see any changes in your skin, call your doctor right away. Only he or she can diagnose a problem. If you have skin cancer, seeing your doctor can be the first step toward getting the treatment that could save your life.   [employer] last reviewed this educational content on 4/1/2019 2000-2020 [employer]. [address] [place] [address], [place] [address]. All rights reserved. This information is not intended as a substitute for professional medical care. Always follow your healthcare professional's instructions.       When should I call my doctor?  If you are worsening or not improving, please, contact us or seek urgent care as noted below.     Who should I call with questions (adults)?  [facility] (adult and pediatric): [phone]  [facility] (adult): [phone]  [facility] ([facility] and [place]) [phone]  For urgent needs outside of business hours call the [facility] at [phone] and ask for the dermatology resident on call to be paged  If this is a medical emergency and you are unable to reach an ER, Call [phone]      If you need a prescription refill, please contact your pharmacy. Refills are approved or denied by our Physicians during normal business hours, Monday through Fridays  Per office policy, refills will not be granted if you have not been seen within the past year (or sooner depending on your child's condition)

## 2023-12-22 NOTE — LETTER
12/22/2023         RE: Luca Araiza  [address]  [place] [address]        Dear Colleague,    Thank you for referring your patient, Luca Araiza, to the [facility]. Please see a copy of my visit note below.    [facility] Dermatology Note  Encounter Date: Dec 22, 2023  Office Visit     Reviewed patients past medical history and pertinent chart review prior to patients visit today.     Dermatology Problem List:  NUB left anterior neck, shave biopsy 12/22/23 .   Patient denies personal history of skin cancer or dysplastic nevi.    Parents have had things removed, unknown diagnosis    ____________________________________________    Assessment & Plan:     # Neoplasm of uncertain behavior:  left anterior neck  DDx includes BCC vs SCC vs less likely prurigo nodule. Shave biopsy today.    Procedure Note: Biopsy by shave technique  The risks and benefits of the procedure were described to the patient. These include but are not limited to bleeding, infection, scar, incomplete removal, and non-diagnostic biopsy. Verbal informed consent was obtained. The above site(s) was cleansed with an alcohol pad and injected with 1% lidocaine with epinephrine. Once anesthesia was obtained, a biopsy(ies) was performed with Gilette blade. The tissue(s) was placed in a labeled container(s) with formalin and sent to pathology. Hemostasis was achieved with aluminum chloride. Vaseline and a bandage were applied to the wound(s). The patient tolerated the procedure well and was given post biopsy care instructions.     Megha Hnederson, CNP  Dermatology    _______________________________________    CC: Derm Problem (Spot on neck that has not been healing for months and possibly 1 year)    HPI:  Mr. Luca Araiza is a(n) 43 year old male who presents today as a new patient for a spot of concern on the front of the neck. It has been present about 1 year and keeps breaking open. It has not healed.     Patient is  otherwise feeling well, without additional skin concerns.      Physical Exam:  SKIN: Focused examination of anterior neck was performed.  - 2 x 1 mm erythematous plaque with some central erosions    - No other lesions of concern on areas examined.     Medications:  No current outpatient medications on file.     Current Facility-Administered Medications   Medication     alum & mag hydroxide-simethicone (MAALOX) suspension 30 mL     famotidine (PEPCID) injection 20 mg     LORazepam (ATIVAN) injection 0.5 mg      Past Medical History:   Patient Active Problem List   Diagnosis     Family history of colon cancer in father     Malignant neoplasm of ascending colon (H)     Iron deficiency anemia due to chronic blood loss     Colon cancer (H)     Past Medical History:   Diagnosis Date     Anemia      Malignant neoplasm of ascending colon (H) 11/07/2022       CC No referring provider defined for this encounter. on close of this encounter.       Again, thank you for allowing me to participate in the care of your patient.        Sincerely,        RAQUEL Chun CNP

## 2023-12-26 LAB
PATH REPORT.COMMENTS IMP SPEC: ABNORMAL
PATH REPORT.COMMENTS IMP SPEC: ABNORMAL
PATH REPORT.COMMENTS IMP SPEC: YES
PATH REPORT.FINAL DX SPEC: ABNORMAL
PATH REPORT.GROSS SPEC: ABNORMAL
PATH REPORT.MICROSCOPIC SPEC OTHER STN: ABNORMAL
PATH REPORT.RELEVANT HX SPEC: ABNORMAL

## 2023-12-27 ENCOUNTER — TELEPHONE (OUTPATIENT)
Dept: DERMATOLOGY | Facility: CLINIC | Age: 43
End: 2023-12-27
Payer: COMMERCIAL

## 2023-12-27 DIAGNOSIS — C44.511: Primary | ICD-10-CM

## 2023-12-27 NOTE — TELEPHONE ENCOUNTER
Left a voicemail asking patient to give us a call back at our main dermatology line at [phone]    Mavis CALDERON RN BSN  [facility] Dermatology  [phone]

## 2023-12-27 NOTE — TELEPHONE ENCOUNTER
----- Message from RAQUEL Sidhu CNP sent at 12/27/2023  8:57 AM CST -----  Please schedule excision with Derm surg    Left Neck: BCC

## 2023-12-27 NOTE — LETTER
[facility]  [address]  [place] [address]  [phone]      December 28, 2023    Luca Araiza  [address]  [place] [address]      Dear Luca      You are scheduled for Mohs Surgery on Monday, January 22nd, 2024 at 7:45 AM     Please check in at 2nd floor Dermatology Clinic.     Be sure to eat a good breakfast and bathe and wash your hair prior to Surgery. Please bring  with you if this is above your neck    If you are taking any anti-coagulants that are prescribed by your Doctor (such as Coumadin/warfarin, Plavix, Aspirin, Ibuprofen), please continue taking them.     However, If you are taking anti-coagulants over the counter without  a Doctor's order for a Medical condition, please discontinue them 10 days prior to Surgery.      Please wear loose comfortable clothing as it could possibly be 4-6 hours until your surgery is completed depending upon how many layers of tissue need to be removed.     Thank you,    Vinny Vyas MD

## 2023-12-28 NOTE — TELEPHONE ENCOUNTER
Spoke with patient: patient aware of results and had no questions at the time of the call.   Mohs procedure explained and all questions answered.    Appointment scheduled on Monday, January 22nd, 2024 at 7:45 AM at [facility].     MOHS information mailed & letter sent in [employer].     Patient expressed understanding.     Referral placed and linked to appt.     Mavis CALDERON RN, BSN  [facility] Dermatology  [phone]

## 2023-12-29 DIAGNOSIS — N52.8 OTHER MALE ERECTILE DYSFUNCTION: ICD-10-CM

## 2023-12-29 DIAGNOSIS — N52.8 OTHER MALE ERECTILE DYSFUNCTION: Primary | ICD-10-CM

## 2023-12-29 RX ORDER — SILDENAFIL CITRATE 20 MG/1
20 TABLET ORAL 3 TIMES DAILY
Qty: 20 TABLET | Refills: 1 | Status: SHIPPED | OUTPATIENT
Start: 2023-12-29 | End: 2023-12-29

## 2023-12-29 RX ORDER — SILDENAFIL CITRATE 20 MG/1
TABLET ORAL
Qty: 20 TABLET | Refills: 1 | Status: SHIPPED | OUTPATIENT
Start: 2023-12-29 | End: 2024-01-29

## 2024-01-02 ENCOUNTER — TELEPHONE (OUTPATIENT)
Dept: GASTROENTEROLOGY | Facility: CLINIC | Age: 44
End: 2024-01-02
Payer: COMMERCIAL

## 2024-01-02 NOTE — TELEPHONE ENCOUNTER
Attempted to contact patient in order to complete pre assessment questions.     No answer. Left message to return call to [phone] option 4      Procedure details:    Patient scheduled for Colonoscopy  on 1.15.24.     Arrival time: 1415. Procedure time 1515    Pre op exam needed? N/A    Facility location: [facility]; [address], [address], [place] [address]    Sedation type: Conscious sedation     Indication for procedure:   Follow-up examination after colorectal surgery [Z09]  - Primary      Malignant neoplasm of ascending colon (H) [C18.2]      Colon cancer (H) [C18.9]            Chart review:     Electronic implanted devices? No    Recent diagnosis of diverticulitis within the last 6 weeks? No    Diabetic? No      Medication review:    Anticoagulants? No    NSAIDS? No NSAID medications per patient's medication list.  RN will verify with pre-assessment call.    Other medication HOLDING recommendations:  N/A      Prep for procedure:     Bowel prep recommendation: Standard Miralax  Due to: standard bowel prep.    Prep instructions sent via [employer] Below.     Alejandra Valente RN  Endoscopy Procedure Pre Assessment RN

## 2024-01-04 NOTE — TELEPHONE ENCOUNTER
Pre assessment completed for upcoming procedure.   (Please see previous telephone encounter notes for complete details)    Patient  returned call.       Procedure details:    Arrival time and facility location reviewed.    Pre op exam needed? N/A    Designated  policy reviewed. Instructed to have someone stay 6 hours post procedure.     COVID policy reviewed.      Medication review:    Medications reviewed. Please see supporting documentation below. Holding recommendations discussed (if applicable).       Prep for procedure:     Procedure prep instructions reviewed.        Additional information needed?  N/A      Patient  verbalized understanding and had no questions or concerns at this time.      Sofia Sheppard RN  Endoscopy Procedure Pre Assessment RN  [phone] option 4

## 2024-01-15 ENCOUNTER — HOSPITAL ENCOUNTER (OUTPATIENT)
Facility: CLINIC | Age: 44
Discharge: HOME | End: 2024-01-15
Attending: SURGERY | Admitting: SURGERY
Payer: COMMERCIAL

## 2024-01-15 VITALS
HEART RATE: 74 BPM | DIASTOLIC BLOOD PRESSURE: 83 MMHG | OXYGEN SATURATION: 97 % | SYSTOLIC BLOOD PRESSURE: 115 MMHG | RESPIRATION RATE: 16 BRPM

## 2024-01-15 LAB — COLONOSCOPY: NORMAL

## 2024-01-15 PROCEDURE — 88305 TISSUE EXAM BY PATHOLOGIST: CPT | Mod: TC | Performed by: SURGERY

## 2024-01-15 PROCEDURE — 88305 TISSUE EXAM BY PATHOLOGIST: CPT | Mod: 26 | Performed by: PATHOLOGY

## 2024-01-15 PROCEDURE — G0500 MOD SEDAT ENDO SERVICE >5YRS: HCPCS | Performed by: SURGERY

## 2024-01-15 PROCEDURE — 45380 COLONOSCOPY AND BIOPSY: CPT | Mod: 59 | Performed by: SURGERY

## 2024-01-15 PROCEDURE — 250N000011 HC RX IP 250 OP 636: Performed by: SURGERY

## 2024-01-15 RX ORDER — ONDANSETRON 4 MG/1
4 TABLET, ORALLY DISINTEGRATING ORAL EVERY 6 HOURS PRN
Status: CANCELLED | OUTPATIENT
Start: 2024-01-15

## 2024-01-15 RX ORDER — FENTANYL CITRATE 50 UG/ML
INJECTION, SOLUTION INTRAMUSCULAR; INTRAVENOUS PRN
Status: DISCONTINUED | OUTPATIENT
Start: 2024-01-15 | End: 2024-01-15 | Stop reason: HOSPADM

## 2024-01-15 RX ORDER — ONDANSETRON 2 MG/ML
4 INJECTION INTRAMUSCULAR; INTRAVENOUS EVERY 6 HOURS PRN
Status: CANCELLED | OUTPATIENT
Start: 2024-01-15

## 2024-01-15 RX ORDER — ONDANSETRON 2 MG/ML
4 INJECTION INTRAMUSCULAR; INTRAVENOUS
Status: DISCONTINUED | OUTPATIENT
Start: 2024-01-15 | End: 2024-01-15 | Stop reason: HOSPADM

## 2024-01-15 RX ORDER — NALOXONE HYDROCHLORIDE 0.4 MG/ML
0.4 INJECTION, SOLUTION INTRAMUSCULAR; INTRAVENOUS; SUBCUTANEOUS
Status: CANCELLED | OUTPATIENT
Start: 2024-01-15

## 2024-01-15 RX ORDER — FLUMAZENIL 0.1 MG/ML
0.2 INJECTION, SOLUTION INTRAVENOUS
Status: CANCELLED | OUTPATIENT
Start: 2024-01-15 | End: 2024-01-16

## 2024-01-15 RX ORDER — NALOXONE HYDROCHLORIDE 0.4 MG/ML
0.2 INJECTION, SOLUTION INTRAMUSCULAR; INTRAVENOUS; SUBCUTANEOUS
Status: CANCELLED | OUTPATIENT
Start: 2024-01-15

## 2024-01-15 RX ORDER — PROCHLORPERAZINE MALEATE 5 MG
10 TABLET ORAL EVERY 6 HOURS PRN
Status: CANCELLED | OUTPATIENT
Start: 2024-01-15

## 2024-01-15 RX ORDER — LIDOCAINE 40 MG/G
CREAM TOPICAL
Status: DISCONTINUED | OUTPATIENT
Start: 2024-01-15 | End: 2024-01-15 | Stop reason: HOSPADM

## 2024-01-15 ASSESSMENT — ACTIVITIES OF DAILY LIVING (ADL): ADLS_ACUITY_SCORE: 35

## 2024-01-15 NOTE — H&P
Luca Araiza  [id number]  male  43 year old      Reason for procedure/surgery: surveillance colonoscopy    Patient Active Problem List   Diagnosis    Family history of colon cancer in father    Malignant neoplasm of ascending colon (H)    Iron deficiency anemia due to chronic blood loss    Colon cancer (H)       Past Surgical History:    Past Surgical History:   Procedure Laterality Date    COLONOSCOPY      HEMICOLECTOMY, RT/LT Right     IR CHEST PORT PLACEMENT > 5 YRS OF AGE  3/9/2023    REMOVAL OF SPERM DUCT(S)      Description: Surgery Of Male Genitalia Vasectomy;  Recorded: 12/27/2011;  Comments: 12/27/2011    ZC REPAIR CRUCIATE LIGAMENT,KNEE      Description: Primary Repair Of Knee Ligament Cruciate Anterior;  Recorded: 07/20/2009;       Past Medical History:   Past Medical History:   Diagnosis Date    Anemia     Malignant neoplasm of ascending colon (H) 11/07/2022       Social History:   Social History     Tobacco Use    Smoking status: Never     Passive exposure: Never    Smokeless tobacco: Never   Substance Use Topics    Alcohol use: Not Currently       Family History:   Family History   Problem Relation Age of Onset    Breast Cancer Mother     Skin Cancer Mother     Colon Cancer Father     Skin Cancer Father     Anesthesia Reaction No family hx of     Venous thrombosis No family hx of        Allergies: No Known Allergies    Active Medications:   No current outpatient medications on file.       Systemic Review:   CONSTITUTIONAL: NEGATIVE for fever, chills, change in weight  ENT/MOUTH: NEGATIVE for ear, mouth and throat problems  RESP: NEGATIVE for significant cough or SOB  CV: NEGATIVE for chest pain, palpitations or peripheral edema    Physical Examination:   Vital Signs: /77   Pulse 83   Resp 16   SpO2 99%   GENERAL: healthy, alert and no distress  NECK: no adenopathy, no asymmetry, masses, or scars  RESP: lungs clear to auscultation - no rales, rhonchi or wheezes  CV: regular rate and rhythm,  normal S1 S2, no S3 or S4, no murmur, click or rub, no peripheral edema and peripheral pulses strong  ABDOMEN: soft, nontender, no hepatosplenomegaly, no masses and bowel sounds normal  MS: no gross musculoskeletal defects noted, no edema    Plan: Appropriate to proceed as scheduled.      Nahum Contreras MD, MD  1/15/2024    PCP:  Diana Wade

## 2024-01-16 LAB
PATH REPORT.COMMENTS IMP SPEC: NORMAL
PATH REPORT.COMMENTS IMP SPEC: NORMAL
PATH REPORT.FINAL DX SPEC: NORMAL
PATH REPORT.GROSS SPEC: NORMAL
PATH REPORT.MICROSCOPIC SPEC OTHER STN: NORMAL
PATH REPORT.RELEVANT HX SPEC: NORMAL
PHOTO IMAGE: NORMAL

## 2024-01-18 ENCOUNTER — TELEPHONE (OUTPATIENT)
Dept: DERMATOLOGY | Facility: CLINIC | Age: 44
End: 2024-01-18

## 2024-01-18 ASSESSMENT — ENCOUNTER SYMPTOMS
SHORTNESS OF BREATH: 0
CONSTIPATION: 0
FREQUENCY: 0
PARESTHESIAS: 0
HEADACHES: 0
CHILLS: 0
DIARRHEA: 0
WEAKNESS: 0
MYALGIAS: 0
JOINT SWELLING: 0
EYE PAIN: 0
DIZZINESS: 0
HEMATOCHEZIA: 0
DYSURIA: 0
PALPITATIONS: 0
HEMATURIA: 0
HEARTBURN: 0
COUGH: 0
SORE THROAT: 0
NERVOUS/ANXIOUS: 0
FEVER: 0
ARTHRALGIAS: 0
ABDOMINAL PAIN: 0
NAUSEA: 0

## 2024-01-18 NOTE — TELEPHONE ENCOUNTER
[facility] Call Center    Phone Message    May a detailed message be left on voicemail: yes     Reason for Call: Other: Pt called and would like to r/s his MOHS procedure. Pt wanted to keep his apptu until he hears from the care team. Please call him back. Thanks      Action Taken: Other: [place] [facility]    Travel Screening: Not Applicable

## 2024-01-25 NOTE — PROGRESS NOTES
Surgical Office Location :   [facility]  [address] [place] [address]

## 2024-01-29 ENCOUNTER — MYC MEDICAL ADVICE (OUTPATIENT)
Dept: FAMILY MEDICINE | Facility: CLINIC | Age: 44
End: 2024-01-29

## 2024-01-29 ENCOUNTER — OFFICE VISIT (OUTPATIENT)
Dept: DERMATOLOGY | Facility: CLINIC | Age: 44
End: 2024-01-29
Payer: COMMERCIAL

## 2024-01-29 DIAGNOSIS — C44.41 BASAL CELL CARCINOMA (BCC) OF LEFT SIDE OF NECK: Primary | ICD-10-CM

## 2024-01-29 DIAGNOSIS — D23.9 DERMAL NEVUS: ICD-10-CM

## 2024-01-29 DIAGNOSIS — D18.01 ANGIOMA OF SKIN: ICD-10-CM

## 2024-01-29 DIAGNOSIS — L81.4 LENTIGO: ICD-10-CM

## 2024-01-29 DIAGNOSIS — N52.8 OTHER MALE ERECTILE DYSFUNCTION: ICD-10-CM

## 2024-01-29 PROCEDURE — 13132 CMPLX RPR F/C/C/M/N/AX/G/H/F: CPT | Performed by: DERMATOLOGY

## 2024-01-29 PROCEDURE — 17311 MOHS 1 STAGE H/N/HF/G: CPT | Performed by: DERMATOLOGY

## 2024-01-29 PROCEDURE — 99213 OFFICE O/P EST LOW 20 MIN: CPT | Mod: 25 | Performed by: DERMATOLOGY

## 2024-01-29 RX ORDER — SILDENAFIL CITRATE 20 MG/1
TABLET ORAL
Qty: 20 TABLET | Refills: 1 | Status: SHIPPED | OUTPATIENT
Start: 2024-01-29 | End: 2024-03-25

## 2024-01-29 NOTE — LETTER
1/29/2024         RE: Luca Araiza  [address]  [place] [address]        Dear Colleague,    Thank you for referring your patient, Luca Araiza, to the [facility]. Please see a copy of my visit note below.    Surgical Office Location :   [facility]  [address] [place] [address]      Luca Araiza , a 43 year old year old male patient, I was asked to see by MERLE Henderson for basal cell carcinoma on left ant neck.  Patient has no other skin complaints today.  Remainder of the HPI, Meds, PMH, Allergies, FH, and SH was reviewed in chart.      Past Medical History:   Diagnosis Date     Anemia      Malignant neoplasm of ascending colon (H) 11/07/2022       Past Surgical History:   Procedure Laterality Date     COLONOSCOPY       COLONOSCOPY N/A 1/15/2024    Procedure: COLONOSCOPY, WITH POLYPECTOMY AND BIOPSY;  Surgeon: Nahum Contreras MD;  Location: [facility]     HEMICOLECTOMY, RT/LT Right      IR CHEST PORT PLACEMENT > 5 YRS OF AGE  3/9/2023     REMOVAL OF SPERM DUCT(S)      Description: Surgery Of Male Genitalia Vasectomy;  Recorded: 12/27/2011;  Comments: 12/27/2011     ZZC REPAIR CRUCIATE LIGAMENT,KNEE      Description: Primary Repair Of Knee Ligament Cruciate Anterior;  Recorded: 07/20/2009;        Family History   Problem Relation Age of Onset     Breast Cancer Mother      Skin Cancer Mother      Colon Cancer Father      Skin Cancer Father      Anesthesia Reaction No family hx of      Venous thrombosis No family hx of        Social History     Socioeconomic History     Marital status:      Spouse name: Not on file     Number of children: Not on file     Years of education: Not on file     Highest education level: Not on file   Occupational History     Not on file   Tobacco Use     Smoking status: Never     Passive exposure: Never     Smokeless tobacco: Never   Vaping Use     Vaping Use: Never used   Substance and Sexual Activity     Alcohol use: Not Currently     Drug  use: Never     Sexual activity: Yes     Partners: Female     Birth control/protection: Male Surgical   Other Topics Concern     Parent/sibling w/ CABG, MI or angioplasty before 65F 55M? No   Social History Narrative     Not on file     Social Determinants of Health     Financial Resource Strain: Low Risk  (1/17/2024)    Financial Resource Strain      Within the past 12 months, have you or your family members you live with been unable to get utilities (heat, electricity) when it was really needed?: No   Food Insecurity: Low Risk  (1/17/2024)    Food Insecurity      Within the past 12 months, did you worry that your food would run out before you got money to buy more?: No      Within the past 12 months, did the food you bought just not last and you didn t have money to get more?: No   Transportation Needs: Low Risk  (1/17/2024)    Transportation Needs      Within the past 12 months, has lack of transportation kept you from medical appointments, getting your medicines, non-medical meetings or appointments, work, or from getting things that you need?: No   Physical Activity: Not on file   Stress: Not on file   Social Connections: Not on file   Interpersonal Safety: Not on file   Housing Stability: Low Risk  (1/17/2024)    Housing Stability      Do you have housing? : Yes      Are you worried about losing your housing?: No       Outpatient Encounter Medications as of 1/29/2024   Medication Sig Dispense Refill     sildenafil (REVATIO) 20 MG tablet Take 2-5 tablets 30 min prior to sexual activity 20 tablet 1     Facility-Administered Encounter Medications as of 1/29/2024   Medication Dose Route Frequency Provider Last Rate Last Admin     alum & mag hydroxide-simethicone (MAALOX) suspension 30 mL  30 mL Oral Q4H PRN Dayami Pablo APRN CNP         famotidine (PEPCID) injection 20 mg  20 mg Intravenous Q12H Sekou Hall MD   20 mg at 07/05/23 1245     LORazepam (ATIVAN) injection 0.5 mg  0.5 mg Intravenous  Once Sekou Hall MD                 Review Of Systems  Skin: As above  Eyes: negative  Ears/Nose/Throat: negative  Respiratory: No shortness of breath, dyspnea on exertion, cough, or hemoptysis  Cardiovascular: negative  Gastrointestinal: negative  Genitourinary: negative  Musculoskeletal: negative  Neurologic: negative  Psychiatric: negative  Hematologic/Lymphatic/Immunologic: negative  Endocrine: negative      O:   NAD, WDWN, Alert & Oriented, Mood & Affect wnl, Vitals stable   General appearance jaycee ii   Vitals stable   Alert, oriented and in no acute distress   L ant neck 1.8x1cm red plaque  brown macules on face  Red papules on neck   Flesh colored papules on face      Eyes: Conjunctivae/lids:Normal     ENT: Lips, buccal mucosa, tongue: normal    MSK:Normal    Cardiovascular: peripheral edema none    Pulm: Breathing Normal    Neuro/Psych: Orientation:Normal; Mood/Affect:Normal      A/P:  1. lentigo, angioma, dermal nevus  2. L neck basal cell carcinoma   It was a pleasure speaking to Luca Araiza today.  Previous clinic  notes and pertinent laboratory tests were reviewed prior to Luca Araiza's visit.  Signs and Symptoms of skin cancer discussed with patient.  Patient encouraged to perform monthly skin exams.  UV precautions reviewed with patient.  Risks of non-melanoma skin cancer discussed with patient   Return to clinic 6 months  PROCEDURE NOTE  L ant neck basal cell carcinoma   MOHS:   Location    The rationale for Mohs surgery was discussed with the patient and consent was obtained.  The risks and benefits as well as alternatives to therapy were discussed, in detail.  Specifically, the risks of infection, scarring, bleeding, prolonged wound healing, incomplete removal, allergy to anesthesia, nerve injury and recurrence were addressed.  Indication for Mohs was Location. Prior to the procedure, the treatment site was clearly identified and, if available, confirmed with previous photos and  confirmed by the patient   All components of the Universal Protocol/PAUSE rule were completed.  The Mohs surgeon operated in two distinct and integrated capacities as the surgeon and pathologist.      The area was prepped with Betasept.  A rim of normal appearing skin was marked circumferentially around the lesion.  The area was infiltrated with local anesthesia.  The tumor was first debulked to remove all clinically apparent tumor.  An incision following the standard Mohs approach was done and the specimen was oriented,mapped and placed in 1 block(s).  Each specimen was then chromacoded and processed in the Mohs laboratory using standard Mohs technique and submitted for frozen section histology.  Frozen section analysis showed no residual tumor but CLEAR MARGINS.      The tumor was excised using standard Mohs technique in 1 stages(s).  CLEAR MARGINS OBTAINED and Final defect size was 2.3 x 1.5 cm.     We discussed the options for wound management in full with the patient including risks/benefits/ possible outcomes.      REPAIR COMPLEX: Because of the tightness of the surrounding skin and Because of the size and full thickness nature of the defect, Because of the tightness of the surrounding skin, To maintain form and function, and In order to avoid distortion, a complex closure was planned. After LE anesthesia and prep, Burow's triangles were excised in the relaxed skin tension lines. The wound edges were widely undermined greater than width of the defect on both sides by dissection in the subcutaneous plane until adequate tissue mobility was obtained. Hemostasis was obtained. The wound edges were closed in a layered fashion using Vicryl and Fast Absorbing Plain Gut sutures. Postoperative length was 3.7 cm.   EBL minimal; complications none; wound care routine.  The patient was discharged in good condition and will return in one week for wound evaluation.        Again, thank you for allowing me to participate in  the care of your patient.        Sincerely,        Vinny Vyas MD

## 2024-01-29 NOTE — PATIENT INSTRUCTIONS
Sutured Wound Care     [facility]: [phone]  [facility]: [phone]    Left Neck       No strenuous activity for 48 hours. Resume moderate activity in 48 hours. No heavy exercising until you are seen for follow up in one week.     Take Tylenol as needed for discomfort.                         Do not drink alcoholic beverages for 48 hours.     Keep the pressure bandage in place for 24 hours. If the bandage becomes blood tinged or loose, reinforce it with gauze and tape.        (Refer to the reverse side of this page for management of bleeding).    Remove pressure bandage in 24 hours (White Gauze & White Tape)     Leave the flat bandage in place for 1 week .    Keep the bandage dry. Wash around it carefully.    If the tape becomes soiled or starts to come off, reinforce it with additional paper tape.    Do not smoke for 3 weeks; smoking is detrimental to wound healing.    It is normal to have swelling and bruising around the surgical site. The bruising will fade in approximately 10-14 days. Elevate the area to reduce swelling.    Numbness, itchiness and sensitivity to temperature changes can occur after surgery and may take up to 18 months to normalize.      POSSIBLE COMPLICATIONS    BLEEDING:    Leave the bandage in place.  Use tightly rolled up gauze or a cloth to apply direct pressure over the bandage for 20   minutes.  Reapply pressure for an additional 20 minutes if necessary  Call the office or go to the nearest emergency room if pressure fails to stop the bleeding.  Use additional gauze and tape to maintain pressure once the bleeding has stopped.    PAIN:    Post operative pain should slowly get better, never worse.  A severe increase in pain may indicate a problem. Call the office if this occurs.    In case of emergency phone:Dr Vyas [phone]       ONE WEEK AFTER SURGERY:  -Remove bandage  24  -Clean and dry the area with plain tap water using a Q-tip or sterile gauze  pad  -Reapply steri strips down incision and cover with paper tape  -Leave bandage in place for 1  week  -Remove bandage on        2/12/24         WOUND CARE INSTRUCTIONS  for  ONE WEEK AFTER SURGERY    Leave flat bandage on your skin for one week after today s bandage change.  In one week when you remove the bandage, you may resume your regular skin care routine, including washing with mild soap and water, applying moisturizer, make-up and sunscreen.    If there are any open or bleeding areas at the incision/graft site you should begin to cover the area with a bandage daily as follows:    Clean and dry the area with plain tap water using a Q-tip or sterile gauze pad.  Apply Polysporin or Bacitracin ointment to the open area.  Cover the wound with a band-aid or a sterile non-stick gauze pad and micropore paper tape.   SIGNS OF INFECTION  - If you notice any of these signs of infection, call your doctor right away: expanding redness around the wound.  - Yellow or greenish-colored pus or cloudy wound drainage.    - Red streaking spreading from the wound.  - Increased swelling, tenderness, or pain around the wound.   - Fever.    Please remember that yellow and clear drainage from a wound can be normal and related to normal wound healing.  Isolated drainage from a wound without a combination of the above features does not indicate infection.       *Once the bandages are removed, the scar will be red and firm (especially in the lip/chin area). This is normal and will fade in time. It might take 6-12 months for this to happen.     *Massaging the area will help the scar soften and fade quicker. Begin to massage the area one month after the bandages have been removed. To massage apply pressure directly and firmly over the scar with the fingertips and move in a circular motion. Massage the area for a few minutes several times a day. Continue to massage the site for several months.    *Approximately 6-8 weeks after surgery it  is not uncommon to see the formation of  tender pimple-like  bump along the scar. This is normal. As the scar continues to mature and the stitches underneath the skin begin to dissolve, this might occur. Do not pick or squeeze, this will resolve on it s own. Should one break open producing a small amount of drainage, apply Polysporin or Bacitracin ointment a few times a day until the wound is completely healed.    *Numbness in the surgical area is expected. It might take 12-18 months for the feeling to return to normal. During this time sensations of itchiness, tingling and occasional sharp pains might be noted. These feelings are normal and will subside once the nerves have completely healed.     IN CASE OF EMERGENCY: Dr Vyas [phone]   If you were seen in [place] call: [phone]  If you were seen in [place] call: [phone]

## 2024-01-29 NOTE — PROGRESS NOTES
Luca Araiza , a 43 year old year old male patient, I was asked to see by MERLE Henderson for basal cell carcinoma on left ant neck.  Patient has no other skin complaints today.  Remainder of the HPI, Meds, PMH, Allergies, FH, and SH was reviewed in chart.      Past Medical History:   Diagnosis Date    Anemia     Malignant neoplasm of ascending colon (H) 11/07/2022       Past Surgical History:   Procedure Laterality Date    COLONOSCOPY      COLONOSCOPY N/A 1/15/2024    Procedure: COLONOSCOPY, WITH POLYPECTOMY AND BIOPSY;  Surgeon: Nahum Contreras MD;  Location: [facility]    HEMICOLECTOMY, RT/LT Right     IR CHEST PORT PLACEMENT > 5 YRS OF AGE  3/9/2023    REMOVAL OF SPERM DUCT(S)      Description: Surgery Of Male Genitalia Vasectomy;  Recorded: 12/27/2011;  Comments: 12/27/2011    ZZC REPAIR CRUCIATE LIGAMENT,KNEE      Description: Primary Repair Of Knee Ligament Cruciate Anterior;  Recorded: 07/20/2009;        Family History   Problem Relation Age of Onset    Breast Cancer Mother     Skin Cancer Mother     Colon Cancer Father     Skin Cancer Father     Anesthesia Reaction No family hx of     Venous thrombosis No family hx of        Social History     Socioeconomic History    Marital status:      Spouse name: Not on file    Number of children: Not on file    Years of education: Not on file    Highest education level: Not on file   Occupational History    Not on file   Tobacco Use    Smoking status: Never     Passive exposure: Never    Smokeless tobacco: Never   Vaping Use    Vaping Use: Never used   Substance and Sexual Activity    Alcohol use: Not Currently    Drug use: Never    Sexual activity: Yes     Partners: Female     Birth control/protection: Male Surgical   Other Topics Concern    Parent/sibling w/ CABG, MI or angioplasty before 65F 55M? No   Social History Narrative    Not on file     Social Determinants of Health     Financial Resource Strain: Low Risk  (1/17/2024)    Financial Resource Strain     Within the  past 12 months, have you or your family members you live with been unable to get utilities (heat, electricity) when it was really needed?: No   Food Insecurity: Low Risk  (1/17/2024)    Food Insecurity     Within the past 12 months, did you worry that your food would run out before you got money to buy more?: No     Within the past 12 months, did the food you bought just not last and you didn t have money to get more?: No   Transportation Needs: Low Risk  (1/17/2024)    Transportation Needs     Within the past 12 months, has lack of transportation kept you from medical appointments, getting your medicines, non-medical meetings or appointments, work, or from getting things that you need?: No   Physical Activity: Not on file   Stress: Not on file   Social Connections: Not on file   Interpersonal Safety: Not on file   Housing Stability: Low Risk  (1/17/2024)    Housing Stability     Do you have housing? : Yes     Are you worried about losing your housing?: No       Outpatient Encounter Medications as of 1/29/2024   Medication Sig Dispense Refill    sildenafil (REVATIO) 20 MG tablet Take 2-5 tablets 30 min prior to sexual activity 20 tablet 1     Facility-Administered Encounter Medications as of 1/29/2024   Medication Dose Route Frequency Provider Last Rate Last Admin    alum & mag hydroxide-simethicone (MAALOX) suspension 30 mL  30 mL Oral Q4H PRN Dayami Pablo APRN CNP        famotidine (PEPCID) injection 20 mg  20 mg Intravenous Q12H Sekou Hall MD   20 mg at 07/05/23 1245    LORazepam (ATIVAN) injection 0.5 mg  0.5 mg Intravenous Once Sekou Hall MD                 Review Of Systems  Skin: As above  Eyes: negative  Ears/Nose/Throat: negative  Respiratory: No shortness of breath, dyspnea on exertion, cough, or hemoptysis  Cardiovascular: negative  Gastrointestinal: negative  Genitourinary: negative  Musculoskeletal: negative  Neurologic: negative  Psychiatric:  negative  Hematologic/Lymphatic/Immunologic: negative  Endocrine: negative      O:   NAD, WDWN, Alert & Oriented, Mood & Affect wnl, Vitals stable   General appearance jaycee ii   Vitals stable   Alert, oriented and in no acute distress   L ant neck 1.8x1cm red plaque  brown macules on face  Red papules on neck   Flesh colored papules on face      Eyes: Conjunctivae/lids:Normal     ENT: Lips, buccal mucosa, tongue: normal    MSK:Normal    Cardiovascular: peripheral edema none    Pulm: Breathing Normal    Neuro/Psych: Orientation:Normal; Mood/Affect:Normal      A/P:  1. lentigo, angioma, dermal nevus  2. L neck basal cell carcinoma   It was a pleasure speaking to Luca Araiza today.  Previous clinic  notes and pertinent laboratory tests were reviewed prior to Luca Araiza's visit.  Signs and Symptoms of skin cancer discussed with patient.  Patient encouraged to perform monthly skin exams.  UV precautions reviewed with patient.  Risks of non-melanoma skin cancer discussed with patient   Return to clinic 6 months  PROCEDURE NOTE  L ant neck basal cell carcinoma   MOHS:   Location    The rationale for Mohs surgery was discussed with the patient and consent was obtained.  The risks and benefits as well as alternatives to therapy were discussed, in detail.  Specifically, the risks of infection, scarring, bleeding, prolonged wound healing, incomplete removal, allergy to anesthesia, nerve injury and recurrence were addressed.  Indication for Mohs was Location. Prior to the procedure, the treatment site was clearly identified and, if available, confirmed with previous photos and confirmed by the patient   All components of the Universal Protocol/PAUSE rule were completed.  The Mohs surgeon operated in two distinct and integrated capacities as the surgeon and pathologist.      The area was prepped with Betasept.  A rim of normal appearing skin was marked circumferentially around the lesion.  The area was infiltrated with local  anesthesia.  The tumor was first debulked to remove all clinically apparent tumor.  An incision following the standard Mohs approach was done and the specimen was oriented,mapped and placed in 1 block(s).  Each specimen was then chromacoded and processed in the Mohs laboratory using standard Mohs technique and submitted for frozen section histology.  Frozen section analysis showed no residual tumor but CLEAR MARGINS.      The tumor was excised using standard Mohs technique in 1 stages(s).  CLEAR MARGINS OBTAINED and Final defect size was 2.3 x 1.5 cm.     We discussed the options for wound management in full with the patient including risks/benefits/ possible outcomes.      REPAIR COMPLEX: Because of the tightness of the surrounding skin and Because of the size and full thickness nature of the defect, Because of the tightness of the surrounding skin, To maintain form and function, and In order to avoid distortion, a complex closure was planned. After LE anesthesia and prep, Burow's triangles were excised in the relaxed skin tension lines. The wound edges were widely undermined greater than width of the defect on both sides by dissection in the subcutaneous plane until adequate tissue mobility was obtained. Hemostasis was obtained. The wound edges were closed in a layered fashion using Vicryl and Fast Absorbing Plain Gut sutures. Postoperative length was 3.7 cm.   EBL minimal; complications none; wound care routine.  The patient was discharged in good condition and will return in one week for wound evaluation.

## 2024-02-16 NOTE — PROGRESS NOTES
Luca came to chemo infusion this afternoon for pump removal and vad flush.  VSS.  Pt assessed and is feeling well.  Home infusion went well.  Port had good blood return.  Home pump was completely empty.  Port was flushed with ns, heparinized then de-accessed and site covered. Luca left clinic ambulatory and unaccompanied.   Acute kidney failure

## 2024-02-28 ASSESSMENT — ANXIETY QUESTIONNAIRES
GAD7 TOTAL SCORE: 0
7. FEELING AFRAID AS IF SOMETHING AWFUL MIGHT HAPPEN: NOT AT ALL
GAD7 TOTAL SCORE: 0
8. IF YOU CHECKED OFF ANY PROBLEMS, HOW DIFFICULT HAVE THESE MADE IT FOR YOU TO DO YOUR WORK, TAKE CARE OF THINGS AT HOME, OR GET ALONG WITH OTHER PEOPLE?: NOT DIFFICULT AT ALL

## 2024-02-28 ASSESSMENT — PATIENT HEALTH QUESTIONNAIRE - PHQ9
SUM OF ALL RESPONSES TO PHQ QUESTIONS 1-9: 0
10. IF YOU CHECKED OFF ANY PROBLEMS, HOW DIFFICULT HAVE THESE PROBLEMS MADE IT FOR YOU TO DO YOUR WORK, TAKE CARE OF THINGS AT HOME, OR GET ALONG WITH OTHER PEOPLE: NOT DIFFICULT AT ALL

## 2024-02-29 ENCOUNTER — OFFICE VISIT (OUTPATIENT)
Dept: FAMILY MEDICINE | Facility: CLINIC | Age: 44
End: 2024-02-29
Payer: COMMERCIAL

## 2024-02-29 VITALS
SYSTOLIC BLOOD PRESSURE: 115 MMHG | DIASTOLIC BLOOD PRESSURE: 70 MMHG | WEIGHT: 210 LBS | OXYGEN SATURATION: 100 % | TEMPERATURE: 98.1 F | RESPIRATION RATE: 12 BRPM | BODY MASS INDEX: 26.95 KG/M2 | HEIGHT: 74 IN | HEART RATE: 77 BPM

## 2024-02-29 DIAGNOSIS — N52.8 OTHER MALE ERECTILE DYSFUNCTION: ICD-10-CM

## 2024-02-29 DIAGNOSIS — R74.8 ELEVATED LIVER ENZYMES: ICD-10-CM

## 2024-02-29 DIAGNOSIS — Z13.220 LIPID SCREENING: ICD-10-CM

## 2024-02-29 DIAGNOSIS — D50.0 IRON DEFICIENCY ANEMIA DUE TO CHRONIC BLOOD LOSS: ICD-10-CM

## 2024-02-29 DIAGNOSIS — Z00.00 ROUTINE GENERAL MEDICAL EXAMINATION AT A HEALTH CARE FACILITY: Primary | ICD-10-CM

## 2024-02-29 DIAGNOSIS — Z12.5 SCREENING FOR PROSTATE CANCER: ICD-10-CM

## 2024-02-29 PROBLEM — C18.9 COLON CANCER (H): Status: RESOLVED | Noted: 2022-12-21 | Resolved: 2024-02-29

## 2024-02-29 PROBLEM — K76.9 LESION OF LIVER: Status: ACTIVE | Noted: 2024-02-29

## 2024-02-29 PROBLEM — C44.91 BASAL CELL CARCINOMA: Status: ACTIVE | Noted: 2023-12-22

## 2024-02-29 LAB
ERYTHROCYTE [DISTWIDTH] IN BLOOD BY AUTOMATED COUNT: 13.9 % (ref 10–15)
HCT VFR BLD AUTO: 45.3 % (ref 40–53)
HGB BLD-MCNC: 15.2 G/DL (ref 13.3–17.7)
MCH RBC QN AUTO: 28.3 PG (ref 26.5–33)
MCHC RBC AUTO-ENTMCNC: 33.6 G/DL (ref 31.5–36.5)
MCV RBC AUTO: 84 FL (ref 78–100)
PLATELET # BLD AUTO: 214 10E3/UL (ref 150–450)
RBC # BLD AUTO: 5.38 10E6/UL (ref 4.4–5.9)
WBC # BLD AUTO: 8.1 10E3/UL (ref 4–11)

## 2024-02-29 PROCEDURE — 90480 ADMN SARSCOV2 VAC 1/ONLY CMP: CPT | Performed by: FAMILY MEDICINE

## 2024-02-29 PROCEDURE — 99213 OFFICE O/P EST LOW 20 MIN: CPT | Mod: 25 | Performed by: FAMILY MEDICINE

## 2024-02-29 PROCEDURE — 85027 COMPLETE CBC AUTOMATED: CPT | Performed by: FAMILY MEDICINE

## 2024-02-29 PROCEDURE — 99396 PREV VISIT EST AGE 40-64: CPT | Mod: 25 | Performed by: FAMILY MEDICINE

## 2024-02-29 PROCEDURE — 36415 COLL VENOUS BLD VENIPUNCTURE: CPT | Performed by: FAMILY MEDICINE

## 2024-02-29 PROCEDURE — 80061 LIPID PANEL: CPT | Performed by: FAMILY MEDICINE

## 2024-02-29 PROCEDURE — G0103 PSA SCREENING: HCPCS | Performed by: FAMILY MEDICINE

## 2024-02-29 PROCEDURE — 91320 SARSCV2 VAC 30MCG TRS-SUC IM: CPT | Performed by: FAMILY MEDICINE

## 2024-02-29 PROCEDURE — 80053 COMPREHEN METABOLIC PANEL: CPT | Performed by: FAMILY MEDICINE

## 2024-02-29 RX ORDER — SILDENAFIL CITRATE 20 MG/1
TABLET ORAL
Qty: 20 TABLET | Refills: 1 | Status: CANCELLED | OUTPATIENT
Start: 2024-02-29

## 2024-02-29 RX ORDER — SILDENAFIL 50 MG/1
50 TABLET, FILM COATED ORAL DAILY PRN
Qty: 20 TABLET | Refills: 3 | Status: SHIPPED | OUTPATIENT
Start: 2024-02-29 | End: 2024-09-23

## 2024-02-29 NOTE — PROGRESS NOTES
"Preventive Care Visit  [facility]  Diana DO Mauro, Family Medicine  Feb 29, 2024    Assessment & Plan     Routine general medical examination at a health care facility  Counseling  Appropriate preventive services were discussed with this patient, including applicable screening as appropriate for fall prevention, nutrition, physical activity, weight loss and cognition.  Checklist reviewing preventive services available has been given to the patient.  Reviewed patient's diet, addressing concerns and/or questions.   He is at risk for lack of exercise and has been provided with information to increase physical activity for the benefit of his well-being.     Other male erectile dysfunction  Sildenafil works well for ED, requesting refills.  Change from 2.5 20 mg tabs to one 50 mg mg tablet for ease of use.  - sildenafil (VIAGRA) 50 MG tablet; Take 1 tablet (50 mg) by mouth daily as needed (take 1 hour before sexual activity)    Elevated liver enzymes  Alkaline phosphatase and AST have been mildly elevated in the past.  Reassess today.  Reviewed 9/2023 CT chest abdomen pelvis which showed stable 9 mm right hepatic lobe lesion along with a 7 mm inferior right hepatic lobe lesion which was not significantly changed.  Consider dedicated liver ultrasound.  - Comprehensive metabolic panel (BMP + Alb, Alk Phos, ALT, AST, Total. Bili, TP)    Iron deficiency anemia due to chronic blood loss  Mild anemia with prior hemoglobin around 11.5, returned to normal range today.  - CBC with platelets    Lipid screening  - Lipid Profile (Chol, Trig, HDL, LDL calc)    Screening for prostate cancer  Given colon cancer history, will get baseline PSA.  - PSA, screen    Patient has been advised of split billing requirements and indicates understanding: Yes      BMI  Estimated body mass index is 26.96 kg/m  as calculated from the following:    Height as of this encounter: 1.88 m (6' 2\").    Weight as of this " encounter: 95.3 kg (210 lb).   Weight management plan: Discussed healthy diet and exercise guidelines      Liliana Segovia is a 43 year old, presenting for the following:  Physical (fasting)        2/29/2024    10:10 AM   Additional Questions   Roomed by LUCAS Barrientos CMA   Accompanied by -        Health Care Directive  Patient does not have a Health Care Directive or Living Will: Discussed advance care planning with patient; information given to patient to review.    Healthy Habits:     Getting at least 3 servings of Calcium per day:  Yes    Bi-annual eye exam:  Yes    Dental care twice a year:  Yes    Sleep apnea or symptoms of sleep apnea:  None    Diet:  Regular (no restrictions)    Frequency of exercise:  2-3 days/week    Duration of exercise:  Less than 15 minutes    Taking medications regularly:  Yes    Medication side effects:  Not applicable    Additional concerns today:  Yes        2/28/2024   General Health   How would you rate your overall physical health? Good   Feel stress (tense, anxious, or unable to sleep) Not at all         2/28/2024   Nutrition   Three or more servings of calcium each day? Yes   Diet: Regular (no restrictions)   How many servings of fruit and vegetables per day? (!) 2-3   How many sweetened beverages each day? 0-1         2/28/2024   Exercise   Days per week of moderate/strenous exercise 2 days   Average minutes spent exercising at this level 30 min   (!) EXERCISE CONCERN      2/28/2024   Social Factors   Frequency of gathering with friends or relatives Once a week   Worry food won't last until get money to buy more No    No   Food not last or not have enough money for food? No    No   Do you have housing?  Yes    Yes   Are you worried about losing your housing? No    No   Lack of transportation? No    No   Unable to get utilities (heat,electricity)? No    No         2/28/2024   Dental   Dentist two times every year? Yes         2/28/2024   TB Screening   Were you born outside of  [place]?  No       Today's PHQ-9 Score:       2/28/2024    11:24 AM   PHQ-9 SCORE   PHQ-9 Total Score MyChart 0   PHQ-9 Total Score 0         2/28/2024   Substance Use   Alcohol more than 3/day or more than 7/wk No   Do you use any other substances recreationally? No     Social History     Tobacco Use    Smoking status: Never     Passive exposure: Never    Smokeless tobacco: Never   Vaping Use    Vaping Use: Never used   Substance Use Topics    Alcohol use: Not Currently    Drug use: Never           2/28/2024   STI Screening   New sexual partner(s) since last STI/HIV test? (!) DECLINE   ASCVD Risk   The 10-year ASCVD risk score (Dianna DUBON, et al., 2019) is: 0.7%    Values used to calculate the score:      Age: 43 years      Sex: Male      Is Non- : No      Diabetic: No      Tobacco smoker: No      Systolic Blood Pressure: 115 mmHg      Is BP treated: No      HDL Cholesterol: 41 mg/dL      Total Cholesterol: 134 mg/dL       Reviewed and updated as needed this visit by Provider   Tobacco  Allergies  Meds  Problems  Med Hx  Surg Hx  Fam Hx                 Objective    Exam  /70   Pulse 77   Temp 98.1  F (36.7  C) (Oral)   Resp 12   Ht 1.88 m (6' 2\")   Wt 95.3 kg (210 lb)   SpO2 100%   BMI 26.96 kg/m     Estimated body mass index is 26.96 kg/m  as calculated from the following:    Height as of this encounter: 1.88 m (6' 2\").    Weight as of this encounter: 95.3 kg (210 lb).    Physical Exam  GENERAL: alert and no distress  EYES: Eyes grossly normal to inspection, PERRL and conjunctivae and sclerae normal  HENT: ear canals and TM's normal, nose and mouth without ulcers or lesions  NECK: no adenopathy, no asymmetry, masses, or scars  RESP: lungs clear to auscultation - no rales, rhonchi or wheezes  CV: regular rate and rhythm, normal S1 S2, no S3 or S4, no murmur, click or rub, no peripheral edema  ABDOMEN: soft, nontender  MS: no gross musculoskeletal defects noted, no " edema  SKIN: no suspicious lesions or rashes, well-healing incision left clavicle  NEURO: Normal strength and tone, mentation intact and speech normal  PSYCH: mentation appears normal, affect normal/bright      Signed Electronically by: Diana Wade DO    Answers submitted by the patient for this visit:  Patient Health Questionnaire (Submitted on 2/28/2024)  If you checked off any problems, how difficult have these problems made it for you to do your work, take care of things at home, or get along with other people?: Not difficult at all  PHQ9 TOTAL SCORE: 0  WENCESLAO-7 (Submitted on 2/28/2024)  WENCESLAO 7 TOTAL SCORE: 0  Annual Preventive Visit (Submitted on 1/18/2024)  Chief Complaint: Annual Exam:

## 2024-02-29 NOTE — PATIENT INSTRUCTIONS
Preventive Care Advice   This is general advice given by our system to help you stay healthy. However, your care team may have specific advice just for you. Please talk to your care team about your preventive care needs.  Nutrition  Eat 5 or more servings of fruits and vegetables each day.  Try wheat bread, brown rice and whole grain pasta (instead of white bread, rice, and pasta).  Get enough calcium and vitamin D. Check the label on foods and aim for 100% of the RDA (recommended daily allowance).  Lifestyle  Exercise at least 150 minutes each week   (30 minutes a day, 5 days a week).  Do muscle strengthening activities 2 days a week. These help control your weight and prevent disease.  No smoking.  Wear sunscreen to prevent skin cancer.  Have a dental exam and cleaning every 6 months.  Yearly exams  See your health care team every year to talk about:  Any changes in your health.  Any medicines your care team has prescribed.  Preventive care, family planning, and ways to prevent chronic diseases.  Shots (vaccines)   HPV shots (up to age 26), if you've never had them before.  Hepatitis B shots (up to age 59), if you've never had them before.  COVID-19 shot: Get this shot when it's due.  Flu shot: Get a flu shot every year.  Tetanus shot: Get a tetanus shot every 10 years.  Pneumococcal, hepatitis A, and RSV shots: Ask your care team if you need these based on your risk.  Shingles shot (for age 50 and up).  General health tests  Diabetes screening:  Starting at age 35, Get screened for diabetes at least every 3 years.  If you are younger than age 35, ask your care team if you should be screened for diabetes.  Cholesterol test: At age 39, start having a cholesterol test every 5 years, or more often if advised.  Bone density scan (DEXA): At age 50, ask your care team if you should have this scan for osteoporosis (brittle bones).  Hepatitis C: Get tested at least once in your life.  STIs (sexually transmitted  infections)  Before age 24: Ask your care team if you should be screened for STIs.  After age 24: Get screened for STIs if you're at risk. You are at risk for STIs (including HIV) if:  You are sexually active with more than one person.  You don't use condoms every time.  You or a partner was diagnosed with a sexually transmitted infection.  If you are at risk for HIV, ask about PrEP medicine to prevent HIV.  Get tested for HIV at least once in your life, whether you are at risk for HIV or not.  Cancer screening tests  Cervical cancer screening: If you have a cervix, begin getting regular cervical cancer screening tests at age 21. Most people who have regular screenings with normal results can stop after age 65. Talk about this with your provider.  Breast cancer scan (mammogram): If you've ever had breasts, begin having regular mammograms starting at age 40. This is a scan to check for breast cancer.  Colon cancer screening: It is important to start screening for colon cancer at age 45.  Have a colonoscopy test every 10 years (or more often if you're at risk) Or, ask your provider about stool tests like a FIT test every year or Cologuard test every 3 years.  To learn more about your testing options, visit: https://www.pushd/693285.pdf.  For help making a decision, visit: https://bit.ly/cx40074.  Prostate cancer screening test: If you have a prostate and are age 55 to 69, ask your provider if you would benefit from a yearly prostate cancer screening test.  Lung cancer screening: If you are a current or former smoker age 50 to 80, ask your care team if ongoing lung cancer screenings are right for you.  For informational purposes only. Not to replace the advice of your health care provider. Copyright   2023 [employer]. All rights reserved. Clinically reviewed by the [facility] Transitions Program. [employer] [id number] - REV 01/24.

## 2024-03-01 LAB
ALBUMIN SERPL BCG-MCNC: 4.8 G/DL (ref 3.5–5.2)
ALP SERPL-CCNC: 101 U/L (ref 40–150)
ALT SERPL W P-5'-P-CCNC: 29 U/L (ref 0–70)
ANION GAP SERPL CALCULATED.3IONS-SCNC: 10 MMOL/L (ref 7–15)
AST SERPL W P-5'-P-CCNC: 25 U/L (ref 0–45)
BILIRUB SERPL-MCNC: 0.7 MG/DL
BUN SERPL-MCNC: 22.4 MG/DL (ref 6–20)
CALCIUM SERPL-MCNC: 9.7 MG/DL (ref 8.6–10)
CHLORIDE SERPL-SCNC: 104 MMOL/L (ref 98–107)
CHOLEST SERPL-MCNC: 169 MG/DL
CREAT SERPL-MCNC: 1.25 MG/DL (ref 0.67–1.17)
DEPRECATED HCO3 PLAS-SCNC: 26 MMOL/L (ref 22–29)
EGFRCR SERPLBLD CKD-EPI 2021: 74 ML/MIN/1.73M2
FASTING STATUS PATIENT QL REPORTED: NORMAL
GLUCOSE SERPL-MCNC: 92 MG/DL (ref 70–99)
HDLC SERPL-MCNC: 50 MG/DL
LDLC SERPL CALC-MCNC: 92 MG/DL
NONHDLC SERPL-MCNC: 119 MG/DL
POTASSIUM SERPL-SCNC: 4.8 MMOL/L (ref 3.4–5.3)
PROT SERPL-MCNC: 7.4 G/DL (ref 6.4–8.3)
PSA SERPL DL<=0.01 NG/ML-MCNC: 1.87 NG/ML (ref 0–2.5)
SODIUM SERPL-SCNC: 140 MMOL/L (ref 135–145)
TRIGL SERPL-MCNC: 134 MG/DL

## 2024-03-05 ENCOUNTER — PATIENT OUTREACH (OUTPATIENT)
Dept: ONCOLOGY | Facility: HOSPITAL | Age: 44
End: 2024-03-05
Payer: COMMERCIAL

## 2024-03-22 ENCOUNTER — HOSPITAL ENCOUNTER (OUTPATIENT)
Dept: CT IMAGING | Facility: HOSPITAL | Age: 44
Discharge: HOME | End: 2024-03-22
Attending: INTERNAL MEDICINE | Admitting: INTERNAL MEDICINE
Payer: COMMERCIAL

## 2024-03-22 ENCOUNTER — TELEPHONE (OUTPATIENT)
Dept: ONCOLOGY | Facility: HOSPITAL | Age: 44
End: 2024-03-22

## 2024-03-22 ENCOUNTER — OFFICE VISIT (OUTPATIENT)
Dept: DERMATOLOGY | Facility: CLINIC | Age: 44
End: 2024-03-22
Attending: NURSE PRACTITIONER
Payer: COMMERCIAL

## 2024-03-22 DIAGNOSIS — D22.9 MULTIPLE BENIGN NEVI: Primary | ICD-10-CM

## 2024-03-22 DIAGNOSIS — L81.4 LENTIGINES: ICD-10-CM

## 2024-03-22 DIAGNOSIS — D23.9 DERMATOFIBROMA: ICD-10-CM

## 2024-03-22 DIAGNOSIS — Z12.83 ENCOUNTER FOR SCREENING FOR MALIGNANT NEOPLASM OF SKIN: ICD-10-CM

## 2024-03-22 DIAGNOSIS — Z85.828 HISTORY OF BASAL CELL CARCINOMA (BCC): ICD-10-CM

## 2024-03-22 DIAGNOSIS — D18.01 CHERRY ANGIOMA: ICD-10-CM

## 2024-03-22 DIAGNOSIS — C18.2 MALIGNANT NEOPLASM OF ASCENDING COLON (H): ICD-10-CM

## 2024-03-22 LAB — RADIOLOGIST FLAGS: ABNORMAL

## 2024-03-22 PROCEDURE — 99213 OFFICE O/P EST LOW 20 MIN: CPT | Performed by: NURSE PRACTITIONER

## 2024-03-22 PROCEDURE — 71260 CT THORAX DX C+: CPT

## 2024-03-22 PROCEDURE — 250N000011 HC RX IP 250 OP 636: Performed by: INTERNAL MEDICINE

## 2024-03-22 RX ORDER — HEPARIN SODIUM (PORCINE) LOCK FLUSH IV SOLN 100 UNIT/ML 100 UNIT/ML
500 SOLUTION INTRAVENOUS ONCE
Status: COMPLETED | OUTPATIENT
Start: 2024-03-22 | End: 2024-03-22

## 2024-03-22 RX ORDER — IOPAMIDOL 755 MG/ML
90 INJECTION, SOLUTION INTRAVASCULAR ONCE
Status: COMPLETED | OUTPATIENT
Start: 2024-03-22 | End: 2024-03-22

## 2024-03-22 RX ADMIN — Medication 500 UNITS: at 09:04

## 2024-03-22 RX ADMIN — IOPAMIDOL 90 ML: 755 INJECTION, SOLUTION INTRAVENOUS at 08:56

## 2024-03-22 NOTE — PATIENT INSTRUCTIONS
Patient Education       Proper skin care from [facility]:    -Eliminate harsh soaps as they strip the natural oils from the skin, often resulting in dry itchy skin ( i.e. Dial, Zest, Romansh Spring)  -Use mild soaps such as Cetaphil or Dove Sensitive Skin in the shower. You do not need to use soap on arms, legs, and trunk every time you shower unless visibly soiled.   -Avoid hot or cold showers.  -After showering, lightly dry off and apply moisturizing within 2-3 minutes. This will help trap moisture in the skin.   -Aggressive use of a moisturizer at least 1-2 times a day to the entire body (including -Vanicream, Cetaphil, Aquaphor or Cerave) and moisturize hands after every washing.  -We recommend using moisturizers that come in a tub that needs to be scooped out, not a pump. This has more of an oil base. It will hold moisture in your skin much better than a water base moisturizer. The above recommended are non-pore clogging.      Wear a sunscreen with at least SPF 30 on your face, ears, neck and V of the chest daily. Wear sunscreen on other areas of the body if those areas are exposed to the sun throughout the day. Sunscreens can contain physical and/or chemical blockers. Physical blockers are less likely to clog pores, these include zinc oxide and titanium dioxide. Reapply every two hour and after swimming.     Sunscreen examples: https://www.ewg.org/sunscreen/    UV radiation  UVA radiation remains constant throughout the day and throughout the year. It is a longer wavelength than UVB and therefore penetrates deeper into the skin leading to immediate and delayed tanning, photoaging, and skin cancer. 70-80% of UVA and UVB radiation occurs between the hours of 10am-2pm.  UVB radiation  UVB radiation causes the most harmful effects and is more significant during the summer months. However, snow and ice can reflect UVB radiation leading to skin damage during the winter months as well. UVB radiation is  responsible for tanning, burning, inflammation, delayed erythema (pinkness), pigmentation (brown spots), and skin cancer.     I recommend self monthly full body exams and yearly full body exams with a dermatology provider. If you develop a new or changing lesion please follow up for examination. Most skin cancers are pink and scaly or pink and pearly. However, we do see blue/brown/black skin cancers.  Consider the ABCDEs of melanoma when giving yourself your monthly full body exam ( don't forget the groin, buttocks, feet, toes, etc). A-asymmetry, B-borders, C-color, D-diameter, E-elevation or evolving. If you see any of these changes please follow up in clinic. If you cannot see your back I recommend purchasing a hand held mirror to use with a larger wall mirror.       Checking for Skin Cancer  You can find cancer early by checking your skin each month. There are 3 kinds of skin cancer. They are melanoma, basal cell carcinoma, and squamous cell carcinoma. Doing monthly skin checks is the best way to find new marks or skin changes. Follow the instructions below for checking your skin.   The ABCDEs of checking moles for melanoma   Check your moles or growths for signs of melanoma using ABCDE:   Asymmetry: the sides of the mole or growth don t match  Border: the edges are ragged, notched, or blurred  Color: the color within the mole or growth varies  Diameter: the mole or growth is larger than 6 mm (size of a pencil eraser)  Evolving: the size, shape, or color of the mole or growth is changing (evolving is not shown in the images below)    Checking for other types of skin cancer  Basal cell carcinoma or squamous cell carcinoma have symptoms such as:     A spot or mole that looks different from all other marks on your skin  Changes in how an area feels, such as itching, tenderness, or pain  Changes in the skin's surface, such as oozing, bleeding, or scaliness  A sore that does not heal  New swelling or redness beyond  the border of a mole    Who s at risk?  Anyone can get skin cancer. But you are at greater risk if you have:   Fair skin, light-colored hair, or light-colored eyes  Many moles or abnormal moles on your skin  A history of sunburns from sunlight or tanning beds  A family history of skin cancer  A history of exposure to radiation or chemicals  A weakened immune system  If you have had skin cancer in the past, you are at risk for recurring skin cancer.   How to check your skin  Do your monthly skin checkups in front of a full-length mirror. Check all parts of your body, including your:   Head (ears, face, neck, and scalp)  Torso (front, back, and sides)  Arms (tops, undersides, upper, and lower armpits)  Hands (palms, backs, and fingers, including under the nails)  Buttocks and genitals  Legs (front, back, and sides)  Feet (tops, soles, toes, including under the nails, and between toes)  If you have a lot of moles, take digital photos of them each month. Make sure to take photos both up close and from a distance. These can help you see if any moles change over time.   Most skin changes are not cancer. But if you see any changes in your skin, call your doctor right away. Only he or she can diagnose a problem. If you have skin cancer, seeing your doctor can be the first step toward getting the treatment that could save your life.   [employer] last reviewed this educational content on 4/1/2019 2000-2020 [employer]. [address] [place] [address], [place] [address]. All rights reserved. This information is not intended as a substitute for professional medical care. Always follow your healthcare professional's instructions.       When should I call my doctor?  If you are worsening or not improving, please, contact us or seek urgent care as noted below.     Who should I call with questions (adults)?  [facility] (adult and pediatric): [phone]  [facility]  [place] (adult): [phone]  [facility] ([place], [place], [place] and [place]) [phone]  For urgent needs outside of business hours call the [facility] at [phone] and ask for the dermatology resident on call to be paged  If this is a medical emergency and you are unable to reach an ER, Call 911      If you need a prescription refill, please contact your pharmacy. Refills are approved or denied by our Physicians during normal business hours, Monday through Fridays    Per office policy, refills will not be granted if you have not been seen within the past year (or sooner depending on your condition)

## 2024-03-22 NOTE — PROGRESS NOTES
[facility] Dermatology Note  Encounter Date: Mar 22, 2024  Office Visit     Reviewed patients past medical history and pertinent chart review prior to patients visit today.     Dermatology Problem List:  History of BCC removed on the left anterior neck by Mohs on 1/29/2024    No known family history of melanoma    ____________________________________________    Assessment & Plan:     #History of BCC on the left anterior neck, well-healed scar without signs of recurrent malignancies    # Benign skin findings including: Dermatofibroma's, cherry angioma, lentigines and benign nevi.   - No further intervention required. Patient to report changes.   - Patient reassured of the benign nature of these lesions.    #Signs and Symptoms of non-melanoma skin cancer and ABCDEs of melanoma reviewed with patient. Patient encouraged to perform monthly self skin exams and educated on how to perform them. UV precautions reviewed with patient. Patient was asked about new or changing moles/lesions on body.     #Reviewed Sunscreen: Apply 20 minutes prior to going outdoors and reapply every two hours, when wet or sweating. We recommend using an SPF 30 or higher, and to use one that is water resistant.       Follow-up:  1 years for follow up full body skin exam, prn for new or changing lesions or new concerns    Megha Henderson CNP  Dermatology     ____________________________________________    CC: Skin Check (Full- spots of concern on left arm )    HPI:  Mr. Luca Araiza is a(n) 43 year old male who presents today as a return patient for a full body skin cancer screening. Patient has no specific concerns today.     Patient is otherwise feeling well, without additional skin concerns.     Physical Exam:  Vitals: There were no vitals taken for this visit.  SKIN: Total skin excluding the genitalia areas was performed. The exam included the head/face, neck, both arms, chest, back, abdomen, both legs, digits, mons pubis, buttock  and nails.   -Well-healed scar on the left anterior neck without signs of recurrent malignancies  -Several pink-brown firm papules with stellate center and positive dimple sign on the trunk and extremities  -several 1-2mm red dome shaped symmetric papules scattered on the trunk  -multiple tan/brown flat round macules and raised papules scattered throughout trunk, extremities and head. No worrisome features for malignancy noted on examination.  -scattered tan, homogenous macules scattered on sun exposed areas of trunk, extremities and face.     - No other lesions of concern on areas examined.     Medications:  Current Outpatient Medications   Medication    sildenafil (REVATIO) 20 MG tablet    sildenafil (VIAGRA) 50 MG tablet     Current Facility-Administered Medications   Medication    alum & mag hydroxide-simethicone (MAALOX) suspension 30 mL    famotidine (PEPCID) injection 20 mg    LORazepam (ATIVAN) injection 0.5 mg      Past Medical History:   Patient Active Problem List   Diagnosis    Family history of colon cancer in father    Malignant neoplasm of ascending colon (H)    Iron deficiency anemia due to chronic blood loss    Basal cell carcinoma    Lesion of liver     Past Medical History:   Diagnosis Date    Anemia     Basal cell carcinoma     Malignant neoplasm of ascending colon (H) 11/07/2022       CC Amelia Henderson, APRN CNP  [address] [facility] [address]  [place] [address] on close of this encounter.

## 2024-03-22 NOTE — LETTER
3/22/2024         RE: Luca Araiza  [address]  [place] [address]        Dear Colleague,    Thank you for referring your patient, Luca Araiza, to the [facility]. Please see a copy of my visit note below.    [facility] Dermatology Note  Encounter Date: Mar 22, 2024  Office Visit     Reviewed patients past medical history and pertinent chart review prior to patients visit today.     Dermatology Problem List:  History of BCC removed on the left anterior neck by Mohs on 1/29/2024    No known family history of melanoma    ____________________________________________    Assessment & Plan:     #History of BCC on the left anterior neck, well-healed scar without signs of recurrent malignancies    # Benign skin findings including: Dermatofibroma's, cherry angioma, lentigines and benign nevi.   - No further intervention required. Patient to report changes.   - Patient reassured of the benign nature of these lesions.    #Signs and Symptoms of non-melanoma skin cancer and ABCDEs of melanoma reviewed with patient. Patient encouraged to perform monthly self skin exams and educated on how to perform them. UV precautions reviewed with patient. Patient was asked about new or changing moles/lesions on body.     #Reviewed Sunscreen: Apply 20 minutes prior to going outdoors and reapply every two hours, when wet or sweating. We recommend using an SPF 30 or higher, and to use one that is water resistant.       Follow-up:  1 years for follow up full body skin exam, prn for new or changing lesions or new concerns    Megha Henderson, MURALI  Dermatology     ____________________________________________    CC: Skin Check (Full- spots of concern on left arm )    HPI:  Mr. Luca Araiza is a(n) 43 year old male who presents today as a return patient for a full body skin cancer screening. Patient has no specific concerns today.     Patient is otherwise feeling well, without additional skin concerns.      Physical Exam:  Vitals: There were no vitals taken for this visit.  SKIN: Total skin excluding the genitalia areas was performed. The exam included the head/face, neck, both arms, chest, back, abdomen, both legs, digits, mons pubis, buttock and nails.   -Well-healed scar on the left anterior neck without signs of recurrent malignancies  -Several pink-brown firm papules with stellate center and positive dimple sign on the trunk and extremities  -several 1-2mm red dome shaped symmetric papules scattered on the trunk  -multiple tan/brown flat round macules and raised papules scattered throughout trunk, extremities and head. No worrisome features for malignancy noted on examination.  -scattered tan, homogenous macules scattered on sun exposed areas of trunk, extremities and face.     - No other lesions of concern on areas examined.     Medications:  Current Outpatient Medications   Medication     sildenafil (REVATIO) 20 MG tablet     sildenafil (VIAGRA) 50 MG tablet     Current Facility-Administered Medications   Medication     alum & mag hydroxide-simethicone (MAALOX) suspension 30 mL     famotidine (PEPCID) injection 20 mg     LORazepam (ATIVAN) injection 0.5 mg      Past Medical History:   Patient Active Problem List   Diagnosis     Family history of colon cancer in father     Malignant neoplasm of ascending colon (H)     Iron deficiency anemia due to chronic blood loss     Basal cell carcinoma     Lesion of liver     Past Medical History:   Diagnosis Date     Anemia      Basal cell carcinoma      Malignant neoplasm of ascending colon (H) 11/07/2022        RAQUEL Sidhu CNP  [address] [place] [address] on close of this encounter.      Again, thank you for allowing me to participate in the care of your patient.        Sincerely,        RAQUEL Chun CNP

## 2024-03-25 ENCOUNTER — ONCOLOGY VISIT (OUTPATIENT)
Dept: ONCOLOGY | Facility: HOSPITAL | Age: 44
End: 2024-03-25
Payer: COMMERCIAL

## 2024-03-25 ENCOUNTER — PATIENT OUTREACH (OUTPATIENT)
Dept: ONCOLOGY | Facility: HOSPITAL | Age: 44
End: 2024-03-25

## 2024-03-25 ENCOUNTER — HOSPITAL ENCOUNTER (OUTPATIENT)
Dept: MRI IMAGING | Facility: CLINIC | Age: 44
Discharge: HOME | End: 2024-03-25
Attending: INTERNAL MEDICINE | Admitting: INTERNAL MEDICINE
Payer: COMMERCIAL

## 2024-03-25 ENCOUNTER — LAB (OUTPATIENT)
Dept: INFUSION THERAPY | Facility: HOSPITAL | Age: 44
End: 2024-03-25
Payer: COMMERCIAL

## 2024-03-25 VITALS
HEART RATE: 81 BPM | BODY MASS INDEX: 26.82 KG/M2 | HEIGHT: 74 IN | TEMPERATURE: 97.8 F | WEIGHT: 209 LBS | DIASTOLIC BLOOD PRESSURE: 84 MMHG | SYSTOLIC BLOOD PRESSURE: 134 MMHG | OXYGEN SATURATION: 98 % | RESPIRATION RATE: 18 BRPM

## 2024-03-25 DIAGNOSIS — C18.2 MALIGNANT NEOPLASM OF ASCENDING COLON (H): Primary | ICD-10-CM

## 2024-03-25 DIAGNOSIS — Z80.0 FAMILY HISTORY OF COLON CANCER IN FATHER: ICD-10-CM

## 2024-03-25 DIAGNOSIS — D50.0 IRON DEFICIENCY ANEMIA DUE TO CHRONIC BLOOD LOSS: ICD-10-CM

## 2024-03-25 DIAGNOSIS — C18.2 MALIGNANT NEOPLASM OF ASCENDING COLON (H): ICD-10-CM

## 2024-03-25 LAB — CEA SERPL-MCNC: 4.5 NG/ML

## 2024-03-25 PROCEDURE — 300N000004 RESEARCH KIT COLLECTION: Performed by: INTERNAL MEDICINE

## 2024-03-25 PROCEDURE — 36591 DRAW BLOOD OFF VENOUS DEVICE: CPT

## 2024-03-25 PROCEDURE — A9585 GADOBUTROL INJECTION: HCPCS | Performed by: INTERNAL MEDICINE

## 2024-03-25 PROCEDURE — 36415 COLL VENOUS BLD VENIPUNCTURE: CPT | Performed by: INTERNAL MEDICINE

## 2024-03-25 PROCEDURE — 99213 OFFICE O/P EST LOW 20 MIN: CPT | Performed by: INTERNAL MEDICINE

## 2024-03-25 PROCEDURE — 300N000016 RESEARCH KIT COLLECTION: Performed by: INTERNAL MEDICINE

## 2024-03-25 PROCEDURE — 250N000011 HC RX IP 250 OP 636: Performed by: INTERNAL MEDICINE

## 2024-03-25 PROCEDURE — 250N000011 HC RX IP 250 OP 636: Performed by: RADIOLOGY

## 2024-03-25 PROCEDURE — 74183 MRI ABD W/O CNTR FLWD CNTR: CPT

## 2024-03-25 PROCEDURE — 99214 OFFICE O/P EST MOD 30 MIN: CPT | Performed by: INTERNAL MEDICINE

## 2024-03-25 PROCEDURE — 255N000002 HC RX 255 OP 636: Performed by: INTERNAL MEDICINE

## 2024-03-25 PROCEDURE — G2211 COMPLEX E/M VISIT ADD ON: HCPCS | Performed by: INTERNAL MEDICINE

## 2024-03-25 PROCEDURE — 258N000003 HC RX IP 258 OP 636: Performed by: INTERNAL MEDICINE

## 2024-03-25 PROCEDURE — 82378 CARCINOEMBRYONIC ANTIGEN: CPT | Performed by: INTERNAL MEDICINE

## 2024-03-25 RX ORDER — GADOBUTROL 604.72 MG/ML
9 INJECTION INTRAVENOUS ONCE
Status: COMPLETED | OUTPATIENT
Start: 2024-03-25 | End: 2024-03-25

## 2024-03-25 RX ORDER — HEPARIN SODIUM (PORCINE) LOCK FLUSH IV SOLN 100 UNIT/ML 100 UNIT/ML
5 SOLUTION INTRAVENOUS
Status: DISCONTINUED | OUTPATIENT
Start: 2024-03-25 | End: 2024-03-25 | Stop reason: HOSPADM

## 2024-03-25 RX ORDER — HEPARIN SODIUM (PORCINE) LOCK FLUSH IV SOLN 100 UNIT/ML 100 UNIT/ML
5 SOLUTION INTRAVENOUS ONCE
Status: COMPLETED | OUTPATIENT
Start: 2024-03-25 | End: 2024-03-25

## 2024-03-25 RX ADMIN — Medication 5 ML: at 09:05

## 2024-03-25 RX ADMIN — SODIUM CHLORIDE 50 ML: 9 INJECTION, SOLUTION INTRAVENOUS at 14:30

## 2024-03-25 RX ADMIN — GADOBUTROL 9 ML: 604.72 INJECTION INTRAVENOUS at 14:37

## 2024-03-25 RX ADMIN — Medication 5 ML: at 14:57

## 2024-03-25 ASSESSMENT — PAIN SCALES - GENERAL: PAINLEVEL: NO PAIN (0)

## 2024-03-25 NOTE — PROGRESS NOTES
[facility] Hematology and Oncology Consult Note    Patient: Luca Araiza  MRN: [id number]  Date of Service: Mar 25, 2024       Reason for Visit    Follow-up for colon cancer    New liver lesions on CT scan, March 2024  CT DNA profile positive again, January 2024    T3N1B, stage IIIb adenocarcinoma of the cecum status post laparoscopic right colectomy, December 21, 2022  CT DNA positive, March 2023  Tumor is MMR intact  Early onset of colon cancer with family history, genetic testing negative, 47 genes analyzed, April 2023  Neuropathy    CT scans are reviewed and there is concerning note of 2 new liver lesions.  Also CT DNA profile became positive again in January.  Overall concern for recurrent and metastatic colon cancer now.    Will get MRI of the liver for further evaluation.  Likely will need biopsy after that.  Will send current tissue for next generation sequencing.  Will see back in 3 weeks to discuss treatment options.    Will look into clinical trials as potential treatment options as well.  Questions answered.    Plan: Will schedule MRI today  Follow-up in 3 weeks  Will schedule biopsy after MRI results available  Send original tissue for next generation sequencing  Referral to look into clinical trials    Measurable disease: None postoperatively    Current therapy: Modified FOLFIRINOX day 1 cycle 1, March 14, 2023  Last cycle #12 today August 15, 2023                ECOG Performance    0 - Independent    Encounter Diagnoses:    Problem List Items Addressed This Visit          Digestive    Malignant neoplasm of ascending colon (H) - Primary    Relevant Orders    MR Liver wo & w Contrast    Check Out Appointment Request           ______________________________________________________________________________    Staging History   Cancer Staging   Malignant neoplasm of ascending colon (H)  Staging form: Colon and Rectum, AJCC 8th Edition  - Pathologic stage from 12/21/2022: Stage IIIB (pT3, pN1b, cM0) -  Signed by Dayami Pablo APRN CNP on 7/5/2023        History    Luca is here again for follow-up.  Seen 3 months ago.  Had recent colonoscopy showing a polyp.  Is otherwise asymptomatic.  CT DNA was positive in January.  He has had CT scans for restaging.  Still bothered by neuropathy.  ECOG status is 0.      March 14, 2023:    Luca is here for reevaluation.  Seen about 6 weeks ago.  His CT DNA test was positive and he was randomized to arm for the clinical trial.  He has had Port-A-Cath placed.  No new symptoms or problems.  ECOG status 0.    January 2023:  Mr. Luca Araiza is a 42 year old no significant past medical history who is been diagnosed and undergone laparoscopic hemicolectomy for colon cancer.  He was in [place] last August and had significant illness with diarrhea and vomiting.  He then had physical which showed that he was anemic and subsequently had colonoscopy showing cecal colon cancer.  He underwent laparoscopic right hemicolectomy on December 21 and has recovered well from this.    No other previous medical history.  He had left ACL repair surgery as a teenager.  No other hospitalizations.  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  Does not smoke and does not drink alcohol.    Father had colon cancer in his mid 60s.  Mother had breast cancer in her late 50s.  No siblings or kids with cancer.  Maternal grandfather had colon cancer in his 60s.  Paternal grandmother had cancer type unknown.            Review of systems.  No fever or night sweats.  No loss of weight.  No lumps or bumps anywhere.  No unusual headaches or eyesight issues.  No dizziness.  No bleeding from the nose.  No sores in the mouth. No problems with swallowing.  No chest pain. No shortness of breath. No cough.  No abdominal pain. No nausea or vomiting.  No diarrhea or constipation.  No blood in stool or black colored stools.  No problems passing  "urine.  No numbness or tingling in hands or feet.  No skin rashes.  A 14 point review of systems is otherwise negative.        Past History    Past Medical History:   Diagnosis Date    Anemia     Basal cell carcinoma     Malignant neoplasm of ascending colon (H) 11/07/2022     Past Surgical History:   Procedure Laterality Date    COLONOSCOPY      COLONOSCOPY N/A 1/15/2024    Procedure: COLONOSCOPY, WITH POLYPECTOMY AND BIOPSY;  Surgeon: Nahum Contreras MD;  Location: [facility]    HEMICOLECTOMY, RT/LT Right     IR CHEST PORT PLACEMENT > 5 YRS OF AGE  3/9/2023    REMOVAL OF SPERM DUCT(S)      Description: Surgery Of Male Genitalia Vasectomy;  Recorded: 12/27/2011;  Comments: 12/27/2011    ZZC REPAIR CRUCIATE LIGAMENT,KNEE      Description: Primary Repair Of Knee Ligament Cruciate Anterior;  Recorded: 07/20/2009;     Family History   Problem Relation Age of Onset    Breast Cancer Mother     Skin Cancer Mother     Colon Cancer Father     Skin Cancer Father     Anesthesia Reaction No family hx of     Venous thrombosis No family hx of      Social History     Socioeconomic History    Marital status:      Spouse name: None    Number of children: None    Years of education: None    Highest education level: None   Tobacco Use    Smoking status: Never    Smokeless tobacco: Never   Substance and Sexual Activity    Alcohol use: Not Currently    Drug use: Never    Sexual activity: Yes     Partners: Female     Birth control/protection: Male Surgical   Other Topics Concern    Parent/sibling w/ CABG, MI or angioplasty before 65F 55M? No         Allergies    No Known Allergies        Physical Exam    /84 (BP Location: Left arm, Patient Position: Sitting, Cuff Size: Adult Large)   Pulse 81   Temp 97.8  F (36.6  C) (Tympanic)   Resp 18   Ht 1.88 m (6' 2\")   Wt 94.8 kg (209 lb)   SpO2 98%   BMI 26.83 kg/m        GENERAL: Alert and oriented to time place and person. Seated comfortably. In no distress.    HEAD: " Atraumatic and normocephalic.    EYES: LUIS ANGEL, EOMI.  No pallor.  No icterus.    Oral cavity: no mucosal lesion or tonsillar enlargement.    NECK: supple. JVP normal.  No thyroid enlargement.    LYMPH NODES: No palpable, cervical, axillary or inguinal lymphadenopathy.    CHEST: clear to auscultation bilaterally.  Resonant to percussion throughout bilaterally.  Symmetrical breath movements bilaterally.    CVS: S1 and S2 are heard. Regular rate and rhythm.  No murmur or gallop or rub heard.  No peripheral edema.    ABDOMEN: Soft. Not tender. Not distended.  No palpable hepatomegaly or splenomegaly.  No other mass palpable.  Bowel sounds heard.    EXTREMITIES: Warm.    SKIN: no rash, or bruising or purpura.  Has a full head of hair.    Lab Results    Preoperative CEA was 2.6 and 2.7    Imaging Results    CT and MRI reviewed with no evidence of metastatic disease    CT Chest/Abdomen/Pelvis w Contrast    Result Date: 3/22/2024  EXAM: CT CHEST/ABDOMEN/PELVIS W CONTRAST LOCATION: [facility] DATE: 3/22/2024 INDICATION: Colon cancer. COMPARISON: 09/08/2023, 01/30/2023, 11/03/2022. MRI 11/10/2022 TECHNIQUE: CT scan of the chest, abdomen, and pelvis was performed following injection of IV contrast. Multiplanar reformats were obtained. Dose reduction techniques were used. CONTRAST: IsoVue 370 90mL FINDINGS: LUNGS AND PLEURA: A 2 mm nodule in the left upper lobe on series 4 image 70 is unchanged from 2022 and can be considered benign. Benign calcified granuloma in the right middle lobe on image 187. No new nodules. No pleural effusions. MEDIASTINUM/AXILLAE: Infusion port tip in the RA. No adenopathy. Normal heart size. CORONARY ARTERY CALCIFICATION: None. HEPATOBILIARY: A vague/subtle 12 mm area of low attenuation in the right hepatic lobe (series 3 image 172) in segment 6 is not clearly identified on 09/08/2023 or the prior MRI of 11/10/2022. An adjacent subcentimeter subcapsular lesion on the same image   corresponds with a benign cyst on the prior MRI. A 12 mm vague low-attenuation lesion in the left hepatic lobe in segment IVb on image 164 is also new. Additional new lesion versus beam hardening artifact in segment 2. Tiny cysts in the right hepatic lobe on images 149 and 136 measuring up to 10 mm are unchanged from the 2022 MRI. PANCREAS: Normal. SPLEEN: Normal. ADRENAL GLANDS: Normal. KIDNEYS/BLADDER: Normal. BOWEL: Right hemicolectomy. Bowel loops are normal caliber. LYMPH NODES: Normal. VASCULATURE: Normal. PELVIC ORGANS: Normal. MUSCULOSKELETAL: Normal.     IMPRESSION: 1.  There are at least 2 new vague low-attenuation lesions in the liver measuring up to 12 mm. These are suspicious for metastasis. An MRI of the liver without and with IV gadolinium is recommended for further evaluation. 2.  No other evidence of metastatic disease in the chest, abdomen, or pelvis. 3.  Right hemicolectomy. [[facility]: MRI of the liver without and with IV gadolinium recommended for new liver lesions.] This report will be copied to the [facility] to ensure a provider acknowledges the finding. [facility] Center is available Monday through Friday 8am-3:30 pm.         Signed by: Sekou Hall MD

## 2024-03-25 NOTE — PROGRESS NOTES
"Oncology Rooming Note    March 25, 2024 8:42 AM   Luca Araiza is a 43 year old male who presents for:    Chief Complaint   Patient presents with    Oncology Clinic Visit     3 month follow up with labs and prior CT review related to Malignant neoplasm of ascending colon     Initial Vitals: /84 (BP Location: Left arm, Patient Position: Sitting, Cuff Size: Adult Large)   Pulse 81   Temp 97.8  F (36.6  C) (Tympanic)   Resp 18   Ht 1.88 m (6' 2\")   Wt 94.8 kg (209 lb)   SpO2 98%   BMI 26.83 kg/m   Estimated body mass index is 26.83 kg/m  as calculated from the following:    Height as of this encounter: 1.88 m (6' 2\").    Weight as of this encounter: 94.8 kg (209 lb). Body surface area is 2.22 meters squared.  No Pain (0) Comment: Data Unavailable   No LMP for male patient.  Allergies reviewed: Yes  Medications reviewed: Yes    Medications: Medication refills not needed today.  Pharmacy name entered into EPIC:    [facility] - [place], [place] - [address] [place] [address] PHARMACY [place], [place] - [address] [facility]    Frailty Screening:   Is the patient here for a new oncology consult visit in cancer care? 2. No      Clinical concerns: 3 month follow up with labs and prior CT review related to Malignant neoplasm of ascending colon      ANDREA CROCKER CMA            "

## 2024-03-25 NOTE — H&P (VIEW-ONLY)
[facility] Hematology and Oncology Consult Note    Patient: Luca Araiza  MRN: [id number]  Date of Service: Mar 25, 2024       Reason for Visit    Follow-up for colon cancer    New liver lesions on CT scan, March 2024  CT DNA profile positive again, January 2024    T3N1B, stage IIIb adenocarcinoma of the cecum status post laparoscopic right colectomy, December 21, 2022  CT DNA positive, March 2023  Tumor is MMR intact  Early onset of colon cancer with family history, genetic testing negative, 47 genes analyzed, April 2023  Neuropathy    CT scans are reviewed and there is concerning note of 2 new liver lesions.  Also CT DNA profile became positive again in January.  Overall concern for recurrent and metastatic colon cancer now.    Will get MRI of the liver for further evaluation.  Likely will need biopsy after that.  Will send current tissue for next generation sequencing.  Will see back in 3 weeks to discuss treatment options.    Will look into clinical trials as potential treatment options as well.  Questions answered.    Plan: Will schedule MRI today  Follow-up in 3 weeks  Will schedule biopsy after MRI results available  Send original tissue for next generation sequencing  Referral to look into clinical trials    Measurable disease: None postoperatively    Current therapy: Modified FOLFIRINOX day 1 cycle 1, March 14, 2023  Last cycle #12 today August 15, 2023                ECOG Performance    0 - Independent    Encounter Diagnoses:    Problem List Items Addressed This Visit          Digestive    Malignant neoplasm of ascending colon (H) - Primary    Relevant Orders    MR Liver wo & w Contrast    Check Out Appointment Request           ______________________________________________________________________________    Staging History   Cancer Staging   Malignant neoplasm of ascending colon (H)  Staging form: Colon and Rectum, AJCC 8th Edition  - Pathologic stage from 12/21/2022: Stage IIIB (pT3, pN1b, cM0) -  Signed by Dayami Pablo APRN CNP on 7/5/2023        History    Luca is here again for follow-up.  Seen 3 months ago.  Had recent colonoscopy showing a polyp.  Is otherwise asymptomatic.  CT DNA was positive in January.  He has had CT scans for restaging.  Still bothered by neuropathy.  ECOG status is 0.      March 14, 2023:    Luca is here for reevaluation.  Seen about 6 weeks ago.  His CT DNA test was positive and he was randomized to arm for the clinical trial.  He has had Port-A-Cath placed.  No new symptoms or problems.  ECOG status 0.    January 2023:  Mr. Luca Araiza is a 42 year old no significant past medical history who is been diagnosed and undergone laparoscopic hemicolectomy for colon cancer.  He was in [place] last August and had significant illness with diarrhea and vomiting.  He then had physical which showed that he was anemic and subsequently had colonoscopy showing cecal colon cancer.  He underwent laparoscopic right hemicolectomy on December 21 and has recovered well from this.    No other previous medical history.  He had left ACL repair surgery as a teenager.  No other hospitalizations.  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  Does not smoke and does not drink alcohol.    Father had colon cancer in his mid 60s.  Mother had breast cancer in her late 50s.  No siblings or kids with cancer.  Maternal grandfather had colon cancer in his 60s.  Paternal grandmother had cancer type unknown.            Review of systems.  No fever or night sweats.  No loss of weight.  No lumps or bumps anywhere.  No unusual headaches or eyesight issues.  No dizziness.  No bleeding from the nose.  No sores in the mouth. No problems with swallowing.  No chest pain. No shortness of breath. No cough.  No abdominal pain. No nausea or vomiting.  No diarrhea or constipation.  No blood in stool or black colored stools.  No problems passing  "urine.  No numbness or tingling in hands or feet.  No skin rashes.  A 14 point review of systems is otherwise negative.        Past History    Past Medical History:   Diagnosis Date    Anemia     Basal cell carcinoma     Malignant neoplasm of ascending colon (H) 11/07/2022     Past Surgical History:   Procedure Laterality Date    COLONOSCOPY      COLONOSCOPY N/A 1/15/2024    Procedure: COLONOSCOPY, WITH POLYPECTOMY AND BIOPSY;  Surgeon: Nahum Contreras MD;  Location: [facility]    HEMICOLECTOMY, RT/LT Right     IR CHEST PORT PLACEMENT > 5 YRS OF AGE  3/9/2023    REMOVAL OF SPERM DUCT(S)      Description: Surgery Of Male Genitalia Vasectomy;  Recorded: 12/27/2011;  Comments: 12/27/2011    ZZC REPAIR CRUCIATE LIGAMENT,KNEE      Description: Primary Repair Of Knee Ligament Cruciate Anterior;  Recorded: 07/20/2009;     Family History   Problem Relation Age of Onset    Breast Cancer Mother     Skin Cancer Mother     Colon Cancer Father     Skin Cancer Father     Anesthesia Reaction No family hx of     Venous thrombosis No family hx of      Social History     Socioeconomic History    Marital status:      Spouse name: None    Number of children: None    Years of education: None    Highest education level: None   Tobacco Use    Smoking status: Never    Smokeless tobacco: Never   Substance and Sexual Activity    Alcohol use: Not Currently    Drug use: Never    Sexual activity: Yes     Partners: Female     Birth control/protection: Male Surgical   Other Topics Concern    Parent/sibling w/ CABG, MI or angioplasty before 65F 55M? No         Allergies    No Known Allergies        Physical Exam    /84 (BP Location: Left arm, Patient Position: Sitting, Cuff Size: Adult Large)   Pulse 81   Temp 97.8  F (36.6  C) (Tympanic)   Resp 18   Ht 1.88 m (6' 2\")   Wt 94.8 kg (209 lb)   SpO2 98%   BMI 26.83 kg/m        GENERAL: Alert and oriented to time place and person. Seated comfortably. In no distress.    HEAD: " Atraumatic and normocephalic.    EYES: LUIS ANGEL, EOMI.  No pallor.  No icterus.    Oral cavity: no mucosal lesion or tonsillar enlargement.    NECK: supple. JVP normal.  No thyroid enlargement.    LYMPH NODES: No palpable, cervical, axillary or inguinal lymphadenopathy.    CHEST: clear to auscultation bilaterally.  Resonant to percussion throughout bilaterally.  Symmetrical breath movements bilaterally.    CVS: S1 and S2 are heard. Regular rate and rhythm.  No murmur or gallop or rub heard.  No peripheral edema.    ABDOMEN: Soft. Not tender. Not distended.  No palpable hepatomegaly or splenomegaly.  No other mass palpable.  Bowel sounds heard.    EXTREMITIES: Warm.    SKIN: no rash, or bruising or purpura.  Has a full head of hair.    Lab Results    Preoperative CEA was 2.6 and 2.7    Imaging Results    CT and MRI reviewed with no evidence of metastatic disease    CT Chest/Abdomen/Pelvis w Contrast    Result Date: 3/22/2024  EXAM: CT CHEST/ABDOMEN/PELVIS W CONTRAST LOCATION: [facility] DATE: 3/22/2024 INDICATION: Colon cancer. COMPARISON: 09/08/2023, 01/30/2023, 11/03/2022. MRI 11/10/2022 TECHNIQUE: CT scan of the chest, abdomen, and pelvis was performed following injection of IV contrast. Multiplanar reformats were obtained. Dose reduction techniques were used. CONTRAST: IsoVue 370 90mL FINDINGS: LUNGS AND PLEURA: A 2 mm nodule in the left upper lobe on series 4 image 70 is unchanged from 2022 and can be considered benign. Benign calcified granuloma in the right middle lobe on image 187. No new nodules. No pleural effusions. MEDIASTINUM/AXILLAE: Infusion port tip in the RA. No adenopathy. Normal heart size. CORONARY ARTERY CALCIFICATION: None. HEPATOBILIARY: A vague/subtle 12 mm area of low attenuation in the right hepatic lobe (series 3 image 172) in segment 6 is not clearly identified on 09/08/2023 or the prior MRI of 11/10/2022. An adjacent subcentimeter subcapsular lesion on the same image   corresponds with a benign cyst on the prior MRI. A 12 mm vague low-attenuation lesion in the left hepatic lobe in segment IVb on image 164 is also new. Additional new lesion versus beam hardening artifact in segment 2. Tiny cysts in the right hepatic lobe on images 149 and 136 measuring up to 10 mm are unchanged from the 2022 MRI. PANCREAS: Normal. SPLEEN: Normal. ADRENAL GLANDS: Normal. KIDNEYS/BLADDER: Normal. BOWEL: Right hemicolectomy. Bowel loops are normal caliber. LYMPH NODES: Normal. VASCULATURE: Normal. PELVIC ORGANS: Normal. MUSCULOSKELETAL: Normal.     IMPRESSION: 1.  There are at least 2 new vague low-attenuation lesions in the liver measuring up to 12 mm. These are suspicious for metastasis. An MRI of the liver without and with IV gadolinium is recommended for further evaluation. 2.  No other evidence of metastatic disease in the chest, abdomen, or pelvis. 3.  Right hemicolectomy. [Access Center: MRI of the liver without and with IV gadolinium recommended for new liver lesions.] This report will be copied to the [place] [facility] to ensure a provider acknowledges the finding. Access Center is available Monday through Friday 8am-3:30 pm.         Signed by: Sekou Hall MD

## 2024-03-25 NOTE — PROGRESS NOTES
[facility]: Cancer Care                                                                                          Situation: Chart reviewed by RN Care Coordinator.    Background: Luca was seen in clinic today regarding his colon cancer.     Assessment: Recent scan is concerning for liver lesions. Overall concern for recurrent and metastatic colon cancer now.     Plan/Recommendations: MRI to be done for further evaluation. Possible biopsy after that. Patient is to follow up in about 3 weeks.        Signature:  Angelina Huertas  RN Care Coordinator  [facility]  Cancer [facility]

## 2024-03-25 NOTE — LETTER
"    3/25/2024         RE: Luca Araiza  [address]  [place] [address]        Dear Colleague,    Thank you for referring your patient, Luca Araiza, to the [facility]. Please see a copy of my visit note below.    Oncology Rooming Note    March 25, 2024 8:42 AM   Luca Araiza is a 43 year old male who presents for:    Chief Complaint   Patient presents with     Oncology Clinic Visit     3 month follow up with labs and prior CT review related to Malignant neoplasm of ascending colon     Initial Vitals: /84 (BP Location: Left arm, Patient Position: Sitting, Cuff Size: Adult Large)   Pulse 81   Temp 97.8  F (36.6  C) (Tympanic)   Resp 18   Ht 1.88 m (6' 2\")   Wt 94.8 kg (209 lb)   SpO2 98%   BMI 26.83 kg/m   Estimated body mass index is 26.83 kg/m  as calculated from the following:    Height as of this encounter: 1.88 m (6' 2\").    Weight as of this encounter: 94.8 kg (209 lb). Body surface area is 2.22 meters squared.  No Pain (0) Comment: Data Unavailable   No LMP for male patient.  Allergies reviewed: Yes  Medications reviewed: Yes    Medications: Medication refills not needed today.  Pharmacy name entered into EPIC:    [facility] - [place], [place] - [address] [place] [address] PHARMACY [place], [place] - [address] [facility]    Frailty Screening:   Is the patient here for a new oncology consult visit in cancer care? 2. No      Clinical concerns: 3 month follow up with labs and prior CT review related to Malignant neoplasm of ascending colon      ANDREA CROCKER CMA              [facility] Hematology and Oncology Consult Note    Patient: Luca Araiza  MRN: [id number]  Date of Service: Mar 25, 2024       Reason for Visit    Follow-up for colon cancer    New liver lesions on CT scan, March 2024  CT DNA profile positive again, January 2024    T3N1B, stage IIIb adenocarcinoma of the cecum status post laparoscopic right colectomy, December 21, 2022  CT DNA " positive, March 2023  Tumor is MMR intact  Early onset of colon cancer with family history, genetic testing negative, 47 genes analyzed, April 2023  Neuropathy    CT scans are reviewed and there is concerning note of 2 new liver lesions.  Also CT DNA profile became positive again in January.  Overall concern for recurrent and metastatic colon cancer now.    Will get MRI of the liver for further evaluation.  Likely will need biopsy after that.  Will send current tissue for next generation sequencing.  Will see back in 3 weeks to discuss treatment options.    Will look into clinical trials as potential treatment options as well.  Questions answered.    Plan: Will schedule MRI today  Follow-up in 3 weeks  Will schedule biopsy after MRI results available  Send original tissue for next generation sequencing  Referral to look into clinical trials    Measurable disease: None postoperatively    Current therapy: Modified FOLFIRINOX day 1 cycle 1, March 14, 2023  Last cycle #12 today August 15, 2023                ECOG Performance    0 - Independent    Encounter Diagnoses:    Problem List Items Addressed This Visit          Digestive    Malignant neoplasm of ascending colon (H) - Primary    Relevant Orders    MR Liver wo & w Contrast    Check Out Appointment Request           ______________________________________________________________________________    Staging History   Cancer Staging   Malignant neoplasm of ascending colon (H)  Staging form: Colon and Rectum, AJCC 8th Edition  - Pathologic stage from 12/21/2022: Stage IIIB (pT3, pN1b, cM0) - Signed by Dayami Pablo APRN CNP on 7/5/2023        History    Luca is here again for follow-up.  Seen 3 months ago.  Had recent colonoscopy showing a polyp.  Is otherwise asymptomatic.  CT DNA was positive in January.  He has had CT scans for restaging.  Still bothered by neuropathy.  ECOG status is 0.      March 14, 2023:    Luca is here for reevaluation.  Seen about 6  weeks ago.  His CT DNA test was positive and he was randomized to arm for the clinical trial.  He has had Port-A-Cath placed.  No new symptoms or problems.  ECOG status 0.    January 2023:  Mr. Luca Araiza is a 42 year old no significant past medical history who is been diagnosed and undergone laparoscopic hemicolectomy for colon cancer.  He was in [place] last August and had significant illness with diarrhea and vomiting.  He then had physical which showed that he was anemic and subsequently had colonoscopy showing cecal colon cancer.  He underwent laparoscopic right hemicolectomy on December 21 and has recovered well from this.    No other previous medical history.  He had left ACL repair surgery as a teenager.  No other hospitalizations.  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  Does not smoke and does not drink alcohol.    Father had colon cancer in his mid 60s.  Mother had breast cancer in her late 50s.  No siblings or kids with cancer.  Maternal grandfather had colon cancer in his 60s.  Paternal grandmother had cancer type unknown.            Review of systems.  No fever or night sweats.  No loss of weight.  No lumps or bumps anywhere.  No unusual headaches or eyesight issues.  No dizziness.  No bleeding from the nose.  No sores in the mouth. No problems with swallowing.  No chest pain. No shortness of breath. No cough.  No abdominal pain. No nausea or vomiting.  No diarrhea or constipation.  No blood in stool or black colored stools.  No problems passing urine.  No numbness or tingling in hands or feet.  No skin rashes.  A 14 point review of systems is otherwise negative.        Past History    Past Medical History:   Diagnosis Date     Anemia      Basal cell carcinoma      Malignant neoplasm of ascending colon (H) 11/07/2022     Past Surgical History:   Procedure Laterality Date     COLONOSCOPY       COLONOSCOPY N/A 1/15/2024     "Procedure: COLONOSCOPY, WITH POLYPECTOMY AND BIOPSY;  Surgeon: Nahum Contreras MD;  Location: [facility]     HEMICOLECTOMY, RT/LT Right      IR CHEST PORT PLACEMENT > 5 YRS OF AGE  3/9/2023     REMOVAL OF SPERM DUCT(S)      Description: Surgery Of Male Genitalia Vasectomy;  Recorded: 12/27/2011;  Comments: 12/27/2011     ZZC REPAIR CRUCIATE LIGAMENT,KNEE      Description: Primary Repair Of Knee Ligament Cruciate Anterior;  Recorded: 07/20/2009;     Family History   Problem Relation Age of Onset     Breast Cancer Mother      Skin Cancer Mother      Colon Cancer Father      Skin Cancer Father      Anesthesia Reaction No family hx of      Venous thrombosis No family hx of      Social History     Socioeconomic History     Marital status:      Spouse name: None     Number of children: None     Years of education: None     Highest education level: None   Tobacco Use     Smoking status: Never     Smokeless tobacco: Never   Substance and Sexual Activity     Alcohol use: Not Currently     Drug use: Never     Sexual activity: Yes     Partners: Female     Birth control/protection: Male Surgical   Other Topics Concern     Parent/sibling w/ CABG, MI or angioplasty before 65F 55M? No         Allergies    No Known Allergies        Physical Exam    /84 (BP Location: Left arm, Patient Position: Sitting, Cuff Size: Adult Large)   Pulse 81   Temp 97.8  F (36.6  C) (Tympanic)   Resp 18   Ht 1.88 m (6' 2\")   Wt 94.8 kg (209 lb)   SpO2 98%   BMI 26.83 kg/m        GENERAL: Alert and oriented to time place and person. Seated comfortably. In no distress.    HEAD: Atraumatic and normocephalic.    EYES: LUIS ANGEL, EOMI.  No pallor.  No icterus.    Oral cavity: no mucosal lesion or tonsillar enlargement.    NECK: supple. JVP normal.  No thyroid enlargement.    LYMPH NODES: No palpable, cervical, axillary or inguinal lymphadenopathy.    CHEST: clear to auscultation bilaterally.  Resonant to percussion throughout " bilaterally.  Symmetrical breath movements bilaterally.    CVS: S1 and S2 are heard. Regular rate and rhythm.  No murmur or gallop or rub heard.  No peripheral edema.    ABDOMEN: Soft. Not tender. Not distended.  No palpable hepatomegaly or splenomegaly.  No other mass palpable.  Bowel sounds heard.    EXTREMITIES: Warm.    SKIN: no rash, or bruising or purpura.  Has a full head of hair.    Lab Results    Preoperative CEA was 2.6 and 2.7    Imaging Results    CT and MRI reviewed with no evidence of metastatic disease    CT Chest/Abdomen/Pelvis w Contrast    Result Date: 3/22/2024  EXAM: CT CHEST/ABDOMEN/PELVIS W CONTRAST LOCATION: [facility] DATE: 3/22/2024 INDICATION: Colon cancer. COMPARISON: 09/08/2023, 01/30/2023, 11/03/2022. MRI 11/10/2022 TECHNIQUE: CT scan of the chest, abdomen, and pelvis was performed following injection of IV contrast. Multiplanar reformats were obtained. Dose reduction techniques were used. CONTRAST: IsoVue 370 90mL FINDINGS: LUNGS AND PLEURA: A 2 mm nodule in the left upper lobe on series 4 image 70 is unchanged from 2022 and can be considered benign. Benign calcified granuloma in the right middle lobe on image 187. No new nodules. No pleural effusions. MEDIASTINUM/AXILLAE: Infusion port tip in the RA. No adenopathy. Normal heart size. CORONARY ARTERY CALCIFICATION: None. HEPATOBILIARY: A vague/subtle 12 mm area of low attenuation in the right hepatic lobe (series 3 image 172) in segment 6 is not clearly identified on 09/08/2023 or the prior MRI of 11/10/2022. An adjacent subcentimeter subcapsular lesion on the same image  corresponds with a benign cyst on the prior MRI. A 12 mm vague low-attenuation lesion in the left hepatic lobe in segment IVb on image 164 is also new. Additional new lesion versus beam hardening artifact in segment 2. Tiny cysts in the right hepatic lobe on images 149 and 136 measuring up to 10 mm are unchanged from the 2022 MRI. PANCREAS:  Normal. SPLEEN: Normal. ADRENAL GLANDS: Normal. KIDNEYS/BLADDER: Normal. BOWEL: Right hemicolectomy. Bowel loops are normal caliber. LYMPH NODES: Normal. VASCULATURE: Normal. PELVIC ORGANS: Normal. MUSCULOSKELETAL: Normal.     IMPRESSION: 1.  There are at least 2 new vague low-attenuation lesions in the liver measuring up to 12 mm. These are suspicious for metastasis. An MRI of the liver without and with IV gadolinium is recommended for further evaluation. 2.  No other evidence of metastatic disease in the chest, abdomen, or pelvis. 3.  Right hemicolectomy. [Access Center: MRI of the liver without and with IV gadolinium recommended for new liver lesions.] This report will be copied to the [facility] [place] to ensure a provider acknowledges the finding. Access Center is available Monday through Friday 8am-3:30 pm.         Signed by: Sekou Hall MD      Again, thank you for allowing me to participate in the care of your patient.        Sincerely,        Sekou Hall MD

## 2024-03-26 ENCOUNTER — TELEPHONE (OUTPATIENT)
Dept: ONCOLOGY | Facility: HOSPITAL | Age: 44
End: 2024-03-26
Payer: COMMERCIAL

## 2024-03-26 DIAGNOSIS — C18.2 MALIGNANT NEOPLASM OF ASCENDING COLON (H): Primary | ICD-10-CM

## 2024-03-26 NOTE — TELEPHONE ENCOUNTER
I received a phone call today from Luca.  He is calling because he saw Dr. Hall in the clinic yesterday and had a liver MRI.  He did see the results of the MRI and is wondering if he needs to get a biopsy scheduled.  He would like a call back today if possible to review the next steps.  He can be reached at [phone].    Mireille White RN on 3/26/2024 at 1:43 PM

## 2024-04-01 ENCOUNTER — HOSPITAL ENCOUNTER (OUTPATIENT)
Dept: ULTRASOUND IMAGING | Facility: HOSPITAL | Age: 44
Discharge: HOME | End: 2024-04-01
Attending: INTERNAL MEDICINE | Admitting: RADIOLOGY
Payer: COMMERCIAL

## 2024-04-01 VITALS
HEART RATE: 80 BPM | TEMPERATURE: 97.5 F | DIASTOLIC BLOOD PRESSURE: 87 MMHG | OXYGEN SATURATION: 99 % | SYSTOLIC BLOOD PRESSURE: 151 MMHG | RESPIRATION RATE: 16 BRPM

## 2024-04-01 DIAGNOSIS — C18.2 MALIGNANT NEOPLASM OF ASCENDING COLON (H): ICD-10-CM

## 2024-04-01 DIAGNOSIS — K76.9 LESION OF LIVER: Primary | ICD-10-CM

## 2024-04-01 LAB
HGB BLD-MCNC: 14.4 G/DL (ref 13.3–17.7)
INR PPP: 1.09 (ref 0.85–1.15)
PLATELET # BLD AUTO: 202 10E3/UL (ref 150–450)

## 2024-04-01 PROCEDURE — 85610 PROTHROMBIN TIME: CPT | Performed by: RADIOLOGY

## 2024-04-01 PROCEDURE — 85018 HEMOGLOBIN: CPT | Performed by: RADIOLOGY

## 2024-04-01 PROCEDURE — 88333 PATH CONSLTJ SURG CYTO XM 1: CPT | Mod: TC | Performed by: INTERNAL MEDICINE

## 2024-04-01 PROCEDURE — 88360 TUMOR IMMUNOHISTOCHEM/MANUAL: CPT | Mod: TC | Performed by: INTERNAL MEDICINE

## 2024-04-01 PROCEDURE — 99152 MOD SED SAME PHYS/QHP 5/>YRS: CPT

## 2024-04-01 PROCEDURE — 85049 AUTOMATED PLATELET COUNT: CPT | Performed by: RADIOLOGY

## 2024-04-01 PROCEDURE — 250N000011 HC RX IP 250 OP 636: Performed by: INTERNAL MEDICINE

## 2024-04-01 PROCEDURE — 36591 DRAW BLOOD OFF VENOUS DEVICE: CPT | Performed by: RADIOLOGY

## 2024-04-01 PROCEDURE — 250N000011 HC RX IP 250 OP 636: Performed by: RADIOLOGY

## 2024-04-01 PROCEDURE — 76942 ECHO GUIDE FOR BIOPSY: CPT

## 2024-04-01 RX ORDER — NALOXONE HYDROCHLORIDE 0.4 MG/ML
0.2 INJECTION, SOLUTION INTRAMUSCULAR; INTRAVENOUS; SUBCUTANEOUS
Status: DISCONTINUED | OUTPATIENT
Start: 2024-04-01 | End: 2024-04-02 | Stop reason: HOSPADM

## 2024-04-01 RX ORDER — FLUMAZENIL 0.1 MG/ML
0.2 INJECTION, SOLUTION INTRAVENOUS
Status: DISCONTINUED | OUTPATIENT
Start: 2024-04-01 | End: 2024-04-02 | Stop reason: HOSPADM

## 2024-04-01 RX ORDER — ACETAMINOPHEN 325 MG/1
650 TABLET ORAL EVERY 4 HOURS PRN
Status: DISCONTINUED | OUTPATIENT
Start: 2024-04-01 | End: 2024-04-02 | Stop reason: HOSPADM

## 2024-04-01 RX ORDER — HEPARIN SODIUM (PORCINE) LOCK FLUSH IV SOLN 100 UNIT/ML 100 UNIT/ML
500 SOLUTION INTRAVENOUS ONCE
Status: COMPLETED | OUTPATIENT
Start: 2024-04-01 | End: 2024-04-01

## 2024-04-01 RX ORDER — IBUPROFEN 400 MG/1
400 TABLET, FILM COATED ORAL EVERY 6 HOURS PRN
Status: DISCONTINUED | OUTPATIENT
Start: 2024-04-01 | End: 2024-04-02 | Stop reason: HOSPADM

## 2024-04-01 RX ORDER — FENTANYL CITRATE 50 UG/ML
25-50 INJECTION, SOLUTION INTRAMUSCULAR; INTRAVENOUS EVERY 5 MIN PRN
Status: DISCONTINUED | OUTPATIENT
Start: 2024-04-01 | End: 2024-04-02 | Stop reason: HOSPADM

## 2024-04-01 RX ORDER — NALOXONE HYDROCHLORIDE 0.4 MG/ML
0.4 INJECTION, SOLUTION INTRAMUSCULAR; INTRAVENOUS; SUBCUTANEOUS
Status: DISCONTINUED | OUTPATIENT
Start: 2024-04-01 | End: 2024-04-02 | Stop reason: HOSPADM

## 2024-04-01 RX ADMIN — FENTANYL CITRATE 50 MCG: 50 INJECTION, SOLUTION INTRAMUSCULAR; INTRAVENOUS at 10:57

## 2024-04-01 RX ADMIN — FENTANYL CITRATE 50 MCG: 50 INJECTION, SOLUTION INTRAMUSCULAR; INTRAVENOUS at 10:50

## 2024-04-01 RX ADMIN — MIDAZOLAM HYDROCHLORIDE 1 MG: 1 INJECTION, SOLUTION INTRAMUSCULAR; INTRAVENOUS at 10:52

## 2024-04-01 RX ADMIN — MIDAZOLAM HYDROCHLORIDE 1 MG: 1 INJECTION, SOLUTION INTRAMUSCULAR; INTRAVENOUS at 10:48

## 2024-04-01 RX ADMIN — HEPARIN 500 UNITS: 100 SYRINGE at 14:39

## 2024-04-01 RX ADMIN — MIDAZOLAM HYDROCHLORIDE 0.5 MG: 1 INJECTION, SOLUTION INTRAMUSCULAR; INTRAVENOUS at 11:15

## 2024-04-01 RX ADMIN — MIDAZOLAM HYDROCHLORIDE 0.5 MG: 1 INJECTION, SOLUTION INTRAMUSCULAR; INTRAVENOUS at 11:34

## 2024-04-01 NOTE — PRE-PROCEDURE
GENERAL PRE-PROCEDURE:   Procedure:  US guided liver biopsy with moderate sedation  Date/Time:  4/1/2024 10:11 AM    Written consent obtained?: Yes    Risks and benefits: Risks, benefits and alternatives were discussed    Consent given by:  Patient  Patient states understanding of procedure being performed: Yes    Patient's understanding of procedure matches consent: Yes    Procedure consent matches procedure scheduled: Yes    Expected level of sedation:  Moderate  Appropriately NPO:  Yes  ASA Class:  2  Mallampati  :  Grade 2- soft palate, base of uvula, tonsillar pillars, and portion of posterior pharyngeal wall visible  Lungs:  Lungs clear with good breath sounds bilaterally  Heart:  Normal heart sounds and rate  History & Physical reviewed:  History and physical reviewed and no updates needed  Statement of review:  I have reviewed the lab findings, diagnostic data, medications, and the plan for sedation

## 2024-04-01 NOTE — INTERVAL H&P NOTE
"I have reviewed the surgical (or preoperative) H&P that is linked to this encounter, and examined the patient. There are no significant changes    Clinical Conditions Present on Arrival:  Clinically Significant Risk Factors Present on Admission                  # Overweight: Estimated body mass index is 26.83 kg/m  as calculated from the following:    Height as of 3/25/24: 1.88 m (6' 2\").    Weight as of 3/25/24: 94.8 kg (209 lb).       "

## 2024-04-01 NOTE — PROVIDER NOTIFICATION
Pt. And his wife verbalized understanding of discharge instructions. Pt. Reported pain had decreased to less than a 1 at time of discharge. Pt. Brought to entrance via wheelchair accompanied by his wife Alva as a .

## 2024-04-01 NOTE — SEDATION DOCUMENTATION
Patient Name: Luca Araiza  Medical Record Number: [id number]  Today's Date: 4/1/2024    Procedure: Liver Biopsy  Proceduralist: Dr. Odom    Procedure Start: 11:20  Procedure end: 11:37  Sedation medications administered: 2.5 mg midazolam and 100 mcg fentanyl   Sedation time: 17 minutes    Other Notes: Pt arrived to US from IR holding. Consent reviewed. Pt denies any questions or concerns regarding procedure. Pt positioned supine and then left side-lying and monitored per protocol. Pt tolerated procedure without any noted complications. VSS. Pt transferred back to IR holding.

## 2024-04-04 PROCEDURE — 88342 IMHCHEM/IMCYTCHM 1ST ANTB: CPT | Mod: 26 | Performed by: PATHOLOGY

## 2024-04-04 PROCEDURE — 88341 IMHCHEM/IMCYTCHM EA ADD ANTB: CPT | Mod: 26 | Performed by: PATHOLOGY

## 2024-04-04 PROCEDURE — 88307 TISSUE EXAM BY PATHOLOGIST: CPT | Mod: 26 | Performed by: PATHOLOGY

## 2024-04-04 PROCEDURE — 88360 TUMOR IMMUNOHISTOCHEM/MANUAL: CPT | Mod: 26 | Performed by: PATHOLOGY

## 2024-04-04 PROCEDURE — 88333 PATH CONSLTJ SURG CYTO XM 1: CPT | Mod: 26 | Performed by: PATHOLOGY

## 2024-04-08 ENCOUNTER — TELEPHONE (OUTPATIENT)
Dept: ONCOLOGY | Facility: HOSPITAL | Age: 44
End: 2024-04-08
Payer: COMMERCIAL

## 2024-04-08 ENCOUNTER — PATIENT OUTREACH (OUTPATIENT)
Dept: ONCOLOGY | Facility: HOSPITAL | Age: 44
End: 2024-04-08
Payer: COMMERCIAL

## 2024-04-08 NOTE — TELEPHONE ENCOUNTER
I received a phone call today from Luca.  He had a liver biopsy on 4/1.  He is wondering if Dr. Hall received those results.  He would like a call back at [phone].    Mireille White RN on 4/8/2024 at 10:23 AM

## 2024-04-08 NOTE — PROGRESS NOTES
CXR, US thyroid both unremarkable.  Pt to keep her f/u with Dr Dalia Herbert [facility]: Cancer Care                                                                                          I call Luca to report that liver biopsy results have been finalized but path additional testing has been ordered and that has not resulted yet. I will keep an eye out. Luca verbalized understanding of our conversation.     Signature:  Angelina Huertas  RN Care Coordinator  [facility]  Cancer Care [facility]

## 2024-04-10 LAB
INTERPRETATION: NORMAL
LAB DIRECTOR COMMENTS: NORMAL
LAB DIRECTOR DISCLAIMER: NORMAL
LAB DIRECTOR INTERPRETATION: NORMAL
LAB DIRECTOR METHODOLOGY: NORMAL
LAB DIRECTOR RESULTS: NORMAL
SIGNIFICANT RESULTS: NORMAL
SPECIMEN DESCRIPTION: NORMAL
SPECIMEN DESCRIPTION: NORMAL
TEST DETAILS, MDL: NORMAL

## 2024-04-10 PROCEDURE — G0452 MOLECULAR PATHOLOGY INTERPR: HCPCS | Mod: 26 | Performed by: PATHOLOGY

## 2024-04-10 PROCEDURE — 81445 SO NEO GSAP 5-50DNA/DNA&RNA: CPT | Performed by: SURGERY

## 2024-04-11 LAB
BKR LAB AP ADD'L TEST STATUS: NORMAL
BKR PATH ADDL TEST FINAL COMMENTS: NORMAL

## 2024-04-15 ENCOUNTER — ONCOLOGY VISIT (OUTPATIENT)
Dept: ONCOLOGY | Facility: HOSPITAL | Age: 44
End: 2024-04-15
Attending: INTERNAL MEDICINE
Payer: COMMERCIAL

## 2024-04-15 VITALS
TEMPERATURE: 97.8 F | BODY MASS INDEX: 26.91 KG/M2 | WEIGHT: 209.7 LBS | OXYGEN SATURATION: 98 % | RESPIRATION RATE: 16 BRPM | DIASTOLIC BLOOD PRESSURE: 81 MMHG | HEART RATE: 74 BPM | SYSTOLIC BLOOD PRESSURE: 128 MMHG | HEIGHT: 74 IN

## 2024-04-15 DIAGNOSIS — C18.2 MALIGNANT NEOPLASM OF ASCENDING COLON (H): Primary | ICD-10-CM

## 2024-04-15 PROCEDURE — 99214 OFFICE O/P EST MOD 30 MIN: CPT | Performed by: INTERNAL MEDICINE

## 2024-04-15 ASSESSMENT — PAIN SCALES - GENERAL: PAINLEVEL: NO PAIN (0)

## 2024-04-15 NOTE — PROGRESS NOTES
"Oncology Rooming Note    April 15, 2024 8:58 AM   Luca Araiza is a 43 year old male who presents for:    Chief Complaint   Patient presents with    Oncology Clinic Visit     Return visit 3 weeks with CT 03/22/24, MRI 03/25/24 and Liver Biopsy 04/01/24. Malignant neoplasm of ascending colon.     Initial Vitals: /81 (BP Location: Right arm, Patient Position: Sitting, Cuff Size: Adult Large)   Pulse 74   Temp 97.8  F (36.6  C) (Oral)   Resp 16   Ht 1.88 m (6' 2\")   Wt 95.1 kg (209 lb 11.2 oz)   SpO2 98%   BMI 26.92 kg/m   Estimated body mass index is 26.92 kg/m  as calculated from the following:    Height as of this encounter: 1.88 m (6' 2\").    Weight as of this encounter: 95.1 kg (209 lb 11.2 oz). Body surface area is 2.23 meters squared.  No Pain (0) Comment: Data Unavailable   No LMP for male patient.  Allergies reviewed: Yes  Medications reviewed: Yes    Medications: Medication refills not needed today.  Pharmacy name entered into EPIC:    [facility] - [facility], [place] - [address] [place] [address] PHARMACY [place], [place] - [address] [facility]    Frailty Screening:   Is the patient here for a new oncology consult visit in cancer care? 2. No      Clinical concerns: Return visit 3 weeks with CT 03/22/24, MRI 03/25/24 and Liver Biopsy 04/01/24. Malignant neoplasm of ascending colon.       Heather Manuel, [facility]            "

## 2024-04-15 NOTE — PROGRESS NOTES
[facility] Hematology and Oncology Consult Note    Patient: Luca Araiza  MRN: [id number]  Date of Service: Apr 15, 2024       Reason for Visit    Follow-up for colon cancer    New liver lesions on CT scan, March 2024, biopsy suspicious for malignancy, April 1, 2024  CT DNA profile positive again, January 2024    T3N1B, stage IIIb adenocarcinoma of the cecum status post laparoscopic right colectomy, December 21, 2022  CT DNA positive, March 2023  Tumor is MMR intact  Early onset of colon cancer with family history, genetic testing negative, 47 genes analyzed, April 2023  Neuropathy    Liver biopsy are suspicious for malignancy but not diagnostic.  Overall though the picture is consistent with metastatic colon cancer given that his CT DNA test became positive and he has developed new liver lesions on imaging.    NGS results are not available as of today.    Reviewed with him that NGS results would be most important in determining the initial treatment based on any mutations that are found in the tumor cells.    Most likely treatment would be initial systemic therapy for 2 to 3 months followed by consideration for local therapy to the liver.    Given his limited amount of metastatic disease, I am hopeful that he could go into a remission but explained that there would remain high risk for further relapse.    He will be meeting with Dr. Romero at the [facility] next week and does have appointments at the [facility] in early May.    May be reasonable to get all these opinions and then determine best treatment.    Questions answered.    Plan: Follow-up on NGS testing and special lab tests done March 25  No follow-up with me  Does have appointments with Dr. Romero and at the [facility]        Measurable disease: MRI of the liver    Current therapy: Observation    Treatment history:    Modified FOLFIRINOX day 1 cycle 1, March 14, 2023  Last cycle #12 today August 15, 2023              ECOG  Performance    0 - Independent    Encounter Diagnoses:    Problem List Items Addressed This Visit          Digestive    Malignant neoplasm of ascending colon (H) - Primary    Relevant Orders    Check Out Appointment Request           ______________________________________________________________________________    Staging History   Cancer Staging   Malignant neoplasm of ascending colon (H)  Staging form: Colon and Rectum, AJCC 8th Edition  - Pathologic stage from 12/21/2022: Stage IIIB (pT3, pN1b, cM0) - Signed by Dayami Pablo APRN CNP on 7/5/2023        History    Luca is here again for follow-up.  Seen 3 months ago.  Had recent colonoscopy showing a polyp.  Is otherwise asymptomatic.  CT DNA was positive in January.  He has had CT scans for restaging.  Still bothered by neuropathy.  ECOG status is 0.      March 14, 2023:    Luca is here for reevaluation.  Seen about 6 weeks ago.  His CT DNA test was positive and he was randomized to arm for the clinical trial.  He has had Port-A-Cath placed.  No new symptoms or problems.  ECOG status 0.    January 2023:  Mr. Luca Araiza is a 42 year old no significant past medical history who is been diagnosed and undergone laparoscopic hemicolectomy for colon cancer.  He was in [place] last August and had significant illness with diarrhea and vomiting.  He then had physical which showed that he was anemic and subsequently had colonoscopy showing cecal colon cancer.  He underwent laparoscopic right hemicolectomy on December 21 and has recovered well from this.    No other previous medical history.  He had left ACL repair surgery as a teenager.  No other hospitalizations.  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  Does not smoke and does not drink alcohol.    Father had colon cancer in his mid 60s.  Mother had breast cancer in her late 50s.  No siblings or kids with cancer.  Maternal grandfather had  colon cancer in his 60s.  Paternal grandmother had cancer type unknown.            Review of systems.  No fever or night sweats.  No loss of weight.  No lumps or bumps anywhere.  No unusual headaches or eyesight issues.  No dizziness.  No bleeding from the nose.  No sores in the mouth. No problems with swallowing.  No chest pain. No shortness of breath. No cough.  No abdominal pain. No nausea or vomiting.  No diarrhea or constipation.  No blood in stool or black colored stools.  No problems passing urine.  No numbness or tingling in hands or feet.  No skin rashes.  A 14 point review of systems is otherwise negative.        Past History    Past Medical History:   Diagnosis Date    Anemia     Basal cell carcinoma     Malignant neoplasm of ascending colon (H) 11/07/2022     Past Surgical History:   Procedure Laterality Date    COLONOSCOPY      COLONOSCOPY N/A 1/15/2024    Procedure: COLONOSCOPY, WITH POLYPECTOMY AND BIOPSY;  Surgeon: Nahum Contreras MD;  Location: [facility]    HEMICOLECTOMY, RT/LT Right     IR CHEST PORT PLACEMENT > 5 YRS OF AGE  3/9/2023    REMOVAL OF SPERM DUCT(S)      Description: Surgery Of Male Genitalia Vasectomy;  Recorded: 12/27/2011;  Comments: 12/27/2011    [facility] REPAIR CRUCIATE LIGAMENT,KNEE      Description: Primary Repair Of Knee Ligament Cruciate Anterior;  Recorded: 07/20/2009;     Family History   Problem Relation Age of Onset    Breast Cancer Mother     Skin Cancer Mother     Colon Cancer Father     Skin Cancer Father     Anesthesia Reaction No family hx of     Venous thrombosis No family hx of      Social History     Socioeconomic History    Marital status:      Spouse name: None    Number of children: None    Years of education: None    Highest education level: None   Tobacco Use    Smoking status: Never    Smokeless tobacco: Never   Substance and Sexual Activity    Alcohol use: Not Currently    Drug use: Never    Sexual activity: Yes     Partners: Female     Birth  "control/protection: Male Surgical   Other Topics Concern    Parent/sibling w/ CABG, MI or angioplasty before 65F 55M? No         Allergies    No Known Allergies        Physical Exam    /81 (BP Location: Right arm, Patient Position: Sitting, Cuff Size: Adult Large)   Pulse 74   Temp 97.8  F (36.6  C) (Oral)   Resp 16   Ht 1.88 m (6' 2\")   Wt 95.1 kg (209 lb 11.2 oz)   SpO2 98%   BMI 26.92 kg/m        GENERAL: Alert and oriented to time place and person. Seated comfortably. In no distress.    HEAD: Atraumatic and normocephalic.    EYES: LUIS ANGEL, EOMI.  No pallor.  No icterus.    Oral cavity: no mucosal lesion or tonsillar enlargement.    NECK: supple. JVP normal.  No thyroid enlargement.    LYMPH NODES: No palpable, cervical, axillary or inguinal lymphadenopathy.    CHEST: clear to auscultation bilaterally.  Resonant to percussion throughout bilaterally.  Symmetrical breath movements bilaterally.    CVS: S1 and S2 are heard. Regular rate and rhythm.  No murmur or gallop or rub heard.  No peripheral edema.    ABDOMEN: Soft. Not tender. Not distended.  No palpable hepatomegaly or splenomegaly.  No other mass palpable.  Bowel sounds heard.    EXTREMITIES: Warm.    SKIN: no rash, or bruising or purpura.  Has a full head of hair.    Lab Results    Preoperative CEA was 2.6 and 2.7    Imaging Results    CT and MRI reviewed with no evidence of metastatic disease    US Biopsy Liver    Result Date: 4/1/2024  EXAM: 1. PERCUTANEOUS BIOPSY LIVER LESION 2. ULTRASOUND GUIDANCE 3. CONSCIOUS SEDATION LOCATION: [facility] DATE: 4/1/2024 INDICATION: New liver lesions with history of colon cancer. PROCEDURE: Informed consent obtained. Site marked. Prior images reviewed. Required items made available. Patient identity was confirmed verbally and with arm band. Patient reevaluated immediately before administering sedation. Universal protocol was followed. Time out performed. The site was prepped and " draped in sterile fashion. 10 mL of 1 percent lidocaine was infused into the local soft tissues. Using standard technique and under direct ultrasound guidance, a 20 gauge biopsy needle was used to make two core biopsies. Tissue was submitted to Pathology. The patient tolerated the procedure well. No complications. SEDATION: Versed 3 mg. Fentanyl 100 mcg. The procedure was performed with administration intravenous conscious sedation with appropriate preoperative, intraoperative, and postoperative evaluation. 17 minutes of supervised face to face conscious sedation time was provided by a radiology nurse under my direct supervision.     IMPRESSION: Status post US-guided biopsy liver lesion. Reference CPT Code: 47471, 76542, 89926    MR Liver wo & w Contrast    Result Date: 3/25/2024  MR LIVER WITHOUT AND WITH CONTRAST  3/25/2024 2:40 PM INDICATION: No liver lesions noted on CT. History of colon cancer. COMPARISON: MRI of the abdomen performed 11/10/2022. CT of the chest, abdomen, and pelvis performed 3/22/2024. TECHNIQUE: Routine MRI liver protocol including T1 in/out phase, diffusion, multiplane T2, and dynamic T1 with IV contrast.  CONTRAST: 9 mL Gadavist FINDINGS: LIVER: There are two new enhancing lesions with associated restricted diffusion in hepatic segments Reji and VI (series 11 image 41), suspicious for metastatic disease. The largest of these is in hepatic segment Reji, and measures 2 cm. These lesions correspond to the ill-defined hypoenhancing regions noted on the prior CT scan. There are two unchanged cysts in hepatic segment VII (series 11 images 20 and 25), with the largest measuring 1.1 cm. ADDITIONAL FINDINGS: The gallbladder, pancreas, spleen, adrenal glands, and kidneys are unremarkable. No hydronephrosis. No lymphadenopathy. The visualized loops of small bowel and colon are of normal caliber.     IMPRESSION:  There are two enhancing lesions with associated restricted diffusion in hepatic segments Reji  and VI, suspicious for metastatic disease. These findings correspond to the hypodense lesions noted on the CT of 3/22/2024, and are new since the MRI of 11/10/2022. LUDY EARLY MD   SYSTEM ID:  [id number]    CT Chest/Abdomen/Pelvis w Contrast    Result Date: 3/22/2024  EXAM: CT CHEST/ABDOMEN/PELVIS W CONTRAST LOCATION: [facility] DATE: 3/22/2024 INDICATION: Colon cancer. COMPARISON: 09/08/2023, 01/30/2023, 11/03/2022. MRI 11/10/2022 TECHNIQUE: CT scan of the chest, abdomen, and pelvis was performed following injection of IV contrast. Multiplanar reformats were obtained. Dose reduction techniques were used. CONTRAST: IsoVue 370 90mL FINDINGS: LUNGS AND PLEURA: A 2 mm nodule in the left upper lobe on series 4 image 70 is unchanged from 2022 and can be considered benign. Benign calcified granuloma in the right middle lobe on image 187. No new nodules. No pleural effusions. MEDIASTINUM/AXILLAE: Infusion port tip in the RA. No adenopathy. Normal heart size. CORONARY ARTERY CALCIFICATION: None. HEPATOBILIARY: A vague/subtle 12 mm area of low attenuation in the right hepatic lobe (series 3 image 172) in segment 6 is not clearly identified on 09/08/2023 or the prior MRI of 11/10/2022. An adjacent subcentimeter subcapsular lesion on the same image  corresponds with a benign cyst on the prior MRI. A 12 mm vague low-attenuation lesion in the left hepatic lobe in segment IVb on image 164 is also new. Additional new lesion versus beam hardening artifact in segment 2. Tiny cysts in the right hepatic lobe on images 149 and 136 measuring up to 10 mm are unchanged from the 2022 MRI. PANCREAS: Normal. SPLEEN: Normal. ADRENAL GLANDS: Normal. KIDNEYS/BLADDER: Normal. BOWEL: Right hemicolectomy. Bowel loops are normal caliber. LYMPH NODES: Normal. VASCULATURE: Normal. PELVIC ORGANS: Normal. MUSCULOSKELETAL: Normal.     IMPRESSION: 1.  There are at least 2 new vague low-attenuation lesions in the liver  measuring up to 12 mm. These are suspicious for metastasis. An MRI of the liver without and with IV gadolinium is recommended for further evaluation. 2.  No other evidence of metastatic disease in the chest, abdomen, or pelvis. 3.  Right hemicolectomy. [Access Center: MRI of the liver without and with IV gadolinium recommended for new liver lesions.] This report will be copied to the [facility] to ensure a provider acknowledges the finding. Access Center is available Monday through Friday 8am-3:30 pm.         Signed by: Sekou Hall MD

## 2024-04-15 NOTE — LETTER
"    4/15/2024         RE: Luca Araiza  [address]  [facility] [address]        Dear Colleague,    Thank you for referring your patient, Luca Araiza, to the [facility]. Please see a copy of my visit note below.    Oncology Rooming Note    April 15, 2024 8:58 AM   Luca Araiza is a 43 year old male who presents for:    Chief Complaint   Patient presents with     Oncology Clinic Visit     Return visit 3 weeks with CT 03/22/24, MRI 03/25/24 and Liver Biopsy 04/01/24. Malignant neoplasm of ascending colon.     Initial Vitals: /81 (BP Location: Right arm, Patient Position: Sitting, Cuff Size: Adult Large)   Pulse 74   Temp 97.8  F (36.6  C) (Oral)   Resp 16   Ht 1.88 m (6' 2\")   Wt 95.1 kg (209 lb 11.2 oz)   SpO2 98%   BMI 26.92 kg/m   Estimated body mass index is 26.92 kg/m  as calculated from the following:    Height as of this encounter: 1.88 m (6' 2\").    Weight as of this encounter: 95.1 kg (209 lb 11.2 oz). Body surface area is 2.23 meters squared.  No Pain (0) Comment: Data Unavailable   No LMP for male patient.  Allergies reviewed: Yes  Medications reviewed: Yes    Medications: Medication refills not needed today.  Pharmacy name entered into EPIC:    [facility] - [place], [place] - [address] [place] [address] PHARMACY [place], [place] - [address] [facility]    Frailty Screening:   Is the patient here for a new oncology consult visit in cancer care? 2. No      Clinical concerns: Return visit 3 weeks with CT 03/22/24, MRI 03/25/24 and Liver Biopsy 04/01/24. Malignant neoplasm of ascending colon.       Heather Manuel, [facility] Hematology and Oncology Consult Note    Patient: Luca Araiza  MRN: [id number]  Date of Service: Apr 15, 2024       Reason for Visit    Follow-up for colon cancer    New liver lesions on CT scan, March 2024, biopsy suspicious for malignancy, April 1, 2024  CT DNA profile positive again, January " 2024    T3N1B, stage IIIb adenocarcinoma of the cecum status post laparoscopic right colectomy, December 21, 2022  CT DNA positive, March 2023  Tumor is MMR intact  Early onset of colon cancer with family history, genetic testing negative, 47 genes analyzed, April 2023  Neuropathy    Liver biopsy are suspicious for malignancy but not diagnostic.  Overall though the picture is consistent with metastatic colon cancer given that his CT DNA test became positive and he has developed new liver lesions on imaging.    NGS results are not available as of today.    Reviewed with him that NGS results would be most important in determining the initial treatment based on any mutations that are found in the tumor cells.    Most likely treatment would be initial systemic therapy for 2 to 3 months followed by consideration for local therapy to the liver.    Given his limited amount of metastatic disease, I am hopeful that he could go into a remission but explained that there would remain high risk for further relapse.    He will be meeting with Dr. Romero at the [facility] next week and does have appointments at the [facility] in early May.    May be reasonable to get all these opinions and then determine best treatment.    Questions answered.    Plan: Follow-up on NGS testing and special lab tests done March 25  No follow-up with me  Does have appointments with Dr. Romero and at the [facility]        Measurable disease: MRI of the liver    Current therapy: Observation    Treatment history:    Modified FOLFIRINOX day 1 cycle 1, March 14, 2023  Last cycle #12 today August 15, 2023              ECOG Performance    0 - Independent    Encounter Diagnoses:    Problem List Items Addressed This Visit          Digestive    Malignant neoplasm of ascending colon (H) - Primary    Relevant Orders    Check Out Appointment Request           ______________________________________________________________________________    Staging  History   Cancer Staging   Malignant neoplasm of ascending colon (H)  Staging form: Colon and Rectum, AJCC 8th Edition  - Pathologic stage from 12/21/2022: Stage IIIB (pT3, pN1b, cM0) - Signed by Dayami Pablo APRN CNP on 7/5/2023        History    Luca is here again for follow-up.  Seen 3 months ago.  Had recent colonoscopy showing a polyp.  Is otherwise asymptomatic.  CT DNA was positive in January.  He has had CT scans for restaging.  Still bothered by neuropathy.  ECOG status is 0.      March 14, 2023:    Luca is here for reevaluation.  Seen about 6 weeks ago.  His CT DNA test was positive and he was randomized to arm for the clinical trial.  He has had Port-A-Cath placed.  No new symptoms or problems.  ECOG status 0.    January 2023:  Mr. Luca Araiza is a 42 year old no significant past medical history who is been diagnosed and undergone laparoscopic hemicolectomy for colon cancer.  He was in [place] last August and had significant illness with diarrhea and vomiting.  He then had physical which showed that he was anemic and subsequently had colonoscopy showing cecal colon cancer.  He underwent laparoscopic right hemicolectomy on December 21 and has recovered well from this.    No other previous medical history.  He had left ACL repair surgery as a teenager.  No other hospitalizations.  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  Does not smoke and does not drink alcohol.    Father had colon cancer in his mid 60s.  Mother had breast cancer in her late 50s.  No siblings or kids with cancer.  Maternal grandfather had colon cancer in his 60s.  Paternal grandmother had cancer type unknown.            Review of systems.  No fever or night sweats.  No loss of weight.  No lumps or bumps anywhere.  No unusual headaches or eyesight issues.  No dizziness.  No bleeding from the nose.  No sores in the mouth. No problems with swallowing.  No chest  pain. No shortness of breath. No cough.  No abdominal pain. No nausea or vomiting.  No diarrhea or constipation.  No blood in stool or black colored stools.  No problems passing urine.  No numbness or tingling in hands or feet.  No skin rashes.  A 14 point review of systems is otherwise negative.        Past History    Past Medical History:   Diagnosis Date     Anemia      Basal cell carcinoma      Malignant neoplasm of ascending colon (H) 11/07/2022     Past Surgical History:   Procedure Laterality Date     COLONOSCOPY       COLONOSCOPY N/A 1/15/2024    Procedure: COLONOSCOPY, WITH POLYPECTOMY AND BIOPSY;  Surgeon: Nahum Contreras MD;  Location: [facility]     HEMICOLECTOMY, RT/LT Right      IR CHEST PORT PLACEMENT > 5 YRS OF AGE  3/9/2023     REMOVAL OF SPERM DUCT(S)      Description: Surgery Of Male Genitalia Vasectomy;  Recorded: 12/27/2011;  Comments: 12/27/2011     ZZC REPAIR CRUCIATE LIGAMENT,KNEE      Description: Primary Repair Of Knee Ligament Cruciate Anterior;  Recorded: 07/20/2009;     Family History   Problem Relation Age of Onset     Breast Cancer Mother      Skin Cancer Mother      Colon Cancer Father      Skin Cancer Father      Anesthesia Reaction No family hx of      Venous thrombosis No family hx of      Social History     Socioeconomic History     Marital status:      Spouse name: None     Number of children: None     Years of education: None     Highest education level: None   Tobacco Use     Smoking status: Never     Smokeless tobacco: Never   Substance and Sexual Activity     Alcohol use: Not Currently     Drug use: Never     Sexual activity: Yes     Partners: Female     Birth control/protection: Male Surgical   Other Topics Concern     Parent/sibling w/ CABG, MI or angioplasty before 65F 55M? No         Allergies    No Known Allergies        Physical Exam    /81 (BP Location: Right arm, Patient Position: Sitting, Cuff Size: Adult Large)   Pulse 74   Temp 97.8  F (36.6  C) (Oral)   " Resp 16   Ht 1.88 m (6' 2\")   Wt 95.1 kg (209 lb 11.2 oz)   SpO2 98%   BMI 26.92 kg/m        GENERAL: Alert and oriented to time place and person. Seated comfortably. In no distress.    HEAD: Atraumatic and normocephalic.    EYES: LUIS ANGEL, EOMI.  No pallor.  No icterus.    Oral cavity: no mucosal lesion or tonsillar enlargement.    NECK: supple. JVP normal.  No thyroid enlargement.    LYMPH NODES: No palpable, cervical, axillary or inguinal lymphadenopathy.    CHEST: clear to auscultation bilaterally.  Resonant to percussion throughout bilaterally.  Symmetrical breath movements bilaterally.    CVS: S1 and S2 are heard. Regular rate and rhythm.  No murmur or gallop or rub heard.  No peripheral edema.    ABDOMEN: Soft. Not tender. Not distended.  No palpable hepatomegaly or splenomegaly.  No other mass palpable.  Bowel sounds heard.    EXTREMITIES: Warm.    SKIN: no rash, or bruising or purpura.  Has a full head of hair.    Lab Results    Preoperative CEA was 2.6 and 2.7    Imaging Results    CT and MRI reviewed with no evidence of metastatic disease    US Biopsy Liver    Result Date: 4/1/2024  EXAM: 1. PERCUTANEOUS BIOPSY LIVER LESION 2. ULTRASOUND GUIDANCE 3. CONSCIOUS SEDATION LOCATION: [facility] DATE: 4/1/2024 INDICATION: New liver lesions with history of colon cancer. PROCEDURE: Informed consent obtained. Site marked. Prior images reviewed. Required items made available. Patient identity was confirmed verbally and with arm band. Patient reevaluated immediately before administering sedation. Universal protocol was followed. Time out performed. The site was prepped and draped in sterile fashion. 10 mL of 1 percent lidocaine was infused into the local soft tissues. Using standard technique and under direct ultrasound guidance, a 20 gauge biopsy needle was used to make two core biopsies. Tissue was submitted to Pathology. The patient tolerated the procedure well. No complications. " SEDATION: Versed 3 mg. Fentanyl 100 mcg. The procedure was performed with administration intravenous conscious sedation with appropriate preoperative, intraoperative, and postoperative evaluation. 17 minutes of supervised face to face conscious sedation time was provided by a radiology nurse under my direct supervision.     IMPRESSION: Status post US-guided biopsy liver lesion. Reference CPT Code: 67277, 64934, 35766    MR Liver wo & w Contrast    Result Date: 3/25/2024  MR LIVER WITHOUT AND WITH CONTRAST  3/25/2024 2:40 PM INDICATION: No liver lesions noted on CT. History of colon cancer. COMPARISON: MRI of the abdomen performed 11/10/2022. CT of the chest, abdomen, and pelvis performed 3/22/2024. TECHNIQUE: Routine MRI liver protocol including T1 in/out phase, diffusion, multiplane T2, and dynamic T1 with IV contrast.  CONTRAST: 9 mL Gadavist FINDINGS: LIVER: There are two new enhancing lesions with associated restricted diffusion in hepatic segments Reji and VI (series 11 image 41), suspicious for metastatic disease. The largest of these is in hepatic segment Reji, and measures 2 cm. These lesions correspond to the ill-defined hypoenhancing regions noted on the prior CT scan. There are two unchanged cysts in hepatic segment VII (series 11 images 20 and 25), with the largest measuring 1.1 cm. ADDITIONAL FINDINGS: The gallbladder, pancreas, spleen, adrenal glands, and kidneys are unremarkable. No hydronephrosis. No lymphadenopathy. The visualized loops of small bowel and colon are of normal caliber.     IMPRESSION:  There are two enhancing lesions with associated restricted diffusion in hepatic segments Reji and VI, suspicious for metastatic disease. These findings correspond to the hypodense lesions noted on the CT of 3/22/2024, and are new since the MRI of 11/10/2022. LUDY EARLY MD   SYSTEM ID:  [id number]    CT Chest/Abdomen/Pelvis w Contrast    Result Date: 3/22/2024  EXAM: CT CHEST/ABDOMEN/PELVIS W CONTRAST  LOCATION: [facility] DATE: 3/22/2024 INDICATION: Colon cancer. COMPARISON: 09/08/2023, 01/30/2023, 11/03/2022. MRI 11/10/2022 TECHNIQUE: CT scan of the chest, abdomen, and pelvis was performed following injection of IV contrast. Multiplanar reformats were obtained. Dose reduction techniques were used. CONTRAST: IsoVue 370 90mL FINDINGS: LUNGS AND PLEURA: A 2 mm nodule in the left upper lobe on series 4 image 70 is unchanged from 2022 and can be considered benign. Benign calcified granuloma in the right middle lobe on image 187. No new nodules. No pleural effusions. MEDIASTINUM/AXILLAE: Infusion port tip in the RA. No adenopathy. Normal heart size. CORONARY ARTERY CALCIFICATION: None. HEPATOBILIARY: A vague/subtle 12 mm area of low attenuation in the right hepatic lobe (series 3 image 172) in segment 6 is not clearly identified on 09/08/2023 or the prior MRI of 11/10/2022. An adjacent subcentimeter subcapsular lesion on the same image  corresponds with a benign cyst on the prior MRI. A 12 mm vague low-attenuation lesion in the left hepatic lobe in segment IVb on image 164 is also new. Additional new lesion versus beam hardening artifact in segment 2. Tiny cysts in the right hepatic lobe on images 149 and 136 measuring up to 10 mm are unchanged from the 2022 MRI. PANCREAS: Normal. SPLEEN: Normal. ADRENAL GLANDS: Normal. KIDNEYS/BLADDER: Normal. BOWEL: Right hemicolectomy. Bowel loops are normal caliber. LYMPH NODES: Normal. VASCULATURE: Normal. PELVIC ORGANS: Normal. MUSCULOSKELETAL: Normal.     IMPRESSION: 1.  There are at least 2 new vague low-attenuation lesions in the liver measuring up to 12 mm. These are suspicious for metastasis. An MRI of the liver without and with IV gadolinium is recommended for further evaluation. 2.  No other evidence of metastatic disease in the chest, abdomen, or pelvis. 3.  Right hemicolectomy. [Access Center: MRI of the liver without and with IV gadolinium  recommended for new liver lesions.] This report will be copied to the [facility] to ensure a provider acknowledges the finding. Access Center is available Monday through Friday 8am-3:30 pm.         Signed by: Sekou Hall MD      Again, thank you for allowing me to participate in the care of your patient.        Sincerely,        Sekou Hall MD

## 2024-04-17 PROCEDURE — 88360 TUMOR IMMUNOHISTOCHEM/MANUAL: CPT | Mod: TC | Performed by: SURGERY

## 2024-04-17 PROCEDURE — 88360 TUMOR IMMUNOHISTOCHEM/MANUAL: CPT | Mod: 26 | Performed by: PATHOLOGY

## 2024-04-18 ENCOUNTER — PATIENT OUTREACH (OUTPATIENT)
Dept: ONCOLOGY | Facility: HOSPITAL | Age: 44
End: 2024-04-18

## 2024-04-18 LAB
PATH REPORT.ADDENDUM SPEC: ABNORMAL
PATH REPORT.COMMENTS IMP SPEC: ABNORMAL
PATH REPORT.COMMENTS IMP SPEC: ABNORMAL
PATH REPORT.COMMENTS IMP SPEC: YES
PATH REPORT.FINAL DX SPEC: ABNORMAL
PATH REPORT.GROSS SPEC: ABNORMAL
PATH REPORT.MICROSCOPIC SPEC OTHER STN: ABNORMAL
PATH REPORT.RELEVANT HX SPEC: ABNORMAL
PATHOLOGY SYNOPTIC REPORT: ABNORMAL
PHOTO IMAGE: ABNORMAL

## 2024-04-18 PROCEDURE — 88377 M/PHMTRC ALYS ISHQUANT/SEMIQ: CPT | Performed by: SURGERY

## 2024-04-18 PROCEDURE — 88377 M/PHMTRC ALYS ISHQUANT/SEMIQ: CPT | Mod: 26 | Performed by: MEDICAL GENETICS

## 2024-04-22 ENCOUNTER — ONCOLOGY VISIT (OUTPATIENT)
Dept: ONCOLOGY | Facility: CLINIC | Age: 44
End: 2024-04-22
Attending: STUDENT IN AN ORGANIZED HEALTH CARE EDUCATION/TRAINING PROGRAM
Payer: COMMERCIAL

## 2024-04-22 VITALS
RESPIRATION RATE: 12 BRPM | DIASTOLIC BLOOD PRESSURE: 79 MMHG | SYSTOLIC BLOOD PRESSURE: 122 MMHG | BODY MASS INDEX: 26.92 KG/M2 | WEIGHT: 209.7 LBS | HEART RATE: 86 BPM | OXYGEN SATURATION: 99 % | TEMPERATURE: 98.2 F

## 2024-04-22 DIAGNOSIS — C18.2 MALIGNANT NEOPLASM OF ASCENDING COLON (H): ICD-10-CM

## 2024-04-22 PROCEDURE — 99215 OFFICE O/P EST HI 40 MIN: CPT | Mod: GC | Performed by: STUDENT IN AN ORGANIZED HEALTH CARE EDUCATION/TRAINING PROGRAM

## 2024-04-22 PROCEDURE — 99417 PROLNG OP E/M EACH 15 MIN: CPT | Mod: GC | Performed by: STUDENT IN AN ORGANIZED HEALTH CARE EDUCATION/TRAINING PROGRAM

## 2024-04-22 PROCEDURE — 99213 OFFICE O/P EST LOW 20 MIN: CPT | Performed by: STUDENT IN AN ORGANIZED HEALTH CARE EDUCATION/TRAINING PROGRAM

## 2024-04-22 ASSESSMENT — PAIN SCALES - GENERAL: PAINLEVEL: NO PAIN (0)

## 2024-04-22 NOTE — LETTER
4/22/2024         RE: Luca Araiza  [address]  [place] [address]        Dear Colleague,    Thank you for referring your patient, Luca Araiza, to the [facility]. Please see a copy of my visit note below.    [facility]  Medical Oncology Initial Patient Visit Note    Patient name: Luca Araiza  YOB: 1980  Visit date: 04/22/24    Oncologic History:    Initial Diagnosis: Stage IIIB ([id number]) Colon Adenocarcinoma of ascending colon (diag 11/2022), non-obstructive, pMMR, HER2 low (2+ by IHC), grade 2, LVI present (small vessel), PNI present, 3/33 LN positive, initial CEA 2.6, surgical resection with R0 resection   Prior treatment(s):   1) Surgical resection with laparoscopic right logan-colectomy with removal of terminal ileum and appendix, partial ometectomy with R0 resection (Dr. Contreras) on 12/21/22  2) Adjuvant treatment - enrolled into GI-008 trial, randomized to and received 12 cycles of  mFOLFIRINOX (3/14/23-8/15/23), surveillance with standard imaging, colonoscopy as well as additional ct-DNA testing;   Treatment intent:  Curative    At Recurrence (current)   Diagnosis/Stage: Metachronous liver metastases from KRAS-mutant, pMMR Colon Cancer (suspected) Stage Sanjuana [id number] with two lesions noted in segments SANJUANA and VI of Liver, biopsy suspicious for atypical cells concerning for malignancy but not diagnostic due to paucicellular biopsy sample (4/4/24)  Molecular:  Colorectal Cancer NGS panel on colonic resection sample ([id number] and [id number]): KRAS G12D c.35G>A  VAF 26.6%, PIK3CA Q546K c.1636C>A VAF 10.6%; TMB-low  Current Treatment:   Treatment intent: Curative     Care Team  Medical oncologist: Diomedes Hall MD ([facility]), Hernandez Romero MD ([employer])  Surgical oncologist:  Nahum Contreras MD (Colorectal Surgery, [facility]); Nancy Guerra MD ([facility]at[facility], scheduled to see May 7)     Other  members of care team: Diana Wade DO (PCP)   Primary caregiver at home/ contact:  wife Alva Araiza at [phone]     Reason for consultation/ patient visit:  Management of Recurrent colorectal cancer    History of presenting illness:  Mr. Luca Araiza is a 43 year old male with previously resected Stage III Colon Cancer s/p resection and adjuvant treatment who now presents with suspected live recurrence of colon cancer. His oncologic history is summarized below:    08/2022: GI illness during [place] vacation with significant nausea, diarrhea. Subsequent physical with PCP, labs showing iron deficiency anemia.   11/03/222 : Colonoscopy showing cecal mass, biopsy proven moderately differentiated adenocarcinoma. Staging CT CAP (11/3) and MRI Abdomen (11/10/22) negative for metastatic disease. T2-hyperintense cysts noted in liver.   12/21/2022: Surgical resection with ascending colectomy, removal of appendix and terminal ileum, pT3N1b pathologic staging, LVI present (small vessel), PNI present, 3/33 LN positive, R0 resection, pMMR, HER2 2+ (by IHC);   03/14/23 -  Enrolled in  clinical trial, under supervision of med onc Dr. Diomedes Hall, ct-DNA test positive. Randomized to mFOLFIRINOX arm.   03/23/23: Germline genetic testing ([employer] Common Hereditary Cancers panel, 47 genes) negative for known, heritable mutations.   03/14/23 - 08/15/23: completed 12 cycles of adjuvant mFOLFRINOX, tolerated well except for grade 1 peripheral neuropathy.   01/15/24: 1-year surveillance colonoscopy demonstrating two polyps (tubular adenomas), no concern for luminal recurrence.   01/16/24: surveillance ct-DNA testing positive;   03/22/24 : CT CAP showing two new low-attenutation lesions in liver measuring  up to 12mm (in segment VI) and in segement Reji. No other evidence of metastatic disease in chest, lungs, abdomen or pelvis.   03/25/24: MRI Liver showing two enhancing lesions in hepatic semgents Reji and V  corresponding to lesions on prior CT.   04/04/24 : US Guided Liver Biopsy : per histopath report, limited evaluation due to paucicellular sample, with findings suspicious for malignancy but not diagnostic. Rare atypical cell clusters were noted in touch prep and on H&E-stained sections.     Interval history:  Luca is here today with his wife Alva. Overall doing well. Completely asymptomatic. No abdominal pain, N/V, diarrhea or constipation or BRBPR. No jaundice. Active, walks most days, plays pickleball, plays pick-up basketball with his teenage sons. 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 Mild tingling/numbness in feet primarily, not bothersome, no limitation of activity.     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  Still working full time. Has three teenage sons. Does not smoke and does not drink alcohol.  Wife is a .    Family history:  Father had colon cancer in his mid 60s. Mother had breast cancer in her late 50s.  No siblings or kids with cancer.  Maternal grandfather had colon cancer in his 60s.  Paternal grandmother had cancer type unknown.    Allergies:   No Known Allergies    Outpatient medications:     Current Outpatient Medications:     sildenafil (VIAGRA) 50 MG tablet, Take 1 tablet (50 mg) by mouth daily as needed (take 1 hour before sexual activity), Disp: 20 tablet, Rfl: 3    Current Facility-Administered Medications:     alum & mag hydroxide-simethicone (MAALOX) suspension 30 mL, 30 mL, Oral, Q4H PRN, Dayami Pablo APRN CNP    famotidine (PEPCID) injection 20 mg, 20 mg, Intravenous, Q12H, Sekou Hall MD, 20  mg at 07/05/23 1245    LORazepam (ATIVAN) injection 0.5 mg, 0.5 mg, Intravenous, Once, Sekou Hall MD      Physical examination:  Vital signs: /79 (BP Location: Right arm, Patient Position: Sitting, Cuff Size: Adult Regular)   Pulse 86   Temp 98.2  F (36.8  C) (Oral)   Resp 12   Wt 95.1 kg (209 lb 11.2 oz)   SpO2 99%   BMI 26.92 kg/m      ECOG performance status:  0  Vascular access:  right chest wall port     GENERAL: Well-nourished healthy-appearing, sitting up in chair, no acute distress.   HEENT: No icterus, no pallor.   LUNGS: Normal work of breathing.   CARDIOVASCULAR: Regular rate and rhythm  ABDOMEN: Soft, nontender  EXTREMITIES: No edema   NEUROLOGIC: Alert and oriented      Lab data:  I have personally reviewed the following lab data   Latest CBC from 4/1/24 showing normal Hgb (14.4), platelet count and CMP from 3/25/24 showing normal serum electrolytes, liver transaminases, bilirubin. Elevated creat of 1.25 noted;     Radiology data:  I have personally reviewed the images of / or the following radiology data -   CT CAP 3/22/24, MRI Liver/Abd (3/25/24), CT CAP from 9/2023, 1/2023, 11/2022, MRI Liver (11/2022)    Pathology and other data:  I have personally reviewed and interpreted the following data,   Original diagnostic histopath report from Colonoscopy biopsy 11/2022, surgical resection sample report (12/21/22), colonoscopy biopsy report (1/2024) and latest US Liver biopsy (4/1/24)       Assessment and Plan:  Mr. Luca Araiza is a 43 year old male with prior history of localized high-risk Stage IIIB Colonic Adenocarcinoma of cecum  (diagnosed 11/2022) s/p surgical resection and 6 months of adjuvant FOLFIRINOX (completed 9/2023) as part of a clinical trial who now presents with concern for liver-only recurrence of cancer (with two new lesions in liver) in setting of recent (1/2024) positive ct-DNA.     For this patient with metachronous, suspected liver-only metastasis from  colorectal cancer, we recommend close multidisciplinary decision-making. We would favor locoregional therapies (including surgery) to address these liver lesions if feasible. Towards this we will await opinion from Hepatobiliary Surgical team at [facility] on May 7th that has already been scheduled by patient. Following locoregional therapy, we would favor close, active surveillance with CT CAP and MRI Liver along with tumor-informed ct-DNA testing to help guide further decision-making. Should there be evidene of further recurrence (tumor biology has shown itself to be aggressive given quick recurrence despite triple-drug adjuvant therapy) - we would consider systemic therapy as per standard of care for metastatic colon cancer.     We discussed, in lay language, the role of adjuvant therapy following potential surgical resection of colorectal cancer liver metastases (CRCLM), and the challenges of interpreting the limited phase 3 data in this space (EPOC and [id number]). There appears to be a definite improvement in PFS or DFS in these studies i.e. delay in recurrence of disease but no definite evidence of improved overall survival (and this if any is <5%)  ie patients don't necessarily live longer with adjuvant therapy in this setting and tend to have more toxicity. For the adjuvant therapy regimen, we would consider 5FU-based regimen such as FOLFIRI (rather than FOLFOX due to prior receipt of 6months of FOLFIRINOX), this is in line with NCCN guidelines.       Plan:  - await opinion regarding resectability of liver lesions from [facility] at [place] (4/7/24)  - plan for obtaining tumor-informed ct-DNA ([facility])   - schedule MRI Liver and CT CAP approx 2 months following resection/local therapy      The patient and family asked numerous excellent questions which we answered to the best of our abilities. At the completion of our consultation, the patient and accompanying caregivers had an excellent understanding of the  plan. They have our contact information for any further questions or concerns and of course, as always, we are available in the interim.       The patient was seen with and the above assessment and plan was discussed with staff physician Dr. Hernandez Romero MD who agreed with the same.    Rod Pretty   PGY-6 Fellow, Hematology,Oncology and BMT  [facility]     ------------    Physician Attestation    I, Hernandez Romero, saw and evaluated Luca Araiza in-person and agree with the resident/ fellow documentation by Dr. Pretty.    I have reviewed and discussed with them the history, physical exam, and plan.  I personally reviewed the vital signs, interval events, medications, labs and imaging.      I personally performed the substantive portion of the medical decision making for this visit - please see the full documentation for full details.      Key management decisions made by me and carried out under my direction:     Amanda 42 yo man with oligo metastatic to liver recurrent R sided colon cancer.  Completed 12 doses adjuvant FOLFIRINOX as part of trial in Aug 2023.  Very fit and functional.  Will see [facility] team soon, hopefully they can do curative intent liver directed therapy.  We discussed minimal role of mary-op chemo right now.  See us back May 13-- we can plan next steps, surveillance, SOC ctDNA (earlier via trial) etc.       I spent a total of 75 minutes on the date of service including preparation time (e.g., review of records and interpretation of tests), visit time with the patient and care partners, requesting interventions, communicating with other health care professionals, and documentation.      Date of Service (when I saw the patient): 04/22/24    Hernandez Romero M.D.   of Medicine  Division of Hematology, Oncology and Transplantation  [facility]

## 2024-04-22 NOTE — NURSING NOTE
"Oncology Rooming Note    April 22, 2024 2:52 PM   Luca Araiza is a 43 year old male who presents for:    Chief Complaint   Patient presents with    Oncology Clinic Visit     Malignant neoplasm of ascending colon      Initial Vitals: /79 (BP Location: Right arm, Patient Position: Sitting, Cuff Size: Adult Regular)   Pulse 86   Temp 98.2  F (36.8  C) (Oral)   Resp 12   Wt 95.1 kg (209 lb 11.2 oz)   SpO2 99%   BMI 26.92 kg/m   Estimated body mass index is 26.92 kg/m  as calculated from the following:    Height as of 4/15/24: 1.88 m (6' 2\").    Weight as of this encounter: 95.1 kg (209 lb 11.2 oz). Body surface area is 2.23 meters squared.  No Pain (0) Comment: Data Unavailable   No LMP for male patient.  Allergies reviewed: Yes  Medications reviewed: Yes    Medications: Medication refills not needed today.  Pharmacy name entered into EPIC:    [facility] - [place], [place] - [address] [place] [address] PHARMACY [place], [place] - [address] [facility]    Frailty Screening:   Is the patient here for a new oncology consult visit in cancer care? 2. No      Clinical concerns: none    Susan Branham"

## 2024-04-22 NOTE — PROGRESS NOTES
[facility]  Medical Oncology Initial Patient Visit Note    Patient name: Luca Araiza  YOB: 1980  Visit date: 04/22/24    Oncologic History:    Initial Diagnosis: Stage IIIB ([id number]) Colon Adenocarcinoma of ascending colon (diag 11/2022), non-obstructive, pMMR, HER2 low (2+ by IHC), grade 2, LVI present (small vessel), PNI present, 3/33 LN positive, initial CEA 2.6, surgical resection with R0 resection   Prior treatment(s):   1) Surgical resection with laparoscopic right logan-colectomy with removal of terminal ileum and appendix, partial ometectomy with R0 resection (Dr. Contreras) on 12/21/22  2) Adjuvant treatment - enrolled into GI-008 trial, randomized to and received 12 cycles of  mFOLFIRINOX (3/14/23-8/15/23), surveillance with standard imaging, colonoscopy as well as additional ct-DNA testing;   Treatment intent:  Curative    At Recurrence (current)   Diagnosis/Stage: Metachronous liver metastases from KRAS-mutant, pMMR Colon Cancer (suspected) Stage Sanjuana [id number] with two lesions noted in segments SANJUANA and VI of Liver, biopsy suspicious for atypical cells concerning for malignancy but not diagnostic due to paucicellular biopsy sample (4/4/24)  Molecular:  Colorectal Cancer NGS panel on colonic resection sample ([id number] and [id number] A5): KRAS G12D c.35G>A  VAF 26.6%, PIK3CA Q546K c.1636C>A VAF 10.6%; TMB-low  Current Treatment:   Treatment intent: Curative     Care Team  Medical oncologist: Diomedes Hall [facility] ([facility]), Hernandez Romero MD ([facility])  Surgical oncologist:  Nahum Contreras MD (Colorectal Surgery, [facility]); Nancy Guerra MD ([facility] Hepatobiliary Surgery, scheduled to see May 7)     Other members of care team: Diana Wade DO (PCP)   Primary caregiver at home/ contact:  wife Alva Araiza at [phone]     Reason for consultation/ patient visit:  Management of Recurrent colorectal cancer    History of presenting illness:    Luca Araiza is a 43 year old male with previously resected Stage III Colon Cancer s/p resection and adjuvant treatment who now presents with suspected live recurrence of colon cancer. His oncologic history is summarized below:    08/2022: GI illness during [place] vacation with significant nausea, diarrhea. Subsequent physical with PCP, labs showing iron deficiency anemia.   11/03/222 : Colonoscopy showing cecal mass, biopsy proven moderately differentiated adenocarcinoma. Staging CT CAP (11/3) and MRI Abdomen (11/10/22) negative for metastatic disease. T2-hyperintense cysts noted in liver.   12/21/2022: Surgical resection with ascending colectomy, removal of appendix and terminal ileum, pT3N1b pathologic staging, LVI present (small vessel), PNI present, 3/33 LN positive, R0 resection, pMMR, HER2 2+ (by IHC);   03/14/23 -  Enrolled in  clinical trial, under supervision of med onc Dr. Diomedes Hall, ct-DNA test positive. Randomized to mFOLFIRINOX arm.   03/23/23: Germline genetic testing (Inv[employer]ae Common Hereditary Cancers panel, 47 genes) negative for known, heritable mutations.   03/14/23 - 08/15/23: completed 12 cycles of adjuvant mFOLFRINOX, tolerated well except for grade 1 peripheral neuropathy.   01/15/24: 1-year surveillance colonoscopy demonstrating two polyps (tubular adenomas), no concern for luminal recurrence.   01/16/24: surveillance ct-DNA testing positive;   03/22/24 : CT CAP showing two new low-attenutation lesions in liver measuring  up to 12mm (in segment VI) and in segement Reji. No other evidence of metastatic disease in chest, lungs, abdomen or pelvis.   03/25/24: MRI Liver showing two enhancing lesions in hepatic semgents Reji and V corresponding to lesions on prior CT.   04/04/24 : US Guided Liver Biopsy : per histopath report, limited evaluation due to paucicellular sample, with findings suspicious for malignancy but not diagnostic. Rare atypical cell clusters were noted in  touch prep and on H&E-stained sections.     Interval history:  Luca is here today with his wife Alva. Overall doing well. Completely asymptomatic. No abdominal pain, N/V, diarrhea or constipation or BRBPR. No jaundice. Active, walks most days, plays pickleball, plays pick-up basketball with his teenage sons. 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 Mild tingling/numbness in feet primarily, not bothersome, no limitation of activity.     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  Still working full time. Has three teenage sons. Does not smoke and does not drink alcohol.  Wife is a .    Family history:  Father had colon cancer in his mid 60s. Mother had breast cancer in her late 50s.  No siblings or kids with cancer.  Maternal grandfather had colon cancer in his 60s.  Paternal grandmother had cancer type unknown.    Allergies:   No Known Allergies    Outpatient medications:     Current Outpatient Medications:     sildenafil (VIAGRA) 50 MG tablet, Take 1 tablet (50 mg) by mouth daily as needed (take 1 hour before sexual activity), Disp: 20 tablet, Rfl: 3    Current Facility-Administered Medications:     alum & mag hydroxide-simethicone (MAALOX) suspension 30 mL, 30 mL, Oral, Q4H PRN, Dayami Pablo APRN CNP    famotidine (PEPCID) injection 20 mg, 20 mg, Intravenous, Q12H, Sekou Hall MD, 20 mg at 07/05/23 1245    LORazepam (ATIVAN) injection 0.5 mg, 0.5 mg, Intravenous, Once, Sekou Hall MD      Physical examination:  Vital signs: /79 (BP Location: Right arm, Patient Position: Sitting, Cuff Size: Adult Regular)    Pulse 86   Temp 98.2  F (36.8  C) (Oral)   Resp 12   Wt 95.1 kg (209 lb 11.2 oz)   SpO2 99%   BMI 26.92 kg/m      ECOG performance status:  0  Vascular access:  right chest wall port     GENERAL: Well-nourished healthy-appearing, sitting up in chair, no acute distress.   HEENT: No icterus, no pallor.   LUNGS: Normal work of breathing.   CARDIOVASCULAR: Regular rate and rhythm  ABDOMEN: Soft, nontender  EXTREMITIES: No edema   NEUROLOGIC: Alert and oriented      Lab data:  I have personally reviewed the following lab data   Latest CBC from 4/1/24 showing normal Hgb (14.4), platelet count and CMP from 3/25/24 showing normal serum electrolytes, liver transaminases, bilirubin. Elevated creat of 1.25 noted;     Radiology data:  I have personally reviewed the images of / or the following radiology data -   CT CAP 3/22/24, MRI Liver/Abd (3/25/24), CT CAP from 9/2023, 1/2023, 11/2022, MRI Liver (11/2022)    Pathology and other data:  I have personally reviewed and interpreted the following data,   Original diagnostic histopath report from Colonoscopy biopsy 11/2022, surgical resection sample report (12/21/22), colonoscopy biopsy report (1/2024) and latest US Liver biopsy (4/1/24)       Assessment and Plan:  Mr. Luca Araiza is a 43 year old male with prior history of localized high-risk Stage IIIB Colonic Adenocarcinoma of cecum  (diagnosed 11/2022) s/p surgical resection and 6 months of adjuvant FOLFIRINOX (completed 9/2023) as part of a clinical trial who now presents with concern for liver-only recurrence of cancer (with two new lesions in liver) in setting of recent (1/2024) positive ct-DNA.     For this patient with metachronous, suspected liver-only metastasis from colorectal cancer, we recommend close multidisciplinary decision-making. We would favor locoregional therapies (including surgery) to address these liver lesions if feasible. Towards this we will await opinion from Hepatobiliary Surgical team at [facility] on May 7th that has already been scheduled by patient. Following locoregional therapy, we would favor close, active surveillance with CT CAP and MRI Liver along with tumor-informed ct-DNA testing to help guide further decision-making. Should there be evidene of further recurrence (tumor biology has shown itself to be aggressive given quick recurrence despite triple-drug adjuvant therapy) - we would consider systemic therapy as per standard of care for metastatic colon cancer.     We discussed, in lay language, the role of adjuvant therapy following potential surgical resection of colorectal cancer liver metastases (CRCLM), and the challenges of interpreting the limited phase 3 data in this space (EPOC and [id number]). There appears to be a definite improvement in PFS or DFS in these studies i.e. delay in recurrence of disease but no definite evidence of improved overall survival (and this if any is <5%)  ie patients don't necessarily live longer with adjuvant therapy in this setting and tend to have more toxicity. For the adjuvant therapy regimen, we would consider 5FU-based regimen such as FOLFIRI (rather than FOLFOX due to prior receipt of 6months of FOLFIRINOX), this is in line with NCCN guidelines.       Plan:  - await opinion regarding resectability of liver lesions from [facility] Surgery at [facility] (4/7/24)  - plan for obtaining tumor-informed ct-DNA ([facility])   - schedule MRI Liver and CT CAP approx 2 months following resection/local therapy      The patient and family asked numerous excellent questions which we answered to the best of our abilities. At the completion of our consultation, the patient and accompanying caregivers had an excellent understanding of the plan. They have our contact information for any further questions or concerns and of course, as always, we are available in the interim.       The patient was seen with and the above assessment and plan was discussed with staff physician Dr. Ng  MD Nick who agreed with the same.    Rod Pretty   PGY-6 Fellow, Hematology,Oncology and BMT  [facility]     ------------    Physician Attestation    I, Hernandez Romero, saw and evaluated Luca Araiza in-person and agree with the resident/ fellow documentation by Dr. Pretty.    I have reviewed and discussed with them the history, physical exam, and plan.  I personally reviewed the vital signs, interval events, medications, labs and imaging.      I personally performed the substantive portion of the medical decision making for this visit - please see the full documentation for full details.      Key management decisions made by me and carried out under my direction:     Amanda 42 yo man with oligo metastatic to liver recurrent R sided colon cancer.  Completed 12 doses adjuvant FOLFIRINOX as part of trial in Aug 2023.  Very fit and functional.  Will see [facility] team soon, hopefully they can do curative intent liver directed therapy.  We discussed minimal role of mary-op chemo right now.  See us back May 13-- we can plan next steps, surveillance, SOC ctDNA (earlier via trial) etc.       I spent a total of 75 minutes on the date of service including preparation time (e.g., review of records and interpretation of tests), visit time with the patient and care partners, requesting interventions, communicating with other health care professionals, and documentation.      Date of Service (when I saw the patient): 04/22/24    Hernandez Romero M.D.   of Medicine  Division of Hematology, Oncology and Transplantation  [facility]

## 2024-05-01 LAB — INTERPRETATION: NORMAL

## 2024-05-02 LAB
PATH REPORT.ADDENDUM SPEC: NORMAL
PATH REPORT.COMMENTS IMP SPEC: NORMAL
PATH REPORT.FINAL DX SPEC: NORMAL
PATH REPORT.GROSS SPEC: NORMAL
PATH REPORT.MICROSCOPIC SPEC OTHER STN: NORMAL
PATH REPORT.RELEVANT HX SPEC: NORMAL
PHOTO IMAGE: NORMAL

## 2024-05-09 ENCOUNTER — PATIENT OUTREACH (OUTPATIENT)
Dept: ONCOLOGY | Facility: CLINIC | Age: 44
End: 2024-05-09
Payer: COMMERCIAL

## 2024-05-09 ENCOUNTER — MYC MEDICAL ADVICE (OUTPATIENT)
Dept: ONCOLOGY | Facility: CLINIC | Age: 44
End: 2024-05-09
Payer: COMMERCIAL

## 2024-05-12 NOTE — PROGRESS NOTES
Virtual Visit Details    Type of service:  Video Visit     Originating Location (pt. Location): Home  Distant Location (provider location):  On-site  Platform used for Video Visit: [facility]  Medical Oncology Return Patient Visit Note    Patient name: Luca Araiza  YOB: 1980      Oncologic History:    Initial Diagnosis: Stage IIIB ([id number]) Colon Adenocarcinoma of ascending colon (diag 11/2022), non-obstructive, pMMR, HER2 low (2+ by IHC), grade 2, LVI present (small vessel), PNI present, 3/33 LN positive, initial CEA 2.6, surgical resection with R0 resection   Prior treatment(s):   1) Surgical resection with laparoscopic right logan-colectomy with removal of terminal ileum and appendix, partial ometectomy with R0 resection (Dr. 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, pMMR Colon Cancer (suspected) Stage Sanjuana [id number] with lesions noted in segments SANJUANA and VI of Liver, biopsy suspicious for atypical cells concerning for malignancy but not diagnostic due to paucicellular biopsy sample (4/4/24)  Molecular:  Colorectal Cancer NGS panel on colonic resection sample ([id number] A5 and [id number] A5): KRAS G12D c.35G>A  VAF 26.6%, PIK3CA Q546K c.1636C>A VAF 10.6%; TMB-low  Current Treatment:   Treatment intent: Curative     Care Team  Medical oncologist: Diomedes Hall MD (Med Onc [facility]), Hernandez Romero MD ([facility])  Surgical oncologist:  Nahum Contreras MD (Colorectal Surgery, [facility]); Nancy Guerra MD ([facility] Hepatobiliary Surgery, scheduled to see May 7)     Other members of care team: Diana Wade DO (PCP)   Primary caregiver at home/  contact:  wife Alva Araiza at [phone]     Reason for consultation/ patient visit:  Management of Recurrent colorectal cancer    History of presenting illness:  Mr. Luca Araiza is a 43 year old male with previously resected Stage III Colon Cancer s/p resection and adjuvant treatment who now presents with suspected live recurrence of colon cancer. His oncologic history is summarized below:    08/2022: GI illness during [place] vacation with significant nausea, diarrhea. Subsequent physical with PCP, labs showing iron deficiency anemia.   11/03/222 : Colonoscopy showing cecal mass, biopsy proven moderately differentiated adenocarcinoma. Staging CT CAP (11/3) and MRI Abdomen (11/10/22) negative for metastatic disease. T2-hyperintense cysts noted in liver.   12/21/2022: Surgical resection with ascending colectomy, removal of appendix and terminal ileum, pT3N1b pathologic staging, LVI present (small vessel), PNI present, 3/33 LN positive, R0 resection, pMMR, HER2 2+ (by IHC);   03/14/23 -  Enrolled in  clinical trial, under supervision of med onc Dr. Diomedes Hall, ct-DNA test positive. Randomized to mFOLFIRINOX arm.   03/23/23: Germline genetic testing (Inv[employer]ae Common Hereditary Cancers panel, 47 genes) negative for known, heritable mutations.   03/14/23 - 08/15/23: completed 12 cycles of adjuvant mFOLFRINOX, tolerated well except for grade 1 peripheral neuropathy.   01/15/24: 1-year surveillance colonoscopy demonstrating two polyps (tubular adenomas), no concern for luminal recurrence.   01/16/24: surveillance ct-DNA testing positive;   03/22/24 : CT CAP showing two new low-attenutation lesions in liver measuring  up to 12mm (in segment VI) and in segement Reji. No other evidence of metastatic disease in chest, lungs, abdomen or pelvis.   03/25/24: MRI Liver showing two enhancing lesions in hepatic semgents Reji and V corresponding to lesions on prior CT.   04/04/24 : US Guided Liver Biopsy : per  histopath report, limited evaluation due to paucicellular sample, with findings suspicious for malignancy but not diagnostic. Rare atypical cell clusters were noted in touch prep and on H&E-stained sections.   4/22/2024: transferred care to Romero.  5/7/2024: CT CAP at [facility] with liver lesions seen, no LAD. CEA 3.2. Met Dr. Nancy Guerra at [facility] on 5/8/2024. Considering open hepatic wedge resection vs ablation, intraop US and cyrus. Consider WIL/ mary-op chemo.    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  Still working full time. Has three teenage sons. Does not smoke and does not drink alcohol.  Wife is a .  Active, walks most days, plays pickleball, plays pick-up basketball with his teenage sons. 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    Family history:  Father had colon cancer in his mid 60s. Mother had breast cancer in her late 50s.  No siblings or kids with cancer.  Maternal grandfather had colon cancer in his 60s.  Paternal grandmother had cancer type unknown.    Allergies:   No Known Allergies    Outpatient medications:     Current Outpatient Medications:     sildenafil (VIAGRA) 50 MG tablet, Take 1 tablet (50 mg) by mouth daily as needed (take 1 hour before sexual activity), Disp: 20 tablet, Rfl: 3    Current Facility-Administered Medications:     alum & mag hydroxide-simethicone (MAALOX) suspension 30 mL, 30 mL, Oral, Q4H PRN, Dayami Pablo APRN CNP    famotidine (PEPCID) injection 20 mg, 20 mg, Intravenous, Q12H, Sekou Hall MD, 20 mg at 07/05/23 1245    LORazepam (ATIVAN)  injection 0.5 mg, 0.5 mg, Intravenous, Once, Sekou Hall MD      Physical examination:    ECOG performance status:  0  Vascular access:  right chest wall port     General: patient appears well in no acute distress, alert and oriented, speech clear and fluid  Skin: no visualized rash or lesions on visualized skin  Resp: Appears to be breathing comfortably without accessory muscle usage, speaking in full sentences, no audible wheezes or cough.  Psych: Coherent speech, normal rate and volume, able to articulate logical thoughts, able to abstract reason, no tangential thoughts, no hallucinations or delusions.        Lab and imaging data:  I personally reviewed the CT CAP at [facility] on 5/7/2024 which showed multiple liver lesions.      Assessment and Plan:  Mr. Luca Araiza is a 43 year old male with prior stage IIIB cecal adenocarcinoma  (diagnosed 11/2022) s/p surgical resection and 6 months of adjuvant FOLFIRINOX (completed 9/2023) as part of a clinical trial who presented in spring 2024 with concern for liver-only recurrence of cancer  in setting of recent (1/2024) positive ct-DNA.     We have discussed lack of definitive role of mary-op chemo in this setting.    Met with Dr. Guerra at [facility], plan for liver directed therapy on 5/14/2024.    He will get back to us in a few weeks with how things went, plan for scans (at [facility]?), and ctDNA-- may need to do through [facility] since earlier it was on trial.      I spent a total of 50 minutes on the date of service including preparation time (e.g., review of records and interpretation of tests), visit time with the patient and care partners, requesting interventions, communicating with other health care professionals, and documentation.        Hernandez Romero M.D.   of Medicine  Division of Hematology, Oncology and Transplantation  [facility]

## 2024-05-13 ENCOUNTER — VIRTUAL VISIT (OUTPATIENT)
Dept: ONCOLOGY | Facility: CLINIC | Age: 44
End: 2024-05-13
Attending: STUDENT IN AN ORGANIZED HEALTH CARE EDUCATION/TRAINING PROGRAM
Payer: COMMERCIAL

## 2024-05-13 ENCOUNTER — PATIENT OUTREACH (OUTPATIENT)
Dept: ONCOLOGY | Facility: CLINIC | Age: 44
End: 2024-05-13

## 2024-05-13 VITALS — BODY MASS INDEX: 26.31 KG/M2 | WEIGHT: 205 LBS | HEIGHT: 74 IN

## 2024-05-13 DIAGNOSIS — C78.7 METASTASES TO THE LIVER (H): ICD-10-CM

## 2024-05-13 DIAGNOSIS — C18.0 CECAL CANCER (H): Primary | ICD-10-CM

## 2024-05-13 PROCEDURE — 99215 OFFICE O/P EST HI 40 MIN: CPT | Mod: 95 | Performed by: STUDENT IN AN ORGANIZED HEALTH CARE EDUCATION/TRAINING PROGRAM

## 2024-05-13 ASSESSMENT — PAIN SCALES - GENERAL: PAINLEVEL: NO PAIN (0)

## 2024-05-13 NOTE — LETTER
5/13/2024         RE: Luca Araiza  [address]  [place] [address]        Dear Colleague,    Thank you for referring your patient, Luca Araiza, to the [facility]. Please see a copy of my visit note below.    Virtual Visit Details    Type of service:  Video Visit     Originating Location (pt. Location): Home  Distant Location (provider location):  On-site  Platform used for Video Visit: [facility]  Medical Oncology Return Patient Visit Note    Patient name: Luca Araiza  YOB: 1980      Oncologic History:    Initial Diagnosis: Stage IIIB ([id number]) Colon Adenocarcinoma of ascending colon (diag 11/2022), non-obstructive, pMMR, HER2 low (2+ by IHC), grade 2, LVI present (small vessel), PNI present, 3/33 LN positive, initial CEA 2.6, surgical resection with R0 resection   Prior treatment(s):   1) Surgical resection with laparoscopic right logan-colectomy with removal of terminal ileum and appendix, partial ometectomy with R0 resection (Dr. 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, pMMR Colon Cancer (suspected) Stage Sanjuana [id number] with lesions noted in segments SANJUANA and VI of Liver, biopsy suspicious for atypical cells concerning for malignancy but not diagnostic due to paucicellular biopsy sample (4/4/24)  Molecular:  Colorectal Cancer NGS panel on colonic resection sample ([id number] and [id number] [id number]): KRAS G12D c.35G>A  VAF 26.6%, PIK3CA Q546K c.1636C>A VAF 10.6%; TMB-low  Current Treatment:   Treatment intent: Curative     Care Team  Medical oncologist: Diomedes Hall MD (Med Onc [facility]), Hernandez Romero MD ([facility])  Surgical  oncologist:  Nahum Contreras MD (Colorectal Surgery, [facility]); Nancy Guerra MD ([facility], scheduled to see May 7)     Other members of care team: Diana Wade DO (PCP)   Primary caregiver at home/ contact:  wife Alva Araiza at [phone]     Reason for consultation/ patient visit:  Management of Recurrent colorectal cancer    History of presenting illness:  Mr. Luca Araiza is a 43 year old male with previously resected Stage III Colon Cancer s/p resection and adjuvant treatment who now presents with suspected live recurrence of colon cancer. His oncologic history is summarized below:    08/2022: GI illness during [place] vacation with significant nausea, diarrhea. Subsequent physical with PCP, labs showing iron deficiency anemia.   11/03/222 : Colonoscopy showing cecal mass, biopsy proven moderately differentiated adenocarcinoma. Staging CT CAP (11/3) and MRI Abdomen (11/10/22) negative for metastatic disease. T2-hyperintense cysts noted in liver.   12/21/2022: Surgical resection with ascending colectomy, removal of appendix and terminal ileum, pT3N1b pathologic staging, LVI present (small vessel), PNI present, 3/33 LN positive, R0 resection, pMMR, HER2 2+ (by IHC);   03/14/23 -  Enrolled in  clinical trial, under supervision of med onc Dr. Diomedes Hall, ct-DNA test positive. Randomized to mFOLFIRINOX arm.   03/23/23: Germline genetic testing ([employer] Common Hereditary Cancers panel, 47 genes) negative for known, heritable mutations.   03/14/23 - 08/15/23: completed 12 cycles of adjuvant mFOLFRINOX, tolerated well except for grade 1 peripheral neuropathy.   01/15/24: 1-year surveillance colonoscopy demonstrating two polyps (tubular adenomas), no concern for luminal recurrence.   01/16/24: surveillance ct-DNA testing positive;   03/22/24 : CT CAP showing two new low-attenutation lesions in liver measuring  up to 12mm (in segment VI) and in segement Reji. No  other evidence of metastatic disease in chest, lungs, abdomen or pelvis.   03/25/24: MRI Liver showing two enhancing lesions in hepatic semgents Reji and V corresponding to lesions on prior CT.   04/04/24 : US Guided Liver Biopsy : per histopath report, limited evaluation due to paucicellular sample, with findings suspicious for malignancy but not diagnostic. Rare atypical cell clusters were noted in touch prep and on H&E-stained sections.   4/22/2024: transferred care to [facility].  5/7/2024: CT CAP at [facility] with liver lesions seen, no LAD. CEA 3.2. Met Dr. Nancy Guerra at [facility] on 5/8/2024. Considering open hepatic wedge resection vs ablation, intraop US and cyrus. Consider WIL/ mary-op chemo.    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  Still working full time. Has three teenage sons. Does not smoke and does not drink alcohol.  Wife is a .  Active, walks most days, plays pickleball, plays pick-up basketball with his teenage sons. 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    Family history:  Father had colon cancer in his mid 60s. Mother had breast cancer in her late 50s.  No siblings or kids with cancer.  Maternal grandfather had colon cancer in his 60s.  Paternal grandmother had cancer type unknown.    Allergies:   No Known Allergies    Outpatient medications:     Current Outpatient Medications:     sildenafil (VIAGRA) 50 MG tablet, Take 1 tablet (50 mg) by mouth daily as needed (take 1 hour before sexual activity), Disp: 20 tablet, Rfl: 3    Current Facility-Administered Medications:     alum & mag  hydroxide-simethicone (MAALOX) suspension 30 mL, 30 mL, Oral, Q4H PRN, Dayami Pablo APRN CNP    famotidine (PEPCID) injection 20 mg, 20 mg, Intravenous, Q12H, Sekou Hall MD, 20 mg at 07/05/23 1245    LORazepam (ATIVAN) injection 0.5 mg, 0.5 mg, Intravenous, Once, Sekou Hall MD      Physical examination:    ECOG performance status:  0  Vascular access:  right chest wall port     General: patient appears well in no acute distress, alert and oriented, speech clear and fluid  Skin: no visualized rash or lesions on visualized skin  Resp: Appears to be breathing comfortably without accessory muscle usage, speaking in full sentences, no audible wheezes or cough.  Psych: Coherent speech, normal rate and volume, able to articulate logical thoughts, able to abstract reason, no tangential thoughts, no hallucinations or delusions.        Lab and imaging data:  I personally reviewed the CT CAP at [facility] on 5/7/2024 which showed multiple liver lesions.      Assessment and Plan:  Mr. Luca Araiza is a 43 year old male with prior stage IIIB cecal adenocarcinoma  (diagnosed 11/2022) s/p surgical resection and 6 months of adjuvant FOLFIRINOX (completed 9/2023) as part of a clinical trial who presented in spring 2024 with concern for liver-only recurrence of cancer  in setting of recent (1/2024) positive ct-DNA.     We have discussed lack of definitive role of mary-op chemo in this setting.    Met with Dr. Guerra at [facility], plan for liver directed therapy on 5/14/2024.    He will get back to us in a few weeks with how things went, plan for scans (at [facility]?), and ctDNA-- may need to do through [facility] since earlier it was on trial.      I spent a total of 50 minutes on the date of service including preparation time (e.g., review of records and interpretation of tests), visit time with the patient and care partners, requesting interventions, communicating with other health care professionals, and  documentation.        Hernandez Romero M.D.   of Medicine  Division of Hematology, Oncology and Transplantation  [facility]

## 2024-05-13 NOTE — PROGRESS NOTES
[facility]: Cancer Care Short Note                                                                                          Chart audit to assign Oncology Care Coordination enrollment status.      Ana Wagner, RN, BSN  RN Care Coordinator   [facility]

## 2024-05-13 NOTE — NURSING NOTE
Is the patient currently in the state of [place]? YES    Visit mode:VIDEO    If the visit is dropped, the patient can be reconnected by: VIDEO VISIT: Text to cell phone:   Telephone Information:   Mobile [phone]       Will anyone else be joining the visit? NO  (If patient encounters technical issues they should call [phone])    How would you like to obtain your AVS? MyChart    Are changes needed to the allergy or medication list? No    Are refills needed on medications prescribed by this physician? NO    Reason for visit: MOUNIKA GARCIA

## 2024-06-07 ENCOUNTER — PATIENT OUTREACH (OUTPATIENT)
Dept: ONCOLOGY | Facility: CLINIC | Age: 44
End: 2024-06-07
Payer: COMMERCIAL

## 2024-06-07 ENCOUNTER — MYC MEDICAL ADVICE (OUTPATIENT)
Dept: ONCOLOGY | Facility: CLINIC | Age: 44
End: 2024-06-07
Payer: COMMERCIAL

## 2024-06-07 NOTE — PROGRESS NOTES
[facility]: Cancer Care Short Note                                                                                          Will schedule patient for 6/24 virtual follow up with Dr. Romero.  Unable to access his [facility] records through care everywhere.  Asked patient to approve access though the [facility].  In the meantime, we will request records.      Ana Wagner RN, BSN  RN Care Coordinator   [facility]

## 2024-06-23 NOTE — PROGRESS NOTES
Virtual Visit Details    Type of service:  Video Visit     Originating Location (pt. Location): Home  Distant Location (provider location):  On-site  Platform used for Video Visit: [facility]  Medical Oncology Return Patient Visit Note    Patient name: Luca Araiza  YOB: 1980      Oncologic History:    Initial Diagnosis: Stage IIIB ([id number]) Colon Adenocarcinoma of ascending colon (diag 11/2022), non-obstructive, pMMR, HER2 low (2+ by IHC), grade 2, LVI present (small vessel), PNI present, 3/33 LN positive, initial CEA 2.6, surgical resection with R0 resection   Prior treatment(s):   1) Surgical resection with laparoscopic right logan-colectomy with removal of terminal ileum and appendix, partial ometectomy with R0 resection (Dr. 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      Molecular:  NGS panel on colonic resection sample ([id number] and [id number] A5): KRAS G12D c.35G>A  VAF 26.6%, PIK3CA Q546K c.1636C>A VAF 10.6%; TMB-low    Molecular panel of 5/2024 liver specimen: APC E902 mutation, APX U4867ea, ARID1A, KRAS G12D, PIK3CA Q546K, TP53 mutations, TMB 3, SHILOH    Treatment intent: Curative     Care Team  Medical oncologist: Diomedes Hall MD (Med Onc [facility]), Hernandez Romero MD ([facility])  Surgical oncologist:  Nahum Contreras MD (Colorectal Surgery, [facility]); Nancy Guerra MD ([facility], scheduled to see May 7)     Other members of care team: Diana Wade DO (PCP)   Primary caregiver at home/ contact:  wife Alva Araiza at [phone]     Reason for consultation/ patient visit:  Management of Recurrent colorectal cancer    History of presenting illness:  Mr. Luca Araiza is a 43 year old male with previously resected Stage III Colon Cancer s/p resection and adjuvant treatment who now presents with suspected live recurrence of colon cancer. His oncologic history is summarized below:    08/2022: GI illness during [place] vacation with significant nausea, diarrhea. Subsequent physical with PCP, labs showing iron deficiency anemia.   11/03/222 : Colonoscopy showing cecal mass, biopsy proven moderately differentiated adenocarcinoma. Staging CT CAP (11/3) and MRI Abdomen (11/10/22) negative for metastatic disease. T2-hyperintense cysts noted in liver.   12/21/2022: Surgical resection with ascending colectomy, removal of appendix and terminal ileum, pT3N1b pathologic staging, LVI present (small vessel), PNI present, 3/33 LN positive, R0 resection, pMMR, HER2 2+ (by IHC);   03/14/23 -  Enrolled in  clinical trial, under supervision of med onc Dr. Diomedes Hall, ct-DNA test positive. Randomized to mFOLFIRINOX arm.   03/23/23: Germline genetic testing ([employer] Common Hereditary Cancers panel, 47 genes) negative for known, heritable mutations.   03/14/23 - 08/15/23: completed 12 cycles of adjuvant mFOLFRINOX, tolerated well except for grade 1 peripheral neuropathy.   01/15/24: 1-year surveillance colonoscopy demonstrating two polyps (tubular adenomas), no concern for luminal recurrence.   01/16/24: surveillance ct-DNA testing positive;   03/22/24 : CT CAP showing two new low-attenutation lesions in liver measuring  up  to 12mm (in segment VI) and in segement Reji. No other evidence of metastatic disease in chest, lungs, abdomen or pelvis.   03/25/24: MRI Liver showing two enhancing lesions in hepatic semgents Reji and V corresponding to lesions on prior CT.   04/04/24 : US Guided Liver Biopsy : per histopath report, limited evaluation due to paucicellular sample, with findings suspicious for malignancy but not diagnostic. Rare atypical cell clusters were noted in touch prep and on H&E-stained sections.   4/22/2024: transferred care to [facility] [place].  5/7/2024: CT CAP at [facility] with liver lesions seen, no LAD. CEA 3.2. Met Dr. Nancy Guerra at [facility] on 5/8/2024. On 5/14/2024, underwent, liver segmentectomy - segment 4B, nonanatomic liver wedge resection x2 - segment 2, segment 6, cholecystectomy, ultrasound-guided microwave ablation of lesion in liver segment 7.   Liver, segment II, resection:  Metastatic adenocarcinoma, forming a 2.4 x 2 x 1 cm nodule.  Resection margin negative for tumor.   Liver, segment IVB, resection: Metastatic  adenocarcinoma approaching the resection margin.   Liver, segment VI mass, resection:  Metastatic  adenocarcinoma, forming a 1.4 cm nodule.  The resection margin is negative for tumor, by 1 mm.   Liver, segment VI margin, excision:  Negative for tumor.   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  Still working full time. Has three teenage  sons. Does not smoke and does not drink alcohol.  Wife is a .  Active, walks most days, plays pickleball, plays pick-up basketball with his teenage sons. 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    Family history:  Father had colon cancer in his mid 60s. Mother had breast cancer in her late 50s.  No siblings or kids with cancer.  Maternal grandfather had colon cancer in his 60s.  Paternal grandmother had cancer type unknown.    Allergies:   No Known Allergies    Outpatient medications:     Current Outpatient Medications:     sildenafil (VIAGRA) 50 MG tablet, Take 1 tablet (50 mg) by mouth daily as needed (take 1 hour before sexual activity), Disp: 20 tablet, Rfl: 3    Current Facility-Administered Medications:     alum & mag hydroxide-simethicone (MAALOX) suspension 30 mL, 30 mL, Oral, Q4H PRN, Dayami Pablo APRN CNP    famotidine (PEPCID) injection 20 mg, 20 mg, Intravenous, Q12H, Sekou Hall MD, 20 mg at 07/05/23 1245    LORazepam (ATIVAN) injection 0.5 mg, 0.5 mg, Intravenous, Once, Sekou Hall MD      Physical examination:    ECOG performance status:  0  Vascular access:  right chest wall port     General: patient appears well in no acute distress, alert and oriented, speech clear and fluid  Skin: no visualized rash or lesions on visualized skin  Resp: Appears to be breathing comfortably without accessory muscle usage, speaking in full sentences, no audible wheezes or cough.  Psych: Coherent speech, normal rate and volume, able to articulate logical thoughts, able to abstract reason, no tangential thoughts, no hallucinations or delusions.        Lab and imaging data:  I personally reviewed the CT CAP at [place] on 6/6/2024 with DAISY    Assessment and Plan:  Mr. Luca Araiza is a 43 year old male with prior stage IIIB cecal adenocarcinoma  (diagnosed 11/2022) s/p surgical resection and 6 months of adjuvant FOLFIRINOX (completed  9/2023) as part of a clinical trial who presented in spring 2024 with concern for liver-only recurrence of cancer  in setting of recent (1/2024) positive ct-DNA.   S/p liver directed therapy in 5/2024 at [place].    Currently DAISY.    Given recent FOLFIRINOX (as part of trial), will not plan for more chemo currently.  There is no data that ''pseudoadjuvant'' chemo improves outcomes. Even if it did, he just had FOLFIRINOX.    Will do surveillance. Scans on Sep 11 at [place]. See us back Sep 23 by a video visit.  Will do tumor informed ctDNA. This will be off trial now. Will request altera + signatera.    Discussed with Dr. Vela re: role of WIL pump. Will not request visit for now, but could be an option in the future.      I spent a total of 50 minutes on the date of service including preparation time (e.g., review of records and interpretation of tests), visit time with the patient and care partners, requesting interventions, communicating with other health care professionals, and documentation.        Hernandez Romero M.D.   of Medicine  Division of Hematology, Oncology and Transplantation  [facility]

## 2024-06-24 ENCOUNTER — VIRTUAL VISIT (OUTPATIENT)
Dept: ONCOLOGY | Facility: CLINIC | Age: 44
End: 2024-06-24
Attending: STUDENT IN AN ORGANIZED HEALTH CARE EDUCATION/TRAINING PROGRAM
Payer: COMMERCIAL

## 2024-06-24 VITALS — HEIGHT: 74 IN | WEIGHT: 195 LBS | BODY MASS INDEX: 25.03 KG/M2

## 2024-06-24 DIAGNOSIS — C18.9 COLON CANCER METASTASIZED TO LIVER (H): Primary | ICD-10-CM

## 2024-06-24 DIAGNOSIS — C78.7 COLON CANCER METASTASIZED TO LIVER (H): Primary | ICD-10-CM

## 2024-06-24 PROCEDURE — 99215 OFFICE O/P EST HI 40 MIN: CPT | Mod: 95 | Performed by: STUDENT IN AN ORGANIZED HEALTH CARE EDUCATION/TRAINING PROGRAM

## 2024-06-24 ASSESSMENT — PAIN SCALES - GENERAL: PAINLEVEL: MILD PAIN (2)

## 2024-06-24 NOTE — NURSING NOTE
Patient confirms medications and allergies are accurate via patients echeck in completion, and or denies any changes since last reviewed/verified.     Is the patient currently in the state of [place]? YES    Visit mode:VIDEO    If the visit is dropped, the patient can be reconnected by: VIDEO VISIT: Text to cell phone:   Telephone Information:   Mobile [phone]       Will anyone else be joining the visit? NO  (If patient encounters technical issues they should call [phone])    How would you like to obtain your AVS? MyChart    Are changes needed to the allergy or medication list? No    Are refills needed on medications prescribed by this physician? NO    Reason for visit: RECHECK    Loretta GARCIA

## 2024-06-24 NOTE — LETTER
6/24/2024      Luca Araiza  [address]  [place] [address]      Dear Colleague,    Thank you for referring your patient, Luca Araiza, to the [facility]. Please see a copy of my visit note below.    Virtual Visit Details    Type of service:  Video Visit     Originating Location (pt. Location): Home  Distant Location (provider location):  On-site  Platform used for Video Visit: [facility]  Medical Oncology Return Patient Visit Note    Patient name: Luca Araiza  YOB: 1980      Oncologic History:    Initial Diagnosis: Stage IIIB ([id number]) Colon Adenocarcinoma of ascending colon (diag 11/2022), non-obstructive, pMMR, HER2 low (2+ by IHC), grade 2, LVI present (small vessel), PNI present, 3/33 LN positive, initial CEA 2.6, surgical resection with R0 resection   Prior treatment(s):   1) Surgical resection with laparoscopic right logan-colectomy with removal of terminal ileum and appendix, partial ometectomy with R0 resection (Dr. 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, pMMR Colon Cancer (suspected) Stage Sanjuana [id number] with lesions noted in segments SANJUANA and VI of Liver, biopsy suspicious for atypical cells concerning for malignancy but not diagnostic due to paucicellular biopsy sample (4/4/24)  Molecular:  Colorectal Cancer NGS panel on colonic resection sample ([id number] and [id number] A5): KRAS G12D c.35G>A  VAF 26.6%, PIK3CA Q546K c.1636C>A VAF 10.6%; TMB-low  Prior treatment(s):   1) 5/2024: liver segmentectomy - segment 4B, nonanatomic liver wedge resection x2 - segment 2, segment 6, cholecystectomy, ultrasound-guided microwave ablation of lesion in liver segment  7.    Treatment intent: Curative     Care Team  Medical oncologist: Diomedes Hall MD (Med Onc [facility]), Hernandez Romero MD ([facility])  Surgical oncologist:  Nahum Contreras MD (Colorectal Surgery, [facility]); Nancy Guerra MD ([facility] Hepatobiliary Surgery, scheduled to see May 7)     Other members of care team: Diana Wade DO (PCP)   Primary caregiver at home/ contact:  wife Alva Araiza at [phone]     Reason for consultation/ patient visit:  Management of Recurrent colorectal cancer    History of presenting illness:  Mr. Luca Araiza is a 43 year old male with previously resected Stage III Colon Cancer s/p resection and adjuvant treatment who now presents with suspected live recurrence of colon cancer. His oncologic history is summarized below:    08/2022: GI illness during [place] vacation with significant nausea, diarrhea. Subsequent physical with PCP, labs showing iron deficiency anemia.   11/03/222 : Colonoscopy showing cecal mass, biopsy proven moderately differentiated adenocarcinoma. Staging CT CAP (11/3) and MRI Abdomen (11/10/22) negative for metastatic disease. T2-hyperintense cysts noted in liver.   12/21/2022: Surgical resection with ascending colectomy, removal of appendix and terminal ileum, pT3N1b pathologic staging, LVI present (small vessel), PNI present, 3/33 LN positive, R0 resection, pMMR, HER2 2+ (by IHC);   03/14/23 -  Enrolled in  clinical trial, under supervision of med onc Dr. Diomedes Hall, ct-DNA test positive. Randomized to mFOLFIRINOX arm.   03/23/23: Germline genetic testing ([employer] Common Hereditary Cancers panel, 47 genes) negative for known, heritable mutations.   03/14/23 - 08/15/23: completed 12 cycles of adjuvant mFOLFRINOX, tolerated well except for grade 1 peripheral neuropathy.   01/15/24: 1-year surveillance colonoscopy demonstrating two polyps (tubular adenomas), no concern for luminal recurrence.   01/16/24: surveillance  ct-DNA testing positive;   03/22/24 : CT CAP showing two new low-attenutation lesions in liver measuring  up to 12mm (in segment VI) and in segement Reji. No other evidence of metastatic disease in chest, lungs, abdomen or pelvis.   03/25/24: MRI Liver showing two enhancing lesions in hepatic semgents Reji and V corresponding to lesions on prior CT.   04/04/24 : US Guided Liver Biopsy : per histopath report, limited evaluation due to paucicellular sample, with findings suspicious for malignancy but not diagnostic. Rare atypical cell clusters were noted in touch prep and on H&E-stained sections.   4/22/2024: transferred care to [facility].  5/7/2024: CT CAP at [facility] with liver lesions seen, no LAD. CEA 3.2. Met Dr. Nancy Guerra at [place] on 5/8/2024. On 5/14/2024, underwent, liver segmentectomy - segment 4B, nonanatomic liver wedge resection x2 - segment 2, segment 6, cholecystectomy, ultrasound-guided microwave ablation of lesion in liver segment 7.   Liver, segment II, resection:  Metastatic adenocarcinoma, forming a 2.4 x 2 x 1 cm nodule.  Resection margin negative for tumor.   Liver, segment IVB, resection: Metastatic  adenocarcinoma approaching the resection margin.   Liver, segment VI mass, resection:  Metastatic  adenocarcinoma, forming a 1.4 cm nodule.  The resection margin is negative for tumor, by 1 mm.   Liver, segment VI margin, excision:  Negative for tumor.   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  Still working full time. Has three teenage sons. Does not smoke and does not drink alcohol.  Wife is a .  Active, walks most days, plays pickleball, plays pick-up basketball with his teenage sons. 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    Family history:  Father had colon cancer in his mid 60s. Mother had breast cancer in her late 50s.  No siblings or kids with cancer.  Maternal grandfather had colon cancer in his 60s.  Paternal grandmother had cancer type unknown.    Allergies:   No Known Allergies    Outpatient medications:     Current Outpatient Medications:      sildenafil (VIAGRA) 50 MG tablet, Take 1 tablet (50 mg) by mouth daily as needed (take 1 hour before sexual activity), Disp: 20 tablet, Rfl: 3    Current Facility-Administered Medications:      alum & mag hydroxide-simethicone (MAALOX) suspension 30 mL, 30 mL, Oral, Q4H PRN, Dayami Pablo APRN CNP     famotidine (PEPCID) injection 20 mg, 20 mg, Intravenous, Q12H, Sekou Hall MD, 20 mg at 07/05/23 1245     LORazepam (ATIVAN) injection 0.5 mg, 0.5 mg, Intravenous, Once, Sekou Hall MD      Physical examination:    ECOG performance status:  0  Vascular access:  right chest wall port     General: patient appears well in no acute distress, alert and oriented, speech clear and fluid  Skin: no visualized rash or lesions on visualized skin  Resp: Appears to be breathing comfortably without accessory muscle usage, speaking in full sentences, no audible wheezes or cough.  Psych: Coherent speech, normal rate and volume, able to articulate logical thoughts, able to abstract reason, no tangential thoughts, no hallucinations or delusions.        Lab and imaging data:  I personally reviewed the CT CAP at [place] on 6/6/2024 with DAISY    Assessment and Plan:  Mr. Luca Araiza is a 43 year old male with prior stage IIIB cecal adenocarcinoma  (diagnosed  11/2022) s/p surgical resection and 6 months of adjuvant FOLFIRINOX (completed 9/2023) as part of a clinical trial who presented in spring 2024 with concern for liver-only recurrence of cancer  in setting of recent (1/2024) positive ct-DNA.   S/p liver directed therapy in 5/2024 at [place].    Currently DAISY.    Given recent FOLFIRINOX (as part of trial), will not plan for more chemo currently.  There is no data that ''pseudoadjuvant'' chemo improves outcomes. Even if it did, he just had FOLFIRINOX.    Will do surveillance. Scans on Sep 11 at [facility]. See us back Sep 23 by a video visit.  Will do tumor informed ctDNA. This will be off trial now. Will request altera + signatera.    Discussed with Dr. Vela re: role of WIL pump. Will not request visit for now, but could be an option in the future.      I spent a total of 50 minutes on the date of service including preparation time (e.g., review of records and interpretation of tests), visit time with the patient and care partners, requesting interventions, communicating with other health care professionals, and documentation.        Hernandez Romero M.D.   of Medicine  Division of Hematology, Oncology and Transplantation  [facility]       Again, thank you for allowing me to participate in the care of your patient.        Sincerely,        Hernandez Romero MD

## 2024-06-26 ENCOUNTER — VIRTUAL VISIT (OUTPATIENT)
Dept: FAMILY MEDICINE | Facility: CLINIC | Age: 44
End: 2024-06-26
Payer: COMMERCIAL

## 2024-06-26 ENCOUNTER — LAB (OUTPATIENT)
Dept: LAB | Facility: CLINIC | Age: 44
End: 2024-06-26
Payer: COMMERCIAL

## 2024-06-26 DIAGNOSIS — C18.9 COLON CANCER METASTASIZED TO LIVER (H): Primary | ICD-10-CM

## 2024-06-26 DIAGNOSIS — C18.9 COLON CANCER METASTASIZED TO LIVER (H): ICD-10-CM

## 2024-06-26 DIAGNOSIS — C78.7 COLON CANCER METASTASIZED TO LIVER (H): Primary | ICD-10-CM

## 2024-06-26 DIAGNOSIS — R74.8 ELEVATED ALKALINE PHOSPHATASE LEVEL: Primary | ICD-10-CM

## 2024-06-26 DIAGNOSIS — C78.7 COLON CANCER METASTASIZED TO LIVER (H): ICD-10-CM

## 2024-06-26 LAB
ALBUMIN SERPL BCG-MCNC: 4.1 G/DL (ref 3.5–5.2)
ALP SERPL-CCNC: 231 U/L (ref 40–150)
ALT SERPL W P-5'-P-CCNC: 26 U/L (ref 0–70)
ANION GAP SERPL CALCULATED.3IONS-SCNC: 9 MMOL/L (ref 7–15)
AST SERPL W P-5'-P-CCNC: 21 U/L (ref 0–45)
BILIRUB DIRECT SERPL-MCNC: <0.2 MG/DL (ref 0–0.3)
BILIRUB SERPL-MCNC: 0.3 MG/DL
BUN SERPL-MCNC: 17.5 MG/DL (ref 6–20)
CALCIUM SERPL-MCNC: 9.3 MG/DL (ref 8.6–10)
CHLORIDE SERPL-SCNC: 103 MMOL/L (ref 98–107)
CREAT SERPL-MCNC: 1.03 MG/DL (ref 0.67–1.17)
DEPRECATED HCO3 PLAS-SCNC: 28 MMOL/L (ref 22–29)
EGFRCR SERPLBLD CKD-EPI 2021: >90 ML/MIN/1.73M2
ERYTHROCYTE [DISTWIDTH] IN BLOOD BY AUTOMATED COUNT: 13 % (ref 10–15)
GLUCOSE SERPL-MCNC: 98 MG/DL (ref 70–99)
HCT VFR BLD AUTO: 39.3 % (ref 40–53)
HGB BLD-MCNC: 12.4 G/DL (ref 13.3–17.7)
MCH RBC QN AUTO: 26.9 PG (ref 26.5–33)
MCHC RBC AUTO-ENTMCNC: 31.6 G/DL (ref 31.5–36.5)
MCV RBC AUTO: 85 FL (ref 78–100)
PLATELET # BLD AUTO: 384 10E3/UL (ref 150–450)
POTASSIUM SERPL-SCNC: 4.5 MMOL/L (ref 3.4–5.3)
PROT SERPL-MCNC: 7.6 G/DL (ref 6.4–8.3)
RBC # BLD AUTO: 4.61 10E6/UL (ref 4.4–5.9)
SODIUM SERPL-SCNC: 140 MMOL/L (ref 135–145)
WBC # BLD AUTO: 11.1 10E3/UL (ref 4–11)

## 2024-06-26 PROCEDURE — 36415 COLL VENOUS BLD VENIPUNCTURE: CPT

## 2024-06-26 PROCEDURE — 82977 ASSAY OF GGT: CPT

## 2024-06-26 PROCEDURE — 82248 BILIRUBIN DIRECT: CPT

## 2024-06-26 PROCEDURE — 99441 PR PHYSICIAN TELEPHONE EVALUATION 5-10 MIN: CPT | Performed by: FAMILY MEDICINE

## 2024-06-26 PROCEDURE — 85027 COMPLETE CBC AUTOMATED: CPT

## 2024-06-26 PROCEDURE — 80053 COMPREHEN METABOLIC PANEL: CPT

## 2024-06-26 NOTE — PROGRESS NOTES
Luca is a 43 year old who is being evaluated via a billable telephone visit.    What phone number would you like to be contacted at? [phone]  How would you like to obtain your AVS? MyChart  Originating Location (pt. Location): Other at work    Distant Location (provider location):  On-site    Assessment & Plan     Colon cancer metastasized to liver (H)  History of cecal adenocarcinoma diagnosed 11/2022 s/p resection and adjuvant chemo.  Concern for liver recurrence 1/2024 s/p liver directed therapy.  Labs recommended by his oncologist, ordered today.  Plan to review and forward to his oncologist, Dr. Guerra at [place].    Subjective   Luca is a 43 year old, presenting for the following health issues:  LAB REQUEST    History of Present Illness       Reason for visit:  Labs    He eats 2-3 servings of fruits and vegetables daily.He consumes 0 sweetened beverage(s) daily.He exercises with enough effort to increase his heart rate 10 to 19 minutes per day.  He exercises with enough effort to increase his heart rate 3 or less days per week.   He is taking medications regularly.     Seen by Dr. Romero, heme-onc on 6/24/2024 via video visit.  Planning on surveillance scans, next scheduled in September via [place].  For now, no scheduled chemotherapy.  Will plan to reassess scans/labs and determine if further treatment is needed.  Was having some left upper quadrant pain and discussed this with his oncologist who recommended CBC, BMP, and hepatic panel.  He would like to have these performed and once resulted reviewed and sent to oncology.      Objective           Vitals:  No vitals were obtained today due to virtual visit.    Physical Exam   General: Alert and no distress //Respiratory: No audible wheeze, cough, or shortness of breath // Psychiatric:  Appropriate affect, tone, and pace of words      No results found for this or any previous visit (from the past 24 hour(s)).      Phone call duration: 5 minutes  Signed  Electronically by: Diana Wade, DO

## 2024-06-27 ENCOUNTER — PATIENT OUTREACH (OUTPATIENT)
Dept: ONCOLOGY | Facility: CLINIC | Age: 44
End: 2024-06-27
Payer: COMMERCIAL

## 2024-06-27 LAB — GGT SERPL-CCNC: 104 U/L (ref 8–61)

## 2024-06-27 NOTE — PROGRESS NOTES
[facility]: Cancer Care Short Note                                                                                             Altera testing requisition submitted on pathology specimen JR- as requested by Dr Romero.  Home blood draw for signatera requested every 3 months.        Ana Wagner RN, BSN  RN Care Coordinator   [facility]

## 2024-06-28 ENCOUNTER — MYC MEDICAL ADVICE (OUTPATIENT)
Dept: ONCOLOGY | Facility: CLINIC | Age: 44
End: 2024-06-28
Payer: COMMERCIAL

## 2024-07-01 ENCOUNTER — NURSE TRIAGE (OUTPATIENT)
Dept: ONCOLOGY | Facility: CLINIC | Age: 44
End: 2024-07-01

## 2024-07-01 ENCOUNTER — ANCILLARY PROCEDURE (OUTPATIENT)
Dept: CT IMAGING | Facility: CLINIC | Age: 44
End: 2024-07-01
Payer: COMMERCIAL

## 2024-07-01 DIAGNOSIS — C78.7 COLON CANCER METASTASIZED TO LIVER (H): ICD-10-CM

## 2024-07-01 DIAGNOSIS — C18.9 COLON CANCER METASTASIZED TO LIVER (H): ICD-10-CM

## 2024-07-01 DIAGNOSIS — K75.0 LIVER ABSCESS: Primary | ICD-10-CM

## 2024-07-01 PROCEDURE — 74177 CT ABD & PELVIS W/CONTRAST: CPT | Mod: TC | Performed by: RADIOLOGY

## 2024-07-01 RX ORDER — METRONIDAZOLE 500 MG/1
500 TABLET ORAL 3 TIMES DAILY
Qty: 30 TABLET | Refills: 0 | Status: SHIPPED | OUTPATIENT
Start: 2024-07-01 | End: 2024-09-23

## 2024-07-01 RX ORDER — IOPAMIDOL 755 MG/ML
119 INJECTION, SOLUTION INTRAVASCULAR ONCE
Status: COMPLETED | OUTPATIENT
Start: 2024-07-01 | End: 2024-07-01

## 2024-07-01 RX ORDER — CIPROFLOXACIN 500 MG/1
500 TABLET, FILM COATED ORAL 2 TIMES DAILY
Qty: 20 TABLET | Refills: 0 | Status: SHIPPED | OUTPATIENT
Start: 2024-07-01 | End: 2024-09-23

## 2024-07-01 RX ADMIN — IOPAMIDOL 119 ML: 755 INJECTION, SOLUTION INTRAVASCULAR at 15:05

## 2024-07-01 NOTE — PROGRESS NOTES
Paged regarding acute findings in the CT abdomen from 7/1 showing area of rim enhancement suspicious for abscess In hepatic segment VI. Discussed with patient. He also has developed a new sharp pain below right rib cage radiating to back in the last 1 week.No fevers or chills. Taken together these are likely due to a intraabdominal infection we will do a course of ciprofloxacin and flagyl for 10 days. He will call if symptoms worsen. Discussed red flag signs.     Americo Chandra MD    Hematology, Oncology, and Transplant

## 2024-07-01 NOTE — TELEPHONE ENCOUNTER
[facility] calling to report urgent finding on today's CT scan result:     IMPRESSION:   1.  A fluid collection at a surgical defect in hepatic VI of the liver  now contains air bubbles and rim enhancement, and there is mild  adjacent fat stranding. Findings are suspicious for abscess.       1602 [employer] message sent to  for acknowledgement     1626 Call to pt per  request to check if pt experiencing fevers, chills or abd pain. Pt stating he has lower back pain that radiates towards R lower ribcage. Denies F/C    1628 Notifed  who states he will contact pt to discuss.     1633  informing writer pt will do cipro and flagyl for 10 days.

## 2024-07-11 ENCOUNTER — MYC MEDICAL ADVICE (OUTPATIENT)
Dept: ONCOLOGY | Facility: CLINIC | Age: 44
End: 2024-07-11
Payer: COMMERCIAL

## 2024-07-11 DIAGNOSIS — C78.7 COLON CANCER METASTASIZED TO LIVER (H): Primary | ICD-10-CM

## 2024-07-11 DIAGNOSIS — C18.9 COLON CANCER METASTASIZED TO LIVER (H): Primary | ICD-10-CM

## 2024-07-15 ENCOUNTER — ANCILLARY PROCEDURE (OUTPATIENT)
Dept: CT IMAGING | Facility: CLINIC | Age: 44
End: 2024-07-15
Payer: COMMERCIAL

## 2024-07-15 DIAGNOSIS — C78.7 COLON CANCER METASTASIZED TO LIVER (H): ICD-10-CM

## 2024-07-15 DIAGNOSIS — C18.9 COLON CANCER METASTASIZED TO LIVER (H): ICD-10-CM

## 2024-07-15 PROCEDURE — 74177 CT ABD & PELVIS W/CONTRAST: CPT | Mod: TC | Performed by: RADIOLOGY

## 2024-07-15 RX ORDER — IOPAMIDOL 755 MG/ML
119 INJECTION, SOLUTION INTRAVASCULAR ONCE
Status: COMPLETED | OUTPATIENT
Start: 2024-07-15 | End: 2024-07-15

## 2024-07-15 RX ADMIN — IOPAMIDOL 119 ML: 755 INJECTION, SOLUTION INTRAVASCULAR at 08:01

## 2024-09-11 DIAGNOSIS — C18.2 MALIGNANT NEOPLASM OF ASCENDING COLON (H): ICD-10-CM

## 2024-09-11 DIAGNOSIS — Z09 FOLLOW-UP EXAMINATION AFTER COLORECTAL SURGERY: ICD-10-CM

## 2024-09-11 DIAGNOSIS — C18.9 COLON CANCER (H): Primary | ICD-10-CM

## 2024-09-11 DIAGNOSIS — Z80.0 FAMILY HISTORY OF COLON CANCER IN FATHER: ICD-10-CM

## 2024-09-23 ENCOUNTER — VIRTUAL VISIT (OUTPATIENT)
Dept: ONCOLOGY | Facility: CLINIC | Age: 44
End: 2024-09-23
Attending: STUDENT IN AN ORGANIZED HEALTH CARE EDUCATION/TRAINING PROGRAM
Payer: COMMERCIAL

## 2024-09-23 VITALS — WEIGHT: 200 LBS | BODY MASS INDEX: 25.67 KG/M2 | HEIGHT: 74 IN

## 2024-09-23 DIAGNOSIS — C18.0 CECAL CANCER (H): Primary | ICD-10-CM

## 2024-09-23 PROCEDURE — 99214 OFFICE O/P EST MOD 30 MIN: CPT | Mod: 95 | Performed by: STUDENT IN AN ORGANIZED HEALTH CARE EDUCATION/TRAINING PROGRAM

## 2024-09-23 ASSESSMENT — PAIN SCALES - GENERAL: PAINLEVEL: NO PAIN (0)

## 2024-09-23 NOTE — NURSING NOTE
Current patient location: [address] [place] [address] N  [facility] [address]    Is the patient currently in the state of [place]? YES    Visit mode:VIDEO    If the visit is dropped, the patient can be reconnected by: VIDEO VISIT: Text to cell phone:   Telephone Information:   Mobile [phone]       Will anyone else be joining the visit? NO  (If patient encounters technical issues they should call [phone])    How would you like to obtain your AVS? MyChart    Are changes needed to the allergy or medication list? Pt stated no changes to allergies and Pt stated no med changes    Are refills needed on medications prescribed by this physician? NO    Rooming Documentation:  Questionnaire(s) completed    Reason for visit: MOUNIKA Murrieta LPN

## 2024-09-23 NOTE — LETTER
9/23/2024      Luca Araiza  [address]  [place] [address]      Dear Colleague,    Thank you for referring your patient, Luca Araiza, to the [facility] CANCER [facility]. Please see a copy of my visit note below.    Virtual Visit Details    Type of service:  Video Visit     Originating Location (pt. Location): Home  Distant Location (provider location):  On-site  Platform used for Video Visit: [facility]  Medical Oncology Return Patient Visit Note    Patient name: Luca Araiza  YOB: 1980      Oncologic History:    Initial Diagnosis: Stage IIIB ([id number]) Colon Adenocarcinoma of ascending colon (diag 11/2022), non-obstructive, pMMR, HER2 low (2+ by IHC), grade 2, LVI present (small vessel), PNI present, 3/33 LN positive, initial CEA 2.6, surgical resection with R0 resection   Prior treatment(s):   1) Surgical resection with laparoscopic right logan-colectomy with removal of terminal ileum and appendix, partial ometectomy with R0 resection (Dr. 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      Molecular:  NGS panel on colonic resection sample ([id number] A5 and [id number] A5): KRAS G12D c.35G>A  VAF 26.6%, PIK3CA Q546K c.1636C>A VAF 10.6%;  TMB-low    Molecular panel of 5/2024 liver specimen: APC E902 mutation, APX [id number], ARID1A, KRAS G12D, PIK3CA Q546K, TP53 mutations, TMB 3, SHILOH    Treatment intent: Curative     Care Team  Medical oncologist: Diomedes Hall MD (Med Onc [facility]), Hernandez Romero MD ([facility])  Surgical oncologist:  Nahum Contreras MD (Colorectal Surgery, [facility]); Nancy Guerra MD ([facility] Hepatobiliary Surgery, scheduled to see May 7)     Other members of care team: Diana Wade DO (PCP)   Primary caregiver at home/ contact:  wife Alva Araiza at [phone]     Reason for consultation/ patient visit:  Management of Recurrent colorectal cancer    History of presenting illness:  Mr. Luca Araiza is a 43 year old male with previously resected Stage III Colon Cancer s/p resection and adjuvant treatment who now presents with suspected live recurrence of colon cancer. His oncologic history is summarized below:    08/2022: GI illness during [place] vacation with significant nausea, diarrhea. Subsequent physical with PCP, labs showing iron deficiency anemia.   11/03/222 : Colonoscopy showing cecal mass, biopsy proven moderately differentiated adenocarcinoma. Staging CT CAP (11/3) and MRI Abdomen (11/10/22) negative for metastatic disease. T2-hyperintense cysts noted in liver.   12/21/2022: Surgical resection with ascending colectomy, removal of appendix and terminal ileum, pT3N1b pathologic staging, LVI present (small vessel), PNI present, 3/33 LN positive, R0 resection, pMMR, HER2 2+ (by IHC);   03/14/23 -  Enrolled in  clinical trial, under supervision of med onc Dr. Diomedes Hall, ct-DNA test positive. Randomized to mFOLFIRINOX arm.   03/23/23: Germline genetic testing ([employer] Common Hereditary Cancers panel, 47 genes) negative for known, heritable mutations.   03/14/23 - 08/15/23: completed 12 cycles of adjuvant mFOLFRINOX, tolerated well except for grade 1 peripheral neuropathy.    01/15/24: 1-year surveillance colonoscopy demonstrating two polyps (tubular adenomas), no concern for luminal recurrence.   01/16/24: surveillance ct-DNA testing positive;   03/22/24 : CT CAP showing two new low-attenutation lesions in liver measuring  up to 12mm (in segment VI) and in segement Reji. No other evidence of metastatic disease in chest, lungs, abdomen or pelvis.   03/25/24: MRI Liver showing two enhancing lesions in hepatic semgents Reji and V corresponding to lesions on prior CT.   04/04/24 : US Guided Liver Biopsy : per histopath report, limited evaluation due to paucicellular sample, with findings suspicious for malignancy but not diagnostic. Rare atypical cell clusters were noted in touch prep and on H&E-stained sections.   4/22/2024: transferred care to [facility].  5/7/2024: CT CAP at [facility] with liver lesions seen, no LAD. CEA 3.2. Met Dr. Nancy Guerra at [place] on 5/8/2024. On 5/14/2024, underwent, liver segmentectomy - segment 4B, nonanatomic liver wedge resection x2 - segment 2, segment 6, cholecystectomy, ultrasound-guided microwave ablation of lesion in liver segment 7.   Liver, segment II, resection:  Metastatic adenocarcinoma, forming a 2.4 x 2 x 1 cm nodule.  Resection margin negative for tumor.   Liver, segment IVB, resection: Metastatic  adenocarcinoma approaching the resection margin.   Liver, segment VI mass, resection:  Metastatic  adenocarcinoma, forming a 1.4 cm nodule.  The resection margin is negative for tumor, by 1 mm.   Liver, segment VI margin, excision:  Negative for tumor.   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  Still working full time. Has three teenage sons. Does not smoke and does not drink alcohol.  Wife is a .  Active, walks most days, plays pickleball, plays pick-up basketball with his teenage sons. 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    Family history:  Father had colon cancer in his mid 60s. Mother had breast cancer in her late 50s.  No siblings or kids with cancer.  Maternal grandfather had colon cancer in his 60s.  Paternal grandmother had cancer type unknown.    Allergies:   No Known Allergies    Outpatient medications:     Current Outpatient Medications:      ciprofloxacin (CIPRO) 500 MG tablet, Take 1 tablet (500 mg) by mouth 2 times daily, Disp: 20 tablet, Rfl: 0     metroNIDAZOLE (FLAGYL) 500 MG tablet, Take 1 tablet (500 mg) by mouth 3 times daily, Disp: 30 tablet, Rfl: 0     sildenafil (VIAGRA) 50 MG tablet, Take 1 tablet (50 mg) by mouth daily as needed (take 1 hour before sexual activity), Disp: 20 tablet, Rfl: 3    Current Facility-Administered Medications:      alum & mag hydroxide-simethicone (MAALOX) suspension 30 mL, 30 mL, Oral, Q4H PRN, Dayami Pablo APRN CNP     famotidine (PEPCID) injection 20 mg, 20 mg, Intravenous, Q12H, Sekou Hall MD, 20 mg at 07/05/23 1245     LORazepam (ATIVAN) injection 0.5 mg, 0.5 mg, Intravenous, Once, Sekou Hall MD      Physical examination:    ECOG performance status:  0  Vascular access:  right chest wall port     General: patient appears well in no acute distress, alert and oriented, speech clear and fluid  Skin: no visualized rash or lesions on visualized skin  Resp: Appears to be breathing comfortably without accessory muscle usage, speaking in full  sentences, no audible wheezes or cough.  Psych: Coherent speech, normal rate and volume, able to articulate logical thoughts, able to abstract reason, no tangential thoughts, no hallucinations or delusions.        Lab and imaging data:  I personally reviewed the MR abdomen at [place] in [address] with DAISY    Assessment and Plan:  Mr. Luca Araiza is a 43 year old male with prior stage IIIB cecal adenocarcinoma  (diagnosed 11/2022) s/p surgical resection and 6 months of adjuvant FOLFIRINOX (completed 9/2023) as part of a clinical trial who presented in spring 2024 with concern for liver-only recurrence of cancer  in setting of recent (1/2024) positive ct-DNA.     S/p liver directed therapy in 5/2024 at [place].    ctDNA neg in 7/2024.  Scans in 9/2024 with DAISY.      Port flush in 6 weeks. Prefers [facility].  Continue surveillance, likely scans at [place] in Dec 2024.  See us Jan 6 virtually.  He may hold off on ctDNA to align with Dec scans.      Discussed with Dr. Vela re: role of WIL pump. Will not request visit for now, but could be an option in the future.      I spent a total of 35 minutes on the date of service including preparation time (e.g., review of records and interpretation of tests), visit time with the patient and care partners, requesting interventions, communicating with other health care professionals, and documentation.        Hernandez Romero M.D.   of Medicine  Division of Hematology, Oncology and Transplantation  [facility]       Again, thank you for allowing me to participate in the care of your patient.        Sincerely,        Hernandez Romero MD

## 2024-09-23 NOTE — PROGRESS NOTES
Virtual Visit Details    Type of service:  Video Visit     Originating Location (pt. Location): Home  Distant Location (provider location):  On-site  Platform used for Video Visit: [facility]  Medical Oncology Return Patient Visit Note    Patient name: Luca Araiza  YOB: 1980      Oncologic History:    Initial Diagnosis: Stage IIIB ([id number]) Colon Adenocarcinoma of ascending colon (diag 11/2022), non-obstructive, pMMR, HER2 low (2+ by IHC), grade 2, LVI present (small vessel), PNI present, 3/33 LN positive, initial CEA 2.6, surgical resection with R0 resection   Prior treatment(s):   1) Surgical resection with laparoscopic right logan-colectomy with removal of terminal ileum and appendix, partial ometectomy with R0 resection (Dr. 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      Molecular:  NGS panel on colonic resection sample ([id number] A5 and [id number] A5): KRAS G12D c.35G>A  VAF 26.6%, PIK3CA Q546K c.1636C>A VAF 10.6%; TMB-low    Molecular panel of 5/2024 liver specimen: APC E902 mutation, APX I0465td, ARID1A, KRAS G12D, PIK3CA Q546K, TP53 mutations, TMB 3, SHILOH    Treatment intent: Curative     Care Team  Medical oncologist: Diomedes Hall MD (Med Onc  [facility]), Hernandez Romero MD ([facility])  Surgical oncologist:  Nahum Contreras MD (Colorectal Surgery, [facility]); Nancy Guerra MD ([facility], scheduled to see May 7)     Other members of care team: Diana Wade DO (PCP)   Primary caregiver at home/ contact:  wife Alva Araiza at [phone]     Reason for consultation/ patient visit:  Management of Recurrent colorectal cancer    History of presenting illness:  Mr. Luca Araiza is a 43 year old male with previously resected Stage III Colon Cancer s/p resection and adjuvant treatment who now presents with suspected live recurrence of colon cancer. His oncologic history is summarized below:    08/2022: GI illness during [place] vacation with significant nausea, diarrhea. Subsequent physical with PCP, labs showing iron deficiency anemia.   11/03/222 : Colonoscopy showing cecal mass, biopsy proven moderately differentiated adenocarcinoma. Staging CT CAP (11/3) and MRI Abdomen (11/10/22) negative for metastatic disease. T2-hyperintense cysts noted in liver.   12/21/2022: Surgical resection with ascending colectomy, removal of appendix and terminal ileum, pT3N1b pathologic staging, LVI present (small vessel), PNI present, 3/33 LN positive, R0 resection, pMMR, HER2 2+ (by IHC);   03/14/23 -  Enrolled in  clinical trial, under supervision of med onc Dr. Diomedes Hall, ct-DNA test positive. Randomized to mFOLFIRINOX arm.   03/23/23: Germline genetic testing ([employer] Common Hereditary Cancers panel, 47 genes) negative for known, heritable mutations.   03/14/23 - 08/15/23: completed 12 cycles of adjuvant mFOLFRINOX, tolerated well except for grade 1 peripheral neuropathy.   01/15/24: 1-year surveillance colonoscopy demonstrating two polyps (tubular adenomas), no concern for luminal recurrence.   01/16/24: surveillance ct-DNA testing positive;   03/22/24 : CT CAP showing two new low-attenutation lesions in liver  measuring  up to 12mm (in segment VI) and in segement Reji. No other evidence of metastatic disease in chest, lungs, abdomen or pelvis.   03/25/24: MRI Liver showing two enhancing lesions in hepatic semgents Reji and V corresponding to lesions on prior CT.   04/04/24 : US Guided Liver Biopsy : per histopath report, limited evaluation due to paucicellular sample, with findings suspicious for malignancy but not diagnostic. Rare atypical cell clusters were noted in touch prep and on H&E-stained sections.   4/22/2024: transferred care to [facility].  5/7/2024: CT CAP at [facility] with liver lesions seen, no LAD. CEA 3.2. Met Dr. Nancy Guerra at [facility] on 5/8/2024. On 5/14/2024, underwent, liver segmentectomy - segment 4B, nonanatomic liver wedge resection x2 - segment 2, segment 6, cholecystectomy, ultrasound-guided microwave ablation of lesion in liver segment 7.   Liver, segment II, resection:  Metastatic adenocarcinoma, forming a 2.4 x 2 x 1 cm nodule.  Resection margin negative for tumor.   Liver, segment IVB, resection: Metastatic  adenocarcinoma approaching the resection margin.   Liver, segment VI mass, resection:  Metastatic  adenocarcinoma, forming a 1.4 cm nodule.  The resection margin is negative for tumor, by 1 mm.   Liver, segment VI margin, excision:  Negative for tumor.   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  Still working full time. Has  three teenage sons. Does not smoke and does not drink alcohol.  Wife is a .  Active, walks most days, plays pickleball, plays pick-up basketball with his teenage sons. 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    Family history:  Father had colon cancer in his mid 60s. Mother had breast cancer in her late 50s.  No siblings or kids with cancer.  Maternal grandfather had colon cancer in his 60s.  Paternal grandmother had cancer type unknown.    Allergies:   No Known Allergies    Outpatient medications:     Current Outpatient Medications:     ciprofloxacin (CIPRO) 500 MG tablet, Take 1 tablet (500 mg) by mouth 2 times daily, Disp: 20 tablet, Rfl: 0    metroNIDAZOLE (FLAGYL) 500 MG tablet, Take 1 tablet (500 mg) by mouth 3 times daily, Disp: 30 tablet, Rfl: 0    sildenafil (VIAGRA) 50 MG tablet, Take 1 tablet (50 mg) by mouth daily as needed (take 1 hour before sexual activity), Disp: 20 tablet, Rfl: 3    Current Facility-Administered Medications:     alum & mag hydroxide-simethicone (MAALOX) suspension 30 mL, 30 mL, Oral, Q4H PRN, Dayami Pablo, APRN CNP    famotidine (PEPCID) injection 20 mg, 20 mg, Intravenous, Q12H, Sekou Hall MD, 20 mg at 07/05/23 1245    LORazepam (ATIVAN) injection 0.5 mg, 0.5 mg, Intravenous, Once, Sekou Hall MD      Physical examination:    ECOG performance status:  0  Vascular access:  right chest wall port     General: patient appears well in no acute distress, alert and oriented, speech clear and fluid  Skin: no visualized rash or lesions on visualized skin  Resp: Appears to be breathing comfortably without accessory muscle usage, speaking in full sentences, no audible wheezes or cough.  Psych: Coherent speech, normal rate and volume, able to articulate logical thoughts, able to abstract reason, no tangential thoughts, no hallucinations or delusions.        Lab and imaging data:  I personally reviewed  the MR abdomen at [facility] in [address]24 with DAISY    Assessment and Plan:  Mr. Luca Araiza is a 43 year old male with prior stage IIIB cecal adenocarcinoma  (diagnosed 11/2022) s/p surgical resection and 6 months of adjuvant FOLFIRINOX (completed 9/2023) as part of a clinical trial who presented in spring 2024 with concern for liver-only recurrence of cancer  in setting of recent (1/2024) positive ct-DNA.     S/p liver directed therapy in 5/2024 at [facility].    ctDNA neg in 7/2024.  Scans in 9/2024 with DAISY.      Port flush in 6 weeks. Prefers [facility].  Continue surveillance, likely scans at [facility] in Dec 2024.  See us Jan 6 virtually.  He may hold off on ctDNA to align with Dec scans.      Discussed with Dr. Vela re: role of WIL pump. Will not request visit for now, but could be an option in the future.      I spent a total of 35 minutes on the date of service including preparation time (e.g., review of records and interpretation of tests), visit time with the patient and care partners, requesting interventions, communicating with other health care professionals, and documentation.        Hernandez Romero M.D.   of Medicine  Division of Hematology, Oncology and Transplantation  [facility]

## 2024-10-24 ENCOUNTER — TELEPHONE (OUTPATIENT)
Dept: GASTROENTEROLOGY | Facility: CLINIC | Age: 44
End: 2024-10-24

## 2024-10-24 NOTE — TELEPHONE ENCOUNTER
"Diane Jan 2025 schedule not available yet, in-basket sent for reminder to schedule.    Endoscopy Scheduling Screen    Have you had any respiratory illness or flu-like symptoms in the last 10 days?  No    What is your communication preference for Instructions and/or Bowel Prep?   MyChart    What insurance is in the chart?  Other:  BCBS  PATIENT WILL CALL TO UPDATE    Ordering/Referring Provider: Nahum Contreras MD in [facility]     (If ordering provider performs procedure, schedule with ordering provider unless otherwise instructed. )    BMI: Estimated body mass index is 25.68 kg/m  as calculated from the following:    Height as of 9/23/24: 1.88 m (6' 2\").    Weight as of 9/23/24: 90.7 kg (200 lb).     Sedation Ordered  moderate sedation.   If patient BMI > 50 do not schedule in ASC.    If patient BMI > 45 do not schedule at [facility].    Are you taking methadone or Suboxone?  NO, No RN review required.    Have you been diagnosed and are being treated for severe PTSD or severe anxiety?  NO, No RN review required.    Are you taking any prescription medications for pain 3 or more times per week?   NO, No RN review required.    Do you have a history of malignant hyperthermia?  No    (Females) Are you currently pregnant?   No     Have you been diagnosed or told you have pulmonary hypertension?   No    Do you have an LVAD?  No    Have you been told you have moderate to severe sleep apnea?  No.    Have you been told you have COPD, asthma, or any other lung disease?  No    Do you have any heart conditions?  No     Have you ever had or are you waiting for an organ transplant?  No. Continue scheduling, no site restrictions.    Have you had a stroke or transient ischemic attack (TIA aka \"mini stroke\" in the last 6 months?   No    Have you been diagnosed with or been told you have cirrhosis of the liver?   No.    Are you currently on dialysis?   No    Do you need assistance transferring?   No    BMI: Estimated " "body mass index is 25.68 kg/m  as calculated from the following:    Height as of 9/23/24: 1.88 m (6' 2\").    Weight as of 9/23/24: 90.7 kg (200 lb).     Is patients BMI > 40 and scheduling location [facility]?  No    Do you take an injectable or oral medication for weight loss or diabetes (excluding insulin)?  No    Do you take the medication Naltrexone?  No    Do you take blood thinners?  No       Prep   Are you currently on dialysis or do you have chronic kidney disease?  No    Do you have a diagnosis of diabetes?  No    Do you have a diagnosis of cystic fibrosis (CF)?  No    On a regular basis do you go 3 -5 days between bowel movements?  No    BMI > 40?  No    Preferred Pharmacy:      [facility] - [address] [place] [address], [phone]      Final Scheduling Details     Procedure scheduled  Colonoscopy    Surgeon:  TACOS     Date of procedure:  JANUARY     Pre-OP / PAC:   TBD    Location  [facility] - Per order.    Sedation   Moderate Sedation - Per order.      Patient Reminders:   You will receive a call from a Nurse to review instructions and health history.  This assessment must be completed prior to your procedure.  Failure to complete the Nurse assessment may result in the procedure being cancelled.      On the day of your procedure, please designate an adult(s) who can drive you home stay with you for the next 24 hours. The medicines used in the exam will make you sleepy. You will not be able to drive.      You cannot take public transportation, ride share services, or non-medical taxi service without a responsible caregiver.  Medical transport services are allowed with the requirement that a responsible caregiver will receive you at your destination.  We require that drivers and caregivers are confirmed prior to your procedure.    "

## 2024-10-28 ENCOUNTER — LAB (OUTPATIENT)
Dept: INFUSION THERAPY | Facility: HOSPITAL | Age: 44
End: 2024-10-28

## 2024-10-28 DIAGNOSIS — D50.0 IRON DEFICIENCY ANEMIA DUE TO CHRONIC BLOOD LOSS: ICD-10-CM

## 2024-10-28 DIAGNOSIS — C18.2 MALIGNANT NEOPLASM OF ASCENDING COLON (H): ICD-10-CM

## 2024-10-28 DIAGNOSIS — Z80.0 FAMILY HISTORY OF COLON CANCER IN FATHER: Primary | ICD-10-CM

## 2024-10-28 PROCEDURE — 96523 IRRIG DRUG DELIVERY DEVICE: CPT

## 2024-10-28 PROCEDURE — 250N000011 HC RX IP 250 OP 636: Performed by: NURSE PRACTITIONER

## 2024-10-28 RX ORDER — HEPARIN SODIUM (PORCINE) LOCK FLUSH IV SOLN 100 UNIT/ML 100 UNIT/ML
5 SOLUTION INTRAVENOUS
Status: DISCONTINUED | OUTPATIENT
Start: 2024-10-28 | End: 2024-10-28 | Stop reason: HOSPADM

## 2024-10-28 RX ADMIN — Medication 5 ML: at 08:30

## 2024-11-07 NOTE — PROGRESS NOTES
Colon and Rectal Surgery Clinic Note    RE: Luca Araiza.  : 1980.  LIANG: 11/10/2022.    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 Had had cramping since he got sick in [place] over the summer, continued with intermittent cramping (occurs x1-3 weekly), which prompted colonoscopy.    Baseline bowel habits: remains with normal BMs, no straining, no nausea, distention or vomiting  Last colonoscopy: 11/3/22      Pathology:           MR Abdomen 11/10/22: IMPRESSION:  No evidence for metastatic disease in the visualized abdomen. Multiple T2 hyperintense cysts throughout the liver.    CT CAP 11/3/22:       CEA 2.8      Medical history:  Past Medical History:   Diagnosis Date     Malignant neoplasm of ascending colon (H) 2022       Surgical history:  Past Surgical History:   Procedure Laterality Date     REMOVAL OF SPERM DUCT(S)      Description: Surgery Of Male Genitalia Vasectomy;  Recorded: 2011;  Comments: 2011     [facility] REPAIR CRUCIATE LIGAMENT,KNEE      Description: Primary Repair Of Knee Ligament Cruciate Anterior;  Recorded: 2009;       Family history:  Family History   Problem Relation Age of Onset     Breast Cancer Mother      Colon Cancer Father    Father with colon cancer at 60    Medications:  Current Outpatient Medications   Medication Sig Dispense Refill     ferrous sulfate (FEROSUL) 325 (65 Fe) MG tablet Take 1 tablet (325 mg) by mouth every other day         Allergies:  No Known Allergies    Social history:   Social History     Tobacco Use     Smoking status: Never     Smokeless tobacco: Never   Substance Use Topics     Alcohol use: Not Currently     Marital status: .    ROS:  A complete review of systems was performed with the patient and all systems negative except as per HPI.    Physical Examination:  /75 (BP Location: Left arm,  Large Joint Aspiration/Injection: L knee    Date/Time: 11/7/2024 1:30 PM    Performed by: Fabiana Callejas FNP  Authorized by: Fabiana Callejas FNP    Consent Done?:  Yes (Verbal)  Indications:  Pain  Site marked: the procedure site was marked    Timeout: prior to procedure the correct patient, procedure, and site was verified    Prep: patient was prepped and draped in usual sterile fashion      Local anesthesia used?: Yes    Local anesthetic:  Topical anesthetic  Ultrasonic Guidance for needle placement?: No    Approach:  Anterolateral (superolateral)  Location:  Knee  Site:  L knee  Medications:  3 mL LIDOcaine HCL 20 mg/ml (2%) 20 mg/mL (2 %); 60 mg hyaluronate sodium, stabilized (DUROLANE) 60 mg/3 mL  Patient tolerance:  Patient tolerated the procedure well with no immediate complications     "Patient Position: Sitting, Cuff Size: Adult Regular)   Pulse 77   Ht 1.88 m (6' 2\")   Wt 96 kg (211 lb 9.6 oz)   SpO2 100%   BMI 27.17 kg/m    General: Well hydrated. No acute distress.  Abdomen: Soft, NT, nondistended. No inguinal adenopathy palpated.    Laboratory values reviewed:  Recent Labs   Lab Test 10/21/22  0724 10/06/22  1232   WBC  --  8.4   HGB 8.4* 9.0*   PLT  --  423   CR  --  1.19*   ALBUMIN  --  4.5   BILITOTAL  --  0.3   ALKPHOS  --  62   ALT  --  9*   AST  --  19       ASSESSMENT  1. 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 Anemia, hgb 8.4    PLAN  1. Med oncology referral  2. Genetics referral given father with colon cancer history, as well as himself at the age of 42.  3. Begin iron infusions for anemia  4. Repeat full set of labs in 3 weeks and prior to surgery    Laparoscopic Right hemicolectomy  1. Preoperative labs: CBC, CMP, PTT/INR, Prealbumin.  2. Mechanical bowel prep with oral antibiotics.  3. Ostomy marking with WOCN.  4. Hold these medications prior to surgery: none  5. Advised patient to begin protein shakes: Premier or Pure protein given high protein, low carb ratio for pre-operative rehab.    Risks, benefits, and alternatives of operative treatment were thoroughly discussed with the patient, he understands these well and agrees to proceed.    Time spent: 45 minutes, >50% spent in discussing, counseling and coordinating care.    Nahum Contreras M.D    Division of Colon and Rectal Surgery  [facility]    Referring Provider:  Diana Wade DO  [address] [place] [address]     Primary Care Provider:  Diana Wade  "

## 2024-11-12 ENCOUNTER — E-VISIT (OUTPATIENT)
Dept: FAMILY MEDICINE | Facility: CLINIC | Age: 44
End: 2024-11-12
Payer: COMMERCIAL

## 2024-11-12 DIAGNOSIS — Z20.89 PNEUMONIA EXPOSURE: Primary | ICD-10-CM

## 2024-11-13 RX ORDER — AZITHROMYCIN 250 MG/1
TABLET, FILM COATED ORAL
Qty: 6 TABLET | Refills: 0 | Status: SHIPPED | OUTPATIENT
Start: 2024-11-13 | End: 2024-11-18

## 2024-11-13 NOTE — PATIENT INSTRUCTIONS
Dale Segovia,    Sorry to hear your family has not been feeling well.  Given your cancer history I think is reasonable to pursue the Z-Donavan.  Will send this to your pharmacy.  If you are experiencing shortness of breath, wheezing, or dehydration concerns, please be seen more urgently in person.    Diana Wade, DO

## 2024-11-25 ENCOUNTER — MYC MEDICAL ADVICE (OUTPATIENT)
Dept: ONCOLOGY | Facility: CLINIC | Age: 44
End: 2024-11-25
Payer: COMMERCIAL

## 2024-12-02 NOTE — PROGRESS NOTES
End Stage Liver Disease precipitated coagulopathy  Heparin d/c d/t thrombocytopenia   Met with patient and provider for end of treatment visit.  Labs and CT scan reviewed and stable.  Patient is recovering well from chemo- still feels fatigued and has neuropathy in fingers and toes.  He will return to clinic in 3 months with a CEA at that visit.  Next scan will be in 6 months.      Will follow up with patient regarding CtDNA results.    JEANNINE [employer]

## 2024-12-11 ENCOUNTER — NURSE TRIAGE (OUTPATIENT)
Dept: ONCOLOGY | Facility: CLINIC | Age: 44
End: 2024-12-11
Payer: COMMERCIAL

## 2024-12-11 NOTE — TELEPHONE ENCOUNTER
UNA Peterson w/surgery team  PH: [phone], cell phone.  Calling from [facility] in [place].  Yaneth would like to speak with Dr. Romero about some findings on imaging before calling the patient.    Paged Dr. Romero at [phone].  Message routed to Care Team

## 2024-12-17 ENCOUNTER — TELEPHONE (OUTPATIENT)
Dept: GASTROENTEROLOGY | Facility: CLINIC | Age: 44
End: 2024-12-17
Payer: COMMERCIAL

## 2024-12-17 NOTE — TELEPHONE ENCOUNTER
Caller: Luca Araiza   Reason for Reschedule/Cancellation (please be detailed, any staff messages or encounters to note?):     Per Papa BLUNT       Prior to reschedule please review:  Ordering Provider:    Nahum Contreras MD     Sedation Determined: mod   Does patient have any ASC Exclusions, please identify?: n      Notes on Cancelled Procedure:  Procedure:Lower Endoscopy [Colonoscopy]   Date: 01/27/2025   Location:[facility]; [address] [place] [address], [address], [place] [address]  Surgeon: papa         Rescheduled: no

## 2025-01-06 ENCOUNTER — HOSPITAL ENCOUNTER (OUTPATIENT)
Facility: CLINIC | Age: 45
Discharge: HOME | End: 2025-01-06
Attending: SURGERY | Admitting: SURGERY
Payer: COMMERCIAL

## 2025-01-06 VITALS
RESPIRATION RATE: 16 BRPM | OXYGEN SATURATION: 99 % | HEART RATE: 94 BPM | DIASTOLIC BLOOD PRESSURE: 93 MMHG | SYSTOLIC BLOOD PRESSURE: 121 MMHG

## 2025-01-06 LAB — COLONOSCOPY: NORMAL

## 2025-01-06 PROCEDURE — 45378 DIAGNOSTIC COLONOSCOPY: CPT | Performed by: SURGERY

## 2025-01-06 PROCEDURE — 250N000011 HC RX IP 250 OP 636: Performed by: SURGERY

## 2025-01-06 PROCEDURE — G0105 COLORECTAL SCRN; HI RISK IND: HCPCS | Performed by: SURGERY

## 2025-01-06 PROCEDURE — G0500 MOD SEDAT ENDO SERVICE >5YRS: HCPCS | Performed by: SURGERY

## 2025-01-06 RX ORDER — FLUMAZENIL 0.1 MG/ML
0.2 INJECTION, SOLUTION INTRAVENOUS
Status: DISCONTINUED | OUTPATIENT
Start: 2025-01-06 | End: 2025-01-06 | Stop reason: HOSPADM

## 2025-01-06 RX ORDER — LIDOCAINE 40 MG/G
CREAM TOPICAL
Status: DISCONTINUED | OUTPATIENT
Start: 2025-01-06 | End: 2025-01-06 | Stop reason: HOSPADM

## 2025-01-06 RX ORDER — PROCHLORPERAZINE MALEATE 5 MG/1
10 TABLET ORAL EVERY 6 HOURS PRN
Status: DISCONTINUED | OUTPATIENT
Start: 2025-01-06 | End: 2025-01-06 | Stop reason: HOSPADM

## 2025-01-06 RX ORDER — NALOXONE HYDROCHLORIDE 0.4 MG/ML
0.4 INJECTION, SOLUTION INTRAMUSCULAR; INTRAVENOUS; SUBCUTANEOUS
Status: DISCONTINUED | OUTPATIENT
Start: 2025-01-06 | End: 2025-01-06 | Stop reason: HOSPADM

## 2025-01-06 RX ORDER — NALOXONE HYDROCHLORIDE 0.4 MG/ML
0.2 INJECTION, SOLUTION INTRAMUSCULAR; INTRAVENOUS; SUBCUTANEOUS
Status: DISCONTINUED | OUTPATIENT
Start: 2025-01-06 | End: 2025-01-06 | Stop reason: HOSPADM

## 2025-01-06 RX ORDER — HEPARIN SODIUM (PORCINE) LOCK FLUSH IV SOLN 100 UNIT/ML 100 UNIT/ML
5-10 SOLUTION INTRAVENOUS
Status: DISCONTINUED | OUTPATIENT
Start: 2025-01-06 | End: 2025-01-06 | Stop reason: HOSPADM

## 2025-01-06 RX ORDER — FENTANYL CITRATE 50 UG/ML
INJECTION, SOLUTION INTRAMUSCULAR; INTRAVENOUS PRN
Status: DISCONTINUED | OUTPATIENT
Start: 2025-01-06 | End: 2025-01-06 | Stop reason: HOSPADM

## 2025-01-06 RX ORDER — ONDANSETRON 2 MG/ML
4 INJECTION INTRAMUSCULAR; INTRAVENOUS
Status: DISCONTINUED | OUTPATIENT
Start: 2025-01-06 | End: 2025-01-06

## 2025-01-06 RX ORDER — ONDANSETRON 4 MG/1
4 TABLET, ORALLY DISINTEGRATING ORAL EVERY 6 HOURS PRN
Status: DISCONTINUED | OUTPATIENT
Start: 2025-01-06 | End: 2025-01-06 | Stop reason: HOSPADM

## 2025-01-06 RX ORDER — ONDANSETRON 2 MG/ML
4 INJECTION INTRAMUSCULAR; INTRAVENOUS EVERY 6 HOURS PRN
Status: DISCONTINUED | OUTPATIENT
Start: 2025-01-06 | End: 2025-01-06 | Stop reason: HOSPADM

## 2025-01-06 RX ADMIN — Medication 5 ML: at 14:48

## 2025-01-06 ASSESSMENT — ACTIVITIES OF DAILY LIVING (ADL)
ADLS_ACUITY_SCORE: 52
ADLS_ACUITY_SCORE: 52

## 2025-01-06 NOTE — H&P
Luca Araiza  [id number]  male  44 year old      Reason for procedure/surgery: surveillance colonoscopy, colon cancer    Patient Active Problem List   Diagnosis    Family history of colon cancer in father    Malignant neoplasm of ascending colon (H)    Iron deficiency anemia due to chronic blood loss    Colon cancer metastasized to liver (H)    Basal cell carcinoma    Lesion of liver       Past Surgical History:    Past Surgical History:   Procedure Laterality Date    COLONOSCOPY      COLONOSCOPY N/A 1/15/2024    Procedure: COLONOSCOPY, WITH POLYPECTOMY AND BIOPSY;  Surgeon: Nahum Contreras MD;  Location: [facility]    HEMICOLECTOMY, RT/LT Right     IR CHEST PORT PLACEMENT > 5 YRS OF AGE  3/9/2023    REMOVAL OF SPERM DUCT(S)      Description: Surgery Of Male Genitalia Vasectomy;  Recorded: 12/27/2011;  Comments: 12/27/2011    [facility] REPAIR CRUCIATE LIGAMENT,KNEE      Description: Primary Repair Of Knee Ligament Cruciate Anterior;  Recorded: 07/20/2009;       Past Medical History:   Past Medical History:   Diagnosis Date    Anemia     Basal cell carcinoma     Malignant neoplasm of ascending colon (H) 11/07/2022       Social History:   Social History     Tobacco Use    Smoking status: Never     Passive exposure: Never    Smokeless tobacco: Never   Substance Use Topics    Alcohol use: Not Currently       Family History:   Family History   Problem Relation Age of Onset    Breast Cancer Mother     Skin Cancer Mother     Colon Cancer Father     Skin Cancer Father     Anesthesia Reaction No family hx of     Venous thrombosis No family hx of        Allergies: No Known Allergies    Active Medications:   No current outpatient medications on file.       Systemic Review:   CONSTITUTIONAL: NEGATIVE for fever, chills, change in weight  ENT/MOUTH: NEGATIVE for ear, mouth and throat problems  RESP: NEGATIVE for significant cough or SOB  CV: NEGATIVE for chest pain, palpitations or peripheral edema    Physical Examination:   Vital Signs:  BP (!) 131/100   SpO2 100%   GENERAL: healthy, alert and no distress  NECK: no adenopathy, no asymmetry, masses, or scars  RESP: lungs clear to auscultation - no rales, rhonchi or wheezes  CV: regular rate and rhythm, normal S1 S2, no S3 or S4, no murmur, click or rub, no peripheral edema and peripheral pulses strong  ABDOMEN: soft, nontender, no hepatosplenomegaly, no masses and bowel sounds normal  MS: no gross musculoskeletal defects noted, no edema    Plan: Appropriate to proceed as scheduled.      Nahum Contreras MD, MD  1/6/2025    PCP:  Clinic - [facility]

## 2025-02-28 SDOH — HEALTH STABILITY: PHYSICAL HEALTH: ON AVERAGE, HOW MANY DAYS PER WEEK DO YOU ENGAGE IN MODERATE TO STRENUOUS EXERCISE (LIKE A BRISK WALK)?: 1 DAY

## 2025-02-28 SDOH — HEALTH STABILITY: PHYSICAL HEALTH: ON AVERAGE, HOW MANY MINUTES DO YOU ENGAGE IN EXERCISE AT THIS LEVEL?: 10 MIN

## 2025-02-28 ASSESSMENT — SOCIAL DETERMINANTS OF HEALTH (SDOH): HOW OFTEN DO YOU GET TOGETHER WITH FRIENDS OR RELATIVES?: THREE TIMES A WEEK

## 2025-03-03 ENCOUNTER — OFFICE VISIT (OUTPATIENT)
Dept: FAMILY MEDICINE | Facility: CLINIC | Age: 45
End: 2025-03-03
Attending: FAMILY MEDICINE
Payer: COMMERCIAL

## 2025-03-03 VITALS
RESPIRATION RATE: 20 BRPM | DIASTOLIC BLOOD PRESSURE: 88 MMHG | WEIGHT: 219.4 LBS | OXYGEN SATURATION: 99 % | HEIGHT: 74 IN | SYSTOLIC BLOOD PRESSURE: 129 MMHG | HEART RATE: 89 BPM | BODY MASS INDEX: 28.16 KG/M2 | TEMPERATURE: 97.7 F

## 2025-03-03 DIAGNOSIS — Z13.220 LIPID SCREENING: ICD-10-CM

## 2025-03-03 DIAGNOSIS — Z00.00 ROUTINE GENERAL MEDICAL EXAMINATION AT A HEALTH CARE FACILITY: Primary | ICD-10-CM

## 2025-03-03 PROBLEM — Z95.828 PRESENCE OF OTHER VASCULAR IMPLANTS AND GRAFTS: Status: ACTIVE | Noted: 2024-05-10

## 2025-03-03 PROBLEM — K76.9 LESION OF LIVER: Status: RESOLVED | Noted: 2024-02-29 | Resolved: 2025-03-03

## 2025-03-03 PROBLEM — C18.9 COLON CANCER METASTASIZED TO LUNG (H): Status: ACTIVE | Noted: 2024-12-16

## 2025-03-03 PROBLEM — C78.00 COLON CANCER METASTASIZED TO LUNG (H): Status: ACTIVE | Noted: 2024-12-16

## 2025-03-03 PROBLEM — C78.7 MALIGNANT NEOPLASM METASTATIC TO LIVER (H): Status: ACTIVE | Noted: 2022-12-21

## 2025-03-03 PROBLEM — D50.0 ANEMIA DUE TO CHRONIC BLOOD LOSS: Status: ACTIVE | Noted: 2022-11-07

## 2025-03-03 LAB
CHOLEST SERPL-MCNC: 199 MG/DL
FASTING STATUS PATIENT QL REPORTED: YES
HDLC SERPL-MCNC: 48 MG/DL
LDLC SERPL CALC-MCNC: 116 MG/DL
NONHDLC SERPL-MCNC: 151 MG/DL
TRIGL SERPL-MCNC: 177 MG/DL

## 2025-03-03 PROCEDURE — 80061 LIPID PANEL: CPT | Performed by: FAMILY MEDICINE

## 2025-03-03 PROCEDURE — 36415 COLL VENOUS BLD VENIPUNCTURE: CPT | Performed by: FAMILY MEDICINE

## 2025-03-03 PROCEDURE — 99396 PREV VISIT EST AGE 40-64: CPT | Performed by: FAMILY MEDICINE

## 2025-03-03 PROCEDURE — 3079F DIAST BP 80-89 MM HG: CPT | Performed by: FAMILY MEDICINE

## 2025-03-03 PROCEDURE — 3074F SYST BP LT 130 MM HG: CPT | Performed by: FAMILY MEDICINE

## 2025-03-03 NOTE — PROGRESS NOTES
"Preventive Care Visit  [facility]  Diana Wade DO, Family Medicine  Mar 3, 2025      Assessment & Plan     Routine general medical examination at a health care facility  Reviewed outside monitoring labs through [facility].  Patient to discussed pneumococcal, shingles, and COVID vaccine with his oncology team.  BMI  Estimated body mass index is 28.19 kg/m  as calculated from the following:    Height as of this encounter: 1.879 m (6' 1.98\").    Weight as of this encounter: 99.5 kg (219 lb 6.4 oz).   Weight management plan: Discussed healthy diet and exercise guidelines    Counseling  Appropriate preventive services were addressed with this patient via screening, questionnaire, or discussion as appropriate for fall prevention, nutrition, physical activity, social engagement, weight loss and cognition.  Checklist reviewing preventive services available has been given to the patient.  Reviewed patient's diet, addressing concerns and/or questions.   He is at risk for lack of exercise and has been provided with information to increase physical activity for the benefit of his well-being.     Lipid screening  - Lipid Profile (Chol, Trig, HDL, LDL calc); Future    Subjective   Luca is a 44 year old, presenting for the following:  Annual Visit (Not fasting - labs)        3/3/2025     8:45 AM   Additional Questions   Roomed by Nataliya FERRELL CMA   Accompanied by self          HPI     Advance Care Planning  Patient does not have a Health Care Directive: Discussed advance care planning with patient; however, patient declined at this time.      2/28/2025   General Health   How would you rate your overall physical health? Good   Feel stress (tense, anxious, or unable to sleep) Not at all         2/28/2025   Nutrition   Three or more servings of calcium each day? Yes   Diet: Regular (no restrictions)   How many servings of fruit and vegetables per day? (!) 2-3   How many sweetened beverages each day? 0-1 "         2/28/2025   Exercise   Days per week of moderate/strenous exercise 1 day   Average minutes spent exercising at this level 10 min   (!) EXERCISE CONCERN      2/28/2025   Social Factors   Frequency of gathering with friends or relatives Three times a week   Worry food won't last until get money to buy more No   Food not last or not have enough money for food? No   Do you have housing? (Housing is defined as stable permanent housing and does not include staying ouside in a car, in a tent, in an abandoned building, in an overnight shelter, or couch-surfing.) No   Are you worried about losing your housing? No   Lack of transportation? No   Unable to get utilities (heat,electricity)? No   Want help with housing or utility concern? No   (!) HOUSING CONCERN PRESENT      2/28/2025   Dental   Dentist two times every year? Yes         2/28/2024   TB Screening   Were you born outside of the [place]? No         Today's PHQ-2 Score:       3/2/2025     9:54 AM   PHQ-2 ( 1999 [employer])   Q1: Little interest or pleasure in doing things 0   Q2: Feeling down, depressed or hopeless 0   PHQ-2 Score 0    Q1: Little interest or pleasure in doing things Not at all   Q2: Feeling down, depressed or hopeless Not at all   PHQ-2 Score 0       Patient-reported           2/28/2025   Substance Use   Alcohol more than 3/day or more than 7/wk No   Do you use any other substances recreationally? No     Social History     Tobacco Use    Smoking status: Never     Passive exposure: Never    Smokeless tobacco: Never   Vaping Use    Vaping status: Never Used   Substance Use Topics    Alcohol use: Not Currently    Drug use: Never           2/28/2025   STI Screening   New sexual partner(s) since last STI/HIV test? No   ASCVD Risk   The 10-year ASCVD risk score (Dianna DUBON, et al., 2019) is: 1.2%    Values used to calculate the score:      Age: 44 years      Sex: Male      Is Non- : No      Diabetic: No      Tobacco smoker:  "No      Systolic Blood Pressure: 129 mmHg      Is BP treated: No      HDL Cholesterol: 50 mg/dL      Total Cholesterol: 169 mg/dL       Reviewed and updated as needed this visit by Provider   Tobacco  Allergies  Meds  Problems  Med Hx  Surg Hx  Fam Hx               Objective    Exam  /88   Pulse 89   Temp 97.7  F (36.5  C) (Oral)   Resp 20   Ht 1.879 m (6' 1.98\")   Wt 99.5 kg (219 lb 6.4 oz)   SpO2 99%   BMI 28.19 kg/m     Estimated body mass index is 28.19 kg/m  as calculated from the following:    Height as of this encounter: 1.879 m (6' 1.98\").    Weight as of this encounter: 99.5 kg (219 lb 6.4 oz).    Physical Exam  GENERAL: alert and no distress  EYES: Eyes grossly normal to inspection, PERRL and conjunctivae and sclerae normal  HENT: ear canals and TM's normal, nose and mouth without ulcers or lesions  NECK: no adenopathy, no asymmetry, masses, or scars  RESP: lungs clear to auscultation - no rales, rhonchi or wheezes  CV: regular rate and rhythm, normal S1 S2, no S3 or S4, no murmur, click or rub, no peripheral edema  ABDOMEN: soft, nontender, no hepatosplenomegaly, no masses and bowel sounds normal  MS: no gross musculoskeletal defects noted, no edema  SKIN: no suspicious lesions or rashes  NEURO: Normal strength and tone, mentation intact and speech normal  PSYCH: mentation appears normal, affect normal/bright        Signed Electronically by: Diana Wade DO    "

## 2025-03-03 NOTE — PATIENT INSTRUCTIONS
Patient Education   Preventive Care Advice   This is general advice given by our system to help you stay healthy. However, your care team may have specific advice just for you. Please talk to your care team about your preventive care needs.  Nutrition  Eat 5 or more servings of fruits and vegetables each day.  Try wheat bread, brown rice and whole grain pasta (instead of white bread, rice, and pasta).  Get enough calcium and vitamin D. Check the label on foods and aim for 100% of the RDA (recommended daily allowance).  Lifestyle  Exercise at least 150 minutes each week  (30 minutes a day, 5 days a week).  Do muscle strengthening activities 2 days a week. These help control your weight and prevent disease.  No smoking.  Wear sunscreen to prevent skin cancer.  Have a dental exam and cleaning every 6 months.  Yearly exams  See your health care team every year to talk about:  Any changes in your health.  Any medicines your care team has prescribed.  Preventive care, family planning, and ways to prevent chronic diseases.  Shots (vaccines)   HPV shots (up to age 26), if you've never had them before.  Hepatitis B shots (up to age 59), if you've never had them before.  COVID-19 shot: Get this shot when it's due.  Flu shot: Get a flu shot every year.  Tetanus shot: Get a tetanus shot every 10 years.  Pneumococcal, hepatitis A, and RSV shots: Ask your care team if you need these based on your risk.  Shingles shot (for age 50 and up)  General health tests  Diabetes screening:  Starting at age 35, Get screened for diabetes at least every 3 years.  If you are younger than age 35, ask your care team if you should be screened for diabetes.  Cholesterol test: At age 39, start having a cholesterol test every 5 years, or more often if advised.  Bone density scan (DEXA): At age 50, ask your care team if you should have this scan for osteoporosis (brittle bones).  Hepatitis C: Get tested at least once in your life.  STIs (sexually  transmitted infections)  Before age 24: Ask your care team if you should be screened for STIs.  After age 24: Get screened for STIs if you're at risk. You are at risk for STIs (including HIV) if:  You are sexually active with more than one person.  You don't use condoms every time.  You or a partner was diagnosed with a sexually transmitted infection.  If you are at risk for HIV, ask about PrEP medicine to prevent HIV.  Get tested for HIV at least once in your life, whether you are at risk for HIV or not.  Cancer screening tests  Cervical cancer screening: If you have a cervix, begin getting regular cervical cancer screening tests starting at age 21.  Breast cancer scan (mammogram): If you've ever had breasts, begin having regular mammograms starting at age 40. This is a scan to check for breast cancer.  Colon cancer screening: It is important to start screening for colon cancer at age 45.  Have a colonoscopy test every 10 years (or more often if you're at risk) Or, ask your provider about stool tests like a FIT test every year or Cologuard test every 3 years.  To learn more about your testing options, visit:   .  For help making a decision, visit:   https://bit.ly/xs95508.  Prostate cancer screening test: If you have a prostate, ask your care team if a prostate cancer screening test (PSA) at age 55 is right for you.  Lung cancer screening: If you are a current or former smoker ages 50 to 80, ask your care team if ongoing lung cancer screenings are right for you.  For informational purposes only. Not to replace the advice of your health care provider. Copyright   2023 [employer]. All rights reserved. Clinically reviewed by the [facility] Transitions Program. Freebase 189170 - REV 01/24.

## 2025-04-22 ENCOUNTER — VIRTUAL VISIT (OUTPATIENT)
Dept: ONCOLOGY | Facility: CLINIC | Age: 45
End: 2025-04-22
Attending: STUDENT IN AN ORGANIZED HEALTH CARE EDUCATION/TRAINING PROGRAM
Payer: COMMERCIAL

## 2025-04-22 DIAGNOSIS — C18.0 CECAL CANCER (H): Primary | ICD-10-CM

## 2025-04-22 PROCEDURE — 1126F AMNT PAIN NOTED NONE PRSNT: CPT | Performed by: STUDENT IN AN ORGANIZED HEALTH CARE EDUCATION/TRAINING PROGRAM

## 2025-04-22 PROCEDURE — 98006 SYNCH AUDIO-VIDEO EST MOD 30: CPT | Performed by: STUDENT IN AN ORGANIZED HEALTH CARE EDUCATION/TRAINING PROGRAM

## 2025-04-22 NOTE — LETTER
4/22/2025      Luca Araiza  [address]  [place] [address]      Dear Colleague,    Thank you for referring your patient, Luca Araiza, to the [facility]. Please see a copy of my visit note below.    Virtual Visit Details    Type of service:  Video Visit     Originating Location (pt. Location): Home  Distant Location (provider location):  On-site  Platform used for Video Visit: [facility]  Medical Oncology Return Patient Visit Note    Patient name: Luca Araiza  YOB: 1980      Oncologic History:    Initial Diagnosis: Stage IIIB ([id number]) Colon Adenocarcinoma of ascending colon (diag 11/2022), non-obstructive, pMMR, HER2 low (2+ by IHC), grade 2, LVI present (small vessel), PNI present, 3/33 LN positive, initial CEA 2.6, surgical resection with R0 resection   Prior treatment(s):   1) Surgical resection with laparoscopic right logan-colectomy with removal of terminal ileum and appendix, partial ometectomy with R0 resection (Dr. 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  2) 12/2024: new lung nodules-- started clinical trial at [place] in 1/2025    Onvansertib in Combination With FOLFIRI and Bevacizumab Versus FOLFIRI and Bevacizumab for Second Line  Treatment of Metastatic Colorectal Cancer in Participants With a Anika Rat Sarcoma Virus Gene (KRAS) or Neuroblastoma-DAILY (NRAS) ([id number])  Treatment Protocol:  [facility] CRDF-004 ( Onvansertib / FOLFIRI ( Fluorouracil / Leucovorin / Irinotecan ) / Bevacizumab )    Molecular:  NGS panel on colonic resection sample ([id number] A5 and [id number] A5): KRAS G12D c.35G>A  VAF 26.6%, PIK3CA Q546K c.1636C>A VAF 10.6%; TMB-low    Molecular panel of 5/2024 liver specimen: APC E902 mutation, APX C0978wo, ARID1A, KRAS G12D, PIK3CA Q546K, TP53 mutations, TMB 3, SHILOH        Care Team  Medical oncologist: Diomedes Hall MD (Med Onc [facility]), Hernandez Romero MD ([facility])  Surgical oncologist:  Nahum Contreras MD (Colorectal Surgery, [facility]); Nancy Guerra MD ([facility] Hepatobiliary Surgery, scheduled to see May 7)     Other members of care team: Diana Wade DO (PCP)   Primary caregiver at home/ contact:  wife Alva Araiza at [phone]     Reason for consultation/ patient visit:  Management of Recurrent colorectal cancer    History of presenting illness:  Mr. Luca Araiza is a 44 year old male with previously resected Stage III Colon Cancer s/p resection and adjuvant treatment who now presents with suspected live recurrence of colon cancer. His oncologic history is summarized below:    08/2022: GI illness during [place] vacation with significant nausea, diarrhea. Subsequent physical with PCP, labs showing iron deficiency anemia.   11/03/222 : Colonoscopy showing cecal mass, biopsy proven moderately differentiated adenocarcinoma. Staging CT CAP (11/3) and MRI Abdomen (11/10/22) negative for metastatic disease. T2-hyperintense cysts noted in liver.   12/21/2022: Surgical resection with ascending colectomy, removal of appendix and terminal ileum, pT3N1b pathologic staging, LVI present (small vessel), PNI present, 3/33 LN positive, R0 resection, pMMR, HER2 2+ (by IHC);   03/14/23 -  Enrolled in [facility]  clinical trial, under supervision of med onc Dr. Diomedes Hall, ct-DNA test positive. Randomized to mFOLFIRINOX arm.   03/23/23: Germline genetic testing ([employer] Common Hereditary Cancers panel, 47 genes) negative for known, heritable mutations.   03/14/23 - 08/15/23: completed 12 cycles of adjuvant mFOLFRINOX, tolerated well except for grade 1 peripheral neuropathy.   01/15/24: 1-year surveillance colonoscopy demonstrating two polyps (tubular adenomas), no concern for luminal recurrence.   01/16/24: surveillance ct-DNA testing positive;   03/22/24 : CT CAP showing two new low-attenutation lesions in liver measuring  up to 12mm (in segment VI) and in segement Reji. No other evidence of metastatic disease in chest, lungs, abdomen or pelvis.   03/25/24: MRI Liver showing two enhancing lesions in hepatic semgents Reji and V corresponding to lesions on prior CT.   04/04/24 : US Guided Liver Biopsy : per histopath report, limited evaluation due to paucicellular sample, with findings suspicious for malignancy but not diagnostic. Rare atypical cell clusters were noted in touch prep and on H&E-stained sections.   4/22/2024: transferred care to [facility].  5/7/2024: CT CAP at [facility] with liver lesions seen, no LAD. CEA 3.2. Met Dr. Nancy Guerra at [facility] on 5/8/2024. On 5/14/2024, underwent, liver segmentectomy - segment 4B, nonanatomic liver wedge resection x2 - segment 2, segment 6, cholecystectomy, ultrasound-guided microwave ablation of lesion in liver segment 7.   Liver, segment II, resection:  Metastatic adenocarcinoma, forming a 2.4 x 2 x 1 cm nodule.  Resection margin negative for tumor.   Liver, segment IVB, resection: Metastatic  adenocarcinoma approaching the resection margin.   Liver, segment VI mass, resection:  Metastatic  adenocarcinoma, forming a 1.4 cm nodule.  The resection margin is negative for tumor, by 1 mm.   Liver, segment VI margin, excision:  Negative for tumor.   6/6/2024: CT CAP with DAISY,  c/b infection treated with PO abx and drain  7/2024- tumor informed ctDNA neg  9/2024: scans at [facility] with DAISY  12/2024 scans at [facility] with pulm nodules  1/2025- started trial at [facility]: Onvansertib in Combination With FOLFIRI and Bevacizumab Versus FOLFIRI and Bevacizumab for Second Line Treatment of Metastatic Colorectal Cancer in Participants With a Anika Rat Sarcoma Virus Gene (KRAS) or Neuroblastoma-DAILY (NRAS) ([id number])  Treatment Protocol:  [facility] CRDF-004 ( Onvansertib / FOLFIRI ( Fluorouracil / Leucovorin / Irinotecan ) / Bevacizumab )        Interval history:  Video visit  Fatigue from chemo        Physical examination:    ECOG performance status:  0-1  Vascular access:  right chest wall port     General: patient appears well in no acute distress, alert and oriented, speech clear and fluid  Skin: no visualized rash or lesions on visualized skin  Resp: Appears to be breathing comfortably without accessory muscle usage, speaking in full sentences, no audible wheezes or cough.  Psych: Coherent speech, normal rate and volume, able to articulate logical thoughts, able to abstract reason, no tangential thoughts, no hallucinations or delusions.        Lab and imaging data:  I personally reviewed the CT CAP from 3/2025 at [facility] with responding lung lesion      Assessment and Plan:  Mr. Luca Araiza is a 44 year old male with prior stage IIIB cecal adenocarcinoma  (diagnosed 11/2022) s/p surgical resection and 6 months of adjuvant FOLFIRINOX (completed 9/2023) as part of a clinical trial who presented in spring 2024 with concern for liver-only recurrence of cancer  in setting of recent (1/2024) positive ct-DNA.     S/p liver directed therapy in 5/2024 at [facility].    Lung recurrence in 1/2025 and since then on [facility] trial with Onvansertib in Combination With FOLFIRI and Bevacizumab.    Clinical check in today.  We discussed several aspects of care.    He will speak to [facility] team re: possibility of a chemo break over the  summer, after scans/ decision on main lung lesion next week.        I spent a total of 35 minutes on the date of service including preparation time (e.g., review of records and interpretation of tests), visit time with the patient and care partners, requesting interventions, communicating with other health care professionals, and documentation.        Hernandez Romero M.D.   of Medicine  Division of Hematology, Oncology and Transplantation  [facility]       Again, thank you for allowing me to participate in the care of your patient.        Sincerely,        Hernandez Romero MD    Electronically signed

## 2025-04-22 NOTE — PROGRESS NOTES
Virtual Visit Details    Type of service:  Video Visit     Originating Location (pt. Location): Home  Distant Location (provider location):  On-site  Platform used for Video Visit: [facility]  Medical Oncology Return Patient Visit Note    Patient name: Luca Araiza  YOB: 1980      Oncologic History:    Initial Diagnosis: Stage IIIB ([id number]) Colon Adenocarcinoma of ascending colon (diag 11/2022), non-obstructive, pMMR, HER2 low (2+ by IHC), grade 2, LVI present (small vessel), PNI present, 3/33 LN positive, initial CEA 2.6, surgical resection with R0 resection   Prior treatment(s):   1) Surgical resection with laparoscopic right logan-colectomy with removal of terminal ileum and appendix, partial ometectomy with R0 resection (Dr. 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  2) 12/2024: new lung nodules-- started clinical trial at [place] in 1/2025    Onvansertib in Combination With FOLFIRI and Bevacizumab Versus FOLFIRI and Bevacizumab for Second Line Treatment of Metastatic Colorectal Cancer in Participants With a Anika Rat Sarcoma Virus Gene (KRAS) or Neuroblastoma-DAILY (NRAS) ([id number])  Treatment Protocol:  [facility] CRDF-004 ( Onvansertib / FOLFIRI ( Fluorouracil / Leucovorin /  Irinotecan ) / Bevacizumab )    Molecular:  NGS panel on colonic resection sample ([id number] A5 and [id number] A5): KRAS G12D c.35G>A  VAF 26.6%, PIK3CA Q546K c.1636C>A VAF 10.6%; TMB-low    Molecular panel of 5/2024 liver specimen: APC E902 mutation, APX P7592st, ARID1A, KRAS G12D, PIK3CA Q546K, TP53 mutations, TMB 3, SHILOH        Care Team  Medical oncologist: Diomedes Hall MD (Med Onc [facility]), Hernandez Romero MD ([facility])  Surgical oncologist:  Nahum Contreras MD (Colorectal Surgery, [facility]); Nancy Guerra MD ([facility] Hepatobiliary Surgery, scheduled to see May 7)     Other members of care team: Diana Wade DO (PCP)   Primary caregiver at home/ contact:  wife Alva Araiza at [phone]     Reason for consultation/ patient visit:  Management of Recurrent colorectal cancer    History of presenting illness:  Mr. Luca Araiza is a 44 year old male with previously resected Stage III Colon Cancer s/p resection and adjuvant treatment who now presents with suspected live recurrence of colon cancer. His oncologic history is summarized below:    08/2022: GI illness during [place] vacation with significant nausea, diarrhea. Subsequent physical with PCP, labs showing iron deficiency anemia.   11/03/222 : Colonoscopy showing cecal mass, biopsy proven moderately differentiated adenocarcinoma. Staging CT CAP (11/3) and MRI Abdomen (11/10/22) negative for metastatic disease. T2-hyperintense cysts noted in liver.   12/21/2022: Surgical resection with ascending colectomy, removal of appendix and terminal ileum, pT3N1b pathologic staging, LVI present (small vessel), PNI present, 3/33 LN positive, R0 resection, pMMR, HER2 2+ (by IHC);   03/14/23 -  Enrolled in  clinical trial, under supervision of med onc Dr. Diomedes Hall, ct-DNA test positive. Randomized to mFOLFIRINOX arm.   03/23/23: Germline genetic testing ([employer] Common Hereditary Cancers panel, 47 genes) negative for known,  heritable mutations.   03/14/23 - 08/15/23: completed 12 cycles of adjuvant mFOLFRINOX, tolerated well except for grade 1 peripheral neuropathy.   01/15/24: 1-year surveillance colonoscopy demonstrating two polyps (tubular adenomas), no concern for luminal recurrence.   01/16/24: surveillance ct-DNA testing positive;   03/22/24 : CT CAP showing two new low-attenutation lesions in liver measuring  up to 12mm (in segment VI) and in segement Reji. No other evidence of metastatic disease in chest, lungs, abdomen or pelvis.   03/25/24: MRI Liver showing two enhancing lesions in hepatic semgents Reji and V corresponding to lesions on prior CT.   04/04/24 : US Guided Liver Biopsy : per histopath report, limited evaluation due to paucicellular sample, with findings suspicious for malignancy but not diagnostic. Rare atypical cell clusters were noted in touch prep and on H&E-stained sections.   4/22/2024: transferred care to [facility].  5/7/2024: CT CAP at [facility] with liver lesions seen, no LAD. CEA 3.2. Met Dr. Nancy Guerra at [place] on 5/8/2024. On 5/14/2024, underwent, liver segmentectomy - segment 4B, nonanatomic liver wedge resection x2 - segment 2, segment 6, cholecystectomy, ultrasound-guided microwave ablation of lesion in liver segment 7.   Liver, segment II, resection:  Metastatic adenocarcinoma, forming a 2.4 x 2 x 1 cm nodule.  Resection margin negative for tumor.   Liver, segment IVB, resection: Metastatic  adenocarcinoma approaching the resection margin.   Liver, segment VI mass, resection:  Metastatic  adenocarcinoma, forming a 1.4 cm nodule.  The resection margin is negative for tumor, by 1 mm.   Liver, segment VI margin, excision:  Negative for tumor.   6/6/2024: CT CAP with DAISY, c/b infection treated with PO abx and drain  7/2024- tumor informed ctDNA neg  9/2024: scans at [place] with DAISY  12/2024 scans at [place] with pulm nodules  1/2025- started trial at [place]: Onvansertib in Combination With FOLFIRI and  Bevacizumab Versus FOLFIRI and Bevacizumab for Second Line Treatment of Metastatic Colorectal Cancer in Participants With a Anika Rat Sarcoma Virus Gene (KRAS) or Neuroblastoma-DAILY (NRAS) ([id number])  Treatment Protocol:  [facility] CRDF-004 ( Onvansertib / FOLFIRI ( Fluorouracil / Leucovorin / Irinotecan ) / Bevacizumab )        Interval history:  Video visit  Fatigue from chemo        Physical examination:    ECOG performance status:  0-1  Vascular access:  right chest wall port     General: patient appears well in no acute distress, alert and oriented, speech clear and fluid  Skin: no visualized rash or lesions on visualized skin  Resp: Appears to be breathing comfortably without accessory muscle usage, speaking in full sentences, no audible wheezes or cough.  Psych: Coherent speech, normal rate and volume, able to articulate logical thoughts, able to abstract reason, no tangential thoughts, no hallucinations or delusions.        Lab and imaging data:  I personally reviewed the CT CAP from 3/2025 at [place] with responding lung lesion      Assessment and Plan:  Mr. Luca Araiza is a 44 year old male with prior stage IIIB cecal adenocarcinoma  (diagnosed 11/2022) s/p surgical resection and 6 months of adjuvant FOLFIRINOX (completed 9/2023) as part of a clinical trial who presented in spring 2024 with concern for liver-only recurrence of cancer  in setting of recent (1/2024) positive ct-DNA.     S/p liver directed therapy in 5/2024 at [place].    Lung recurrence in 1/2025 and since then on [place] trial with Onvansertib in Combination With FOLFIRI and Bevacizumab.    Clinical check in today.  We discussed several aspects of care.    He will speak to [place] team re: possibility of a chemo break over the summer, after scans/ decision on main lung lesion next week.        I spent a total of 35 minutes on the date of service including preparation time (e.g., review of records and interpretation of tests), visit time with the patient  and care partners, requesting interventions, communicating with other health care professionals, and documentation.        Hernandez Romero M.D.   of Medicine  Division of Hematology, Oncology and Transplantation  [facility]

## 2025-04-22 NOTE — NURSING NOTE
Current patient location: [address] [place] [address] N  [facility] [id number]    Is the patient currently in the state of [place]? YES    Visit mode: VIDEO    If the visit is dropped, the patient can be reconnected by:VIDEO VISIT: Text to cell phone:   Telephone Information:   Mobile [phone]       Will anyone else be joining the visit? NO  (If patient encounters technical issues they should call [phone]    Are changes needed to the allergy or medication list? Pt stated no changes to allergies and Pt stated no med changes    Are refills needed on medications prescribed by this physician? NO    Rooming Documentation:  Questionnaire(s) completed    Reason for visit: RECHECK    Zora GARCIA

## (undated) DEVICE — STPL ENDO LINEAR CUT ECHELON GST 45MM COMPACT PCEE45A

## (undated) DEVICE — TUBING SMOKE EVAC PNEUMOCLEAR HIGH FLOW 0620050250

## (undated) DEVICE — SYRINGE EAR/ULCER STERILE 2 OZ BULB 4172

## (undated) DEVICE — ENDO TROCAR SLEEVE KII ADV FIXATION 05X100MM CFS02

## (undated) DEVICE — DEVICE SUTURE PASSER 14GA WECK EFX EFXSP2

## (undated) DEVICE — LINEN TOWEL PACK X30 5481

## (undated) DEVICE — DRAPE IOBAN INCISE 23X17" 6650EZ

## (undated) DEVICE — SU PDS II 0 CT-1 27" Z340H

## (undated) DEVICE — TUBING SUCTION 10'X3/16" N510

## (undated) DEVICE — SUCTION TIP YANKAUER W/O VENT K86

## (undated) DEVICE — LIGHT HANDLE X1 31140133

## (undated) DEVICE — SU MONOCRYL 4-0 PS-2 27" UND Y426H

## (undated) DEVICE — DRAPE LEGGINGS CLEAR 8430

## (undated) DEVICE — SYSTEM LAPAROVUE VISIBILITY LAPVUE10

## (undated) DEVICE — SU SILK 3-0 TIE 12X30" A304H

## (undated) DEVICE — STPL LINEAR CUT 75MM TLC75

## (undated) DEVICE — SU VICRYL 0 UR-6 27" J603H

## (undated) DEVICE — SYR BULB IRRIG DOVER 60 ML LATEX FREE 67000

## (undated) DEVICE — Device

## (undated) DEVICE — SPONGE LAP 18X18" X8435

## (undated) DEVICE — STPL LINEAR 90 X 3.5MM TA9035S

## (undated) DEVICE — LINEN TOWEL PACK X6 WHITE 5487

## (undated) DEVICE — ENDO TROCAR FIRST ENTRY KII FIOS ADV FIX 05X100MM CFF03

## (undated) DEVICE — SUCTION IRR STRYKERFLOW II W/TIP 250-070-520

## (undated) DEVICE — ESU PENCIL W/COATED BLADE E2450H

## (undated) DEVICE — CATH TRAY FOLEY SURESTEP 16FR W/URNE MTR STLK LATEX A303316A

## (undated) DEVICE — SOL WATER IRRIG 3000ML BAG 2B7117

## (undated) DEVICE — ESU LIGASURE MARYLAND LAPAROSCOPIC SLR/DVDR 5MMX37CM LF1937

## (undated) DEVICE — ADH SKIN CLOSURE PREMIERPRO EXOFIN 1.0ML 3470

## (undated) DEVICE — DRAPE U SPLIT 74X120" 29440

## (undated) DEVICE — SUCTION TIP POOLE K770

## (undated) DEVICE — SU PDS II 3-0 SH 27" Z316H

## (undated) DEVICE — SU SILK 2-0 TIE 12X30" A305H

## (undated) DEVICE — COVER CAMERA IN-LIGHT DISP LT-C02

## (undated) DEVICE — ENDO TROCAR BLUNT TIP KII BALLOON 12X100MM C0R47

## (undated) RX ORDER — HEPARIN SODIUM (PORCINE) LOCK FLUSH IV SOLN 100 UNIT/ML 100 UNIT/ML
SOLUTION INTRAVENOUS
Status: DISPENSED
Start: 2025-01-06

## (undated) RX ORDER — HYDROMORPHONE HYDROCHLORIDE 1 MG/ML
INJECTION, SOLUTION INTRAMUSCULAR; INTRAVENOUS; SUBCUTANEOUS
Status: DISPENSED
Start: 2022-12-21

## (undated) RX ORDER — HEPARIN SODIUM (PORCINE) LOCK FLUSH IV SOLN 100 UNIT/ML 100 UNIT/ML
SOLUTION INTRAVENOUS
Status: DISPENSED
Start: 2024-03-25

## (undated) RX ORDER — METRONIDAZOLE 500 MG/100ML
INJECTION, SOLUTION INTRAVENOUS
Status: DISPENSED
Start: 2022-12-21

## (undated) RX ORDER — HEPARIN SODIUM (PORCINE) LOCK FLUSH IV SOLN 100 UNIT/ML 100 UNIT/ML
SOLUTION INTRAVENOUS
Status: DISPENSED
Start: 2024-04-01

## (undated) RX ORDER — ONDANSETRON 2 MG/ML
INJECTION INTRAMUSCULAR; INTRAVENOUS
Status: DISPENSED
Start: 2022-12-21

## (undated) RX ORDER — FENTANYL CITRATE 50 UG/ML
INJECTION, SOLUTION INTRAMUSCULAR; INTRAVENOUS
Status: DISPENSED
Start: 2023-03-09

## (undated) RX ORDER — ENOXAPARIN SODIUM 100 MG/ML
INJECTION SUBCUTANEOUS
Status: DISPENSED
Start: 2022-12-21

## (undated) RX ORDER — FENTANYL CITRATE 50 UG/ML
INJECTION, SOLUTION INTRAMUSCULAR; INTRAVENOUS
Status: DISPENSED
Start: 2022-12-21

## (undated) RX ORDER — FENTANYL CITRATE 50 UG/ML
INJECTION, SOLUTION INTRAMUSCULAR; INTRAVENOUS
Status: DISPENSED
Start: 2024-01-15

## (undated) RX ORDER — OXYCODONE HYDROCHLORIDE 5 MG/1
TABLET ORAL
Status: DISPENSED
Start: 2022-12-21

## (undated) RX ORDER — FENTANYL CITRATE 50 UG/ML
INJECTION, SOLUTION INTRAMUSCULAR; INTRAVENOUS
Status: DISPENSED
Start: 2025-01-06

## (undated) RX ORDER — LABETALOL HYDROCHLORIDE 5 MG/ML
INJECTION, SOLUTION INTRAVENOUS
Status: DISPENSED
Start: 2022-12-21

## (undated) RX ORDER — HEPARIN SODIUM (PORCINE) LOCK FLUSH IV SOLN 100 UNIT/ML 100 UNIT/ML
SOLUTION INTRAVENOUS
Status: DISPENSED
Start: 2023-09-08

## (undated) RX ORDER — LIDOCAINE HYDROCHLORIDE AND EPINEPHRINE 10; 10 MG/ML; UG/ML
INJECTION, SOLUTION INFILTRATION; PERINEURAL
Status: DISPENSED
Start: 2023-03-09

## (undated) RX ORDER — HEPARIN SODIUM (PORCINE) LOCK FLUSH IV SOLN 100 UNIT/ML 100 UNIT/ML
SOLUTION INTRAVENOUS
Status: DISPENSED
Start: 2023-03-09

## (undated) RX ORDER — CEFAZOLIN SODIUM/WATER 2 G/20 ML
SYRINGE (ML) INTRAVENOUS
Status: DISPENSED
Start: 2023-03-09

## (undated) RX ORDER — CEFAZOLIN SODIUM/WATER 2 G/20 ML
SYRINGE (ML) INTRAVENOUS
Status: DISPENSED
Start: 2022-12-21

## (undated) RX ORDER — FENTANYL CITRATE 50 UG/ML
INJECTION, SOLUTION INTRAMUSCULAR; INTRAVENOUS
Status: DISPENSED
Start: 2024-04-01

## (undated) RX ORDER — HEPARIN SODIUM (PORCINE) LOCK FLUSH IV SOLN 100 UNIT/ML 100 UNIT/ML
SOLUTION INTRAVENOUS
Status: DISPENSED
Start: 2024-03-22

## (undated) RX ORDER — GABAPENTIN 300 MG/1
CAPSULE ORAL
Status: DISPENSED
Start: 2022-12-21

## (undated) RX ORDER — ACETAMINOPHEN 325 MG/1
TABLET ORAL
Status: DISPENSED
Start: 2022-12-21